# Patient Record
Sex: MALE | Race: BLACK OR AFRICAN AMERICAN | NOT HISPANIC OR LATINO | Employment: FULL TIME | URBAN - METROPOLITAN AREA
[De-identification: names, ages, dates, MRNs, and addresses within clinical notes are randomized per-mention and may not be internally consistent; named-entity substitution may affect disease eponyms.]

---

## 2017-01-13 ENCOUNTER — OFFICE VISIT (OUTPATIENT)
Dept: OPHTHALMOLOGY | Facility: CLINIC | Age: 52
End: 2017-01-13
Payer: COMMERCIAL

## 2017-01-13 DIAGNOSIS — I10 ESSENTIAL HYPERTENSION: ICD-10-CM

## 2017-01-13 DIAGNOSIS — H26.491 PCO (POSTERIOR CAPSULAR OPACIFICATION), RIGHT: ICD-10-CM

## 2017-01-13 DIAGNOSIS — Z96.1 PSEUDOPHAKIA: ICD-10-CM

## 2017-01-13 DIAGNOSIS — E11.9 DM TYPE 2 WITHOUT RETINOPATHY: ICD-10-CM

## 2017-01-13 DIAGNOSIS — H25.12 NUCLEAR SCLEROSIS, LEFT: Primary | ICD-10-CM

## 2017-01-13 DIAGNOSIS — H52.7 REFRACTIVE ERROR: ICD-10-CM

## 2017-01-13 PROCEDURE — 99999 PR PBB SHADOW E&M-EST. PATIENT-LVL III: CPT | Mod: PBBFAC,,, | Performed by: OPHTHALMOLOGY

## 2017-01-13 PROCEDURE — 92014 COMPRE OPH EXAM EST PT 1/>: CPT | Mod: S$GLB,,, | Performed by: OPHTHALMOLOGY

## 2017-01-13 NOTE — MR AVS SNAPSHOT
[facility]  [address]  [place] [address]  Phone: [phone]  Fax: [phone]                  David Corona Jr.   2017 3:00 PM   Office Visit    Description:  Male : 1965   Provider:  Festus Mata MD   Department:  [facility]           Reason for Visit     Diabetic Eye Exam           Diagnoses this Visit        Comments    Nuclear sclerosis, left    -  Primary     PCO (posterior capsular opacification), right         DM type 2 without retinopathy         Essential hypertension         Refractive error         Pseudophakia                To Do List           Future Appointments        Provider Department Dept Phone    2017 1:20 PM Riddhi Galindo MD [facility] Medicine [phone]      Goals (5 Years of Data)              11/18/16    7/15/16    3/24/16    HEMOGLOBIN A1C < 7.0   7.5  8.4  8.1    Related Problems    Uncontrolled type 2 diabetes with stage 3 chronic kidney disease GFR 30-59      Follow-Up and Disposition     Return in about 6 months (around 2017) for cataract check OD..      [facility]     [facility] On [facility] Nurse Care Line -  Assistance  Registered nurses in the [facility] Center provide clinical advisement, health education, appointment booking, and other advisory services.  Call for this free service at [phone].             Medications           Message regarding Medications     Verify the changes and/or additions to your medication regime listed below are the same as discussed with your clinician today.  If any of these changes or additions are incorrect, please notify your healthcare provider.             Verify that the below list of medications is an accurate representation of the medications you are currently taking.  If none reported, the list may be blank. If incorrect, please contact your healthcare provider. Carry this list with you in case of emergency.           Current Medications   "   amlodipine (NORVASC) 5 MG tablet Take 1 tablet (5 mg total) by mouth once daily.    aspirin 325 MG tablet Take by mouth. 1 Tablet Oral Every day    blood sugar diagnostic (CONTOUR TEST STRIPS) Strp USE THREE TIMES DAILY    colchicine 0.6 mg tablet Take 1 tablet (0.6 mg total) by mouth once daily.    diltiaZEM (CARDIZEM) 120 MG tablet Take 1 tablet (120 mg total) by mouth once daily.    furosemide (LASIX) 40 MG tablet Take 1 tablet (40 mg total) by mouth 2 (two) times daily.    glimepiride (AMARYL) 2 MG tablet Take 2 tablets (4 mg total) by mouth 2 (two) times daily.    insulin aspart (NOVOLOG FLEXPEN) 100 unit/mL InPn pen Inject 15 Units into the skin 3 (three) times daily with meals.    insulin glargine (LANTUS SOLOSTAR) 100 unit/mL (3 mL) InPn pen INJECT 26 UNITS UNDER THE SKIN NIGHTLY    lancets (MICROLET LANCET) Misc TEST THREE TIMES DAILY    lisinopril (PRINIVIL,ZESTRIL) 40 MG tablet Take 1 tablet (40 mg total) by mouth 2 (two) times daily.    omega-3 fatty acids 1,000 mg Cap Take by mouth. 2 Capsule Oral Every day    pen needle, diabetic (NOVOFINE 32) 32 gauge x 1/4" Ndle USE FOUR TIMES DAILY AS NEEDED    pioglitazone (ACTOS) 45 MG tablet Take 1 tablet (45 mg total) by mouth once daily.    pravastatin (PRAVACHOL) 20 MG tablet Take 1 tablet (20 mg total) by mouth once daily.           Clinical Reference Information           Allergies as of 1/13/2017     No Known Allergies      Immunizations Administered on Date of Encounter - 1/13/2017     None      "

## 2017-01-14 NOTE — PROGRESS NOTES
Subjective:       Patient ID: David Corona Jr. is a 51 y.o. male.    Chief Complaint: Diabetic Eye Exam    HPI  Review of Systems    Objective:      Physical Exam    Assessment:       1. Nuclear sclerosis, left    2. PCO (posterior capsular opacification), right    3. DM type 2 without retinopathy    4. Essential hypertension    5. Refractive error - Both Eyes    6. Pseudophakia - Right Eye        Plan:       Cataract OS- Not visually significant per Pt.     Early PCO OD- Not Visually Significant.  DM-No NPDR OU.  HTN-No retinopathy OU.  RE-Pt wants MRx.      Control DM & HTN.  Give MRx.  RTC 6 mos for cataract check.

## 2017-01-27 ENCOUNTER — OFFICE VISIT (OUTPATIENT)
Dept: FAMILY MEDICINE | Facility: CLINIC | Age: 52
End: 2017-01-27
Payer: COMMERCIAL

## 2017-01-27 VITALS
BODY MASS INDEX: 40.43 KG/M2 | HEART RATE: 100 BPM | WEIGHT: 315 LBS | HEIGHT: 74 IN | TEMPERATURE: 98 F | OXYGEN SATURATION: 96 % | SYSTOLIC BLOOD PRESSURE: 136 MMHG | DIASTOLIC BLOOD PRESSURE: 84 MMHG

## 2017-01-27 DIAGNOSIS — Z23 FLU VACCINE NEED: ICD-10-CM

## 2017-01-27 DIAGNOSIS — G47.33 OSA (OBSTRUCTIVE SLEEP APNEA): ICD-10-CM

## 2017-01-27 DIAGNOSIS — N25.81 SECONDARY HYPERPARATHYROIDISM OF RENAL ORIGIN: ICD-10-CM

## 2017-01-27 DIAGNOSIS — I10 ESSENTIAL HYPERTENSION: ICD-10-CM

## 2017-01-27 DIAGNOSIS — E66.01 MORBID OBESITY WITH BMI OF 50.0-59.9, ADULT: ICD-10-CM

## 2017-01-27 DIAGNOSIS — E78.5 HYPERLIPIDEMIA LDL GOAL <100: ICD-10-CM

## 2017-01-27 PROBLEM — E11.9 DM TYPE 2 WITHOUT RETINOPATHY: Status: RESOLVED | Noted: 2017-01-13 | Resolved: 2017-01-27

## 2017-01-27 PROCEDURE — 3045F PR MOST RECENT HEMOGLOBIN A1C LEVEL 7.0-9.0%: CPT | Mod: S$GLB,,, | Performed by: INTERNAL MEDICINE

## 2017-01-27 PROCEDURE — 1159F MED LIST DOCD IN RCRD: CPT | Mod: S$GLB,,, | Performed by: INTERNAL MEDICINE

## 2017-01-27 PROCEDURE — 99214 OFFICE O/P EST MOD 30 MIN: CPT | Mod: 25,S$GLB,, | Performed by: INTERNAL MEDICINE

## 2017-01-27 PROCEDURE — 3075F SYST BP GE 130 - 139MM HG: CPT | Mod: S$GLB,,, | Performed by: INTERNAL MEDICINE

## 2017-01-27 PROCEDURE — 90471 IMMUNIZATION ADMIN: CPT | Mod: S$GLB,,, | Performed by: INTERNAL MEDICINE

## 2017-01-27 PROCEDURE — 99999 PR PBB SHADOW E&M-EST. PATIENT-LVL III: CPT | Mod: PBBFAC,,, | Performed by: INTERNAL MEDICINE

## 2017-01-27 PROCEDURE — 3079F DIAST BP 80-89 MM HG: CPT | Mod: S$GLB,,, | Performed by: INTERNAL MEDICINE

## 2017-01-27 PROCEDURE — 3066F NEPHROPATHY DOC TX: CPT | Mod: S$GLB,,, | Performed by: INTERNAL MEDICINE

## 2017-01-27 PROCEDURE — 90686 IIV4 VACC NO PRSV 0.5 ML IM: CPT | Mod: S$GLB,,, | Performed by: INTERNAL MEDICINE

## 2017-01-27 RX ORDER — INSULIN LISPRO 100 [IU]/ML
INJECTION, SOLUTION INTRAVENOUS; SUBCUTANEOUS
Qty: 15 ML | Refills: 1 | Status: SHIPPED | OUTPATIENT
Start: 2017-01-27 | End: 2017-04-04 | Stop reason: SDUPTHER

## 2017-01-27 RX ORDER — INSULIN ASPART 100 [IU]/ML
15 INJECTION, SOLUTION INTRAVENOUS; SUBCUTANEOUS
Qty: 15 ML | Refills: 5 | Status: CANCELLED | OUTPATIENT
Start: 2017-01-27

## 2017-01-27 RX ORDER — DILTIAZEM HYDROCHLORIDE 120 MG/1
120 TABLET, FILM COATED ORAL DAILY
Qty: 30 TABLET | Refills: 5 | Status: SHIPPED | OUTPATIENT
Start: 2017-01-27 | End: 2017-08-18 | Stop reason: SDUPTHER

## 2017-01-27 NOTE — TELEPHONE ENCOUNTER
Novolog is not covered with insurance, will cover Humalog 100u/ml quick pen 15ml for 30 days supply @$20.00.

## 2017-01-27 NOTE — PROGRESS NOTES
HISTORY OF PRESENT ILLNESS:  David Corona Jr. is a 51 y.o. male who presents to the clinic today for Diabetes and Chronic Kidney Disease  .  The patient presents to clinic today for follow-up of his type 2 diabetes mellitus complicated by chronic kidney disease stage III, neuropathy, and retinopathy, as well as hypertension and hyperlipidemia.  He states his blood pressures are controlled at home.  He states his blood sugars at home range from 120-150.  He has fair dietary habits.  He stays active, but does not exercise regularly.  He denies any significant worsening of his lower extremity edema.  He denies cardiac chest pain or shortness of breath.  He reports fair compliance with his CPAP machine.  He is overdue for follow-up with nephrology.  He continues to struggle with trying to lose weight.      PAST MEDICAL HISTORY:  Past Medical History   Diagnosis Date    Arthritis     Cataract      left eye     CKD (chronic kidney disease) stage 3, GFR 30-59 ml/min     Colon polyp 11/2016    Dependent edema     Diverticulosis     Gout, arthritis     Hyperlipidemia LDL goal <100     Hypertension     Morbid obesity     TOSIN (obstructive sleep apnea)     Peripheral autonomic neuropathy in disorders classified elsewhere(337.1)     Proteinuria     Type II or unspecified type diabetes mellitus with neurological manifestations, uncontrolled     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        PAST SURGICAL HISTORY:  Past Surgical History   Procedure Laterality Date    Cataract surgery  2007     right eye    Colonoscopy N/A 11/22/2016     Procedure: COLONOSCOPY;  Surgeon: Abdi La MD;  Location: [facility] ([address] [place] [address]);  Service: Endoscopy;  Laterality: N/A;  2nd floor case/BMI 59.13/wants week of Thanksgiving       SOCIAL HISTORY:  Social History     Social History     Marital status:      Spouse name: N/A    Number of children: 3    Years of education: N/A     Occupational History     [employer]          Social History Main Topics    Smoking status: Never Smoker    Smokeless tobacco: Never Used    Alcohol use 0.0 oz/week     0 Standard drinks or equivalent per week      Comment: rarely    Drug use: No    Sexual activity: Not on file     Other Topics Concern    Not on file     Social History Narrative       FAMILY HISTORY:  Family History   Problem Relation Age of Onset    Breast cancer Mother     Diabetes Mother     Hypertension Mother     Stroke Mother     Prostate cancer Father     Diabetes Father     Hypertension Father     No Known Problems Sister     No Known Problems Brother     Breast cancer Maternal Grandmother     Amblyopia Neg Hx     Blindness Neg Hx     Cataracts Neg Hx     Glaucoma Neg Hx     Macular degeneration Neg Hx     Retinal detachment Neg Hx     Strabismus Neg Hx        ALLERGIES AND MEDICATIONS: updated and reviewed.  Review of patient's allergies indicates:  No Known Allergies  Medication List with Changes/Refills   Current Medications    AMLODIPINE (NORVASC) 5 MG TABLET    Take 1 tablet (5 mg total) by mouth once daily.    ASPIRIN 325 MG TABLET    Take by mouth. 1 Tablet Oral Every day    BLOOD SUGAR DIAGNOSTIC (CONTOUR TEST STRIPS) STRP    USE THREE TIMES DAILY    COLCHICINE 0.6 MG TABLET    Take 1 tablet (0.6 mg total) by mouth once daily.    FUROSEMIDE (LASIX) 40 MG TABLET    Take 1 tablet (40 mg total) by mouth 2 (two) times daily.    GLIMEPIRIDE (AMARYL) 2 MG TABLET    Take 2 tablets (4 mg total) by mouth 2 (two) times daily.    INSULIN ASPART (NOVOLOG FLEXPEN) 100 UNIT/ML INPN PEN    Inject 15 Units into the skin 3 (three) times daily with meals.    INSULIN GLARGINE (LANTUS SOLOSTAR) 100 UNIT/ML (3 ML) INPN PEN    INJECT 26 UNITS UNDER THE SKIN NIGHTLY    LANCETS  "(MICROLET LANCET) MISC    TEST THREE TIMES DAILY    LISINOPRIL (PRINIVIL,ZESTRIL) 40 MG TABLET    Take 1 tablet (40 mg total) by mouth 2 (two) times daily.    OMEGA-3 FATTY ACIDS 1,000 MG CAP    Take by mouth. 2 Capsule Oral Every day    PEN NEEDLE, DIABETIC (NOVOFINE 32) 32 GAUGE X 1/4" NDLE    USE FOUR TIMES DAILY AS NEEDED    PIOGLITAZONE (ACTOS) 45 MG TABLET    Take 1 tablet (45 mg total) by mouth once daily.    PRAVASTATIN (PRAVACHOL) 20 MG TABLET    Take 1 tablet (20 mg total) by mouth once daily.   Changed and/or Refilled Medications    Modified Medication Previous Medication    DILTIAZEM (CARDIZEM) 120 MG TABLET diltiaZEM (CARDIZEM) 120 MG tablet       Take 1 tablet (120 mg total) by mouth once daily.    Take 1 tablet (120 mg total) by mouth once daily.            REVIEW OF SYSTEMS:  General ROS: negative for - chills, fever or malaise  Psychological ROS: negative for - anxiety, depression or suicidal ideation  Ophthalmic ROS: negative for - blurry vision or eye pain  ENT ROS: negative for - epistaxis, oral lesions or sore throat  Allergy and Immunology ROS: negative for - hives  Hematological and Lymphatic ROS: negative for - swollen lymph nodes  Endocrine ROS: negative for - polydipsia/polyuria or temperature intolerance  Respiratory ROS: no cough, shortness of breath, or wheezing  Cardiovascular ROS: no chest pain or dyspnea on exertion  Gastrointestinal ROS: no abdominal pain, change in bowel habits, or black or bloody stools  Dermatological ROS: negative for mole changes  Musculoskeletal ROS: negative for - gait disturbance or joint swelling  Neurological ROS: no TIA or stroke symptoms  Genito-Urinary ROS: no dysuria, trouble voiding, or hematuria        PHYSICAL EXAM:  Vitals:    01/27/17 1416   BP: 136/84   Pulse: 100   Temp: 98.2 °F (36.8 °C)     Weight: (!) 205 kg (451 lb 15.1 oz)   Height: 6' 2" (188 cm)   Body mass index is 58.03 kg/(m^2).    Physical Examination: General appearance - alert, well " appearing, and in no distress and morbidly obese  Mental status - alert, oriented to person, place, and time, normal mood, behavior, speech, dress, motor activity, and thought processes  Eyes - pupils equal and reactive, extraocular eye movements intact, sclera anicteric  Mouth - mucous membranes moist, pharynx normal without lesions  Neck - supple, no significant adenopathy, carotids upstroke normal bilaterally, no bruits  Lymphatics - no palpable lymphadenopathy  Chest - clear to auscultation, no wheezes, rales or rhonchi, symmetric air entry  Heart - normal rate and regular rhythm, no gallops noted  Neurological - alert, oriented, normal speech, no focal findings or movement disorder noted, cranial nerves II through XII intact  Musculoskeletal - no muscular tenderness noted, Mild-Moderate osteoarthritic changes noted to both knee joints. No joint effusions noted.   Extremities - pedal edema 1 +  Skin - normal coloration and turgor, no rashes, no suspicious skin lesions noted       ASSESSMENT AND PLAN:  1. Uncontrolled type 2 diabetes mellitus with stage 3 chronic kidney disease, with long-term current use of insulin/2. Type 2 diabetes mellitus, uncontrolled, with neuropathy/3. Uncontrolled type 2 diabetes mellitus with right eye affected by mild nonproliferative retinopathy and macular edema, with long-term current use of insulin  The last time we checked his A1c it was less than 8.  We discussed diabetic diet and regular exercise.  Continue current regimen for now.  I will check blood work and address results accordingly.  He is up-to-date on his eye exam.  Neuropathy pain controlled. Stable kidney function. Observe. Patient counseled to avoid/minimize the use of anti-inflammatory  medication.   - Hemoglobin A1c; Future  - Microalbumin/creatinine urine ratio; Future    4. Essential hypertension  Discussed sodium restriction, maintaining ideal body weight and regular exercise program as physiologic means to  achieve blood pressure control. The patient will strive towards this. The current medical regimen is effective;  continue present plan and medications. Recommended patient to check home readings to monitor and see me for followup as scheduled or sooner as needed. Patient was educated that both decongestant and anti-inflammatory medication may raise blood pressure.   I do question the fact that he is on both low dose diltiazem and amlodipine.  I asked him to discuss this with his nephrologist once she schedules an appointment.  - diltiaZEM (CARDIZEM) 120 MG tablet; Take 1 tablet (120 mg total) by mouth once daily.  Dispense: 30 tablet; Refill: 5  - CBC auto differential; Future    5. Hyperlipidemia LDL goal <100  We discussed low fat diet and regular exercise.The current medical regimen is effective;  continue present plan and medications.    - Comprehensive metabolic panel; Future  - Lipid panel; Future    6. TOSIN (obstructive sleep apnea)  We discussed the potential ramifications of untreated sleep apnea, which could include daytime sleepiness, hypertension, heart disease including CHF, sudden death while sleeping and/or stroke. The patient was advised to abstain from driving should they feel sleepy or drowsy.  We discussed potential treatment options, which could include weight loss, body positioning, continuous positive airway pressure (CPAP), or referral for surgical consideration.      7. Secondary hyperparathyroidism of renal origin  Stable. Asymptomatic. Observe.     8. Morbid obesity with BMI of 50.0-59.9, adult  The patient is asked to make an attempt to improve diet and exercise patterns to aid in medical management of this problem.   He declines referral for bariatric surgery.    9. Flu vaccine need    - Influenza - Quadrivalent (3 years & older) (PF)          Return in about 6 months (around 7/27/2017), or if symptoms worsen or fail to improve, for annual exam. or sooner as needed.

## 2017-01-27 NOTE — MR AVS SNAPSHOT
[facility]  [address] [place] [address]  Phone: [phone]  Fax: [phone]                  David Corona    2017 2:20 PM   Office Visit    Description:  Male : 1965   Provider:  Riddhi Galindo MD   Department:  [facility] Medicine           Reason for Visit     Diabetes     Chronic Kidney Disease           Diagnoses this Visit        Comments    Uncontrolled type 2 diabetes mellitus with stage 3 chronic kidney disease, with long-term current use of insulin    -  Primary     Type 2 diabetes mellitus, uncontrolled, with neuropathy         Uncontrolled type 2 diabetes mellitus with 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1    Related Problems    Uncontrolled type 2 diabetes with stage 3 chronic kidney disease GFR 30-59      Follow-Up and Disposition     Return in about 6 months (around 2017), or if symptoms worsen or fail to improve, for annual exam.       These Medications        Disp Refills Start End    diltiaZEM (CARDIZEM) 120 MG tablet 30 tablet 5 2017     Take 1 tablet (120 mg total) by mouth once daily. - Oral    Pharmacy: [facility] [address] [place] [address] [place] - [address] [place] [address] [place] AT [facility] Ph #: [phone]         [facility] On Call     [facility] On Call Nurse Care Line -  Assistance  Registered nurses in the [facility] On Call  "Center provide clinical advisement, health education, appointment booking, and other advisory services.  Call for this free service at [phone].             Medications           Message regarding Medications     Verify the changes and/or additions to your medication regime listed below are the same as discussed with your clinician today.  If any of these changes or additions are incorrect, please notify your healthcare provider.             Verify that the below list of medications is an accurate representation of the medications you are currently taking.  If none reported, the list may be blank. If incorrect, please contact your healthcare provider. Carry this list with you in case of emergency.           Current Medications     amlodipine (NORVASC) 5 MG tablet Take 1 tablet (5 mg total) by mouth once daily.    aspirin 325 MG tablet Take by mouth. 1 Tablet Oral Every day    blood sugar diagnostic (CONTOUR TEST STRIPS) Strp USE THREE TIMES DAILY    colchicine 0.6 mg tablet Take 1 tablet (0.6 mg total) by mouth once daily.    diltiaZEM (CARDIZEM) 120 MG tablet Take 1 tablet (120 mg total) by mouth once daily.    furosemide (LASIX) 40 MG tablet Take 1 tablet (40 mg total) by mouth 2 (two) times daily.    glimepiride (AMARYL) 2 MG tablet Take 2 tablets (4 mg total) by mouth 2 (two) times daily.    insulin aspart (NOVOLOG FLEXPEN) 100 unit/mL InPn pen Inject 15 Units into the skin 3 (three) times daily with meals.    insulin glargine (LANTUS SOLOSTAR) 100 unit/mL (3 mL) InPn pen INJECT 26 UNITS UNDER THE SKIN NIGHTLY    lancets (MICROLET LANCET) Misc TEST THREE TIMES DAILY    lisinopril (PRINIVIL,ZESTRIL) 40 MG tablet Take 1 tablet (40 mg total) by mouth 2 (two) times daily.    omega-3 fatty acids 1,000 mg Cap Take by mouth. 2 Capsule Oral Every day    pen needle, diabetic (NOVOFINE 32) 32 gauge x 1/4" Ndle USE FOUR TIMES DAILY AS NEEDED    pioglitazone (ACTOS) 45 MG tablet Take 1 tablet (45 mg total) by mouth " "once daily.    pravastatin (PRAVACHOL) 20 MG tablet Take 1 tablet (20 mg total) by mouth once daily.           Clinical Reference Information           Vital Signs - Last Recorded  Most recent update: 1/27/2017  2:17 PM by Bradley Ryan MA    BP Pulse Temp Ht Wt SpO2    136/84 100 98.2 °F (36.8 °C) (Oral) 6' 2" (1.88 m) (!) 205 kg (451 lb 15.1 oz) 96%    BMI                58.03 kg/m2          Blood Pressure          Most Recent Value    BP  136/84      Allergies as of 1/27/2017     No Known Allergies      Immunizations Administered on Date of Encounter - 1/27/2017     Name Date Dose VIS Date Route    influenza - Quadrivalent - PF (ADULT)  Incomplete 0.5 mL 8/7/2015 Intramuscular      Orders Placed During Today's Visit      Normal Orders This Visit    Influenza - Quadrivalent (3 years & older) (PF)     Future Labs/Procedures Expected by Expires    CBC auto differential  1/27/2017 1/27/2018    Comprehensive metabolic panel  1/27/2017 1/27/2018    Hemoglobin A1c  1/27/2017 1/27/2018    Lipid panel  1/27/2017 1/27/2018    Microalbumin/creatinine urine ratio  1/27/2017 1/27/2018      "

## 2017-01-27 NOTE — PROGRESS NOTES
Influenza vaccine administered, madelin well. Instructed to wait 15mins for observation, no reaction noted @ time of discharge.

## 2017-01-31 ENCOUNTER — LAB VISIT (OUTPATIENT)
Dept: LAB | Facility: HOSPITAL | Age: 52
End: 2017-01-31
Attending: INTERNAL MEDICINE
Payer: COMMERCIAL

## 2017-01-31 DIAGNOSIS — E78.5 HYPERLIPIDEMIA LDL GOAL <100: ICD-10-CM

## 2017-01-31 DIAGNOSIS — I10 ESSENTIAL HYPERTENSION: ICD-10-CM

## 2017-01-31 LAB
ALBUMIN SERPL BCP-MCNC: 3.4 G/DL
ALP SERPL-CCNC: 97 U/L
ALT SERPL W/O P-5'-P-CCNC: 6 U/L
ANION GAP SERPL CALC-SCNC: 8 MMOL/L
AST SERPL-CCNC: 22 U/L
BASOPHILS # BLD AUTO: 0.03 K/UL
BASOPHILS NFR BLD: 0.5 %
BILIRUB SERPL-MCNC: 0.4 MG/DL
BUN SERPL-MCNC: 21 MG/DL
CALCIUM SERPL-MCNC: 9 MG/DL
CHLORIDE SERPL-SCNC: 105 MMOL/L
CHOLEST/HDLC SERPL: 2.9 {RATIO}
CO2 SERPL-SCNC: 26 MMOL/L
CREAT SERPL-MCNC: 1.7 MG/DL
DIFFERENTIAL METHOD: ABNORMAL
EOSINOPHIL # BLD AUTO: 0.3 K/UL
EOSINOPHIL NFR BLD: 4.1 %
ERYTHROCYTE [DISTWIDTH] IN BLOOD BY AUTOMATED COUNT: 15.7 %
EST. GFR  (AFRICAN AMERICAN): 53.2 ML/MIN/1.73 M^2
EST. GFR  (NON AFRICAN AMERICAN): 46 ML/MIN/1.73 M^2
GLUCOSE SERPL-MCNC: 132 MG/DL
HCT VFR BLD AUTO: 39.8 %
HDL/CHOLESTEROL RATIO: 34.1 %
HDLC SERPL-MCNC: 126 MG/DL
HDLC SERPL-MCNC: 43 MG/DL
HGB BLD-MCNC: 13 G/DL
LDLC SERPL CALC-MCNC: 72.2 MG/DL
LYMPHOCYTES # BLD AUTO: 1 K/UL
LYMPHOCYTES NFR BLD: 15.2 %
MCH RBC QN AUTO: 30.3 PG
MCHC RBC AUTO-ENTMCNC: 32.7 %
MCV RBC AUTO: 93 FL
MONOCYTES # BLD AUTO: 1.3 K/UL
MONOCYTES NFR BLD: 19.7 %
NEUTROPHILS # BLD AUTO: 4 K/UL
NEUTROPHILS NFR BLD: 60.3 %
NONHDLC SERPL-MCNC: 83 MG/DL
PLATELET # BLD AUTO: 191 K/UL
PMV BLD AUTO: 12 FL
POTASSIUM SERPL-SCNC: 4.6 MMOL/L
PROT SERPL-MCNC: 7.5 G/DL
RBC # BLD AUTO: 4.29 M/UL
SODIUM SERPL-SCNC: 139 MMOL/L
TRIGL SERPL-MCNC: 54 MG/DL
WBC # BLD AUTO: 6.66 K/UL

## 2017-01-31 PROCEDURE — 83036 HEMOGLOBIN GLYCOSYLATED A1C: CPT

## 2017-01-31 PROCEDURE — 80053 COMPREHEN METABOLIC PANEL: CPT

## 2017-01-31 PROCEDURE — 85025 COMPLETE CBC W/AUTO DIFF WBC: CPT

## 2017-01-31 PROCEDURE — 80061 LIPID PANEL: CPT

## 2017-01-31 PROCEDURE — 36415 COLL VENOUS BLD VENIPUNCTURE: CPT | Mod: PO

## 2017-02-01 LAB
ESTIMATED AVG GLUCOSE: 146 MG/DL
HBA1C MFR BLD HPLC: 6.7 %

## 2017-02-19 DIAGNOSIS — E78.5 HYPERLIPIDEMIA: ICD-10-CM

## 2017-02-19 DIAGNOSIS — I10 ESSENTIAL HYPERTENSION: ICD-10-CM

## 2017-02-20 RX ORDER — GLIMEPIRIDE 2 MG/1
TABLET ORAL
Qty: 120 TABLET | Refills: 5 | Status: SHIPPED | OUTPATIENT
Start: 2017-02-20 | End: 2017-08-29 | Stop reason: SDUPTHER

## 2017-02-20 RX ORDER — PRAVASTATIN SODIUM 20 MG/1
TABLET ORAL
Qty: 30 TABLET | Refills: 5 | Status: SHIPPED | OUTPATIENT
Start: 2017-02-20 | End: 2017-08-29 | Stop reason: SDUPTHER

## 2017-02-20 RX ORDER — LISINOPRIL 40 MG/1
TABLET ORAL
Qty: 60 TABLET | Refills: 5 | Status: SHIPPED | OUTPATIENT
Start: 2017-02-20 | End: 2017-08-29 | Stop reason: SDUPTHER

## 2017-02-20 RX ORDER — AMLODIPINE BESYLATE 5 MG/1
TABLET ORAL
Qty: 30 TABLET | Refills: 5 | Status: SHIPPED | OUTPATIENT
Start: 2017-02-20 | End: 2017-08-29 | Stop reason: SDUPTHER

## 2017-02-20 RX ORDER — PIOGLITAZONEHYDROCHLORIDE 45 MG/1
TABLET ORAL
Qty: 30 TABLET | Refills: 5 | Status: SHIPPED | OUTPATIENT
Start: 2017-02-20 | End: 2017-08-29 | Stop reason: SDUPTHER

## 2017-03-08 RX ORDER — FUROSEMIDE 40 MG/1
40 TABLET ORAL 2 TIMES DAILY
Qty: 60 TABLET | Refills: 2 | Status: SHIPPED | OUTPATIENT
Start: 2017-03-08 | End: 2017-06-13 | Stop reason: SDUPTHER

## 2017-03-28 ENCOUNTER — TELEPHONE (OUTPATIENT)
Dept: FAMILY MEDICINE | Facility: CLINIC | Age: 52
End: 2017-03-28

## 2017-03-28 NOTE — TELEPHONE ENCOUNTER
I called and spoke with the patient and he states that he slept on his shoulder wrong did not try anything over the counter and no he didn't injuire it

## 2017-03-28 NOTE — TELEPHONE ENCOUNTER
How long has he been having shoulder pain? Did he injure his shoulder? What has he tried so far for the pain?

## 2017-03-28 NOTE — TELEPHONE ENCOUNTER
Recommend patient ice the shoulder and take tylenol for the next few days. Recommend office visit if symptoms worsen or persist.

## 2017-03-28 NOTE — TELEPHONE ENCOUNTER
----- Message from Codi Marr sent at 3/28/2017 10:03 AM CDT -----  Contact: self   Pt having a lot of pain on the right side of his shoulder. He requests a prescription for muscle relaxer. Please contact the pt at [phone]. Thanks!     [facility] [address] [place] [address] [place] AT [facility]

## 2017-04-05 RX ORDER — INSULIN LISPRO 100 [IU]/ML
INJECTION, SOLUTION INTRAVENOUS; SUBCUTANEOUS
Qty: 15 ML | Refills: 0 | Status: SHIPPED | OUTPATIENT
Start: 2017-04-05 | End: 2017-05-09

## 2017-05-09 RX ORDER — INSULIN LISPRO 100 [IU]/ML
INJECTION, SOLUTION INTRAVENOUS; SUBCUTANEOUS
Qty: 15 ML | Refills: 0 | Status: SHIPPED | OUTPATIENT
Start: 2017-05-09 | End: 2017-06-12 | Stop reason: SDUPTHER

## 2017-05-19 DIAGNOSIS — M10.9 GOUT, UNSPECIFIED CAUSE, UNSPECIFIED CHRONICITY, UNSPECIFIED SITE: ICD-10-CM

## 2017-05-19 RX ORDER — COLCHICINE 0.6 MG/1
0.6 TABLET ORAL DAILY
Qty: 30 TABLET | Refills: 2 | Status: SHIPPED | OUTPATIENT
Start: 2017-05-19 | End: 2018-01-05 | Stop reason: SDUPTHER

## 2017-06-13 RX ORDER — INSULIN LISPRO 100 [IU]/ML
INJECTION, SOLUTION INTRAVENOUS; SUBCUTANEOUS
Qty: 15 ML | Refills: 0 | Status: SHIPPED | OUTPATIENT
Start: 2017-06-13 | End: 2017-07-14 | Stop reason: SDUPTHER

## 2017-06-13 RX ORDER — FUROSEMIDE 40 MG/1
TABLET ORAL
Qty: 60 TABLET | Refills: 0 | Status: SHIPPED | OUTPATIENT
Start: 2017-06-13 | End: 2017-07-17 | Stop reason: SDUPTHER

## 2017-07-14 RX ORDER — INSULIN LISPRO 100 [IU]/ML
INJECTION, SOLUTION INTRAVENOUS; SUBCUTANEOUS
Qty: 15 ML | Refills: 0 | Status: SHIPPED | OUTPATIENT
Start: 2017-07-14 | End: 2017-08-25 | Stop reason: SDUPTHER

## 2017-07-17 RX ORDER — FUROSEMIDE 40 MG/1
TABLET ORAL
Qty: 60 TABLET | Refills: 0 | Status: SHIPPED | OUTPATIENT
Start: 2017-07-17 | End: 2017-08-18 | Stop reason: SDUPTHER

## 2017-08-18 DIAGNOSIS — E11.9 TYPE 2 DIABETES MELLITUS WITHOUT COMPLICATION: ICD-10-CM

## 2017-08-18 DIAGNOSIS — I10 ESSENTIAL HYPERTENSION: ICD-10-CM

## 2017-08-18 DIAGNOSIS — E78.5 HYPERLIPIDEMIA LDL GOAL <100: ICD-10-CM

## 2017-08-18 RX ORDER — DILTIAZEM HYDROCHLORIDE 120 MG/1
TABLET, FILM COATED ORAL
Qty: 30 TABLET | Refills: 0 | Status: SHIPPED | OUTPATIENT
Start: 2017-08-18 | End: 2017-09-14 | Stop reason: SDUPTHER

## 2017-08-18 RX ORDER — FUROSEMIDE 40 MG/1
TABLET ORAL
Qty: 60 TABLET | Refills: 0 | Status: SHIPPED | OUTPATIENT
Start: 2017-08-18 | End: 2017-09-14 | Stop reason: SDUPTHER

## 2017-08-25 RX ORDER — INSULIN LISPRO 100 [IU]/ML
INJECTION, SOLUTION INTRAVENOUS; SUBCUTANEOUS
Qty: 15 ML | Refills: 0 | Status: SHIPPED | OUTPATIENT
Start: 2017-08-25 | End: 2017-09-28 | Stop reason: SDUPTHER

## 2017-08-29 DIAGNOSIS — I10 ESSENTIAL HYPERTENSION: ICD-10-CM

## 2017-08-29 DIAGNOSIS — E78.5 HYPERLIPIDEMIA, UNSPECIFIED HYPERLIPIDEMIA TYPE: ICD-10-CM

## 2017-08-29 RX ORDER — PIOGLITAZONEHYDROCHLORIDE 45 MG/1
TABLET ORAL
Qty: 30 TABLET | Refills: 1 | Status: SHIPPED | OUTPATIENT
Start: 2017-08-29 | End: 2017-11-01 | Stop reason: SDUPTHER

## 2017-08-29 RX ORDER — PRAVASTATIN SODIUM 20 MG/1
TABLET ORAL
Qty: 30 TABLET | Refills: 1 | Status: SHIPPED | OUTPATIENT
Start: 2017-08-29 | End: 2017-11-01 | Stop reason: SDUPTHER

## 2017-08-29 RX ORDER — LISINOPRIL 40 MG/1
TABLET ORAL
Qty: 60 TABLET | Refills: 1 | Status: SHIPPED | OUTPATIENT
Start: 2017-08-29 | End: 2017-11-01 | Stop reason: SDUPTHER

## 2017-08-29 RX ORDER — AMLODIPINE BESYLATE 5 MG/1
TABLET ORAL
Qty: 30 TABLET | Refills: 1 | Status: SHIPPED | OUTPATIENT
Start: 2017-08-29 | End: 2017-11-01 | Stop reason: SDUPTHER

## 2017-08-29 RX ORDER — GLIMEPIRIDE 2 MG/1
TABLET ORAL
Qty: 120 TABLET | Refills: 1 | Status: SHIPPED | OUTPATIENT
Start: 2017-08-29 | End: 2017-11-01 | Stop reason: SDUPTHER

## 2017-09-03 ENCOUNTER — HOSPITAL ENCOUNTER (EMERGENCY)
Facility: HOSPITAL | Age: 52
Discharge: HOME | End: 2017-09-04
Attending: EMERGENCY MEDICINE | Admitting: EMERGENCY MEDICINE
Payer: COMMERCIAL
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
## 2017-09-03 DIAGNOSIS — S01.111A EYELID LACERATION, RIGHT, INITIAL ENCOUNTER: Primary | ICD-10-CM

## 2017-09-03 PROCEDURE — 96374 THER/PROPH/DIAG INJ IV PUSH: CPT

## 2017-09-03 PROCEDURE — 99284 EMERGENCY DEPT VISIT MOD MDM: CPT | Mod: 25

## 2017-09-03 PROCEDURE — 99284 EMERGENCY DEPT VISIT MOD MDM: CPT | Mod: ,,, | Performed by: EMERGENCY MEDICINE

## 2017-09-04 VITALS
RESPIRATION RATE: 18 BRPM | TEMPERATURE: 99 F | OXYGEN SATURATION: 100 % | SYSTOLIC BLOOD PRESSURE: 146 MMHG | DIASTOLIC BLOOD PRESSURE: 78 MMHG | BODY MASS INDEX: 41.75 KG/M2 | WEIGHT: 315 LBS | HEART RATE: 75 BPM | HEIGHT: 73 IN

## 2017-09-04 PROCEDURE — 25000003 PHARM REV CODE 250: Performed by: EMERGENCY MEDICINE

## 2017-09-04 PROCEDURE — 90471 IMMUNIZATION ADMIN: CPT | Performed by: EMERGENCY MEDICINE

## 2017-09-04 PROCEDURE — 90715 TDAP VACCINE 7 YRS/> IM: CPT | Performed by: EMERGENCY MEDICINE

## 2017-09-04 PROCEDURE — 63600175 PHARM REV CODE 636 W HCPCS: Performed by: EMERGENCY MEDICINE

## 2017-09-04 PROCEDURE — 12051 INTMD RPR FACE/MM 2.5 CM/<: CPT

## 2017-09-04 RX ORDER — HYDROCODONE BITARTRATE AND ACETAMINOPHEN 5; 325 MG/1; MG/1
1 TABLET ORAL EVERY 6 HOURS PRN
Qty: 10 TABLET | Refills: 0 | Status: SHIPPED | OUTPATIENT
Start: 2017-09-04 | End: 2022-10-03

## 2017-09-04 RX ORDER — LIDOCAINE HYDROCHLORIDE AND EPINEPHRINE 15; 5 MG/ML; UG/ML
1 INJECTION, SOLUTION EPIDURAL ONCE
Status: DISCONTINUED | OUTPATIENT
Start: 2017-09-04 | End: 2017-09-04

## 2017-09-04 RX ORDER — TETRACAINE HYDROCHLORIDE 5 MG/ML
2 SOLUTION OPHTHALMIC
Status: COMPLETED | OUTPATIENT
Start: 2017-09-04 | End: 2017-09-04

## 2017-09-04 RX ORDER — BACITRACIN ZINC 500 UNIT/G
OINTMENT (GRAM) TOPICAL 2 TIMES DAILY
Qty: 28 G | Refills: 0 | Status: SHIPPED | OUTPATIENT
Start: 2017-09-04 | End: 2018-11-20

## 2017-09-04 RX ORDER — BACITRACIN ZINC 500 UNIT/G
OINTMENT IN PACKET (EA) TOPICAL
Status: COMPLETED | OUTPATIENT
Start: 2017-09-04 | End: 2017-09-04

## 2017-09-04 RX ORDER — LIDOCAINE HYDROCHLORIDE AND EPINEPHRINE 15; 5 MG/ML; UG/ML
1 INJECTION, SOLUTION EPIDURAL ONCE
Status: COMPLETED | OUTPATIENT
Start: 2017-09-04 | End: 2017-09-04

## 2017-09-04 RX ORDER — CEFAZOLIN SODIUM 1 G/3ML
1 INJECTION, POWDER, FOR SOLUTION INTRAMUSCULAR; INTRAVENOUS
Status: COMPLETED | OUTPATIENT
Start: 2017-09-04 | End: 2017-09-04

## 2017-09-04 RX ADMIN — CLOSTRIDIUM TETANI TOXOID ANTIGEN (FORMALDEHYDE INACTIVATED), CORYNEBACTERIUM DIPHTHERIAE TOXOID ANTIGEN (FORMALDEHYDE INACTIVATED), BORDETELLA PERTUSSIS TOXOID ANTIGEN (GLUTARALDEHYDE INACTIVATED), BORDETELLA PERTUSSIS FILAMENTOUS HEMAGGLUTININ ANTIGEN (FORMALDEHYDE INACTIVATED), BORDETELLA PERTUSSIS PERTACTIN ANTIGEN, AND BORDETELLA PERTUSSIS FIMBRIAE 2/3 ANTIGEN 0.5 ML: 5; 2; 2.5; 5; 3; 5 INJECTION, SUSPENSION INTRAMUSCULAR at 03:09

## 2017-09-04 RX ADMIN — TETRACAINE HYDROCHLORIDE 2 DROP: 5 SOLUTION OPHTHALMIC at 02:09

## 2017-09-04 RX ADMIN — CEFAZOLIN 1 G: 330 INJECTION, POWDER, FOR SOLUTION INTRAMUSCULAR; INTRAVENOUS at 03:09

## 2017-09-04 RX ADMIN — LIDOCAINE HYDROCHLORIDE AND EPINEPHRINE 1 ML: 15; 5 INJECTION, SOLUTION EPIDURAL at 05:09

## 2017-09-04 RX ADMIN — BACITRACIN ZINC: 500 OINTMENT TOPICAL at 08:09

## 2017-09-04 NOTE — CONSULTS
[facility]  Ophthalmology  Consult Note    Patient Name: David Corona Jr.  MRN: [id number]  Admission Date: 9/3/2017  Hospital Length of Stay: 0 days  Attending Provider: Jg Adams MD   Primary Care Physician: Riddhi Galindo MD  Principal Problem:<principal problem not specified>    Inpatient consult to Ophthalmology  Consult performed by: DORA BLACKWELL  Consult ordered by: JG ADAMS        Subjective:     Chief Complaint: eyelid laceration    HPI: 53 yo M with h/o T2DM, HTN, CKD, gout who presents with a laceration to his right upper eyelid. Earlier today, the patient states he fell forward while in the shower and glanced his eyelid on a glass door. He did not hit his head or face and he denies LOC. He denies any vision changes. He denies pain elsewhere.    No current facility-administered medications on file prior to encounter.      Current Outpatient Prescriptions on File Prior to Encounter   Medication Sig    amlodipine (NORVASC) 5 MG tablet TAKE 1 TABLET(5 MG) BY MOUTH EVERY DAY    aspirin 325 MG tablet Take by mouth. 1 Tablet Oral Every day    blood sugar diagnostic (CONTOUR TEST STRIPS) Strp USE THREE TIMES DAILY    colchicine 0.6 mg tablet Take 1 tablet (0.6 mg total) by mouth once daily.    diltiaZEM (CARDIZEM) 120 MG tablet TAKE 1 TABLET(120 MG) BY MOUTH EVERY DAY    furosemide (LASIX) 40 MG tablet TAKE 1 TABLET(40 MG) BY MOUTH TWICE DAILY    glimepiride (AMARYL) 2 MG tablet TAKE 2 TABLETS(4 MG) BY MOUTH TWICE DAILY    HUMALOG KWIKPEN 100 unit/mL InPn pen ADMINISTER 15 UNITS UNDER THE SKIN THREE TIMES DAILY WITH MEALS    insulin glargine (LANTUS SOLOSTAR) 100 unit/mL (3 mL) InPn pen INJECT 26 UNITS UNDER THE SKIN NIGHTLY (Patient taking differently: INJECT 20 UNITS UNDER THE SKIN NIGHTLY)    lancets (MICROLET LANCET) Misc TEST THREE TIMES DAILY    lisinopril (PRINIVIL,ZESTRIL) 40 MG tablet TAKE 1 TABLET(40 MG) BY MOUTH TWICE DAILY    omega-3  "fatty acids 1,000 mg Cap Take by mouth. 2 Capsule Oral Every day    pen needle, diabetic (NOVOFINE 32) 32 gauge x 1/4" Ndle USE FOUR TIMES DAILY AS NEEDED    pioglitazone (ACTOS) 45 MG tablet TAKE 1 TABLET(45 MG) BY MOUTH EVERY DAY    pravastatin (PRAVACHOL) 20 MG tablet TAKE 1 TABLET(20 MG) BY MOUTH EVERY DAY       Past Medical History:   Diagnosis Date    Arthritis     Aut neuropthy in other disease     Cataract     left eye     CKD (chronic kidney disease) stage 3, GFR 30-59 ml/min     Colon polyp 11/2016    Dependent edema     Diverticulosis     Gout, arthritis     Hyperlipidemia LDL goal <100     Hypertension     Morbid obesity     TOSIN (obstructive sleep apnea)     Proteinuria     Type II or unspecified type diabetes mellitus with neurological manifestations, uncontrolled     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        Past Surgical History:   Procedure Laterality Date    cataract surgery  2007    right eye    COLONOSCOPY N/A 11/22/2016    Procedure: COLONOSCOPY;  Surgeon: Abdi La MD;  Location: [facility] ([address] [place] [address]);  Service: Endoscopy;  Laterality: N/A;  2nd floor case/BMI 59.13/wants week of Thanksgiving       Review of patient's allergies indicates:  No Known Allergies    Family History     Problem Relation (Age of Onset)    Breast cancer Mother, Maternal Grandmother    Diabetes Mother, Father    Hypertension Mother, Father    No Known Problems Sister, Brother    Prostate cancer Father    Stroke Mother        Social History Main Topics    Smoking status: Never Smoker    Smokeless tobacco: Never Used    Alcohol use 0.0 oz/week      Comment: rarely    Drug use: No    Sexual activity: Not on file     Review of Systems  Objective:     Vital Signs (Most Recent):  Temp: 98.6 °F (37 °C) (09/03/17 2051)  Pulse: 75 (09/04/17 0801)  Resp: 18 " (09/04/17 0801)  BP: (!) 146/78 (09/04/17 0801)  SpO2: 100 % (09/04/17 0801)  O2 Device (Oxygen Therapy): room air (09/03/17 2051) Vital Signs (24h Range):  Temp:  [98.6 °F (37 °C)] 98.6 °F (37 °C)  Pulse:  [75-93] 75  Resp:  [18] 18  SpO2:  [96 %-100 %] 100 %  BP: (146-148)/(72-78) 146/78     Weight: (!) 204.1 kg (450 lb) (09/03/17 2051)  Body mass index is 59.37 kg/m².    Base Eye Exam     Visual Acuity (Snellen - Linear)       Right Left    Dist sc 20/30 20/40 +2    Dist ph sc 20/25 20/25          Tonometry (Applanation, 8:26 AM)       Right Left    Pressure 14 15          Pupils       Pupils Dark Light Shape React APD    Right PERRL 4 2 Round Brisk None    Left PERRL 4 2 Round Brisk None          Visual Fields       Right Left     Full Full          Extraocular Movement       Right Left     Full, Ortho Full, Ortho          Neuro/Psych     Oriented x3:  Yes    Mood/Affect:  Normal            Slit Lamp and Fundus Exam     External Exam       Right Left    External upper eylid edema and mild ecchymosis. Normal          Slit Lamp Exam       Right Left    Lids/Lashes 8 mm full thickness lacerationto upper lid Normal    Conjunctiva/Sclera Trace Injection White and quiet    Cornea Clear Clear    Anterior Chamber Deep and quiet Deep and quiet    Iris Round and reactive Round and reactive    Lens Posterior chamber intraocular lens Clear                  Assessment/Plan:     51 yo M with full thickness laceration of the upper eyelid.  No ocular injury appreciated.  Good vision.    Laceration repaired in the ED.  Procedure explained to pt and the RBA discussed.  The wound was copiously irrigated with sterile NS then the would was prepped and draped in sterile fashion  The wound was anesthetized with 1.5% lidocaine with epinephrine.  The lid margin was approximated with a 6-0 silk suture left untied.  Subcutaneous closure was achieved with 8-0 vicryl using interrupted sutures x3.  The skin was closed using 6-0 plain gut.   The silk suture of the lid margin was tied and a second silk suture was placed anteriorly.  The tag ends of the lid margin sutures were left long and fixed to upper lid using additional knot on the most inferior plain gut suture.  Good approximation and hemostasis was achieved.  Pt tolerated proc well.    Recommend bacitracin to wound BID.  Pt rec'd 1g keflex upon presentation.  Wound clean, no need for PO ABX.  Pt to follow up in 1-2 weeks in clinic for suture removal and wound check.  Pt given return precautions for wound infections.  Pt given instructions on wound care, all questions answered to pts satisfaction.    Thank you for your consult. I will follow-up with patient. Please contact us if you have any additional questions.    Giorgi Pina MD  Ophthalmology  [facility]    I agree with the above exam & plan.

## 2017-09-04 NOTE — ED TRIAGE NOTES
David YON Corona Jr., a 52 y.o. male presents to the ED complaining of a laceration to the right eyelid after playing with his grandson in the bathtub. Bleeding controlled      Chief Complaint   Patient presents with    Laceration     presents to ed s/p  trip and fall with lac to right eye -  no bleeding noted no vision change.   denies loc .     Review of patient's allergies indicates:  No Known Allergies  Past Medical History:   Diagnosis Date    Arthritis     Cataract     left eye     CKD (chronic kidney disease) stage 3, GFR 30-59 ml/min     Colon polyp 11/2016    Dependent edema     Diverticulosis     Gout, arthritis     Hyperlipidemia LDL goal <100     Hypertension     Morbid obesity     TOSIN (obstructive sleep apnea)     Peripheral autonomic neuropathy in disorders classified elsewhere(337.1)     Proteinuria     Type II or unspecified type diabetes mellitus with neurological manifestations, uncontrolled     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

## 2017-09-04 NOTE — ED NOTES
Patient identifiers verified and correct for David Corona Jr..    LOC: The patient is awake, alert and aware of environment with an appropriate affect, the patient is oriented x 3 and speaking appropriately.  APPEARANCE: Patient resting comfortably and in no acute distress, patient is clean and well groomed, patient's clothing is properly fastened.  SKIN: The skin is warm and dry, color consistent with ethnicity, patient has normal skin turgor and moist mucus membranes, skin intact, no breakdown or bruising noted. Pt has a small laceration to the right eyelid. Bleeding controlled  MUSCULOSKELETAL: Patient moving all extremities spontaneously, no obvious swelling or deformities noted.  RESPIRATORY: Airway is open and patent, respirations are spontaneous, patient has a normal effort and rate, no accessory muscle use noted, bilateral breath sounds even and clear.  CARDIAC: Patient has a normal rate and regular rhythm, no periphreal edema noted, capillary refill < 3 seconds.  ABDOMEN: Soft and non tender to palpation, no distention noted, normoactive bowel sounds present in all four quadrants.  NEUROLOGIC: Pupils equal bilaterally, eyes open spontaneously, behavior appropriate to situation, follows commands, facial expression symmetrical, bilateral hand grasp equal and even, purposeful motor response noted, normal sensation in all extremities when touched with a finger.

## 2017-09-04 NOTE — ED PROVIDER NOTES
Encounter Date: 9/3/2017       History     Chief Complaint   Patient presents with    Laceration     presents to ed s/p  trip and fall with lac to right eye -  no bleeding noted no vision change.   denies loc .     Mr. David Corona Jr. is a 52 year old male with a h/o T2DM, HTN, CKD, gout who presents with a laceration to his right eyelid. Earlier today, the patient states he fell forward while in the shower and hit his eyelid on a glass door. He did not hit his head and he denies LOC. He denies any vision changes. He denies pain elsewhere.          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      Past Surgical History:   Procedure Laterality Date    cataract surgery  2007    right eye    COLONOSCOPY N/A 11/22/2016    Procedure: COLONOSCOPY;  Surgeon: Abdi La MD;  Location: [facility] ([address] [place] [address]);  Service: Endoscopy;  Laterality: N/A;  2nd floor case/BMI 59.13/wants week of Thanksgiving     Family History   Problem Relation Age of Onset    Breast cancer Mother     Diabetes Mother     Hypertension Mother     Stroke Mother     Prostate cancer Father     Diabetes Father     Hypertension Father     No Known Problems Sister     No Known Problems Brother     Breast cancer Maternal Grandmother     Amblyopia Neg Hx      Blindness Neg Hx     Cataracts Neg Hx     Glaucoma Neg Hx     Macular degeneration Neg Hx     Retinal detachment Neg Hx     Strabismus Neg Hx      Social History   Substance Use Topics    Smoking status: Never Smoker    Smokeless tobacco: Never Used    Alcohol use 0.0 oz/week      Comment: rarely     Review of Systems   Constitutional: Negative for chills and fever.   HENT: Negative for congestion and sore throat.    Eyes: Positive for redness. Negative for photophobia and visual disturbance.   Respiratory: Negative for shortness of breath.    Cardiovascular: Negative for chest pain and palpitations.   Gastrointestinal: Negative for abdominal pain, nausea and vomiting.   Genitourinary: Negative for dysuria and hematuria.   Musculoskeletal: Negative for back pain and neck stiffness.   Skin: Negative for rash.   Neurological: Negative for dizziness, weakness and light-headedness.   Hematological: Does not bruise/bleed easily.       Physical Exam     Initial Vitals [09/03/17 2051]   BP Pulse Resp Temp SpO2   (!) 148/72 93 18 98.6 °F (37 °C) 96 %      MAP       97.33         Physical Exam    Constitutional: He appears well-developed and well-nourished. He is not diaphoretic. No distress.   HENT:   Head: Normocephalic and atraumatic.   Mouth/Throat: Oropharynx is clear and moist. No oropharyngeal exudate.   Eyes: Pupils are equal, round, and reactive to light. Right conjunctiva is injected. Right eye exhibits normal extraocular motion. Left eye exhibits normal extraocular motion. Right pupil is round and reactive. Left pupil is round and reactive.       Neck: Normal range of motion. Neck supple.   Cardiovascular: Normal rate, regular rhythm and normal heart sounds. Exam reveals no gallop and no friction rub.    No murmur heard.  Pulmonary/Chest: Breath sounds normal. No respiratory distress. He has no wheezes. He has no rales.   Abdominal: Soft. Bowel sounds are normal. He exhibits no distension. There is no  tenderness.   Lymphadenopathy:     He has no cervical adenopathy.   Neurological: He is alert and oriented to person, place, and time. No cranial nerve deficit.   Skin: Skin is warm and dry. No erythema.   Psychiatric: He has a normal mood and affect. Thought content normal.         ED Course   Procedures  Labs Reviewed - No data to display          Medical Decision Making:   Initial Assessment:   52 year old male with laceration to right eyelid. No other injuries. Conjunctiva is injected, but no visual changes. PERRL. Will consult ophthamology for suture repair.       APC / Resident Notes:   7:00AM  Patient signed out to me by the resident pending opthalmology repair of laceration.    Opthalmology repaired laceration at beside. Recommend bacitracin ointment BID. Follow up in clinic in 7-10 days for suture removal. No empiric abx needed.    Patient was observed for at least 30 minutes after suture removal with no developing eyelid hematoma. Stable for discharge.    He was discharged with prescriptions for Norco and bacitracin ointment.  He will follow up with ophthalmology.  All of the patient's questions were answered.  I reviewed the patient's chart and discussed the case with my supervising physician.            Attending Attestation:   Physician Attestation Statement for Resident:  As the supervising MD   Physician Attestation Statement: I have personally seen and examined this patient.   I agree with the above history. -:   As the supervising MD I agree with the above PE.    As the supervising MD I agree with the above treatment, course, plan, and disposition.    Physician Attestation Statement for NP/PA:   I have conducted a face to face encounter with this patient in addition to the NP/PA, due to Medical Complexity          Attending ED Notes:   Emergent evaluation of complicated right eyelid laceration.  Occurred just prior to arrival.  There does not appear to be any globe trauma.  Patient was evaluated by  ophthalmology and lid laceration was repaired.  Tetanus was updated.  He was given a dose of Ancef in the emergency department.          ED Course      Clinical Impression:   The encounter diagnosis was Eyelid laceration, right, initial encounter.    Disposition:   Disposition: Discharged  Condition: Stable                        Nathalie Guardado PA-C  09/04/17 0800       Jasmina Adams MD  09/05/17 0111       Jasmina Adams MD  09/13/17 2175

## 2017-09-14 ENCOUNTER — OFFICE VISIT (OUTPATIENT)
Dept: OPHTHALMOLOGY | Facility: CLINIC | Age: 52
End: 2017-09-14
Payer: COMMERCIAL

## 2017-09-14 DIAGNOSIS — S01.111A EYELID LACERATION, RIGHT, INITIAL ENCOUNTER: Primary | ICD-10-CM

## 2017-09-14 DIAGNOSIS — I10 ESSENTIAL HYPERTENSION: ICD-10-CM

## 2017-09-14 DIAGNOSIS — Z96.1 PSEUDOPHAKIA: ICD-10-CM

## 2017-09-14 DIAGNOSIS — H26.491 PCO (POSTERIOR CAPSULAR OPACIFICATION), RIGHT: ICD-10-CM

## 2017-09-14 PROCEDURE — 92012 INTRM OPH EXAM EST PATIENT: CPT | Mod: S$GLB,,, | Performed by: OPHTHALMOLOGY

## 2017-09-14 PROCEDURE — 99999 PR PBB SHADOW E&M-EST. PATIENT-LVL II: CPT | Mod: PBBFAC,,, | Performed by: OPHTHALMOLOGY

## 2017-09-14 RX ORDER — DILTIAZEM HYDROCHLORIDE 120 MG/1
TABLET, FILM COATED ORAL
Qty: 30 TABLET | Refills: 0 | Status: SHIPPED | OUTPATIENT
Start: 2017-09-14 | End: 2017-10-16 | Stop reason: SDUPTHER

## 2017-09-14 NOTE — PROGRESS NOTES
Subjective:       Patient ID: David Corona Jr. is a 52 y.o. male.    Chief Complaint: Concerns About Ocular Health    HPI  Review of Systems    Objective:      Physical Exam    Assessment:       1. Eyelid laceration, right, initial encounter    2. PCO (posterior capsular opacification), right    3. Pseudophakia - Right Eye        Plan:       RUL laceration s/p repair in ED on 9/4/17-Pt here for suture removal-Doing well.  Early PCO OD- Not Visually Significant.      Sutures removed from RUL x 2 with scissors & forceps.  RTC in 4 mos for DFE.

## 2017-09-15 RX ORDER — FUROSEMIDE 40 MG/1
TABLET ORAL
Qty: 60 TABLET | Refills: 0 | Status: SHIPPED | OUTPATIENT
Start: 2017-09-15 | End: 2017-10-16 | Stop reason: SDUPTHER

## 2017-09-28 RX ORDER — INSULIN LISPRO 100 [IU]/ML
INJECTION, SOLUTION INTRAVENOUS; SUBCUTANEOUS
Qty: 15 ML | Refills: 0 | Status: SHIPPED | OUTPATIENT
Start: 2017-09-28 | End: 2017-11-04 | Stop reason: SDUPTHER

## 2017-09-28 RX ORDER — INSULIN GLARGINE 100 [IU]/ML
INJECTION, SOLUTION SUBCUTANEOUS
Qty: 15 ML | Refills: 5 | Status: SHIPPED | OUTPATIENT
Start: 2017-09-28 | End: 2018-05-28 | Stop reason: SDUPTHER

## 2017-09-28 RX ORDER — INSULIN LISPRO 100 [IU]/ML
INJECTION, SOLUTION INTRAVENOUS; SUBCUTANEOUS
Qty: 15 ML | Refills: 0 | Status: CANCELLED | OUTPATIENT
Start: 2017-09-28

## 2017-10-16 DIAGNOSIS — I10 ESSENTIAL HYPERTENSION: ICD-10-CM

## 2017-10-17 RX ORDER — FUROSEMIDE 40 MG/1
TABLET ORAL
Qty: 60 TABLET | Refills: 0 | Status: SHIPPED | OUTPATIENT
Start: 2017-10-17 | End: 2017-11-17 | Stop reason: SDUPTHER

## 2017-10-17 RX ORDER — DILTIAZEM HYDROCHLORIDE 120 MG/1
TABLET, FILM COATED ORAL
Qty: 30 TABLET | Refills: 0 | Status: SHIPPED | OUTPATIENT
Start: 2017-10-17 | End: 2018-01-05 | Stop reason: SDUPTHER

## 2017-10-17 RX ORDER — BACITRACIN 500 [USP'U]/G
OINTMENT TOPICAL
Refills: 0 | COMMUNITY
Start: 2017-09-04 | End: 2017-11-01 | Stop reason: SDUPTHER

## 2017-10-20 ENCOUNTER — TELEPHONE (OUTPATIENT)
Dept: FAMILY MEDICINE | Facility: CLINIC | Age: 52
End: 2017-10-20

## 2017-10-20 NOTE — TELEPHONE ENCOUNTER
Spoke w/patient, requesting pain medication for a right ankle sprained which occurred yesterday. Patient declined OV, states e will go to the ED.

## 2017-10-20 NOTE — TELEPHONE ENCOUNTER
----- Message from Manuel Nash sent at 10/20/2017  8:31 AM CDT -----  Contact: Self/[phone]  Patient called stating that he sprained his ankle. He's requesting pain medicine. Thank you.

## 2017-10-28 ENCOUNTER — LAB VISIT (OUTPATIENT)
Dept: LAB | Facility: HOSPITAL | Age: 52
End: 2017-10-28
Attending: INTERNAL MEDICINE
Payer: COMMERCIAL

## 2017-10-28 DIAGNOSIS — I10 ESSENTIAL HYPERTENSION: ICD-10-CM

## 2017-10-28 LAB
ALBUMIN SERPL BCP-MCNC: 3.2 G/DL
ALP SERPL-CCNC: 110 U/L
ALT SERPL W/O P-5'-P-CCNC: 8 U/L
ANION GAP SERPL CALC-SCNC: 10 MMOL/L
AST SERPL-CCNC: 21 U/L
BILIRUB SERPL-MCNC: 0.3 MG/DL
BUN SERPL-MCNC: 35 MG/DL
CALCIUM SERPL-MCNC: 9.4 MG/DL
CHLORIDE SERPL-SCNC: 105 MMOL/L
CO2 SERPL-SCNC: 28 MMOL/L
CREAT SERPL-MCNC: 2.1 MG/DL
EST. GFR  (AFRICAN AMERICAN): 40.9 ML/MIN/1.73 M^2
EST. GFR  (NON AFRICAN AMERICAN): 35.4 ML/MIN/1.73 M^2
ESTIMATED AVG GLUCOSE: 140 MG/DL
GLUCOSE SERPL-MCNC: 151 MG/DL
HBA1C MFR BLD HPLC: 6.5 %
POTASSIUM SERPL-SCNC: 4.8 MMOL/L
PROT SERPL-MCNC: 8 G/DL
SODIUM SERPL-SCNC: 143 MMOL/L

## 2017-10-28 PROCEDURE — 83036 HEMOGLOBIN GLYCOSYLATED A1C: CPT

## 2017-10-28 PROCEDURE — 80053 COMPREHEN METABOLIC PANEL: CPT

## 2017-10-28 PROCEDURE — 36415 COLL VENOUS BLD VENIPUNCTURE: CPT | Mod: PO

## 2017-10-31 PROBLEM — S01.111A EYELID LACERATION, RIGHT: Status: RESOLVED | Noted: 2017-09-14 | Resolved: 2017-10-31

## 2017-11-01 ENCOUNTER — OFFICE VISIT (OUTPATIENT)
Dept: FAMILY MEDICINE | Facility: CLINIC | Age: 52
End: 2017-11-01
Payer: COMMERCIAL

## 2017-11-01 VITALS
SYSTOLIC BLOOD PRESSURE: 120 MMHG | DIASTOLIC BLOOD PRESSURE: 86 MMHG | HEART RATE: 80 BPM | HEIGHT: 73 IN | OXYGEN SATURATION: 96 % | TEMPERATURE: 99 F | BODY MASS INDEX: 41.75 KG/M2 | WEIGHT: 315 LBS

## 2017-11-01 DIAGNOSIS — N18.30 CONTROLLED TYPE 2 DIABETES MELLITUS WITH STAGE 3 CHRONIC KIDNEY DISEASE, WITH LONG-TERM CURRENT USE OF INSULIN: ICD-10-CM

## 2017-11-01 DIAGNOSIS — Z79.4 CONTROLLED TYPE 2 DIABETES MELLITUS WITH STAGE 3 CHRONIC KIDNEY DISEASE, WITH LONG-TERM CURRENT USE OF INSULIN: ICD-10-CM

## 2017-11-01 DIAGNOSIS — E78.5 HYPERLIPIDEMIA LDL GOAL <100: ICD-10-CM

## 2017-11-01 DIAGNOSIS — Z00.00 ROUTINE MEDICAL EXAM: Primary | ICD-10-CM

## 2017-11-01 DIAGNOSIS — E11.40 CONTROLLED TYPE 2 DIABETES WITH NEUROPATHY: ICD-10-CM

## 2017-11-01 DIAGNOSIS — G47.33 OSA (OBSTRUCTIVE SLEEP APNEA): ICD-10-CM

## 2017-11-01 DIAGNOSIS — I10 ESSENTIAL HYPERTENSION: ICD-10-CM

## 2017-11-01 DIAGNOSIS — N25.81 SECONDARY HYPERPARATHYROIDISM OF RENAL ORIGIN: ICD-10-CM

## 2017-11-01 DIAGNOSIS — E66.01 MORBID OBESITY WITH BMI OF 50.0-59.9, ADULT: ICD-10-CM

## 2017-11-01 DIAGNOSIS — E11.3219: ICD-10-CM

## 2017-11-01 DIAGNOSIS — E11.22 CONTROLLED TYPE 2 DIABETES MELLITUS WITH STAGE 3 CHRONIC KIDNEY DISEASE, WITH LONG-TERM CURRENT USE OF INSULIN: ICD-10-CM

## 2017-11-01 DIAGNOSIS — R60.9 DEPENDENT EDEMA: ICD-10-CM

## 2017-11-01 DIAGNOSIS — E78.5 HYPERLIPIDEMIA, UNSPECIFIED HYPERLIPIDEMIA TYPE: ICD-10-CM

## 2017-11-01 PROCEDURE — 99999 PR PBB SHADOW E&M-EST. PATIENT-LVL IV: CPT | Mod: PBBFAC,,, | Performed by: INTERNAL MEDICINE

## 2017-11-01 PROCEDURE — 99396 PREV VISIT EST AGE 40-64: CPT | Mod: S$GLB,,, | Performed by: INTERNAL MEDICINE

## 2017-11-01 RX ORDER — AMLODIPINE BESYLATE 5 MG/1
TABLET ORAL
Qty: 30 TABLET | Refills: 5 | Status: SHIPPED | OUTPATIENT
Start: 2017-11-01 | End: 2018-05-16 | Stop reason: SDUPTHER

## 2017-11-01 RX ORDER — IBUPROFEN 800 MG/1
TABLET ORAL
Refills: 0 | COMMUNITY
Start: 2017-10-21 | End: 2017-11-01

## 2017-11-01 NOTE — PROGRESS NOTES
HISTORY OF PRESENT ILLNESS:  David Corona Jr. is a 52 y.o. male who presents to the clinic today for a routine medical physical exam. His last physical exam was approximately 1 years(s) ago.      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        PAST SURGICAL HISTORY:  Past Surgical History:   Procedure Laterality Date    cataract surgery  2007    right eye    COLONOSCOPY N/A 11/22/2016    Procedure: COLONOSCOPY;  Surgeon: Abdi La MD;  Location: [address]);  Service: Endoscopy;  Laterality: N/A;  2nd floor case/BMI 59.13/wants week of Thanksgiving       SOCIAL HISTORY:  Social History     Social History    Marital status:      Spouse name: N/A    Number of children: 3    Years of education: N/A     Occupational History     [employer]          Social History Main Topics    Smoking status: Never Smoker    Smokeless tobacco: Never Used    Alcohol use 0.0 oz/week      Comment: rarely    Drug use: No    Sexual activity: Not on file     Other Topics Concern    Not on file     Social History Narrative    No narrative on file       FAMILY HISTORY:  Family History   Problem Relation Age of Onset    Breast cancer Mother     Diabetes Mother   "   Hypertension Mother     Stroke Mother     Prostate cancer Father     Diabetes Father     Hypertension Father     No Known Problems Sister     No Known Problems Brother     Breast cancer Maternal Grandmother     Amblyopia Neg Hx     Blindness Neg Hx     Cataracts Neg Hx     Glaucoma Neg Hx     Macular degeneration Neg Hx     Retinal detachment Neg Hx     Strabismus Neg Hx        ALLERGIES AND MEDICATIONS: updated and reviewed.  Review of patient's allergies indicates:  No Known Allergies  Medication List with Changes/Refills   Current Medications    AMLODIPINE (NORVASC) 5 MG TABLET    TAKE 1 TABLET(5 MG) BY MOUTH EVERY DAY    ASPIRIN 325 MG TABLET    Take by mouth. 1 Tablet Oral Every day    BACITRACIN 500 UNIT/GRAM OINT    Apply topically 2 (two) times daily.    BLOOD SUGAR DIAGNOSTIC (CONTOUR TEST STRIPS) STRP    USE THREE TIMES DAILY    COLCHICINE 0.6 MG TABLET    Take 1 tablet (0.6 mg total) by mouth once daily.    DILTIAZEM (CARDIZEM) 120 MG TABLET    TAKE 1 TABLET(120 MG) BY MOUTH EVERY DAY    FUROSEMIDE (LASIX) 40 MG TABLET    TAKE 1 TABLET(40 MG) BY MOUTH TWICE DAILY    GLIMEPIRIDE (AMARYL) 2 MG TABLET    TAKE 2 TABLETS(4 MG) BY MOUTH TWICE DAILY    HYDROCODONE-ACETAMINOPHEN 5-325MG (NORCO) 5-325 MG PER TABLET    Take 1 tablet by mouth every 6 (six) hours as needed for Pain.    INSULIN GLARGINE (LANTUS SOLOSTAR) 100 UNIT/ML (3 ML) INPN PEN    INJECT 26 UNITS UNDER THE SKIN NIGHTLY    INSULIN LISPRO (HUMALOG KWIKPEN) 100 UNIT/ML INPN PEN    ADMINISTER 15 UNITS UNDER THE SKIN THREE TIMES DAILY WITH MEALS    LANCETS (MICROLET LANCET) MISC    TEST THREE TIMES DAILY    LISINOPRIL (PRINIVIL,ZESTRIL) 40 MG TABLET    TAKE 1 TABLET(40 MG) BY MOUTH TWICE DAILY    OMEGA-3 FATTY ACIDS 1,000 MG CAP    Take by mouth. 2 Capsule Oral Every day    PEN NEEDLE, DIABETIC (NOVOFINE 32) 32 GAUGE X 1/4" NDLE    USE FOUR TIMES DAILY AS NEEDED    PIOGLITAZONE (ACTOS) 45 MG TABLET    TAKE 1 TABLET(45 MG) BY MOUTH " EVERY DAY    PRAVASTATIN (PRAVACHOL) 20 MG TABLET    TAKE 1 TABLET(20 MG) BY MOUTH EVERY DAY   Discontinued Medications    BACITRACIN 500 UNIT/GRAM OINTMENT    APPLY TOPICALLY BID    IBUPROFEN (ADVIL,MOTRIN) 800 MG TABLET    TK 1 T PO  Q 8 H PRN         CARE TEAM:  Patient Care Team:  Riddhi Galindo MD as PCP - General (Internal Medicine)  Vick Cornelius MD as Consulting Physician (Nephrology)           SCREENING HISTORY:  Health Maintenance       Date Due Completion Date    Foot Exam 08/19/2017 8/19/2016    Urine Microalbumin 01/31/2018 1/31/2017    Hemoglobin A1c 04/28/2018 10/28/2017    Lipid Panel 06/02/2018 6/2/2017    Eye Exam 09/14/2018 9/14/2017    Override on 1/30/2012: Done    Colonoscopy 11/22/2021 11/22/2016    TETANUS VACCINE 09/04/2027 9/4/2017    Pneumococcal PPSV23 (Medium Risk) (2) 05/07/2031 10/2/2013            REVIEW OF SYSTEMS:  The patient reports fair dietary habits.  The patient does not exercise regularly.  General ROS: positive for  - stressors related to the health of his family (son was injured)  negative for - chills or fever  Psychological ROS: negative for - memory difficulties, obsessive thoughts or suicidal ideation  Ophthalmic ROS: negative for - blurry vision or eye pain  ENT ROS: negative for - epistaxis, oral lesions or sore throat  Allergy and Immunology ROS: negative for - hives  Hematological and Lymphatic ROS: negative for - swollen lymph nodes  Endocrine ROS: negative for - polydipsia/polyuria or temperature intolerance  Respiratory ROS: no cough, shortness of breath, or wheezing  Cardiovascular ROS: no chest pain or dyspnea on exertion  Gastrointestinal ROS: no abdominal pain, change in bowel habits, or black or bloody stools  Dermatological ROS: negative for mole changes  Musculoskeletal ROS: negative for - muscle pain; he twisted his left ankle last week - feeling better  Neurological ROS: no TIA or stroke symptoms  Genito-Urinary ROS: no dysuria, trouble voiding,  "or hematuria        PHYSICAL EXAM:  Vitals:    11/01/17 1400   BP: 120/86   Pulse: 80   Temp: 98.6 °F (37 °C)     Weight: (!) 209.7 kg (462 lb 4.9 oz)   Height: 6' 0.99" (185.4 cm)   Body mass index is 61.01 kg/m².    Physical Examination: General appearance - alert, well appearing, and in no distress and morbidly obese  Mental status - alert, oriented to person, place, and time, normal mood, behavior, speech, dress, motor activity, and thought processes  Eyes - pupils equal and reactive, extraocular eye movements intact, sclera anicteric  Mouth - mucous membranes moist, pharynx normal without lesions  Neck - supple, no significant adenopathy, carotids upstroke normal bilaterally, no bruits, thyroid exam: thyroid is normal in size without nodules or tenderness  Lymphatics - no palpable lymphadenopathy  Chest - clear to auscultation, no wheezes, rales or rhonchi, symmetric air entry  Heart - normal rate and regular rhythm, no gallops noted  Abdomen - soft, nontender, nondistended, no masses or organomegaly   Male - not examined.  Back exam - limited range of motion due to body habitus, no pain with motion noted during exam  Neurological - alert, oriented, normal speech, no focal findings or movement disorder noted, cranial nerves II through XII intact  Musculoskeletal - no muscular tenderness noted, Moderate osteoarthritic changes noted to both knee joints. No joint effusions noted.   Extremities - pedal edema 1-2 +, venous stasis dermatitis noted  Skin - normal coloration and turgor, no rashes, no suspicious skin lesions noted       Protective Sensation (w/ 10 gram monofilament):  Right: Intact  Left: Intact    Visual Inspection:  Normal -  Bilateral    Pedal Pulses:   Right: Diminshed  Left: Diminshed    Posterior tibialis:   Right:Diminshed  Left: Diminshed         Results for orders placed or performed in visit on 10/28/17   Comprehensive metabolic panel   Result Value Ref Range    Sodium 143 136 - 145 mmol/L "    Potassium 4.8 3.5 - 5.1 mmol/L    Chloride 105 95 - 110 mmol/L    CO2 28 23 - 29 mmol/L    Glucose 151 (H) 70 - 110 mg/dL    BUN, Bld 35 (H) 6 - 20 mg/dL    Creatinine 2.1 (H) 0.5 - 1.4 mg/dL    Calcium 9.4 8.7 - 10.5 mg/dL    Total Protein 8.0 6.0 - 8.4 g/dL    Albumin 3.2 (L) 3.5 - 5.2 g/dL    Total Bilirubin 0.3 0.1 - 1.0 mg/dL    Alkaline Phosphatase 110 55 - 135 U/L    AST 21 10 - 40 U/L    ALT 8 (L) 10 - 44 U/L    Anion Gap 10 8 - 16 mmol/L    eGFR if African American 40.9 (A) >60 mL/min/1.73 m^2    eGFR if non  35.4 (A) >60 mL/min/1.73 m^2   Hemoglobin A1c   Result Value Ref Range    Hemoglobin A1C 6.5 (H) 4.0 - 5.6 %    Estimated Avg Glucose 140 (H) 68 - 131 mg/dL        ASSESSMENT AND PLAN:  1. Routine medical exam  Counseled on age appropriate medical preventative services including age appropriate cancer screenings, age appropriate eye and dental exams, over all nutritional health, need for a consistent exercise regimen, and an over all push towards maintaining a vigorous and active lifestyle.  Counseled on age appropriate vaccines and discussed upcoming health care needs based on age/gender. Discussed good sleep hygiene and stress management.     2. controlled type 2 diabetes mellitus with stage 3 chronic kidney disease, with long-term current use of insulin/3. Type 2 diabetes mellitus, controlled, with neuropathy/4. controlled type 2 diabetes mellitus with right eye affected by mild nonproliferative retinopathy and macular edema, with long-term current use of insulin  Diabetes currently is controlled. We discussed diabetic diet and regular exercise. We discussed home blood sugar monitoring, if appropriate. The current medical regimen is effective;  continue present plan and medications. Stable kidney function. Observe. Patient counseled to avoid/minimize the use of anti-inflammatory  Medication. Discussed to stay well hydrated. Also discussed with patient that good control of blood  pressure or diabetes, if present, will help to prevent progression. Neuropathy pain controlled. He is up to date on his eye exam.   - Ambulatory referral to Nephrology - he is overdue for office visit.    5. Essential hypertension  Discussed sodium restriction, maintaining ideal body weight and regular exercise program as physiologic means to achieve blood pressure control. The patient will strive towards this. The current medical regimen is effective;  continue present plan and medications. Recommended patient to check home readings to monitor and see me for followup as scheduled or sooner as needed. Patient was educated that both decongestant and anti-inflammatory medication may raise blood pressure.     6. Hyperlipidemia LDL goal <100  We discussed low fat diet and regular exercise.The current medical regimen is effective;  continue present plan and medications.      7. TOSIN (obstructive sleep apnea)  We discussed the potential ramifications of untreated sleep apnea, which could include daytime sleepiness, hypertension, heart disease including CHF, sudden death while sleeping and/or stroke. The patient was advised to abstain from driving should they feel sleepy or drowsy.  We discussed potential treatment options, which could include weight loss, body positioning, continuous positive airway pressure (CPAP), or referral for surgical consideration.    - CPAP/BIPAP SUPPLIES    8. Dependent edema  Stable. We discussed low salt diet. He is too big to use support stockings. He is aware of the need for weight loss.    9. Secondary hyperparathyroidism of renal origin  Stable. Asymptomatic. Observe.     10. Morbid obesity with BMI of 50.0-59.9, adult  The patient is asked to make an attempt to improve diet and exercise patterns to aid in medical management of this problem. He is not interested in bariatric surgery.          Return in about 6 months (around 5/1/2018), or if symptoms worsen or fail to improve, for follow up  chronic medical conditions.. or sooner as needed.

## 2017-11-02 RX ORDER — PIOGLITAZONEHYDROCHLORIDE 45 MG/1
TABLET ORAL
Qty: 30 TABLET | Refills: 2 | Status: SHIPPED | OUTPATIENT
Start: 2017-11-02 | End: 2018-02-02 | Stop reason: SDUPTHER

## 2017-11-02 RX ORDER — GLIMEPIRIDE 2 MG/1
TABLET ORAL
Qty: 120 TABLET | Refills: 2 | Status: SHIPPED | OUTPATIENT
Start: 2017-11-02 | End: 2018-02-02 | Stop reason: SDUPTHER

## 2017-11-02 RX ORDER — LISINOPRIL 40 MG/1
TABLET ORAL
Qty: 60 TABLET | Refills: 2 | Status: SHIPPED | OUTPATIENT
Start: 2017-11-02 | End: 2018-02-02 | Stop reason: SDUPTHER

## 2017-11-02 RX ORDER — PRAVASTATIN SODIUM 20 MG/1
TABLET ORAL
Qty: 30 TABLET | Refills: 2 | Status: SHIPPED | OUTPATIENT
Start: 2017-11-02 | End: 2018-02-02 | Stop reason: SDUPTHER

## 2017-11-05 RX ORDER — INSULIN LISPRO 100 [IU]/ML
INJECTION, SOLUTION INTRAVENOUS; SUBCUTANEOUS
Qty: 15 ML | Refills: 0 | Status: SHIPPED | OUTPATIENT
Start: 2017-11-05 | End: 2017-12-07 | Stop reason: SDUPTHER

## 2017-11-10 ENCOUNTER — TELEPHONE (OUTPATIENT)
Dept: FAMILY MEDICINE | Facility: CLINIC | Age: 52
End: 2017-11-10

## 2017-11-10 NOTE — TELEPHONE ENCOUNTER
----- Message from Maria L Oscar sent at 11/10/2017 12:52 PM CST -----  REFERRAL: Pt took the first available appointment on 12/5 with Dr. Hancock @ [facility]. Thanks

## 2017-11-17 RX ORDER — FUROSEMIDE 40 MG/1
TABLET ORAL
Qty: 60 TABLET | Refills: 0 | Status: SHIPPED | OUTPATIENT
Start: 2017-11-17 | End: 2017-12-15 | Stop reason: SDUPTHER

## 2017-12-07 RX ORDER — INSULIN LISPRO 100 [IU]/ML
INJECTION, SOLUTION INTRAVENOUS; SUBCUTANEOUS
Qty: 15 ML | Refills: 0 | Status: SHIPPED | OUTPATIENT
Start: 2017-12-07 | End: 2018-01-13 | Stop reason: SDUPTHER

## 2017-12-15 RX ORDER — FUROSEMIDE 40 MG/1
TABLET ORAL
Qty: 60 TABLET | Refills: 0 | Status: SHIPPED | OUTPATIENT
Start: 2017-12-15 | End: 2018-01-16 | Stop reason: SDUPTHER

## 2017-12-20 ENCOUNTER — OFFICE VISIT (OUTPATIENT)
Dept: NEPHROLOGY | Facility: CLINIC | Age: 52
End: 2017-12-20
Payer: COMMERCIAL

## 2017-12-20 VITALS
SYSTOLIC BLOOD PRESSURE: 150 MMHG | HEIGHT: 72 IN | OXYGEN SATURATION: 96 % | BODY MASS INDEX: 42.66 KG/M2 | HEART RATE: 89 BPM | WEIGHT: 315 LBS | DIASTOLIC BLOOD PRESSURE: 80 MMHG

## 2017-12-20 DIAGNOSIS — D63.1 ANEMIA OF CHRONIC RENAL FAILURE, STAGE 3 (MODERATE): ICD-10-CM

## 2017-12-20 DIAGNOSIS — N18.30 ANEMIA OF CHRONIC RENAL FAILURE, STAGE 3 (MODERATE): ICD-10-CM

## 2017-12-20 DIAGNOSIS — Z79.4 CONTROLLED TYPE 2 DIABETES MELLITUS WITH STAGE 3 CHRONIC KIDNEY DISEASE, WITH LONG-TERM CURRENT USE OF INSULIN: Primary | ICD-10-CM

## 2017-12-20 DIAGNOSIS — E11.22 CONTROLLED TYPE 2 DIABETES MELLITUS WITH STAGE 3 CHRONIC KIDNEY DISEASE, WITH LONG-TERM CURRENT USE OF INSULIN: Primary | ICD-10-CM

## 2017-12-20 DIAGNOSIS — I12.9 HYPERTENSIVE KIDNEY DISEASE WITH CHRONIC KIDNEY DISEASE, STAGE 1-4 OR UNSPECIFIED CHRONIC KIDNEY DISEASE: ICD-10-CM

## 2017-12-20 DIAGNOSIS — N25.81 HYPERPARATHYROIDISM DUE TO RENAL INSUFFICIENCY: ICD-10-CM

## 2017-12-20 DIAGNOSIS — N18.30 CONTROLLED TYPE 2 DIABETES MELLITUS WITH STAGE 3 CHRONIC KIDNEY DISEASE, WITH LONG-TERM CURRENT USE OF INSULIN: Primary | ICD-10-CM

## 2017-12-20 PROCEDURE — 99213 OFFICE O/P EST LOW 20 MIN: CPT | Mod: S$GLB,,, | Performed by: INTERNAL MEDICINE

## 2017-12-20 PROCEDURE — 99999 PR PBB SHADOW E&M-EST. PATIENT-LVL II: CPT | Mod: PBBFAC,,, | Performed by: INTERNAL MEDICINE

## 2017-12-20 NOTE — PROGRESS NOTES
Subjective:       Patient ID: David Corona Jr. is a 52 y.o. Black or  male who presents for follow-up evaluation of Chronic Kidney Disease    HPI      Mr. Corona is a 52 year old man with past medical history of hypertension, diabetes, presenting for follow up of chronic kidney disease.  Patient reports blood sugars have been well-controlled, has not checked his blood pressure.  He otherwise denies any fever, chest pain, shortness of breath, abdominal pain, diarrhea, dysuria/hematuria.     Review of Systems   Constitutional: Negative for appetite change, fatigue and fever.   Respiratory: Negative for cough and shortness of breath.    Cardiovascular: Negative for chest pain and leg swelling.   Gastrointestinal: Negative for abdominal pain, constipation, diarrhea, nausea and vomiting.   Genitourinary: Negative for dysuria, flank pain, frequency, hematuria and urgency.   Musculoskeletal: Negative for arthralgias, back pain and joint swelling.   Skin: Negative for rash.   Neurological: Negative for dizziness and light-headedness.   All other systems reviewed and are negative.      Objective:      Physical Exam   Constitutional: He appears well-developed and well-nourished.   Cardiovascular: Normal rate and regular rhythm.  Exam reveals no gallop and no friction rub.    No murmur heard.  Pulmonary/Chest: Effort normal and breath sounds normal. No respiratory distress. He has no wheezes. He has no rales.   Musculoskeletal: He exhibits no edema.   Neurological: He is alert.   Skin: Skin is warm and dry. No rash noted.   Vitals reviewed.      Assessment:       1. Controlled type 2 diabetes mellitus with stage 3 chronic kidney disease, with long-term current use of insulin    2. Hypertensive kidney disease with chronic kidney disease, stage 1-4 or unspecified chronic kidney disease    3. Hyperparathyroidism due to renal insufficiency    4. Anemia of chronic renal failure, stage 3 (moderate)        Plan:      Mr. Corona is a 52 year old man with past medical history of hypertension, diabetes, presenting for follow up of chronic kidney disease stage III.  Patient likely with hypertensive nephrosclerosis v. diabetic nephropathy.  Patient creatinine slightly elevated from baseline, will continue to trend.  Stressed importance of blood pressure/glycemic control, along with weight loss, to prevent any further progression of kidney disease, patient voiced understanding.   - Anemia: Hgb at goal, no indication for erythropoetin therapy  - Bone/mineral metabolism: patient with secondary hyperparathyroidism, PTH at goal for stage CKD, will continue ergocalciferol given VitD deficiency    Return to clinic 6 months pending renal panel 2 weeks, with renal/heme panel, urinalysis, PTH/VitD prior to next visit

## 2018-01-02 ENCOUNTER — LAB VISIT (OUTPATIENT)
Dept: LAB | Facility: HOSPITAL | Age: 53
End: 2018-01-02
Attending: INTERNAL MEDICINE
Payer: COMMERCIAL

## 2018-01-02 DIAGNOSIS — Z79.4 CONTROLLED TYPE 2 DIABETES MELLITUS WITH STAGE 3 CHRONIC KIDNEY DISEASE, WITH LONG-TERM CURRENT USE OF INSULIN: ICD-10-CM

## 2018-01-02 DIAGNOSIS — N18.30 CONTROLLED TYPE 2 DIABETES MELLITUS WITH STAGE 3 CHRONIC KIDNEY DISEASE, WITH LONG-TERM CURRENT USE OF INSULIN: ICD-10-CM

## 2018-01-02 DIAGNOSIS — E11.22 CONTROLLED TYPE 2 DIABETES MELLITUS WITH STAGE 3 CHRONIC KIDNEY DISEASE, WITH LONG-TERM CURRENT USE OF INSULIN: ICD-10-CM

## 2018-01-02 LAB
ALBUMIN SERPL BCP-MCNC: 3 G/DL
ANION GAP SERPL CALC-SCNC: 7 MMOL/L
BUN SERPL-MCNC: 25 MG/DL
CALCIUM SERPL-MCNC: 9 MG/DL
CHLORIDE SERPL-SCNC: 104 MMOL/L
CO2 SERPL-SCNC: 28 MMOL/L
CREAT SERPL-MCNC: 1.4 MG/DL
EST. GFR  (AFRICAN AMERICAN): >60 ML/MIN/1.73 M^2
EST. GFR  (NON AFRICAN AMERICAN): 57.8 ML/MIN/1.73 M^2
GLUCOSE SERPL-MCNC: 147 MG/DL
PHOSPHATE SERPL-MCNC: 3.2 MG/DL
POTASSIUM SERPL-SCNC: 4.4 MMOL/L
SODIUM SERPL-SCNC: 139 MMOL/L

## 2018-01-02 PROCEDURE — 80069 RENAL FUNCTION PANEL: CPT

## 2018-01-02 PROCEDURE — 36415 COLL VENOUS BLD VENIPUNCTURE: CPT | Mod: PO

## 2018-01-05 DIAGNOSIS — I10 ESSENTIAL HYPERTENSION: ICD-10-CM

## 2018-01-05 DIAGNOSIS — M10.9 GOUT, UNSPECIFIED CAUSE, UNSPECIFIED CHRONICITY, UNSPECIFIED SITE: ICD-10-CM

## 2018-01-05 RX ORDER — DILTIAZEM HYDROCHLORIDE 120 MG/1
120 TABLET, FILM COATED ORAL DAILY
Qty: 30 TABLET | Refills: 2 | Status: SHIPPED | OUTPATIENT
Start: 2018-01-05 | End: 2018-04-05 | Stop reason: SDUPTHER

## 2018-01-05 RX ORDER — COLCHICINE 0.6 MG/1
0.6 TABLET ORAL DAILY
Qty: 30 TABLET | Refills: 2 | Status: SHIPPED | OUTPATIENT
Start: 2018-01-05 | End: 2018-05-16 | Stop reason: SDUPTHER

## 2018-01-13 RX ORDER — INSULIN LISPRO 100 [IU]/ML
INJECTION, SOLUTION INTRAVENOUS; SUBCUTANEOUS
Qty: 15 ML | Refills: 0 | Status: SHIPPED | OUTPATIENT
Start: 2018-01-13 | End: 2018-02-17 | Stop reason: SDUPTHER

## 2018-01-16 RX ORDER — FUROSEMIDE 40 MG/1
TABLET ORAL
Qty: 60 TABLET | Refills: 2 | Status: SHIPPED | OUTPATIENT
Start: 2018-01-16 | End: 2018-05-15 | Stop reason: SDUPTHER

## 2018-02-02 DIAGNOSIS — E78.5 HYPERLIPIDEMIA, UNSPECIFIED HYPERLIPIDEMIA TYPE: ICD-10-CM

## 2018-02-02 DIAGNOSIS — I10 ESSENTIAL HYPERTENSION: ICD-10-CM

## 2018-02-02 RX ORDER — LISINOPRIL 40 MG/1
TABLET ORAL
Qty: 60 TABLET | Refills: 0 | Status: SHIPPED | OUTPATIENT
Start: 2018-02-02 | End: 2018-04-12 | Stop reason: SDUPTHER

## 2018-02-02 RX ORDER — GLIMEPIRIDE 2 MG/1
TABLET ORAL
Qty: 120 TABLET | Refills: 0 | Status: SHIPPED | OUTPATIENT
Start: 2018-02-02 | End: 2018-05-16 | Stop reason: SDUPTHER

## 2018-02-02 RX ORDER — PIOGLITAZONEHYDROCHLORIDE 45 MG/1
TABLET ORAL
Qty: 30 TABLET | Refills: 0 | Status: SHIPPED | OUTPATIENT
Start: 2018-02-02 | End: 2018-04-12 | Stop reason: SDUPTHER

## 2018-02-02 RX ORDER — PRAVASTATIN SODIUM 20 MG/1
TABLET ORAL
Qty: 30 TABLET | Refills: 0 | Status: SHIPPED | OUTPATIENT
Start: 2018-02-02 | End: 2018-04-12 | Stop reason: SDUPTHER

## 2018-02-19 RX ORDER — INSULIN LISPRO 100 [IU]/ML
INJECTION, SOLUTION INTRAVENOUS; SUBCUTANEOUS
Qty: 15 ML | Refills: 0 | Status: SHIPPED | OUTPATIENT
Start: 2018-02-19 | End: 2018-03-28 | Stop reason: SDUPTHER

## 2018-03-05 ENCOUNTER — OFFICE VISIT (OUTPATIENT)
Dept: OPHTHALMOLOGY | Facility: CLINIC | Age: 53
End: 2018-03-05
Payer: COMMERCIAL

## 2018-03-05 DIAGNOSIS — H52.7 REFRACTIVE ERROR: ICD-10-CM

## 2018-03-05 DIAGNOSIS — Z96.1 PSEUDOPHAKIA: ICD-10-CM

## 2018-03-05 DIAGNOSIS — E11.9 DM TYPE 2 WITHOUT RETINOPATHY: ICD-10-CM

## 2018-03-05 DIAGNOSIS — I10 ESSENTIAL HYPERTENSION: ICD-10-CM

## 2018-03-05 DIAGNOSIS — H26.491 PCO (POSTERIOR CAPSULAR OPACIFICATION), RIGHT: ICD-10-CM

## 2018-03-05 DIAGNOSIS — H25.12 NUCLEAR SCLEROSIS, LEFT: Primary | ICD-10-CM

## 2018-03-05 PROCEDURE — 99999 PR PBB SHADOW E&M-EST. PATIENT-LVL II: CPT | Mod: PBBFAC,,, | Performed by: OPHTHALMOLOGY

## 2018-03-05 PROCEDURE — 92014 COMPRE OPH EXAM EST PT 1/>: CPT | Mod: S$GLB,,, | Performed by: OPHTHALMOLOGY

## 2018-03-06 NOTE — PROGRESS NOTES
Subjective:       Patient ID: David Croona Jr. is a 52 y.o. male.    Chief Complaint: Cataract (Here for 4 months Cataract Check OS and DFE )    HPI     Cataract    Additional comments: Here for 4 months Cataract Check OS and DFE            Comments   Here for 4 months Cataract Check OS and DFE   Denies eye pain and f/f.  White mucus discharge right eye, notice through out the day.   No noticeable [facility] changes since last visit.   No problems with glare.     Meds: No gtt        Last edited by ESTER Lopez on 3/5/2018  3:34 PM. (History)             Assessment:       1. Nuclear sclerosis, left    2. PCO (posterior capsular opacification), right    3. DM type 2 without retinopathy    4. Essential hypertension    5. Refractive error - Both Eyes    6. Pseudophakia - Right Eye        Plan:       Cataract OS- Not visually significant per Pt.     Early PCO OD- Not Visually Significant.  DM-No NPDR OU.  HTN-No retinopathy OU.  RE-Pt wants MRx.      Control DM & HTN.  AT's.  Give MRx.  RTC 1 yr.

## 2018-03-13 ENCOUNTER — TELEPHONE (OUTPATIENT)
Dept: FAMILY MEDICINE | Facility: CLINIC | Age: 53
End: 2018-03-13

## 2018-03-13 RX ORDER — BLOOD SUGAR DIAGNOSTIC
STRIP MISCELLANEOUS
Qty: 100 EACH | Refills: 5 | Status: SHIPPED | OUTPATIENT
Start: 2018-03-13 | End: 2018-05-16 | Stop reason: SDUPTHER

## 2018-03-13 NOTE — TELEPHONE ENCOUNTER
Spoke with the patient, scheduled a follow up appt.  He didn't want an appt reminder send to his home.  Patient verbalized understandings.

## 2018-03-29 RX ORDER — INSULIN LISPRO 100 [IU]/ML
INJECTION, SOLUTION INTRAVENOUS; SUBCUTANEOUS
Qty: 15 ML | Refills: 0 | Status: SHIPPED | OUTPATIENT
Start: 2018-03-29 | End: 2018-05-02 | Stop reason: SDUPTHER

## 2018-04-05 DIAGNOSIS — I10 ESSENTIAL HYPERTENSION: ICD-10-CM

## 2018-04-05 RX ORDER — DILTIAZEM HYDROCHLORIDE 120 MG/1
TABLET, FILM COATED ORAL
Qty: 30 TABLET | Refills: 0 | Status: SHIPPED | OUTPATIENT
Start: 2018-04-05 | End: 2018-05-03 | Stop reason: SDUPTHER

## 2018-04-12 DIAGNOSIS — E78.5 HYPERLIPIDEMIA, UNSPECIFIED HYPERLIPIDEMIA TYPE: ICD-10-CM

## 2018-04-12 DIAGNOSIS — I10 ESSENTIAL HYPERTENSION: ICD-10-CM

## 2018-04-13 RX ORDER — LISINOPRIL 40 MG/1
TABLET ORAL
Qty: 60 TABLET | Refills: 0 | Status: SHIPPED | OUTPATIENT
Start: 2018-04-13 | End: 2018-05-16 | Stop reason: SDUPTHER

## 2018-04-13 RX ORDER — PIOGLITAZONEHYDROCHLORIDE 45 MG/1
TABLET ORAL
Qty: 30 TABLET | Refills: 0 | Status: SHIPPED | OUTPATIENT
Start: 2018-04-13 | End: 2018-05-16 | Stop reason: SDUPTHER

## 2018-04-13 RX ORDER — PRAVASTATIN SODIUM 20 MG/1
TABLET ORAL
Qty: 30 TABLET | Refills: 0 | Status: SHIPPED | OUTPATIENT
Start: 2018-04-13 | End: 2018-05-16 | Stop reason: SDUPTHER

## 2018-05-03 DIAGNOSIS — I10 ESSENTIAL HYPERTENSION: ICD-10-CM

## 2018-05-03 RX ORDER — DILTIAZEM HYDROCHLORIDE 120 MG/1
TABLET, FILM COATED ORAL
Qty: 30 TABLET | Refills: 5 | Status: SHIPPED | OUTPATIENT
Start: 2018-05-03 | End: 2018-05-28 | Stop reason: SDUPTHER

## 2018-05-03 RX ORDER — INSULIN LISPRO 100 [IU]/ML
INJECTION, SOLUTION INTRAVENOUS; SUBCUTANEOUS
Qty: 15 ML | Refills: 0 | Status: SHIPPED | OUTPATIENT
Start: 2018-05-03 | End: 2018-06-01 | Stop reason: SDUPTHER

## 2018-05-16 DIAGNOSIS — E78.5 HYPERLIPIDEMIA, UNSPECIFIED HYPERLIPIDEMIA TYPE: ICD-10-CM

## 2018-05-16 DIAGNOSIS — M10.9 GOUT, UNSPECIFIED CAUSE, UNSPECIFIED CHRONICITY, UNSPECIFIED SITE: ICD-10-CM

## 2018-05-16 DIAGNOSIS — I10 ESSENTIAL HYPERTENSION: ICD-10-CM

## 2018-05-16 RX ORDER — BLOOD SUGAR DIAGNOSTIC
STRIP MISCELLANEOUS
Qty: 100 EACH | Refills: 0 | Status: SHIPPED | OUTPATIENT
Start: 2018-05-16 | End: 2018-08-22 | Stop reason: SDUPTHER

## 2018-05-16 RX ORDER — FUROSEMIDE 40 MG/1
TABLET ORAL
Qty: 60 TABLET | Refills: 0 | Status: SHIPPED | OUTPATIENT
Start: 2018-05-16 | End: 2018-05-16 | Stop reason: SDUPTHER

## 2018-05-16 RX ORDER — AMLODIPINE BESYLATE 5 MG/1
5 TABLET ORAL DAILY
Qty: 30 TABLET | Refills: 0 | Status: SHIPPED | OUTPATIENT
Start: 2018-05-16 | End: 2018-05-28 | Stop reason: SDUPTHER

## 2018-05-16 RX ORDER — PRAVASTATIN SODIUM 20 MG/1
20 TABLET ORAL DAILY
Qty: 30 TABLET | Refills: 0 | Status: SHIPPED | OUTPATIENT
Start: 2018-05-16 | End: 2018-05-28 | Stop reason: SDUPTHER

## 2018-05-16 RX ORDER — LISINOPRIL 40 MG/1
40 TABLET ORAL 2 TIMES DAILY
Qty: 60 TABLET | Refills: 0 | Status: SHIPPED | OUTPATIENT
Start: 2018-05-16 | End: 2018-05-28 | Stop reason: SDUPTHER

## 2018-05-16 RX ORDER — PIOGLITAZONEHYDROCHLORIDE 45 MG/1
45 TABLET ORAL DAILY
Qty: 30 TABLET | Refills: 0 | Status: SHIPPED | OUTPATIENT
Start: 2018-05-16 | End: 2018-05-28 | Stop reason: SDUPTHER

## 2018-05-16 RX ORDER — GLIMEPIRIDE 2 MG/1
TABLET ORAL
Qty: 120 TABLET | Refills: 0 | Status: CANCELLED | OUTPATIENT
Start: 2018-05-16

## 2018-05-16 RX ORDER — FUROSEMIDE 40 MG/1
TABLET ORAL
Qty: 60 TABLET | Refills: 2 | Status: CANCELLED | OUTPATIENT
Start: 2018-05-16

## 2018-05-16 RX ORDER — FUROSEMIDE 40 MG/1
TABLET ORAL
Qty: 60 TABLET | Refills: 0 | Status: SHIPPED | OUTPATIENT
Start: 2018-05-16 | End: 2018-05-28 | Stop reason: SDUPTHER

## 2018-05-16 RX ORDER — COLCHICINE 0.6 MG/1
0.6 TABLET ORAL DAILY
Qty: 30 TABLET | Refills: 0 | Status: SHIPPED | OUTPATIENT
Start: 2018-05-16 | End: 2018-05-28 | Stop reason: SDUPTHER

## 2018-05-16 RX ORDER — GLIMEPIRIDE 2 MG/1
TABLET ORAL
Qty: 120 TABLET | Refills: 0 | Status: SHIPPED | OUTPATIENT
Start: 2018-05-16 | End: 2018-05-28 | Stop reason: SDUPTHER

## 2018-05-28 ENCOUNTER — OFFICE VISIT (OUTPATIENT)
Dept: FAMILY MEDICINE | Facility: CLINIC | Age: 53
End: 2018-05-28
Payer: COMMERCIAL

## 2018-05-28 ENCOUNTER — LAB VISIT (OUTPATIENT)
Dept: LAB | Facility: HOSPITAL | Age: 53
End: 2018-05-28
Attending: INTERNAL MEDICINE
Payer: COMMERCIAL

## 2018-05-28 VITALS
HEART RATE: 72 BPM | OXYGEN SATURATION: 95 % | HEIGHT: 74 IN | SYSTOLIC BLOOD PRESSURE: 130 MMHG | BODY MASS INDEX: 40.43 KG/M2 | DIASTOLIC BLOOD PRESSURE: 82 MMHG | TEMPERATURE: 98 F | WEIGHT: 315 LBS

## 2018-05-28 DIAGNOSIS — Z79.4 TYPE 2 DIABETES MELLITUS WITH STAGE 3 CHRONIC KIDNEY DISEASE, WITH LONG-TERM CURRENT USE OF INSULIN: ICD-10-CM

## 2018-05-28 DIAGNOSIS — Z79.4 CONTROLLED TYPE 2 DIABETES MELLITUS WITH MILD NONPROLIFERATIVE RETINOPATHY AND MACULAR EDEMA, WITH LONG-TERM CURRENT USE OF INSULIN, UNSPECIFIED LATERALITY: Primary | ICD-10-CM

## 2018-05-28 DIAGNOSIS — I10 ESSENTIAL HYPERTENSION: ICD-10-CM

## 2018-05-28 DIAGNOSIS — N25.81 SECONDARY HYPERPARATHYROIDISM OF RENAL ORIGIN: ICD-10-CM

## 2018-05-28 DIAGNOSIS — N18.30 TYPE 2 DIABETES MELLITUS WITH STAGE 3 CHRONIC KIDNEY DISEASE, WITH LONG-TERM CURRENT USE OF INSULIN: ICD-10-CM

## 2018-05-28 DIAGNOSIS — R60.9 DEPENDENT EDEMA: ICD-10-CM

## 2018-05-28 DIAGNOSIS — E11.40 CONTROLLED TYPE 2 DIABETES WITH NEUROPATHY: ICD-10-CM

## 2018-05-28 DIAGNOSIS — Z79.4 UNCONTROLLED TYPE 2 DIABETES MELLITUS WITH MILD NONPROLIFERATIVE RETINOPATHY AND MACULAR EDEMA, WITH LONG-TERM CURRENT USE OF INSULIN, UNSPECIFIED LATERALITY: ICD-10-CM

## 2018-05-28 DIAGNOSIS — I87.8 VENOUS STASIS OF LOWER EXTREMITY: ICD-10-CM

## 2018-05-28 DIAGNOSIS — E11.3219 CONTROLLED TYPE 2 DIABETES MELLITUS WITH MILD NONPROLIFERATIVE RETINOPATHY AND MACULAR EDEMA, WITH LONG-TERM CURRENT USE OF INSULIN, UNSPECIFIED LATERALITY: Primary | ICD-10-CM

## 2018-05-28 DIAGNOSIS — E11.22 TYPE 2 DIABETES MELLITUS WITH STAGE 3 CHRONIC KIDNEY DISEASE, WITH LONG-TERM CURRENT USE OF INSULIN: ICD-10-CM

## 2018-05-28 DIAGNOSIS — Z23 NEED FOR PROPHYLACTIC VACCINATION AGAINST VIRAL HEPATITIS: ICD-10-CM

## 2018-05-28 DIAGNOSIS — E78.5 HYPERLIPIDEMIA LDL GOAL <100: ICD-10-CM

## 2018-05-28 DIAGNOSIS — M10.9 GOUT, UNSPECIFIED CAUSE, UNSPECIFIED CHRONICITY, UNSPECIFIED SITE: ICD-10-CM

## 2018-05-28 DIAGNOSIS — E66.01 MORBID OBESITY WITH BMI OF 50.0-59.9, ADULT: ICD-10-CM

## 2018-05-28 DIAGNOSIS — G47.33 OSA (OBSTRUCTIVE SLEEP APNEA): ICD-10-CM

## 2018-05-28 DIAGNOSIS — E11.65 UNCONTROLLED TYPE 2 DIABETES MELLITUS WITH MILD NONPROLIFERATIVE RETINOPATHY AND MACULAR EDEMA, WITH LONG-TERM CURRENT USE OF INSULIN, UNSPECIFIED LATERALITY: ICD-10-CM

## 2018-05-28 DIAGNOSIS — E11.3219 UNCONTROLLED TYPE 2 DIABETES MELLITUS WITH MILD NONPROLIFERATIVE RETINOPATHY AND MACULAR EDEMA, WITH LONG-TERM CURRENT USE OF INSULIN, UNSPECIFIED LATERALITY: ICD-10-CM

## 2018-05-28 LAB
ALBUMIN SERPL BCP-MCNC: 3.4 G/DL
ALP SERPL-CCNC: 121 U/L
ALT SERPL W/O P-5'-P-CCNC: 5 U/L
ANION GAP SERPL CALC-SCNC: 10 MMOL/L
AST SERPL-CCNC: 22 U/L
BASOPHILS # BLD AUTO: 0.03 K/UL
BASOPHILS NFR BLD: 0.4 %
BILIRUB SERPL-MCNC: 0.6 MG/DL
BUN SERPL-MCNC: 26 MG/DL
CALCIUM SERPL-MCNC: 9.1 MG/DL
CHLORIDE SERPL-SCNC: 107 MMOL/L
CHOLEST SERPL-MCNC: 121 MG/DL
CHOLEST/HDLC SERPL: 2.6 {RATIO}
CO2 SERPL-SCNC: 22 MMOL/L
CREAT SERPL-MCNC: 1.6 MG/DL
DIFFERENTIAL METHOD: ABNORMAL
EOSINOPHIL # BLD AUTO: 0.3 K/UL
EOSINOPHIL NFR BLD: 4.3 %
ERYTHROCYTE [DISTWIDTH] IN BLOOD BY AUTOMATED COUNT: 17.3 %
EST. GFR  (AFRICAN AMERICAN): 56.4 ML/MIN/1.73 M^2
EST. GFR  (NON AFRICAN AMERICAN): 48.8 ML/MIN/1.73 M^2
ESTIMATED AVG GLUCOSE: 128 MG/DL
GLUCOSE SERPL-MCNC: 131 MG/DL
HBA1C MFR BLD HPLC: 6.1 %
HCT VFR BLD AUTO: 38.3 %
HDLC SERPL-MCNC: 47 MG/DL
HDLC SERPL: 38.8 %
HGB BLD-MCNC: 12 G/DL
IMM GRANULOCYTES # BLD AUTO: 0.03 K/UL
IMM GRANULOCYTES NFR BLD AUTO: 0.4 %
LDLC SERPL CALC-MCNC: 63.4 MG/DL
LYMPHOCYTES # BLD AUTO: 1.6 K/UL
LYMPHOCYTES NFR BLD: 23 %
MCH RBC QN AUTO: 29.7 PG
MCHC RBC AUTO-ENTMCNC: 31.3 G/DL
MCV RBC AUTO: 95 FL
MONOCYTES # BLD AUTO: 0.6 K/UL
MONOCYTES NFR BLD: 9 %
NEUTROPHILS # BLD AUTO: 4.4 K/UL
NEUTROPHILS NFR BLD: 62.9 %
NONHDLC SERPL-MCNC: 74 MG/DL
NRBC BLD-RTO: 0 /100 WBC
PLATELET # BLD AUTO: 198 K/UL
PMV BLD AUTO: 12.6 FL
POTASSIUM SERPL-SCNC: 5.1 MMOL/L
PROT SERPL-MCNC: 7.6 G/DL
RBC # BLD AUTO: 4.04 M/UL
SODIUM SERPL-SCNC: 139 MMOL/L
TRIGL SERPL-MCNC: 53 MG/DL
WBC # BLD AUTO: 6.92 K/UL

## 2018-05-28 PROCEDURE — 3075F SYST BP GE 130 - 139MM HG: CPT | Mod: CPTII,S$GLB,, | Performed by: INTERNAL MEDICINE

## 2018-05-28 PROCEDURE — 83036 HEMOGLOBIN GLYCOSYLATED A1C: CPT

## 2018-05-28 PROCEDURE — 3044F HG A1C LEVEL LT 7.0%: CPT | Mod: CPTII,S$GLB,, | Performed by: INTERNAL MEDICINE

## 2018-05-28 PROCEDURE — 85025 COMPLETE CBC W/AUTO DIFF WBC: CPT

## 2018-05-28 PROCEDURE — 99215 OFFICE O/P EST HI 40 MIN: CPT | Mod: 25,S$GLB,, | Performed by: INTERNAL MEDICINE

## 2018-05-28 PROCEDURE — 90746 HEPB VACCINE 3 DOSE ADULT IM: CPT | Mod: S$GLB,,, | Performed by: INTERNAL MEDICINE

## 2018-05-28 PROCEDURE — 80053 COMPREHEN METABOLIC PANEL: CPT

## 2018-05-28 PROCEDURE — 3008F BODY MASS INDEX DOCD: CPT | Mod: CPTII,S$GLB,, | Performed by: INTERNAL MEDICINE

## 2018-05-28 PROCEDURE — 99999 PR PBB SHADOW E&M-EST. PATIENT-LVL III: CPT | Mod: PBBFAC,,, | Performed by: INTERNAL MEDICINE

## 2018-05-28 PROCEDURE — 90471 IMMUNIZATION ADMIN: CPT | Mod: S$GLB,,, | Performed by: INTERNAL MEDICINE

## 2018-05-28 PROCEDURE — 3079F DIAST BP 80-89 MM HG: CPT | Mod: CPTII,S$GLB,, | Performed by: INTERNAL MEDICINE

## 2018-05-28 PROCEDURE — 36415 COLL VENOUS BLD VENIPUNCTURE: CPT | Mod: PO

## 2018-05-28 PROCEDURE — 80061 LIPID PANEL: CPT

## 2018-05-28 RX ORDER — LISINOPRIL 40 MG/1
40 TABLET ORAL 2 TIMES DAILY
Qty: 60 TABLET | Refills: 5 | Status: SHIPPED | OUTPATIENT
Start: 2018-05-28 | End: 2018-08-22 | Stop reason: SDUPTHER

## 2018-05-28 RX ORDER — DILTIAZEM HYDROCHLORIDE 120 MG/1
120 TABLET, FILM COATED ORAL DAILY
Qty: 30 TABLET | Refills: 5 | Status: SHIPPED | OUTPATIENT
Start: 2018-05-28 | End: 2018-08-22 | Stop reason: SDUPTHER

## 2018-05-28 RX ORDER — GLIMEPIRIDE 2 MG/1
TABLET ORAL
Qty: 120 TABLET | Refills: 5 | Status: SHIPPED | OUTPATIENT
Start: 2018-05-28 | End: 2019-01-14 | Stop reason: SDUPTHER

## 2018-05-28 RX ORDER — AMLODIPINE BESYLATE 5 MG/1
5 TABLET ORAL DAILY
Qty: 30 TABLET | Refills: 5 | Status: SHIPPED | OUTPATIENT
Start: 2018-05-28 | End: 2018-08-22 | Stop reason: SDUPTHER

## 2018-05-28 RX ORDER — FUROSEMIDE 40 MG/1
TABLET ORAL
Qty: 60 TABLET | Refills: 5 | Status: SHIPPED | OUTPATIENT
Start: 2018-05-28 | End: 2018-08-22 | Stop reason: SDUPTHER

## 2018-05-28 RX ORDER — INSULIN GLARGINE 100 [IU]/ML
INJECTION, SOLUTION SUBCUTANEOUS
Qty: 15 ML | Refills: 5 | Status: SHIPPED | OUTPATIENT
Start: 2018-05-28 | End: 2018-11-08 | Stop reason: SDUPTHER

## 2018-05-28 RX ORDER — PIOGLITAZONEHYDROCHLORIDE 45 MG/1
45 TABLET ORAL DAILY
Qty: 30 TABLET | Refills: 5 | Status: SHIPPED | OUTPATIENT
Start: 2018-05-28 | End: 2018-08-22 | Stop reason: SDUPTHER

## 2018-05-28 RX ORDER — PRAVASTATIN SODIUM 20 MG/1
20 TABLET ORAL DAILY
Qty: 30 TABLET | Refills: 5 | Status: SHIPPED | OUTPATIENT
Start: 2018-05-28 | End: 2018-08-22 | Stop reason: SDUPTHER

## 2018-05-28 RX ORDER — COLCHICINE 0.6 MG/1
0.6 TABLET ORAL DAILY
Qty: 30 TABLET | Refills: 5 | Status: SHIPPED | OUTPATIENT
Start: 2018-05-28 | End: 2018-08-22 | Stop reason: SDUPTHER

## 2018-05-28 NOTE — PROGRESS NOTES
HISTORY OF PRESENT ILLNESS:  David Corona Jr. is a 53 y.o. male who presents to the clinic today for Hypertension; Diabetes; and Hyperlipidemia  .  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  He states that he has been exercising for the last month.  He can do about 5 min on a stationary bicycle.  He also does some weights.  He does about 30 min a day.  He has noticed a little less lower extremity edema since he started exercising.  He is also improving his dietary habits.  He realizes he needs to be series about weight loss.  He denies cardiac chest pain or shortness of breath.  He states blood sugars have been well controlled.  He denies any problems with low blood sugars.  If he reports compliance with his CPAP machine.  He denies any recent gout flare-ups.  He does not check his blood pressures at home.  He denies abdominal pain or temperature intolerance.      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        PAST SURGICAL HISTORY:  Past Surgical History:   Procedure Laterality Date    CATARACT EXTRACTION W/  INTRAOCULAR LENS IMPLANT  Right 2007    COLONOSCOPY N/A 11/22/2016    Procedure: COLONOSCOPY;  Surgeon: Abdi La MD;  Location: [facility] ([address] [place] [address]);  Service: Endoscopy;  Laterality: N/A;  2nd floor case/BMI 59.13/wants week of Thanksgiving       SOCIAL HISTORY:  Social History     Social History    Marital status:      Spouse name: N/A    Number of children: 3    Years of education: N/A     Occupational History     [employer]          Social History Main Topics    Smoking status: Never Smoker    Smokeless tobacco: Never Used    Alcohol use 0.0 oz/week      Comment: rarely    Drug use: No    Sexual activity: Not on file     Other Topics Concern    Not on file     Social History Narrative    No narrative on file       FAMILY HISTORY:  Family History   Problem Relation Age of Onset    Breast cancer Mother     Diabetes Mother     Hypertension Mother     Stroke Mother     Prostate cancer Father     Diabetes Father     Hypertension Father     No Known Problems Sister     No Known Problems Brother     Breast cancer Maternal Grandmother     Amblyopia Neg Hx     Blindness Neg Hx     Cataracts Neg Hx     Glaucoma Neg Hx     Macular degeneration Neg Hx     Retinal detachment Neg Hx     Strabismus Neg Hx        ALLERGIES AND MEDICATIONS: updated and reviewed.  Review of patient's allergies indicates:  No Known Allergies  Medication List with Changes/Refills   Current Medications    ASPIRIN 325 MG TABLET    Take by mouth. 1 Tablet Oral Every day    BACITRACIN 500 UNIT/GRAM OINT    Apply topically 2 (two) times daily.    BLOOD SUGAR DIAGNOSTIC (CONTOUR TEST STRIPS) STRP    USE THREE TIMES DAILY    HUMALOG KWIKPEN INSULIN 100 UNIT/ML INPN PEN    ADMINISTER 15 UNITS UNDER THE SKIN THREE TIMES DAILY WITH MEALS    HYDROCODONE-ACETAMINOPHEN 5-325MG (NORCO) 5-325 MG PER TABLET    Take 1 tablet by mouth every 6 (six) hours as needed for Pain.    LANCETS (MICROLET  "LANCET) MISC    TEST THREE TIMES DAILY    OMEGA-3 FATTY ACIDS 1,000 MG CAP    Take by mouth. 2 Capsule Oral Every day    PEN NEEDLE, DIABETIC (NOVOFINE 32) 32 GAUGE X 1/4" NDLE    USE FOUR TIMES DAILY AS NEEDED   Changed and/or Refilled Medications    Modified Medication Previous Medication    AMLODIPINE (NORVASC) 5 MG TABLET amLODIPine (NORVASC) 5 MG tablet       Take 1 tablet (5 mg total) by mouth once daily.    Take 1 tablet (5 mg total) by mouth once daily.    COLCHICINE 0.6 MG TABLET colchicine 0.6 mg tablet       Take 1 tablet (0.6 mg total) by mouth once daily.    Take 1 tablet (0.6 mg total) by mouth once daily.    DILTIAZEM (CARDIZEM) 120 MG TABLET diltiaZEM (CARDIZEM) 120 MG tablet       Take 1 tablet (120 mg total) by mouth once daily.    TAKE 1 TABLET(120 MG) BY MOUTH EVERY DAY    FUROSEMIDE (LASIX) 40 MG TABLET furosemide (LASIX) 40 MG tablet       TAKE 1 TABLET(40 MG) BY MOUTH TWICE DAILY    TAKE 1 TABLET(40 MG) BY MOUTH TWICE DAILY    GLIMEPIRIDE (AMARYL) 2 MG TABLET glimepiride (AMARYL) 2 MG tablet       TAKE 2 TABLETS(4 MG) BY MOUTH TWICE DAILY    TAKE 2 TABLETS(4 MG) BY MOUTH TWICE DAILY    INSULIN GLARGINE (LANTUS SOLOSTAR U-100 INSULIN) 100 UNIT/ML (3 ML) INPN PEN insulin glargine (LANTUS SOLOSTAR) 100 unit/mL (3 mL) InPn pen       INJECT 26 UNITS UNDER THE SKIN NIGHTLY    INJECT 26 UNITS UNDER THE SKIN NIGHTLY    LISINOPRIL (PRINIVIL,ZESTRIL) 40 MG TABLET lisinopril (PRINIVIL,ZESTRIL) 40 MG tablet       Take 1 tablet (40 mg total) by mouth 2 (two) times daily.    Take 1 tablet (40 mg total) by mouth 2 (two) times daily.    PIOGLITAZONE (ACTOS) 45 MG TABLET pioglitazone (ACTOS) 45 MG tablet       Take 1 tablet (45 mg total) by mouth once daily.    Take 1 tablet (45 mg total) by mouth once daily.    PRAVASTATIN (PRAVACHOL) 20 MG TABLET pravastatin (PRAVACHOL) 20 MG tablet       Take 1 tablet (20 mg total) by mouth once daily.    Take 1 tablet (20 mg total) by mouth once daily.          CARE " "TEAM:  Patient Care Team:  Riddhi Galindo MD as PCP - General (Internal Medicine)  Vick Cornelius MD as Consulting Physician (Nephrology)         REVIEW OF SYSTEMS:  Review of Systems   Constitutional: Negative for appetite change, chills, fatigue, fever and unexpected weight change.   HENT: Negative for congestion, dental problem, ear discharge, ear pain, nosebleeds, sinus pain, sore throat and trouble swallowing.    Eyes: Negative for pain, discharge, itching and visual disturbance.   Respiratory: Negative for cough, chest tightness, shortness of breath, wheezing and stridor.    Cardiovascular: Positive for leg swelling. Negative for chest pain and palpitations.   Gastrointestinal: Negative for abdominal distention, abdominal pain, blood in stool, diarrhea, nausea and vomiting.   Endocrine: Negative for cold intolerance, heat intolerance, polydipsia and polyuria.   Genitourinary: Negative for difficulty urinating, dysuria, flank pain, frequency, hematuria and testicular pain.   Musculoskeletal: Positive for arthralgias. Negative for back pain, joint swelling and myalgias.   Skin: Negative for rash.   Allergic/Immunologic: Negative for food allergies.   Neurological: Negative for dizziness, tremors, seizures, syncope, weakness, light-headedness, numbness and headaches.   Hematological: Negative for adenopathy. Does not bruise/bleed easily.   Psychiatric/Behavioral: Negative for behavioral problems, confusion, dysphoric mood and sleep disturbance. The patient is not nervous/anxious.          PHYSICAL EXAM:  Vitals:    05/28/18 0759   BP: 130/82   Pulse: 72   Temp: 98.3 °F (36.8 °C)     Weight: (!) 211 kg (465 lb 2.7 oz)   Height: 6' 2" (188 cm)   Body mass index is 59.72 kg/m².    Physical Examination: General appearance - alert, well appearing, and in no distress and morbidly obese  Mental status - alert, oriented to person, place, and time, normal mood, behavior, speech, dress, motor activity, and thought " processes  Eyes - pupils equal and reactive, extraocular eye movements intact, sclera anicteric  Mouth - mucous membranes moist, pharynx normal without lesions  Neck - supple, no significant adenopathy, carotids upstroke normal bilaterally, no bruits, thyroid exam: thyroid is normal in size without nodules or tenderness  Lymphatics - no palpable lymphadenopathy  Chest - clear to auscultation, no wheezes, rales or rhonchi, symmetric air entry  Heart - normal rate and regular rhythm, no gallops noted  Neurological - alert, oriented, normal speech, no focal findings or movement disorder noted, cranial nerves II through XII intact  Musculoskeletal - no muscular tenderness noted, Moderate osteoarthritic changes noted to both knee joints. No joint effusions noted.   Extremities - pedal edema trace-1 +, venous stasis dermatitis noted  Skin - normal coloration and turgor, no rashes, no suspicious skin lesions noted       ASSESSMENT AND PLAN:  1. Controlled type 2 diabetes mellitus with mild nonproliferative retinopathy and macular edema, with long-term current use of insulin, unspecified laterality/2. Controlled type 2 diabetes with neuropathy/3. Type 2 diabetes mellitus with stage 3 chronic kidney disease, with long-term current use of insulin  Diabetes currently is controlled for age and comorbid conditions. We discussed diabetic diet and regular exercise. We discussed home blood sugar monitoring, if appropriate. He is up to date on his eye exam. Neuropathy pain controlled. Stable kidney function. Observe. Patient counseled to avoid/minimize the use of anti-inflammatory  Medication. Discussed to stay well hydrated. Also discussed with patient that good control of blood pressure or diabetes, if present, will help to prevent progression.    - Hemoglobin A1c; Future  - pioglitazone (ACTOS) 45 MG tablet; Take 1 tablet (45 mg total) by mouth once daily.  Dispense: 30 tablet; Refill: 5  - insulin glargine (LANTUS SOLOSTAR U-100  INSULIN) 100 unit/mL (3 mL) InPn pen; INJECT 26 UNITS UNDER THE SKIN NIGHTLY  Dispense: 15 mL; Refill: 5  - glimepiride (AMARYL) 2 MG tablet; TAKE 2 TABLETS(4 MG) BY MOUTH TWICE DAILY  Dispense: 120 tablet; Refill: 5    4. Essential hypertension  Discussed sodium restriction, maintaining ideal body weight and regular exercise program as physiologic means to achieve blood pressure control. The patient will strive towards this. The current medical regimen is effective;  continue present plan and medications. Recommended patient to check home readings to monitor and see me for followup as scheduled or sooner as needed. Patient was educated that both decongestant and anti-inflammatory medication may raise blood pressure.   - CBC auto differential; Future  - lisinopril (PRINIVIL,ZESTRIL) 40 MG tablet; Take 1 tablet (40 mg total) by mouth 2 (two) times daily.  Dispense: 60 tablet; Refill: 5  - furosemide (LASIX) 40 MG tablet; TAKE 1 TABLET(40 MG) BY MOUTH TWICE DAILY  Dispense: 60 tablet; Refill: 5  - amLODIPine (NORVASC) 5 MG tablet; Take 1 tablet (5 mg total) by mouth once daily.  Dispense: 30 tablet; Refill: 5  - diltiaZEM (CARDIZEM) 120 MG tablet; Take 1 tablet (120 mg total) by mouth once daily.  Dispense: 30 tablet; Refill: 5    5. Hyperlipidemia LDL goal <100  We discussed low fat diet and regular exercise.The current medical regimen is effective;  continue present plan and medications.    - Comprehensive metabolic panel; Future  - Lipid panel; Future  - pravastatin (PRAVACHOL) 20 MG tablet; Take 1 tablet (20 mg total) by mouth once daily.  Dispense: 30 tablet; Refill: 5    6. TOSIN (obstructive sleep apnea)  Patient reports that they are compliant with their CPAP machine.  We discussed the potential ramifications of untreated sleep apnea, which could include daytime sleepiness, hypertension, heart disease including CHF, sudden death while sleeping and/or stroke. The patient was advised to abstain from driving should  they feel sleepy or drowsy.  We discussed potential treatment options, which could include weight loss, body positioning, continuous positive airway pressure (CPAP), or referral for surgical consideration.      7. Dependent edema/8. Venous stasis of lower extremity  Stable. Observe. We discussed low salt diet, leg elevation and support stockings.    9. Secondary hyperparathyroidism of renal origin  Stable. Asymptomatic. Observe.     10. Morbid obesity with BMI of 50.0-59.9, adult  The patient is asked to make an attempt to improve diet and exercise patterns to aid in medical management of this problem.  He declines consultation with weight loss management.    11. Gout, unspecified cause, unspecified chronicity, unspecified site  Patient currently asymptomatic.  Continue current regimen.  Gout precautions discussed: avoid alcohol, seafood and organ meats; stay well hydrated.   - colchicine 0.6 mg tablet; Take 1 tablet (0.6 mg total) by mouth once daily.  Dispense: 30 tablet; Refill: 5    12. Need for prophylactic vaccination against viral hepatitis  Hep B #1 today - routine vaccination for diabetic <60 per CDC guidelines.           Follow-up in about 6 months (around 11/28/2018), or if symptoms worsen or fail to improve, for annual exam. or sooner as needed.

## 2018-06-01 RX ORDER — INSULIN LISPRO 100 [IU]/ML
INJECTION, SOLUTION INTRAVENOUS; SUBCUTANEOUS
Qty: 15 ML | Refills: 1 | Status: SHIPPED | OUTPATIENT
Start: 2018-06-01 | End: 2018-07-30 | Stop reason: SDUPTHER

## 2018-07-25 ENCOUNTER — OFFICE VISIT (OUTPATIENT)
Dept: NEPHROLOGY | Facility: CLINIC | Age: 53
End: 2018-07-25
Payer: COMMERCIAL

## 2018-07-25 VITALS
HEIGHT: 74 IN | SYSTOLIC BLOOD PRESSURE: 110 MMHG | BODY MASS INDEX: 40.43 KG/M2 | HEART RATE: 103 BPM | DIASTOLIC BLOOD PRESSURE: 80 MMHG | OXYGEN SATURATION: 96 % | WEIGHT: 315 LBS

## 2018-07-25 DIAGNOSIS — N18.30 CONTROLLED TYPE 2 DIABETES MELLITUS WITH STAGE 3 CHRONIC KIDNEY DISEASE, WITH LONG-TERM CURRENT USE OF INSULIN: Primary | ICD-10-CM

## 2018-07-25 DIAGNOSIS — Z79.4 CONTROLLED TYPE 2 DIABETES MELLITUS WITH STAGE 3 CHRONIC KIDNEY DISEASE, WITH LONG-TERM CURRENT USE OF INSULIN: Primary | ICD-10-CM

## 2018-07-25 DIAGNOSIS — I12.9 HYPERTENSIVE KIDNEY DISEASE WITH CHRONIC KIDNEY DISEASE, STAGE 1-4 OR UNSPECIFIED CHRONIC KIDNEY DISEASE: ICD-10-CM

## 2018-07-25 DIAGNOSIS — D63.1 ANEMIA OF CHRONIC RENAL FAILURE, STAGE 3 (MODERATE): ICD-10-CM

## 2018-07-25 DIAGNOSIS — N25.81 HYPERPARATHYROIDISM DUE TO RENAL INSUFFICIENCY: ICD-10-CM

## 2018-07-25 DIAGNOSIS — N18.30 ANEMIA OF CHRONIC RENAL FAILURE, STAGE 3 (MODERATE): ICD-10-CM

## 2018-07-25 DIAGNOSIS — E11.22 CONTROLLED TYPE 2 DIABETES MELLITUS WITH STAGE 3 CHRONIC KIDNEY DISEASE, WITH LONG-TERM CURRENT USE OF INSULIN: Primary | ICD-10-CM

## 2018-07-25 PROCEDURE — 3008F BODY MASS INDEX DOCD: CPT | Mod: CPTII,S$GLB,, | Performed by: INTERNAL MEDICINE

## 2018-07-25 PROCEDURE — 3074F SYST BP LT 130 MM HG: CPT | Mod: CPTII,S$GLB,, | Performed by: INTERNAL MEDICINE

## 2018-07-25 PROCEDURE — 3044F HG A1C LEVEL LT 7.0%: CPT | Mod: CPTII,S$GLB,, | Performed by: INTERNAL MEDICINE

## 2018-07-25 PROCEDURE — 99999 PR PBB SHADOW E&M-EST. PATIENT-LVL III: CPT | Mod: PBBFAC,,, | Performed by: INTERNAL MEDICINE

## 2018-07-25 PROCEDURE — 99213 OFFICE O/P EST LOW 20 MIN: CPT | Mod: S$GLB,,, | Performed by: INTERNAL MEDICINE

## 2018-07-25 PROCEDURE — 3079F DIAST BP 80-89 MM HG: CPT | Mod: CPTII,S$GLB,, | Performed by: INTERNAL MEDICINE

## 2018-07-30 RX ORDER — INSULIN LISPRO 100 [IU]/ML
INJECTION, SOLUTION INTRAVENOUS; SUBCUTANEOUS
Qty: 15 ML | Refills: 0 | Status: SHIPPED | OUTPATIENT
Start: 2018-07-30 | End: 2018-08-29 | Stop reason: SDUPTHER

## 2018-07-31 NOTE — PROGRESS NOTES
Subjective:       Patient ID: David Corona Jr. is a 53 y.o. Black or  male who presents for follow-up evaluation of Chronic Kidney Disease    HPI      Mr. Corona is a 53 year old man with past medical history of hypertension, diabetes, presenting for follow up of chronic kidney disease.  Patient reports blood sugars have been well-controlled, has not checked his blood pressure.  He otherwise denies any fever, chest pain, shortness of breath, abdominal pain, diarrhea, dysuria/hematuria.     Review of Systems   Constitutional: Negative for appetite change, fatigue and fever.   Respiratory: Negative for cough and shortness of breath.    Cardiovascular: Negative for chest pain and leg swelling.   Gastrointestinal: Negative for abdominal pain, constipation, diarrhea, nausea and vomiting.   Genitourinary: Negative for dysuria, flank pain, frequency, hematuria and urgency.   Musculoskeletal: Negative for arthralgias, back pain and joint swelling.   Skin: Negative for rash.   Neurological: Negative for dizziness and light-headedness.   All other systems reviewed and are negative.      Objective:      Physical Exam   Constitutional: He appears well-developed and well-nourished.   Cardiovascular: Normal rate and regular rhythm.  Exam reveals no gallop and no friction rub.    No murmur heard.  Pulmonary/Chest: Effort normal and breath sounds normal. No respiratory distress. He has no wheezes. He has no rales.   Musculoskeletal: He exhibits no edema.   Neurological: He is alert.   Skin: Skin is warm and dry. No rash noted.   Vitals reviewed.      Assessment:       1. Controlled type 2 diabetes mellitus with stage 3 chronic kidney disease, with long-term current use of insulin    2. Hypertensive kidney disease with chronic kidney disease, stage 1-4 or unspecified chronic kidney disease    3. Hyperparathyroidism due to renal insufficiency    4. Anemia of chronic renal failure, stage 3 (moderate)        Plan:      Mr. Corona is a 53 year old man with past medical history of hypertension, diabetes, presenting for follow up of chronic kidney disease stage III.  Patient likely with hypertensive nephrosclerosis v. diabetic nephropathy.  Patient creatinine previously within baseline range, will continue to trend.  Stressed importance of blood pressure/glycemic control, along with weight loss, to prevent any further progression of kidney disease, patient voiced understanding.   - Anemia: Hgb at goal, no indication for erythropoetin therapy  - Bone/mineral metabolism: patient with secondary hyperparathyroidism, PTH at goal for stage CKD, will continue ergocalciferol given VitD deficiency    Return to clinic 6 months pending renal panel 2 months, with renal/heme panel, urinalysis, PTH/VitD prior to next visit

## 2018-08-22 DIAGNOSIS — E78.5 HYPERLIPIDEMIA LDL GOAL <100: ICD-10-CM

## 2018-08-22 DIAGNOSIS — E11.40 CONTROLLED TYPE 2 DIABETES WITH NEUROPATHY: ICD-10-CM

## 2018-08-22 DIAGNOSIS — M10.9 GOUT, UNSPECIFIED CAUSE, UNSPECIFIED CHRONICITY, UNSPECIFIED SITE: ICD-10-CM

## 2018-08-22 DIAGNOSIS — I10 ESSENTIAL HYPERTENSION: ICD-10-CM

## 2018-08-22 RX ORDER — LISINOPRIL 40 MG/1
40 TABLET ORAL 2 TIMES DAILY
Qty: 60 TABLET | Refills: 5 | Status: SHIPPED | OUTPATIENT
Start: 2018-08-22 | End: 2019-01-14 | Stop reason: SDUPTHER

## 2018-08-22 RX ORDER — DILTIAZEM HYDROCHLORIDE 120 MG/1
120 TABLET, FILM COATED ORAL DAILY
Qty: 30 TABLET | Refills: 5 | Status: SHIPPED | OUTPATIENT
Start: 2018-08-22 | End: 2019-01-16

## 2018-08-22 RX ORDER — PIOGLITAZONEHYDROCHLORIDE 45 MG/1
45 TABLET ORAL DAILY
Qty: 30 TABLET | Refills: 5 | Status: SHIPPED | OUTPATIENT
Start: 2018-08-22 | End: 2019-03-02 | Stop reason: SDUPTHER

## 2018-08-22 RX ORDER — PRAVASTATIN SODIUM 20 MG/1
20 TABLET ORAL DAILY
Qty: 30 TABLET | Refills: 5 | Status: SHIPPED | OUTPATIENT
Start: 2018-08-22 | End: 2019-03-18 | Stop reason: SDUPTHER

## 2018-08-22 RX ORDER — BLOOD SUGAR DIAGNOSTIC
STRIP MISCELLANEOUS
Qty: 100 EACH | Refills: 5 | Status: SHIPPED | OUTPATIENT
Start: 2018-08-22 | End: 2019-04-24 | Stop reason: SDUPTHER

## 2018-08-22 RX ORDER — FUROSEMIDE 40 MG/1
TABLET ORAL
Qty: 60 TABLET | Refills: 5 | Status: SHIPPED | OUTPATIENT
Start: 2018-08-22 | End: 2019-09-04 | Stop reason: SDUPTHER

## 2018-08-22 RX ORDER — COLCHICINE 0.6 MG/1
0.6 TABLET ORAL DAILY
Qty: 30 TABLET | Refills: 5 | Status: SHIPPED | OUTPATIENT
Start: 2018-08-22 | End: 2019-02-25 | Stop reason: SDUPTHER

## 2018-08-22 RX ORDER — AMLODIPINE BESYLATE 5 MG/1
5 TABLET ORAL DAILY
Qty: 30 TABLET | Refills: 5 | Status: SHIPPED | OUTPATIENT
Start: 2018-08-22 | End: 2019-01-16

## 2018-08-29 RX ORDER — INSULIN LISPRO 100 [IU]/ML
INJECTION, SOLUTION INTRAVENOUS; SUBCUTANEOUS
Qty: 15 ML | Refills: 0 | Status: SHIPPED | OUTPATIENT
Start: 2018-08-29 | End: 2018-09-29 | Stop reason: SDUPTHER

## 2018-08-30 ENCOUNTER — PATIENT MESSAGE (OUTPATIENT)
Dept: FAMILY MEDICINE | Facility: CLINIC | Age: 53
End: 2018-08-30

## 2018-09-30 RX ORDER — INSULIN LISPRO 100 [IU]/ML
INJECTION, SOLUTION INTRAVENOUS; SUBCUTANEOUS
Qty: 15 ML | Refills: 0 | Status: SHIPPED | OUTPATIENT
Start: 2018-09-30 | End: 2018-11-08 | Stop reason: SDUPTHER

## 2018-10-08 ENCOUNTER — LAB VISIT (OUTPATIENT)
Dept: LAB | Facility: HOSPITAL | Age: 53
End: 2018-10-08
Attending: INTERNAL MEDICINE
Payer: COMMERCIAL

## 2018-10-08 DIAGNOSIS — E11.22 CONTROLLED TYPE 2 DIABETES MELLITUS WITH STAGE 3 CHRONIC KIDNEY DISEASE, WITH LONG-TERM CURRENT USE OF INSULIN: ICD-10-CM

## 2018-10-08 DIAGNOSIS — N18.30 CONTROLLED TYPE 2 DIABETES MELLITUS WITH STAGE 3 CHRONIC KIDNEY DISEASE, WITH LONG-TERM CURRENT USE OF INSULIN: ICD-10-CM

## 2018-10-08 DIAGNOSIS — Z79.4 CONTROLLED TYPE 2 DIABETES MELLITUS WITH STAGE 3 CHRONIC KIDNEY DISEASE, WITH LONG-TERM CURRENT USE OF INSULIN: ICD-10-CM

## 2018-10-08 LAB
BILIRUB UR QL STRIP: NEGATIVE
CLARITY UR REFRACT.AUTO: CLEAR
COLOR UR AUTO: YELLOW
CREAT UR-MCNC: 160 MG/DL
GLUCOSE UR QL STRIP: NEGATIVE
HGB UR QL STRIP: NEGATIVE
KETONES UR QL STRIP: NEGATIVE
LEUKOCYTE ESTERASE UR QL STRIP: NEGATIVE
NITRITE UR QL STRIP: NEGATIVE
PH UR STRIP: 6 [PH] (ref 5–8)
PROT UR QL STRIP: NEGATIVE
PROT UR-MCNC: 9 MG/DL
PROT/CREAT UR: 0.06 MG/G{CREAT}
SP GR UR STRIP: 1.01 (ref 1–1.03)
URN SPEC COLLECT METH UR: NORMAL
UROBILINOGEN UR STRIP-ACNC: NEGATIVE EU/DL

## 2018-10-08 PROCEDURE — 81003 URINALYSIS AUTO W/O SCOPE: CPT

## 2018-10-08 PROCEDURE — 82570 ASSAY OF URINE CREATININE: CPT

## 2018-10-09 ENCOUNTER — TELEPHONE (OUTPATIENT)
Dept: FAMILY MEDICINE | Facility: CLINIC | Age: 53
End: 2018-10-09

## 2018-10-09 NOTE — TELEPHONE ENCOUNTER
Spoke with patient and he wanted to schedule follow up with DR Galindo for his diabetes. Informed him that Dr Galindo next available date is 2/1/19. Patient decline seeing anyone else and schedule for 2/1/19.

## 2018-10-16 RX ORDER — INSULIN GLARGINE 100 [IU]/ML
INJECTION, SOLUTION SUBCUTANEOUS
Qty: 15 ML | Refills: 0 | Status: SHIPPED | OUTPATIENT
Start: 2018-10-16 | End: 2018-11-08 | Stop reason: SDUPTHER

## 2018-10-22 ENCOUNTER — TELEPHONE (OUTPATIENT)
Dept: FAMILY MEDICINE | Facility: CLINIC | Age: 53
End: 2018-10-22

## 2018-10-22 DIAGNOSIS — G47.33 OSA (OBSTRUCTIVE SLEEP APNEA): Primary | ICD-10-CM

## 2018-10-22 NOTE — TELEPHONE ENCOUNTER
----- Message from Manuel Nash sent at 10/22/2018  1:38 PM CDT -----  Contact: Self/[phone]  Patient called to request an Order for Cleaning Supplies (CPAP Machine). Thank you.

## 2018-10-23 NOTE — TELEPHONE ENCOUNTER
Pt requesting Rx not listed: Diphenhist 25mg Cab   Spoke with patient.  He states his insurance will pay for a nebulizer cleaning machine if I write a prescription.  We will fax it to [facility].

## 2018-11-08 RX ORDER — INSULIN GLARGINE 100 [IU]/ML
INJECTION, SOLUTION SUBCUTANEOUS
Qty: 15 ML | Refills: 0 | Status: SHIPPED | OUTPATIENT
Start: 2018-11-08 | End: 2019-06-06 | Stop reason: SDUPTHER

## 2018-11-08 RX ORDER — INSULIN LISPRO 100 [IU]/ML
INJECTION, SOLUTION INTRAVENOUS; SUBCUTANEOUS
Qty: 15 ML | Refills: 0 | Status: SHIPPED | OUTPATIENT
Start: 2018-11-08 | End: 2018-12-05 | Stop reason: SDUPTHER

## 2018-11-09 ENCOUNTER — TELEPHONE (OUTPATIENT)
Dept: FAMILY MEDICINE | Facility: CLINIC | Age: 53
End: 2018-11-09

## 2018-11-09 NOTE — TELEPHONE ENCOUNTER
----- Message from Rizwana Lino sent at 11/9/2018 10:01 AM CST -----  Contact: Self/ [phone]  Refill: [HUMALOG KWIKPEN INSULIN 100 unit/mL InPn pen]. Thank you.  .  [facility] [address] [place] [address] [place] [address] [place] AT [address]  [place] [address]  Phone: [phone] Fax: [phone]

## 2018-11-20 ENCOUNTER — LAB VISIT (OUTPATIENT)
Dept: LAB | Facility: HOSPITAL | Age: 53
End: 2018-11-20
Attending: NURSE PRACTITIONER
Payer: COMMERCIAL

## 2018-11-20 ENCOUNTER — OFFICE VISIT (OUTPATIENT)
Dept: FAMILY MEDICINE | Facility: CLINIC | Age: 53
End: 2018-11-20
Payer: COMMERCIAL

## 2018-11-20 VITALS
OXYGEN SATURATION: 95 % | BODY MASS INDEX: 40.43 KG/M2 | HEART RATE: 80 BPM | HEIGHT: 74 IN | TEMPERATURE: 99 F | DIASTOLIC BLOOD PRESSURE: 80 MMHG | SYSTOLIC BLOOD PRESSURE: 142 MMHG | WEIGHT: 315 LBS

## 2018-11-20 DIAGNOSIS — G47.33 OSA (OBSTRUCTIVE SLEEP APNEA): ICD-10-CM

## 2018-11-20 DIAGNOSIS — E78.5 HYPERLIPIDEMIA LDL GOAL <100: ICD-10-CM

## 2018-11-20 DIAGNOSIS — N25.81 SECONDARY HYPERPARATHYROIDISM OF RENAL ORIGIN: ICD-10-CM

## 2018-11-20 DIAGNOSIS — E66.01 MORBID OBESITY WITH BMI OF 50.0-59.9, ADULT: ICD-10-CM

## 2018-11-20 DIAGNOSIS — H25.12 NUCLEAR SCLEROSIS, LEFT: ICD-10-CM

## 2018-11-20 DIAGNOSIS — I10 ESSENTIAL HYPERTENSION: ICD-10-CM

## 2018-11-20 DIAGNOSIS — N18.30 CKD (CHRONIC KIDNEY DISEASE) STAGE 3, GFR 30-59 ML/MIN: Primary | ICD-10-CM

## 2018-11-20 LAB
ESTIMATED AVG GLUCOSE: 146 MG/DL
HBA1C MFR BLD HPLC: 6.7 %

## 2018-11-20 PROCEDURE — 99999 PR PBB SHADOW E&M-EST. PATIENT-LVL V: CPT | Mod: PBBFAC,,, | Performed by: NURSE PRACTITIONER

## 2018-11-20 PROCEDURE — 99214 OFFICE O/P EST MOD 30 MIN: CPT | Mod: S$GLB,,, | Performed by: NURSE PRACTITIONER

## 2018-11-20 PROCEDURE — 83036 HEMOGLOBIN GLYCOSYLATED A1C: CPT

## 2018-11-20 PROCEDURE — 36415 COLL VENOUS BLD VENIPUNCTURE: CPT | Mod: PO

## 2018-11-20 PROCEDURE — 3008F BODY MASS INDEX DOCD: CPT | Mod: CPTII,S$GLB,, | Performed by: NURSE PRACTITIONER

## 2018-11-20 PROCEDURE — 3079F DIAST BP 80-89 MM HG: CPT | Mod: CPTII,S$GLB,, | Performed by: NURSE PRACTITIONER

## 2018-11-20 PROCEDURE — 3077F SYST BP >= 140 MM HG: CPT | Mod: CPTII,S$GLB,, | Performed by: NURSE PRACTITIONER

## 2018-11-20 PROCEDURE — 3044F HG A1C LEVEL LT 7.0%: CPT | Mod: CPTII,S$GLB,, | Performed by: NURSE PRACTITIONER

## 2018-11-20 NOTE — PROGRESS NOTES
Subjective:       Patient ID: David Corona Jr. is a 53 y.o. male.    Chief Complaint: Annual Exam    53-year-old male presents to the clinic today for annual physical.  He states he has fair dietary habits.  He does not exercise.  He is compliant with all of his medications.  He denies any headaches, dizziness, or blurred vision.  He denies any cardiac chest pain, heart palpitations, shortness breath, or swelling to lower extremities.  He denies any abdominal pain, nausea, vomiting, diarrhea, constipation, or blood in stool.  His blood pressure today is 142/80.  Has not taking his blood pressure medications this morning yet.  He h ad his flu shot at work on 10/08/2018.  He is due for a foot exam today and hemoglobin A1c.  He denies any complaints today.      Past Medical History:   Diagnosis Date    Arthritis     Cataract     left eye     CKD (chronic kidney disease) stage 3, GFR 30-59 ml/min     Colon polyp 11/2016    Dependent edema     Diverticulosis     Gout, arthritis     Hyperlipidemia LDL goal <100     Hypertension     Morbid obesity     TOSIN (obstructive sleep apnea)     Peripheral autonomic neuropathy in disorders classified elsewhere(337.1)     Proteinuria     Type II or unspecified type diabetes mellitus with neurological manifestations, uncontrolled(250.62)     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      Past Surgical History:   Procedure Laterality Date    CATARACT EXTRACTION W/  INTRAOCULAR LENS IMPLANT Right 2007    COLONOSCOPY N/A 11/22/2016    Procedure: COLONOSCOPY;  Surgeon: Abdi La MD;  Location: [facility] ([address] [place] [address]);  Service: Endoscopy;  Laterality: N/A;  2nd floor case/BMI 59.13/wants week of Thanksgiving    COLONOSCOPY N/A 11/22/2016    Performed by Abdi La MD at [facility] ([address] [place] [address])      reports that  has never smoked.  he has never used smokeless tobacco. He reports that he drinks alcohol. He reports that he does not use drugs.  Review of Systems   Constitutional: Negative for chills, fatigue and fever.   HENT: Negative for congestion, ear discharge, ear pain, nosebleeds, postnasal drip, rhinorrhea, sinus pressure, sneezing and sore throat.    Eyes: Negative for pain, discharge and itching.   Respiratory: Negative for cough, shortness of breath and wheezing.    Cardiovascular: Negative for chest pain, palpitations and leg swelling.   Gastrointestinal: Negative for abdominal pain, constipation, diarrhea, nausea and vomiting.   Musculoskeletal: Negative for back pain, gait problem and neck pain.   Skin: Negative for color change and rash.   Neurological: Negative for dizziness, light-headedness and headaches.       Objective:      Physical Exam   Constitutional: He is oriented to person, place, and time. He appears well-developed and well-nourished. No distress.   Eyes: Conjunctivae and EOM are normal. Pupils are equal, round, and reactive to light. Right eye exhibits no discharge. Left eye exhibits no discharge. No scleral icterus.   Neck: Normal range of motion. Neck supple. No JVD present.   Cardiovascular: Normal rate, regular rhythm and normal heart sounds.   No murmur heard.  Pulmonary/Chest: Effort normal and breath sounds normal. No respiratory distress. He has no wheezes. He has no rales.   Abdominal: Soft. Bowel sounds are normal. There is no tenderness.   Musculoskeletal: Normal range of motion. He exhibits no edema.   Protective Sensation (w/ 10 gram monofilament):  Right: Intact  Left: Intact    Visual Inspection:  Normal -  Bilateral    Pedal Pulses:   Right: Present  Left: Present    Posterior tibialis:   Right:Present  Left: Present  .   Neurological: He is alert and oriented to person, place, and time.   Skin: Skin is warm and dry. He is not diaphoretic.   Psychiatric: He has a normal mood and affect.        Assessment:       1. CKD (chronic kidney disease) stage 3, GFR 30-59 ml/min    2. Hyperlipidemia LDL goal <100    3. Essential hypertension    4. Morbid obesity with BMI of 50.0-59.9, adult    5. Nuclear sclerosis, left    6. TOSIN (obstructive sleep apnea)    7. Secondary hyperparathyroidism of renal origin    8. Type 2 diabetes mellitus, uncontrolled, with neuropathy    9. 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 Uncontrolled type 2 diabetes with stage 3 chronic kidney disease GFR 30-59        Plan:         CKD (chronic kidney disease) stage 3, GFR 30-59 ml/min  - avoid all anti-inflammatories like Advil, Aleve, Ibuprofen and Naprosyn and stay wee hydrated     Hyperlipidemia LDL goal <100  - The current medical regimen is effective;  continue present plan and medications.    Essential hypertension  - continue all current medications  - blood pressure check up with nurse in 2 weeks   - be sure to take blood pressure medications before come in for blood pressure check up    Morbid obesity with BMI of 50.0-59.9, adult  - The patient is asked to make an attempt to improve diet and exercise patterns to aid in medical management of this problem.    Nuclear sclerosis, left  - followed by Dr. Mata    TOSIN (obstructive sleep apnea)  - uses CPAP machine every night     Secondary hyperparathyroidism of renal origin  - observe stable asymptomatic     Type 2 diabetes mellitus, uncontrolled, with neuropathy  -     Hemoglobin A1c; Future; Expected date: 11/20/2018    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, with long-term current use of insulin  - The current medical regimen is effective;  continue present plan and medications.    Uncontrolled type 2 diabetes with stage 3 chronic kidney disease GFR 30-59  - The current medical regimen is effective;  continue present plan  and medications.

## 2018-11-20 NOTE — PATIENT INSTRUCTIONS
Need to start a exercise program again  Continue to watch diet closely   HGB A1C today   Foot exam today   Blood pressure check up with nurse in 2 weeks  Be sure to take blood pressure medication before coming to get blood pressure rechecked

## 2018-12-04 ENCOUNTER — CLINICAL SUPPORT (OUTPATIENT)
Dept: FAMILY MEDICINE | Facility: CLINIC | Age: 53
End: 2018-12-04
Payer: COMMERCIAL

## 2018-12-04 ENCOUNTER — TELEPHONE (OUTPATIENT)
Dept: FAMILY MEDICINE | Facility: CLINIC | Age: 53
End: 2018-12-04

## 2018-12-04 VITALS — DIASTOLIC BLOOD PRESSURE: 82 MMHG | SYSTOLIC BLOOD PRESSURE: 138 MMHG

## 2018-12-04 DIAGNOSIS — I10 UNCONTROLLED HYPERTENSION: Primary | ICD-10-CM

## 2018-12-04 PROCEDURE — 99999 PR PBB SHADOW E&M-EST. PATIENT-LVL II: CPT | Mod: PBBFAC,,,

## 2018-12-04 NOTE — TELEPHONE ENCOUNTER
Spoke with patient - he will call his pharmacist and get back with me if he still needs a medication change.

## 2018-12-04 NOTE — PROGRESS NOTES
David Ajlobo Savage. 53 y.o. male is here today for Blood Pressure check.   History of HTN yes.    Review of patient's allergies indicates:  No Known Allergies  Creatinine   Date Value Ref Range Status   10/08/2018 1.7 (H) 0.5 - 1.4 mg/dL Final     Sodium   Date Value Ref Range Status   10/08/2018 139 136 - 145 mmol/L Final     Potassium   Date Value Ref Range Status   10/08/2018 5.1 3.5 - 5.1 mmol/L Final   ]  Patient verifies taking blood pressure medications on a regular basis at the same time of the day.     Current Outpatient Medications:     amLODIPine (NORVASC) 5 MG tablet, Take 1 tablet (5 mg total) by mouth once daily., Disp: 30 tablet, Rfl: 5    aspirin 325 MG tablet, Take by mouth. 1 Tablet Oral Every day, Disp: , Rfl:     blood sugar diagnostic (CONTOUR TEST STRIPS) Strp, USE THREE TIMES DAILY, Disp: 100 each, Rfl: 11    colchicine 0.6 mg tablet, Take 1 tablet (0.6 mg total) by mouth once daily., Disp: 30 tablet, Rfl: 5    diltiaZEM (CARDIZEM) 120 MG tablet, Take 1 tablet (120 mg total) by mouth once daily., Disp: 30 tablet, Rfl: 5    furosemide (LASIX) 40 MG tablet, TAKE 1 TABLET(40 MG) BY MOUTH TWICE DAILY, Disp: 60 tablet, Rfl: 5    glimepiride (AMARYL) 2 MG tablet, TAKE 2 TABLETS(4 MG) BY MOUTH TWICE DAILY, Disp: 120 tablet, Rfl: 5    HUMALOG KWIKPEN INSULIN 100 unit/mL InPn pen, ADMINISTER 15 UNITS UNDER THE SKIN THREE TIMES DAILY WITH MEALS, Disp: 15 mL, Rfl: 0    hydrocodone-acetaminophen 5-325mg (NORCO) 5-325 mg per tablet, Take 1 tablet by mouth every 6 (six) hours as needed for Pain., Disp: 10 tablet, Rfl: 0    insulin glargine (LANTUS SOLOSTAR U-100 INSULIN) 100 unit/mL (3 mL) InPn pen, ADMINISTER 26 UNITS UNDER THE SKIN EVERY NIGHT, Disp: 15 mL, Rfl: 0    lancets (MICROLET LANCET) Misc, TEST THREE TIMES DAILY, Disp: 100 each, Rfl: 11    lisinopril (PRINIVIL,ZESTRIL) 40 MG tablet, Take 1 tablet (40 mg total) by mouth 2 (two) times daily., Disp: 60 tablet, Rfl: 5    miscellaneous  "medical supply Kit, Nebulizer machine cleaning supplies/apparatus, Disp: 1 kit, Rfl: 0    omega-3 fatty acids 1,000 mg Cap, Take by mouth. 2 Capsule Oral Every day, Disp: , Rfl:     pen needle, diabetic (NOVOFINE 32) 32 gauge x 1/4" Ndle, USE FOUR TIMES DAILY AS NEEDED, Disp: 100 each, Rfl: 5    pioglitazone (ACTOS) 45 MG tablet, Take 1 tablet (45 mg total) by mouth once daily., Disp: 30 tablet, Rfl: 5    pravastatin (PRAVACHOL) 20 MG tablet, Take 1 tablet (20 mg total) by mouth once daily., Disp: 30 tablet, Rfl: 5  Does patient have record of home blood pressure readings no.   Patient said that he does have a machine and will start taking.  Last dose of blood pressure medication was taken at 8:30 AM.  Patient is asymptomatic.       Vitals:    12/04/18 0938   BP: (!) 172/92   BP Location: Left arm   Patient Position: Sitting   BP Method: Large (Manual)         Dr. Galindo informed of nurse visit.   "

## 2018-12-04 NOTE — PROGRESS NOTES
BP very uncontrolled. I recommend consultation with cardiology for further evaluation/treatment. Consult order placed.

## 2018-12-05 ENCOUNTER — TELEPHONE (OUTPATIENT)
Dept: FAMILY MEDICINE | Facility: CLINIC | Age: 53
End: 2018-12-05

## 2018-12-05 RX ORDER — INSULIN LISPRO 100 [IU]/ML
INJECTION, SOLUTION INTRAVENOUS; SUBCUTANEOUS
Qty: 15 ML | Refills: 0 | Status: SHIPPED | OUTPATIENT
Start: 2018-12-05 | End: 2019-01-07 | Stop reason: SDUPTHER

## 2018-12-05 NOTE — TELEPHONE ENCOUNTER
Pt scheduled to see Dr. Messer on 1/2/19 @ 1:20p. The patient requested an appointment in January. Thanks.

## 2018-12-17 DIAGNOSIS — I10 ESSENTIAL HYPERTENSION: ICD-10-CM

## 2018-12-17 RX ORDER — LISINOPRIL 40 MG/1
TABLET ORAL
Qty: 60 TABLET | Refills: 0 | Status: SHIPPED | OUTPATIENT
Start: 2018-12-17 | End: 2019-01-14 | Stop reason: SDUPTHER

## 2018-12-31 NOTE — TELEPHONE ENCOUNTER
Please schedule next available office visit.   36 y/o F, reported to ED from OBGYN office. A&Ox3, c/o suprapubic pain. Pt reports that her pain is a 4/10 at rest comfortably but as soon as her pelvic area is palpated then her pain increases to 9/10. Pt reports that her pain started about 4 days ago. Pt reports that she had a D&C about 1 month ago. Pt reports that she then was diagnosed with endometriosis about 2 weeks ago. Pt denies any vaginal bleeding or discharge. Pt reports dysuria and frequency. Pt denies hematuria. Pt denies fevers or chills. Pt denies any LOC, H/A, SOB, C/P, N/V/D. Boyfriend at bedside, will continue to monitor pt.

## 2019-01-07 RX ORDER — INSULIN LISPRO 100 [IU]/ML
INJECTION, SOLUTION INTRAVENOUS; SUBCUTANEOUS
Qty: 15 ML | Refills: 0 | Status: SHIPPED | OUTPATIENT
Start: 2019-01-07 | End: 2019-02-13 | Stop reason: SDUPTHER

## 2019-01-14 DIAGNOSIS — I10 ESSENTIAL HYPERTENSION: ICD-10-CM

## 2019-01-14 DIAGNOSIS — E11.40 CONTROLLED TYPE 2 DIABETES WITH NEUROPATHY: ICD-10-CM

## 2019-01-14 RX ORDER — LISINOPRIL 40 MG/1
TABLET ORAL
Qty: 180 TABLET | Refills: 0 | Status: SHIPPED | OUTPATIENT
Start: 2019-01-14 | End: 2019-04-28 | Stop reason: SDUPTHER

## 2019-01-14 RX ORDER — GLIMEPIRIDE 2 MG/1
TABLET ORAL
Qty: 120 TABLET | Refills: 0 | Status: SHIPPED | OUTPATIENT
Start: 2019-01-14 | End: 2019-10-25 | Stop reason: SDUPTHER

## 2019-01-16 ENCOUNTER — OFFICE VISIT (OUTPATIENT)
Dept: CARDIOLOGY | Facility: CLINIC | Age: 54
End: 2019-01-16
Payer: COMMERCIAL

## 2019-01-16 VITALS
RESPIRATION RATE: 15 BRPM | SYSTOLIC BLOOD PRESSURE: 144 MMHG | HEART RATE: 95 BPM | DIASTOLIC BLOOD PRESSURE: 82 MMHG | HEIGHT: 74 IN | WEIGHT: 315 LBS | BODY MASS INDEX: 40.43 KG/M2 | OXYGEN SATURATION: 97 %

## 2019-01-16 DIAGNOSIS — E78.5 HYPERLIPIDEMIA LDL GOAL <100: ICD-10-CM

## 2019-01-16 DIAGNOSIS — G47.33 OSA (OBSTRUCTIVE SLEEP APNEA): ICD-10-CM

## 2019-01-16 DIAGNOSIS — E66.01 MORBID OBESITY WITH BMI OF 50.0-59.9, ADULT: ICD-10-CM

## 2019-01-16 DIAGNOSIS — I10 ESSENTIAL HYPERTENSION: Primary | ICD-10-CM

## 2019-01-16 DIAGNOSIS — N18.30 CKD (CHRONIC KIDNEY DISEASE) STAGE 3, GFR 30-59 ML/MIN: ICD-10-CM

## 2019-01-16 PROCEDURE — 99244 OFF/OP CNSLTJ NEW/EST MOD 40: CPT | Mod: S$GLB,,, | Performed by: INTERNAL MEDICINE

## 2019-01-16 PROCEDURE — 99244 PR OFFICE CONSULTATION,LEVEL IV: ICD-10-PCS | Mod: S$GLB,,, | Performed by: INTERNAL MEDICINE

## 2019-01-16 PROCEDURE — 93000 ELECTROCARDIOGRAM COMPLETE: CPT | Mod: S$GLB,,, | Performed by: INTERNAL MEDICINE

## 2019-01-16 PROCEDURE — 93000 EKG 12-LEAD: ICD-10-PCS | Mod: S$GLB,,, | Performed by: INTERNAL MEDICINE

## 2019-01-16 PROCEDURE — 99999 PR PBB SHADOW E&M-EST. PATIENT-LVL III: ICD-10-PCS | Mod: PBBFAC,,, | Performed by: INTERNAL MEDICINE

## 2019-01-16 PROCEDURE — 99999 PR PBB SHADOW E&M-EST. PATIENT-LVL III: CPT | Mod: PBBFAC,,, | Performed by: INTERNAL MEDICINE

## 2019-01-16 RX ORDER — DILTIAZEM HYDROCHLORIDE 240 MG/1
240 CAPSULE, COATED, EXTENDED RELEASE ORAL DAILY
Qty: 90 CAPSULE | Refills: 3 | Status: SHIPPED | OUTPATIENT
Start: 2019-01-16 | End: 2019-05-06 | Stop reason: SDUPTHER

## 2019-01-16 RX ORDER — ASPIRIN 81 MG/1
81 TABLET ORAL DAILY
Qty: 90 TABLET | Refills: 3 | COMMUNITY
Start: 2019-01-16 | End: 2024-03-18 | Stop reason: SDUPTHER

## 2019-01-16 NOTE — PROGRESS NOTES
CARDIOVASCULAR CONSULTATION    REASON FOR CONSULT:   David Corona Jr. is a 53 y.o. male who presents for eval/mgmt of HTN.    Req/PCP: Josie  Neph: [place]  HISTORY OF PRESENT ILLNESS:   The patient is a very pleasant 53-year-old  man referred at the request of his primary care physician for evaluation and management of uncontrolled high blood pressure.  The patient denies angina or dyspnea.  He has had no palpitations, lightheadedness, dizziness, or syncope.  There has been no PND, orthopnea, or lower extremity edema.  He denies melena, hematuria, or claudicant symptoms.  He tells me he has been compliant with his CPAP apparatus and all of his medications.  He also tells me he is taking baby aspirin at present, not 325 mg.    Family history appears to be negative for premature CAD in first-degree relatives.    The patient is a nonsmoker.    Vitals - 1 value per visit 12/20/2017 3/5/2018 5/28/2018 7/25/2018   SYSTOLIC 150  130 110   DIASTOLIC 80  82 80     Vitals - 1 value per visit 11/20/2018 12/4/2018   SYSTOLIC 142 138   DIASTOLIC 80 82       CARDIOVASCULAR HISTORY:   TOSIN on CPAP    PAST MEDICAL HISTORY:     Past Medical History:   Diagnosis Date    Arthritis     Cataract     left eye     CKD (chronic kidney disease) stage 3, GFR 30-59 ml/min     Colon polyp 11/2016    Dependent edema     Diverticulosis     Gout, arthritis     Hyperlipidemia LDL goal <100     Hypertension     Morbid obesity     TOSIN (obstructive sleep apnea)     Peripheral autonomic neuropathy in disorders classified elsewhere(337.1)     Proteinuria     Type II or unspecified type diabetes mellitus with neurological manifestations, uncontrolled(250.62)     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 If you have any questions, ask your nurse or doctor.               CONTINUE taking these medications    amLODIPine 5 MG tablet  Commonly known as:  NORVASC  Take 1 tablet (5 mg total) by mouth once daily.     aspirin 325 MG tablet     blood sugar diagnostic Strp  Commonly known as:  CONTOUR TEST STRIPS  USE THREE TIMES DAILY     colchicine 0.6 mg tablet  Commonly known as:  COLCRYS  Take 1 tablet (0.6 mg total) by mouth once daily.     diltiaZEM 120 MG tablet  Commonly known as:  CARDIZEM  Take 1 tablet (120 mg total) by mouth once daily.     furosemide 40 MG tablet  Commonly known as:  LASIX  TAKE 1 TABLET(40 MG) BY MOUTH TWICE DAILY     glimepiride 2 MG tablet  Commonly known as:  AMARYL  TAKE 2 TABLETS(4 MG) BY MOUTH TWICE DAILY     HYDROcodone-acetaminophen 5-325 mg per tablet  Commonly known as:  NORCO  Take 1 tablet by mouth every 6 (six) hours as needed for Pain.     insulin glargine 100 units/mL (3mL) SubQ pen  Commonly known as:  LANTUS SOLOSTAR U-100 INSULIN  ADMINISTER 26 UNITS UNDER THE SKIN EVERY NIGHT     insulin lispro 100 unit/mL pen  Commonly known as:  HumaLOG KwikPen Insulin  ADMINISTER 15 UNITS UNDER THE SKIN THREE TIMES DAILY WITH MEALS     lancets Misc  Commonly known as:  MICROLET LANCET  TEST THREE TIMES DAILY     lisinopril 40 MG tablet  Commonly known as:  PRINIVIL,ZESTRIL  TAKE 1 TABLET BY MOUTH TWICE DAILY     miscellaneous medical supply Kit  Nebulizer machine cleaning supplies/apparatus     omega-3 fatty acids 1,000 mg Cap     pen needle, diabetic 32 gauge x 1/4" Ndle  Commonly known as:  NOVOFINE 32  USE FOUR TIMES DAILY AS NEEDED     pioglitazone 45 MG " tablet  Commonly known as:  ACTOS  Take 1 tablet (45 mg total) by mouth once daily.     pravastatin 20 MG tablet  Commonly known as:  PRAVACHOL  Take 1 tablet (20 mg total) by mouth once daily.            SOCIAL HISTORY:     Social History     Socioeconomic History    Marital status:      Spouse name: Not on file    Number of children: 3    Years of education: Not on file    Highest education level: Not on file   Social Needs    Financial resource strain: Not on file    Food insecurity - worry: Not on file    Food insecurity - inability: Not on file    Transportation needs - medical: Not on file    Transportation needs - non-medical: Not on file   Occupational History    Occupation:      Employer: [employer]    Occupation:    Tobacco Use    Smoking status: Never Smoker    Smokeless tobacco: Never Used   Substance and Sexual Activity    Alcohol use: Yes     Alcohol/week: 0.0 oz     Comment: rarely    Drug use: No    Sexual activity: Not on file   Other Topics Concern    Not on file   Social History Narrative    Not on file       FAMILY HISTORY:     Family History   Problem Relation Age of Onset    Breast cancer Mother     Diabetes Mother     Hypertension Mother     Stroke Mother     Prostate cancer Father     Diabetes Father     Hypertension Father     No Known Problems Sister     No Known Problems Brother     Breast cancer Maternal Grandmother     Amblyopia Neg Hx     Blindness Neg Hx     Cataracts Neg Hx     Glaucoma Neg Hx     Macular degeneration Neg Hx     Retinal detachment Neg Hx     Strabismus Neg Hx        REVIEW OF SYSTEMS:   Review of Systems   Constitutional: Negative for chills, diaphoresis and fever.   HENT: Negative for nosebleeds.    Eyes: Negative for blurred vision, double vision and photophobia.   Respiratory: Negative for hemoptysis, shortness of breath and wheezing.    Cardiovascular: Negative for  "chest pain, palpitations, orthopnea, claudication, leg swelling and PND.   Gastrointestinal: Negative for abdominal pain, blood in stool, heartburn, melena, nausea and vomiting.   Genitourinary: Negative for flank pain and hematuria.   Musculoskeletal: Negative for falls, myalgias and neck pain.   Skin: Negative for rash.   Neurological: Negative for dizziness, seizures, loss of consciousness, weakness and headaches.   Endo/Heme/Allergies: Negative for polydipsia. Does not bruise/bleed easily.   Psychiatric/Behavioral: Negative for depression and memory loss. The patient is not nervous/anxious.        PHYSICAL EXAM:     Vitals:    01/16/19 1417   BP: (!) 144/82   Pulse: 95   Resp: 15    Body mass index is 62.07 kg/m².  Weight: (!) 219.3 kg (483 lb 7.5 oz)   Height: 6' 2" (188 cm)     Physical Exam   Constitutional: He is oriented to person, place, and time. He appears well-developed and well-nourished. He is cooperative.  Non-toxic appearance. No distress.   HENT:   Head: Normocephalic and atraumatic.   Eyes: Conjunctivae and EOM are normal. Pupils are equal, round, and reactive to light. No scleral icterus.   Neck: Trachea normal and normal range of motion. Neck supple. Normal carotid pulses and no JVD present. Carotid bruit is not present. No neck rigidity. No tracheal deviation and no edema present. No thyromegaly present.   Cardiovascular: Normal rate, regular rhythm, S1 normal and S2 normal. PMI is not displaced. Exam reveals distant heart sounds. Exam reveals no gallop and no friction rub.   No murmur heard.  Pulses:       Carotid pulses are 2+ on the right side, and 2+ on the left side.  Pulmonary/Chest: Effort normal and breath sounds normal. No stridor. No respiratory distress. He has no wheezes. He has no rales. He exhibits no tenderness.   Abdominal: Soft. He exhibits no distension. There is no hepatosplenomegaly.   obese   Musculoskeletal: Normal range of motion. He exhibits no edema or tenderness. "   Feet:   Right Foot:   Skin Integrity: Negative for ulcer.   Left Foot:   Skin Integrity: Negative for ulcer.   Neurological: He is alert and oriented to person, place, and time. No cranial nerve deficit.   Skin: Skin is warm and dry. No rash noted. No erythema.   Psychiatric: He has a normal mood and affect. His speech is normal and behavior is normal.   Vitals reviewed.      DATA:   EKG: (personally reviewed tracing)  1/16/19 SR 95, PVC, PRWP    Laboratory:  CBC:  Recent Labs   Lab 03/24/16  0742 01/31/17  0715 05/28/18  0841   WHITE BLOOD CELL COUNT 7.92 6.66 6.92   HEMOGLOBIN 12.2 L 13.0 L 12.0 L   HEMATOCRIT 37.8 L 39.8 L 38.3 L   PLATELETS 235 191 198       CHEMISTRIES:  Recent Labs   Lab 01/02/18  0804 05/28/18  0841 10/08/18  0915   GLUCOSE 147 H 131 H 120 H   SODIUM 139 139 139   POTASSIUM 4.4 5.1 5.1   BUN BLD 25 H 26 H 28 H   CREATININE 1.4 1.6 H 1.7 H   EGFR IF  >60.0 56.4 A 52.4 A   EGFR IF NON- 57.8 A 48.8 A 45.4 A   CALCIUM 9.0 9.1 9.1       CARDIAC BIOMARKERS:        COAGS:        LIPIDS/LFTS:  Recent Labs   Lab 03/24/16  0742  01/31/17  0715 10/28/17  0813 05/28/18  0841   CHOLESTEROL 151  --  126  --  121   TRIGLYCERIDES 73  --  54  --  53   HDL 45  --  43  --  47   LDL CHOLESTEROL 91.4  --  72.2  --  63.4   NON-HDL CHOLESTEROL 106  --  83  --  74   AST 22   < > 22 21 22   ALT 7 L   < > 6 L 8 L 5 L    < > = values in this interval not displayed.       Cardiovascular Testing:  none    ASSESSMENT:   # HTN, uncontrolled  # CKD3  # BMI 62  # TOSIN on CPAP  # DM  # HLP on prava 20mg    PLAN:   Cont med rx  Stop amlod  Inc dilt 240mg qd  Diet/exercise/weight loss, bariatric referral  Enroll in digital HTN program  RTC 3 months, sooner if BP >140/90 persistently (pt to purchase BP cuff)    Kavon Messer MD, FACC

## 2019-01-16 NOTE — LETTER
January 16, 2019      Riddhi Galindo MD  [address] [facility] [facility] [address]  [place] [address]           [facility] - Cardiology  [address] [facility] [address]  [place] [address]  Phone: [phone]          Patient: David Corona Jr.   MR Number: [id number]   YOB: 1965   Date of Visit: 1/16/2019       Dear Dr. Riddhi Galindo:    Thank you for referring David Corona to me for evaluation. Attached you will find relevant portions of my assessment and plan of care.    If you have questions, please do not hesitate to call me. I look forward to following David Corona along with you.    Sincerely,    Kavon Messer MD    Enclosure  CC:  No Recipients    If you would like to receive this communication electronically, please contact [email or web address] or [phone] to request more information on [email or web address] Link access.    For providers and/or their staff who would like to refer a patient to [facility], please contact us through our one-stop-shop provider referral line, [facility] , at [phone].    If you feel you have received this communication in error or would no longer like to receive these types of communications, please e-mail [email or web address]

## 2019-02-01 ENCOUNTER — OFFICE VISIT (OUTPATIENT)
Dept: FAMILY MEDICINE | Facility: CLINIC | Age: 54
End: 2019-02-01
Payer: COMMERCIAL

## 2019-02-01 VITALS
OXYGEN SATURATION: 95 % | BODY MASS INDEX: 40.43 KG/M2 | SYSTOLIC BLOOD PRESSURE: 158 MMHG | WEIGHT: 315 LBS | TEMPERATURE: 98 F | HEIGHT: 74 IN | HEART RATE: 98 BPM | DIASTOLIC BLOOD PRESSURE: 86 MMHG

## 2019-02-01 DIAGNOSIS — Z79.4 CONTROLLED TYPE 2 DIABETES MELLITUS WITH STAGE 3 CHRONIC KIDNEY DISEASE, WITH LONG-TERM CURRENT USE OF INSULIN: ICD-10-CM

## 2019-02-01 DIAGNOSIS — E78.5 HYPERLIPIDEMIA LDL GOAL <100: ICD-10-CM

## 2019-02-01 DIAGNOSIS — E11.22 CONTROLLED TYPE 2 DIABETES MELLITUS WITH STAGE 3 CHRONIC KIDNEY DISEASE, WITH LONG-TERM CURRENT USE OF INSULIN: ICD-10-CM

## 2019-02-01 DIAGNOSIS — N18.30 CONTROLLED TYPE 2 DIABETES MELLITUS WITH STAGE 3 CHRONIC KIDNEY DISEASE, WITH LONG-TERM CURRENT USE OF INSULIN: ICD-10-CM

## 2019-02-01 DIAGNOSIS — G47.33 OSA (OBSTRUCTIVE SLEEP APNEA): ICD-10-CM

## 2019-02-01 DIAGNOSIS — E11.3219: ICD-10-CM

## 2019-02-01 DIAGNOSIS — Z23 NEED FOR STREPTOCOCCUS PNEUMONIAE VACCINATION: ICD-10-CM

## 2019-02-01 DIAGNOSIS — Z23 NEED FOR PROPHYLACTIC VACCINATION AGAINST VIRAL HEPATITIS: ICD-10-CM

## 2019-02-01 DIAGNOSIS — Z00.00 ROUTINE MEDICAL EXAM: Primary | ICD-10-CM

## 2019-02-01 DIAGNOSIS — E11.40 CONTROLLED TYPE 2 DIABETES WITH NEUROPATHY: ICD-10-CM

## 2019-02-01 DIAGNOSIS — N25.81 SECONDARY HYPERPARATHYROIDISM OF RENAL ORIGIN: ICD-10-CM

## 2019-02-01 DIAGNOSIS — E66.01 MORBID OBESITY WITH BMI OF 50.0-59.9, ADULT: ICD-10-CM

## 2019-02-01 DIAGNOSIS — I10 ESSENTIAL HYPERTENSION: ICD-10-CM

## 2019-02-01 PROCEDURE — 3077F SYST BP >= 140 MM HG: CPT | Mod: CPTII,S$GLB,, | Performed by: INTERNAL MEDICINE

## 2019-02-01 PROCEDURE — 3079F DIAST BP 80-89 MM HG: CPT | Mod: CPTII,S$GLB,, | Performed by: INTERNAL MEDICINE

## 2019-02-01 PROCEDURE — 90471 IMMUNIZATION ADMIN: CPT | Mod: S$GLB,,, | Performed by: INTERNAL MEDICINE

## 2019-02-01 PROCEDURE — 99999 PR PBB SHADOW E&M-EST. PATIENT-LVL V: ICD-10-PCS | Mod: PBBFAC,,, | Performed by: INTERNAL MEDICINE

## 2019-02-01 PROCEDURE — 99999 PR PBB SHADOW E&M-EST. PATIENT-LVL V: CPT | Mod: PBBFAC,,, | Performed by: INTERNAL MEDICINE

## 2019-02-01 PROCEDURE — 3077F PR MOST RECENT SYSTOLIC BLOOD PRESSURE >= 140 MM HG: ICD-10-PCS | Mod: CPTII,S$GLB,, | Performed by: INTERNAL MEDICINE

## 2019-02-01 PROCEDURE — 3044F PR MOST RECENT HEMOGLOBIN A1C LEVEL <7.0%: ICD-10-PCS | Mod: CPTII,S$GLB,, | Performed by: INTERNAL MEDICINE

## 2019-02-01 PROCEDURE — 90746 HEPB VACCINE 3 DOSE ADULT IM: CPT | Mod: S$GLB,,, | Performed by: INTERNAL MEDICINE

## 2019-02-01 PROCEDURE — 90670 PNEUMOCOCCAL CONJUGATE VACCINE 13-VALENT LESS THAN 5YO & GREATER THAN: ICD-10-PCS | Mod: S$GLB,,, | Performed by: INTERNAL MEDICINE

## 2019-02-01 PROCEDURE — 90471 HEPATITIS B VACCINE ADULT IM: ICD-10-PCS | Mod: S$GLB,,, | Performed by: INTERNAL MEDICINE

## 2019-02-01 PROCEDURE — 99396 PR PREVENTIVE VISIT,EST,40-64: ICD-10-PCS | Mod: 25,S$GLB,, | Performed by: INTERNAL MEDICINE

## 2019-02-01 PROCEDURE — 3079F PR MOST RECENT DIASTOLIC BLOOD PRESSURE 80-89 MM HG: ICD-10-PCS | Mod: CPTII,S$GLB,, | Performed by: INTERNAL MEDICINE

## 2019-02-01 PROCEDURE — 90746 HEPATITIS B VACCINE ADULT IM: ICD-10-PCS | Mod: S$GLB,,, | Performed by: INTERNAL MEDICINE

## 2019-02-01 PROCEDURE — 90472 IMMUNIZATION ADMIN EACH ADD: CPT | Mod: S$GLB,,, | Performed by: INTERNAL MEDICINE

## 2019-02-01 PROCEDURE — 90670 PCV13 VACCINE IM: CPT | Mod: S$GLB,,, | Performed by: INTERNAL MEDICINE

## 2019-02-01 PROCEDURE — 90472 PNEUMOCOCCAL CONJUGATE VACCINE 13-VALENT LESS THAN 5YO & GREATER THAN: ICD-10-PCS | Mod: S$GLB,,, | Performed by: INTERNAL MEDICINE

## 2019-02-01 PROCEDURE — 3044F HG A1C LEVEL LT 7.0%: CPT | Mod: CPTII,S$GLB,, | Performed by: INTERNAL MEDICINE

## 2019-02-01 PROCEDURE — 99396 PREV VISIT EST AGE 40-64: CPT | Mod: 25,S$GLB,, | Performed by: INTERNAL MEDICINE

## 2019-02-01 RX ORDER — ACETAMINOPHEN 500 MG
TABLET ORAL
Qty: 1 EACH | Refills: 0 | Status: SHIPPED | OUTPATIENT
Start: 2019-02-01 | End: 2019-04-24 | Stop reason: SDUPTHER

## 2019-02-01 RX ORDER — AMLODIPINE BESYLATE 5 MG/1
TABLET ORAL
Refills: 0 | COMMUNITY
Start: 2019-01-22 | End: 2019-02-01

## 2019-02-01 NOTE — PROGRESS NOTES
Patient received Pneumococcal 13 vaccine and Hep B tolerated it well. Patient received VIS information.

## 2019-02-01 NOTE — PROGRESS NOTES
HISTORY OF PRESENT ILLNESS:  David Corona Jr. is a 53 y.o. male who presents to the clinic today for a routine medical physical exam. His last physical exam was approximately 1 years(s) ago.      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        PAST SURGICAL HISTORY:  Past Surgical History:   Procedure Laterality Date    CATARACT EXTRACTION W/  INTRAOCULAR LENS IMPLANT Right 2007    COLONOSCOPY N/A 11/22/2016    Performed by Abdi La MD at [facility] ([address] [place] [address])       SOCIAL HISTORY:  Social History     Socioeconomic History    Marital status:      Spouse name: Not on file    Number of children: 3    Years of education: Not on file    Highest education level: Not on file   Social Needs    Financial resource strain: Not on file    Food insecurity - worry: Not on file    Food insecurity - inability: Not on file    Transportation needs - medical: Not on file    Transportation needs - non-medical: Not on file   Occupational History    Occupation:      Employer: [employer]    Occupation:    Tobacco Use    Smoking status: Never Smoker    Smokeless tobacco: Never Used   Substance and Sexual Activity    Alcohol use: Yes     Alcohol/week: 0.0 oz      Comment: rarely    Drug use: No    Sexual activity: Not on file   Other Topics Concern    Not on file   Social History Narrative    Not on file       FAMILY HISTORY:  Family History   Problem Relation Age of Onset    Breast cancer Mother     Diabetes Mother     Hypertension Mother     Stroke Mother     Prostate cancer Father     Diabetes Father     Hypertension Father     No Known Problems Sister     No Known Problems Brother     Breast cancer Maternal Grandmother     Amblyopia Neg Hx     Blindness Neg Hx     Cataracts Neg Hx     Glaucoma Neg Hx     Macular degeneration Neg Hx     Retinal detachment Neg Hx     Strabismus Neg Hx        ALLERGIES AND MEDICATIONS: updated and reviewed.  Review of patient's allergies indicates:  No Known Allergies  Medication List with Changes/Refills   New Medications    BLOOD PRESSURE MONITOR KIT    Test BP as needed.   Current Medications    ASPIRIN (ECOTRIN) 81 MG EC TABLET    Take 1 tablet (81 mg total) by mouth once daily.    BLOOD SUGAR DIAGNOSTIC (CONTOUR TEST STRIPS) STRP    USE THREE TIMES DAILY    COLCHICINE 0.6 MG TABLET    Take 1 tablet (0.6 mg total) by mouth once daily.    DILTIAZEM (CARDIZEM CD) 240 MG 24 HR CAPSULE    Take 1 capsule (240 mg total) by mouth once daily.    FUROSEMIDE (LASIX) 40 MG TABLET    TAKE 1 TABLET(40 MG) BY MOUTH TWICE DAILY    GLIMEPIRIDE (AMARYL) 2 MG TABLET    TAKE 2 TABLETS(4 MG) BY MOUTH TWICE DAILY    HYDROCODONE-ACETAMINOPHEN 5-325MG (NORCO) 5-325 MG PER TABLET    Take 1 tablet by mouth every 6 (six) hours as needed for Pain.    INSULIN GLARGINE (LANTUS SOLOSTAR U-100 INSULIN) 100 UNIT/ML (3 ML) INPN PEN    ADMINISTER 26 UNITS UNDER THE SKIN EVERY NIGHT    INSULIN LISPRO (HUMALOG KWIKPEN INSULIN) 100 UNIT/ML PEN    ADMINISTER 15 UNITS UNDER THE SKIN THREE TIMES DAILY WITH MEALS    LANCETS (MICROLET LANCET) MISC    TEST THREE TIMES DAILY    LISINOPRIL (PRINIVIL,ZESTRIL) 40 MG TABLET    TAKE 1 TABLET BY MOUTH TWICE DAILY  "   MISCELLANEOUS MEDICAL SUPPLY KIT    Nebulizer machine cleaning supplies/apparatus    OMEGA-3 FATTY ACIDS 1,000 MG CAP    Take by mouth. 2 Capsule Oral Every day    PEN NEEDLE, DIABETIC (NOVOFINE 32) 32 GAUGE X 1/4" NDLE    USE FOUR TIMES DAILY AS NEEDED    PIOGLITAZONE (ACTOS) 45 MG TABLET    Take 1 tablet (45 mg total) by mouth once daily.    PRAVASTATIN (PRAVACHOL) 20 MG TABLET    Take 1 tablet (20 mg total) by mouth once daily.   Discontinued Medications    AMLODIPINE (NORVASC) 5 MG TABLET             CARE TEAM:  Patient Care Team:  Riddhi Galindo MD as PCP - General (Internal Medicine)  Vick Cornelius MD as Consulting Physician (Nephrology)           SCREENING HISTORY:  Health Maintenance       Date Due Completion Date    Hepatitis B Vaccines (1 of 3 - Risk 3-dose series) 05/07/1985 2/1/2019    Eye Exam 03/05/2019 3/5/2018    Override on 1/30/2012: Done    Hemoglobin A1c 05/20/2019 11/20/2018    Lipid Panel 05/28/2019 5/28/2018    Urine Microalbumin 10/08/2019 10/8/2018    Foot Exam 11/20/2019 11/20/2018    Low Dose Statin 02/01/2020 2/1/2019    Colonoscopy 11/22/2021 11/22/2016    TETANUS VACCINE 09/04/2027 9/4/2017            REVIEW OF SYSTEMS:   The patient reports fair dietary habits.  The patient does not exercise regularly.  Review of Systems   Constitutional: Negative for appetite change, chills, fatigue, fever and unexpected weight change.   HENT: Negative for congestion, dental problem, ear discharge, ear pain, nosebleeds, sinus pain, sore throat and trouble swallowing.    Eyes: Negative for pain, discharge, itching and visual disturbance.   Respiratory: Negative for cough, chest tightness, shortness of breath, wheezing and stridor.    Cardiovascular: Positive for leg swelling (- chronic). Negative for chest pain and palpitations.   Gastrointestinal: Negative for abdominal distention, abdominal pain, blood in stool, diarrhea, nausea and vomiting.   Endocrine: Negative for cold intolerance, heat " "intolerance, polydipsia and polyuria.   Genitourinary: Negative for difficulty urinating, dysuria, flank pain, frequency, hematuria and testicular pain.   Musculoskeletal: Negative for back pain, joint swelling and myalgias.   Skin: Negative for rash.   Allergic/Immunologic: Negative for food allergies.   Neurological: Negative for dizziness, tremors, seizures, syncope, weakness, light-headedness, numbness and headaches.   Hematological: Negative for adenopathy. Does not bruise/bleed easily.   Psychiatric/Behavioral: Negative for behavioral problems, confusion, dysphoric mood and sleep disturbance. The patient is not nervous/anxious.            PHYSICAL EXAM:  Vitals:    02/01/19 0832   BP: (!) 158/86   Pulse:    Temp:      Weight: (!) 222 kg (489 lb 6.7 oz)   Height: 6' 2" (188 cm)   Body mass index is 62.84 kg/m².    Physical Examination: General appearance - alert, well appearing, and in no distress and morbidly obese  Mental status - alert, oriented to person, place, and time, normal mood, behavior, speech, dress, motor activity, and thought processes  Eyes - pupils equal and reactive, extraocular eye movements intact, sclera anicteric  Mouth - mucous membranes moist, pharynx normal without lesions  Neck - supple, no significant adenopathy, carotids upstroke normal bilaterally, no bruits  Lymphatics - no palpable lymphadenopathy  Chest - clear to auscultation, no wheezes, rales or rhonchi, symmetric air entry  Heart - normal rate and regular rhythm  Neurological - alert, oriented, normal speech, no focal findings or movement disorder noted, cranial nerves II through XII intact  Musculoskeletal - no muscular tenderness noted  Extremities - pedal edema 1-2 +; chronic venous changes noted  Skin - normal coloration and turgor, no rashes, no suspicious skin lesions noted       Lab Results   Component Value Date    HGBA1C 6.7 (H) 11/20/2018    HGBA1C 6.1 (H) 05/28/2018    HGBA1C 6.5 (H) 10/28/2017      Lab Results "   Component Value Date    CHOL 121 05/28/2018    CHOL 126 01/31/2017    CHOL 151 03/24/2016     Lab Results   Component Value Date    LDLCALC 63.4 05/28/2018    LDLCALC 72.2 01/31/2017    LDLCALC 91.4 03/24/2016          ASSESSMENT AND PLAN:  1. Routine medical exam  Counseled on age appropriate medical preventative services including age appropriate cancer screenings, age appropriate eye and dental exams, over all nutritional health, need for a consistent exercise regimen, and an over all push towards maintaining a vigorous and active lifestyle.  Counseled on age appropriate vaccines and discussed upcoming health care needs based on age/gender. Discussed good sleep hygiene and stress management.     2. Controlled type 2 diabetes mellitus with stage 3 chronic kidney disease, with long-term current use of insulin/3. Controlled type 2 diabetes with neuropathy/4. 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 We discussed diabetic diet and regular exercise. We discussed home blood sugar monitoring, if appropriate. Continue current medication regimen.  Diabetic complications addressed: Stable decreased kidney function. Observe. Patient counseled to avoid/minimize the use of anti-inflammatory  Medication. Discussed to stay well hydrated. Also discussed with patient that good control of blood pressure and/or diabetes, if present, will help to prevent progression. and Neuropathy pain controlled.  Patient was counseled on the need for yearly diabetic retinopathy exam and yearly diabetic foot exam.     5. Essential hypertension  Blood pressure is not controlled today. We discussed low salt diet and regular exercise. Patient reports that they have not taken any decongestant or anti-inflammatory medication recently (patient educated that these medications can increase blood pressure).  Medication changes made today:  None. Patient will come back in 2 months for recheck of blood pressure with cardiology - he plans to work on diet and exercise. Patient also asked to check blood pressure at home and bring recordings to next office visit. Patient is not eligible to enroll at this time (program does not have extra large cuff) in the digital hypertension program at this time.    - blood pressure monitor Kit; Test BP as needed.  Dispense: 1 each; Refill: 0    6. Hyperlipidemia LDL goal <100  We discussed low fat diet and regular exercise.The current medical regimen is effective;  continue present plan and medications.      7. TOSIN (obstructive sleep apnea)  CPAP compliance: yes - he needs a new machine.  We discussed the potential ramifications of untreated sleep apnea, which could include daytime sleepiness, hypertension, heart disease including CHF, sudden death while sleeping and/or stroke. The patient was advised to abstain from driving should they feel sleepy or drowsy.  We discussed potential treatment options, which could include weight loss, body positioning, continuous positive airway pressure (CPAP), or referral for surgical consideration.    - CPAP FOR HOME USE    8. Secondary hyperparathyroidism of renal origin  Stable. Asymptomatic. Observe. Followed by nephrology.    9. Morbid obesity with BMI of 50.0-59.9, adult  The patient is asked to make an attempt to improve diet and exercise patterns to aid in medical management of this problem.     10. Need for prophylactic vaccination against viral hepatitis    - Hepatitis B Vaccine (Adult) (IM)  - Hepatitis B Vaccine (Pediatric/Adolescent) (3-Dose) (IM)    11. Need for Streptococcus pneumoniae vaccination    - Pneumococcal Conjugate Vaccine (13 Valent) (IM)           Follow-up in about 6 months (around 8/1/2019), or if symptoms worsen or fail to improve, for follow up chronic medical conditions.. or sooner as needed.

## 2019-02-12 ENCOUNTER — OFFICE VISIT (OUTPATIENT)
Dept: NEPHROLOGY | Facility: CLINIC | Age: 54
End: 2019-02-12
Payer: COMMERCIAL

## 2019-02-12 VITALS
BODY MASS INDEX: 40.43 KG/M2 | WEIGHT: 315 LBS | HEART RATE: 103 BPM | SYSTOLIC BLOOD PRESSURE: 160 MMHG | HEIGHT: 74 IN | OXYGEN SATURATION: 95 % | DIASTOLIC BLOOD PRESSURE: 80 MMHG

## 2019-02-12 DIAGNOSIS — E11.22 CONTROLLED TYPE 2 DIABETES MELLITUS WITH STAGE 3 CHRONIC KIDNEY DISEASE, WITH LONG-TERM CURRENT USE OF INSULIN: Primary | ICD-10-CM

## 2019-02-12 DIAGNOSIS — N18.30 CONTROLLED TYPE 2 DIABETES MELLITUS WITH STAGE 3 CHRONIC KIDNEY DISEASE, WITH LONG-TERM CURRENT USE OF INSULIN: Primary | ICD-10-CM

## 2019-02-12 DIAGNOSIS — Z79.4 CONTROLLED TYPE 2 DIABETES MELLITUS WITH STAGE 3 CHRONIC KIDNEY DISEASE, WITH LONG-TERM CURRENT USE OF INSULIN: Primary | ICD-10-CM

## 2019-02-12 DIAGNOSIS — N18.30 CHRONIC KIDNEY DISEASE, STAGE III (MODERATE): ICD-10-CM

## 2019-02-12 DIAGNOSIS — N25.81 HYPERPARATHYROIDISM DUE TO RENAL INSUFFICIENCY: ICD-10-CM

## 2019-02-12 DIAGNOSIS — D63.1 ANEMIA OF CHRONIC RENAL FAILURE, STAGE 3 (MODERATE): ICD-10-CM

## 2019-02-12 DIAGNOSIS — I12.9 HYPERTENSIVE KIDNEY DISEASE WITH CHRONIC KIDNEY DISEASE, STAGE 1-4 OR UNSPECIFIED CHRONIC KIDNEY DISEASE: ICD-10-CM

## 2019-02-12 DIAGNOSIS — N18.30 ANEMIA OF CHRONIC RENAL FAILURE, STAGE 3 (MODERATE): ICD-10-CM

## 2019-02-12 PROCEDURE — 3077F SYST BP >= 140 MM HG: CPT | Mod: CPTII,S$GLB,, | Performed by: INTERNAL MEDICINE

## 2019-02-12 PROCEDURE — 3079F DIAST BP 80-89 MM HG: CPT | Mod: CPTII,S$GLB,, | Performed by: INTERNAL MEDICINE

## 2019-02-12 PROCEDURE — 99213 PR OFFICE/OUTPT VISIT, EST, LEVL III, 20-29 MIN: ICD-10-PCS | Mod: S$GLB,,, | Performed by: INTERNAL MEDICINE

## 2019-02-12 PROCEDURE — 3079F PR MOST RECENT DIASTOLIC BLOOD PRESSURE 80-89 MM HG: ICD-10-PCS | Mod: CPTII,S$GLB,, | Performed by: INTERNAL MEDICINE

## 2019-02-12 PROCEDURE — 3008F BODY MASS INDEX DOCD: CPT | Mod: CPTII,S$GLB,, | Performed by: INTERNAL MEDICINE

## 2019-02-12 PROCEDURE — 99213 OFFICE O/P EST LOW 20 MIN: CPT | Mod: S$GLB,,, | Performed by: INTERNAL MEDICINE

## 2019-02-12 PROCEDURE — 99999 PR PBB SHADOW E&M-EST. PATIENT-LVL II: CPT | Mod: PBBFAC,,, | Performed by: INTERNAL MEDICINE

## 2019-02-12 PROCEDURE — 3044F PR MOST RECENT HEMOGLOBIN A1C LEVEL <7.0%: ICD-10-PCS | Mod: CPTII,S$GLB,, | Performed by: INTERNAL MEDICINE

## 2019-02-12 PROCEDURE — 3008F PR BODY MASS INDEX (BMI) DOCUMENTED: ICD-10-PCS | Mod: CPTII,S$GLB,, | Performed by: INTERNAL MEDICINE

## 2019-02-12 PROCEDURE — 99999 PR PBB SHADOW E&M-EST. PATIENT-LVL II: ICD-10-PCS | Mod: PBBFAC,,, | Performed by: INTERNAL MEDICINE

## 2019-02-12 PROCEDURE — 3077F PR MOST RECENT SYSTOLIC BLOOD PRESSURE >= 140 MM HG: ICD-10-PCS | Mod: CPTII,S$GLB,, | Performed by: INTERNAL MEDICINE

## 2019-02-12 PROCEDURE — 3044F HG A1C LEVEL LT 7.0%: CPT | Mod: CPTII,S$GLB,, | Performed by: INTERNAL MEDICINE

## 2019-02-13 RX ORDER — INSULIN LISPRO 100 [IU]/ML
INJECTION, SOLUTION INTRAVENOUS; SUBCUTANEOUS
Qty: 15 ML | Refills: 1 | Status: SHIPPED | OUTPATIENT
Start: 2019-02-13 | End: 2019-04-13 | Stop reason: SDUPTHER

## 2019-02-19 ENCOUNTER — CLINICAL SUPPORT (OUTPATIENT)
Dept: FAMILY MEDICINE | Facility: CLINIC | Age: 54
End: 2019-02-19
Payer: COMMERCIAL

## 2019-02-19 VITALS — HEART RATE: 89 BPM | DIASTOLIC BLOOD PRESSURE: 70 MMHG | SYSTOLIC BLOOD PRESSURE: 130 MMHG | OXYGEN SATURATION: 98 %

## 2019-02-19 DIAGNOSIS — I10 ESSENTIAL HYPERTENSION: Primary | ICD-10-CM

## 2019-02-19 PROCEDURE — 99999 PR PBB SHADOW E&M-EST. PATIENT-LVL II: ICD-10-PCS | Mod: PBBFAC,,,

## 2019-02-19 PROCEDURE — 99499 UNLISTED E&M SERVICE: CPT | Mod: S$GLB,,, | Performed by: INTERNAL MEDICINE

## 2019-02-19 PROCEDURE — 99499 NO LOS: ICD-10-PCS | Mod: S$GLB,,, | Performed by: INTERNAL MEDICINE

## 2019-02-19 PROCEDURE — 99999 PR PBB SHADOW E&M-EST. PATIENT-LVL II: CPT | Mod: PBBFAC,,,

## 2019-02-19 NOTE — PROGRESS NOTES
Subjective:       Patient ID: David Corona Jr. is a 53 y.o. Black or  male who presents for follow-up evaluation of Chronic Kidney Disease    HPI      Mr. Corona is a 53 year old man with past medical history of hypertension, diabetes, presenting for follow up of chronic kidney disease.  Patient reports blood sugars have been well-controlled, has not checked his blood pressure.  He otherwise denies any fever, chest pain, shortness of breath, abdominal pain, diarrhea, dysuria/hematuria.  He reports adequate fluid intake, denies any NSAID use; reports weight gain (attributed to dietary indiscretion).      Review of Systems   Constitutional: Negative for appetite change, fatigue and fever.   Respiratory: Negative for cough and shortness of breath.    Cardiovascular: Negative for chest pain and leg swelling.   Gastrointestinal: Negative for abdominal pain, constipation, diarrhea, nausea and vomiting.   Genitourinary: Negative for dysuria, flank pain, frequency, hematuria and urgency.   Musculoskeletal: Negative for arthralgias, back pain and joint swelling.   Skin: Negative for rash.   Neurological: Negative for dizziness and light-headedness.   All other systems reviewed and are negative.      Objective:      Physical Exam   Constitutional: He appears well-developed and well-nourished.   Cardiovascular: Normal rate and regular rhythm. Exam reveals no gallop and no friction rub.   No murmur heard.  Pulmonary/Chest: Effort normal and breath sounds normal. No respiratory distress. He has no wheezes. He has no rales.   Musculoskeletal: He exhibits no edema.   Neurological: He is alert.   Skin: Skin is warm and dry. No rash noted.   Vitals reviewed.      Assessment:       1. Controlled type 2 diabetes mellitus with stage 3 chronic kidney disease, with long-term current use of insulin    2. Chronic kidney disease, stage III (moderate)    3. Hypertensive kidney disease with chronic kidney disease, stage  1-4 or unspecified chronic kidney disease    4. Hyperparathyroidism due to renal insufficiency    5. Anemia of chronic renal failure, stage 3 (moderate)        Plan:     Mr. Corona is a 53 year old man with past medical history of hypertension, diabetes, presenting for follow up of chronic kidney disease stage IIIa.  Patient likely with hypertensive nephrosclerosis v. diabetic nephropathy.  Patient creatinine previously within baseline range, will continue to trend.  Stressed importance of blood pressure/glycemic control, along with weight loss, to prevent any further progression of kidney disease, patient voiced understanding.   - Anemia: Hgb at goal, no indication for erythropoetin therapy  - Bone/mineral metabolism: patient with secondary hyperparathyroidism, PTH at goal for stage CKD, will continue ergocalciferol given VitD deficiency    Return to clinic 6 months pending renal panel within a month, then with renal/heme panel, urinalysis, PTH/VitD prior to next visit

## 2019-02-19 NOTE — PROGRESS NOTES
David MARVIN Doshirley Savage. 53 y.o. male is here today for Blood Pressure check.   History of HTN yes.    Review of patient's allergies indicates:  No Known Allergies  Creatinine   Date Value Ref Range Status   10/08/2018 1.7 (H) 0.5 - 1.4 mg/dL Final     Sodium   Date Value Ref Range Status   10/08/2018 139 136 - 145 mmol/L Final     Potassium   Date Value Ref Range Status   10/08/2018 5.1 3.5 - 5.1 mmol/L Final   ]  Patient verifies taking blood pressure medications on a regular basis at the same time of the day.     Current Outpatient Medications:     aspirin (ECOTRIN) 81 MG EC tablet, Take 1 tablet (81 mg total) by mouth once daily., Disp: 90 tablet, Rfl: 3    blood pressure monitor Kit, Test BP as needed., Disp: 1 each, Rfl: 0    blood sugar diagnostic (CONTOUR TEST STRIPS) Strp, USE THREE TIMES DAILY, Disp: 100 each, Rfl: 11    colchicine 0.6 mg tablet, Take 1 tablet (0.6 mg total) by mouth once daily., Disp: 30 tablet, Rfl: 5    diltiaZEM (CARDIZEM CD) 240 MG 24 hr capsule, Take 1 capsule (240 mg total) by mouth once daily., Disp: 90 capsule, Rfl: 3    furosemide (LASIX) 40 MG tablet, TAKE 1 TABLET(40 MG) BY MOUTH TWICE DAILY, Disp: 60 tablet, Rfl: 5    glimepiride (AMARYL) 2 MG tablet, TAKE 2 TABLETS(4 MG) BY MOUTH TWICE DAILY, Disp: 120 tablet, Rfl: 0    hydrocodone-acetaminophen 5-325mg (NORCO) 5-325 mg per tablet, Take 1 tablet by mouth every 6 (six) hours as needed for Pain., Disp: 10 tablet, Rfl: 0    insulin glargine (LANTUS SOLOSTAR U-100 INSULIN) 100 unit/mL (3 mL) InPn pen, ADMINISTER 26 UNITS UNDER THE SKIN EVERY NIGHT, Disp: 15 mL, Rfl: 0    insulin lispro (HUMALOG KWIKPEN INSULIN) 100 unit/mL pen, ADMINISTER 15 UNITS UNDER THE SKIN THREE TIMES DAILY WITH MEALS, Disp: 15 mL, Rfl: 1    lancets (MICROLET LANCET) Misc, TEST THREE TIMES DAILY, Disp: 100 each, Rfl: 11    lisinopril (PRINIVIL,ZESTRIL) 40 MG tablet, TAKE 1 TABLET BY MOUTH TWICE DAILY, Disp: 180 tablet, Rfl: 0    miscellaneous  "medical supply Kit, Nebulizer machine cleaning supplies/apparatus, Disp: 1 kit, Rfl: 0    omega-3 fatty acids 1,000 mg Cap, Take by mouth. 2 Capsule Oral Every day, Disp: , Rfl:     pen needle, diabetic (NOVOFINE 32) 32 gauge x 1/4" Ndle, USE FOUR TIMES DAILY AS NEEDED, Disp: 100 each, Rfl: 5    pioglitazone (ACTOS) 45 MG tablet, Take 1 tablet (45 mg total) by mouth once daily., Disp: 30 tablet, Rfl: 5    pravastatin (PRAVACHOL) 20 MG tablet, Take 1 tablet (20 mg total) by mouth once daily., Disp: 30 tablet, Rfl: 5  Does patient have record of home blood pressure readings no. .   Last dose of blood pressure medication was taken at 7:00 this am.  Patient is asymptomatic.   Complains of none.    Vitals:    02/19/19 0715   BP: 130/70   BP Location: Right arm   Patient Position: Sitting   BP Method: Thigh Cuff (Manual)   Pulse: 89   SpO2: 98%         Dr. Galindo  informed of nurse visit.   "

## 2019-02-25 DIAGNOSIS — M10.9 GOUT, UNSPECIFIED CAUSE, UNSPECIFIED CHRONICITY, UNSPECIFIED SITE: ICD-10-CM

## 2019-02-25 RX ORDER — COLCHICINE 0.6 MG/1
TABLET ORAL
Qty: 30 TABLET | Refills: 0 | Status: SHIPPED | OUTPATIENT
Start: 2019-02-25 | End: 2019-04-24 | Stop reason: SDUPTHER

## 2019-03-02 DIAGNOSIS — E11.40 CONTROLLED TYPE 2 DIABETES WITH NEUROPATHY: ICD-10-CM

## 2019-03-04 RX ORDER — PIOGLITAZONEHYDROCHLORIDE 45 MG/1
TABLET ORAL
Qty: 30 TABLET | Refills: 5 | Status: SHIPPED | OUTPATIENT
Start: 2019-03-04 | End: 2019-04-24 | Stop reason: SDUPTHER

## 2019-03-12 ENCOUNTER — LAB VISIT (OUTPATIENT)
Dept: LAB | Facility: HOSPITAL | Age: 54
End: 2019-03-12
Attending: INTERNAL MEDICINE
Payer: COMMERCIAL

## 2019-03-12 DIAGNOSIS — N18.30 CHRONIC KIDNEY DISEASE, STAGE III (MODERATE): ICD-10-CM

## 2019-03-12 LAB
ALBUMIN SERPL BCP-MCNC: 3.4 G/DL
ANION GAP SERPL CALC-SCNC: 7 MMOL/L
BASOPHILS # BLD AUTO: 0.03 K/UL
BASOPHILS NFR BLD: 0.4 %
BUN SERPL-MCNC: 21 MG/DL
CALCIUM SERPL-MCNC: 9.1 MG/DL
CHLORIDE SERPL-SCNC: 106 MMOL/L
CO2 SERPL-SCNC: 26 MMOL/L
CREAT SERPL-MCNC: 1.4 MG/DL
DIFFERENTIAL METHOD: ABNORMAL
EOSINOPHIL # BLD AUTO: 0.4 K/UL
EOSINOPHIL NFR BLD: 4.5 %
ERYTHROCYTE [DISTWIDTH] IN BLOOD BY AUTOMATED COUNT: 15.1 %
EST. GFR  (AFRICAN AMERICAN): >60 ML/MIN/1.73 M^2
EST. GFR  (NON AFRICAN AMERICAN): 57.4 ML/MIN/1.73 M^2
FERRITIN SERPL-MCNC: 88 NG/ML
GLUCOSE SERPL-MCNC: 146 MG/DL
HCT VFR BLD AUTO: 39.8 %
HGB BLD-MCNC: 12.4 G/DL
IMM GRANULOCYTES # BLD AUTO: 0.03 K/UL
IMM GRANULOCYTES NFR BLD AUTO: 0.4 %
IRON SERPL-MCNC: 44 UG/DL
LYMPHOCYTES # BLD AUTO: 2 K/UL
LYMPHOCYTES NFR BLD: 24.2 %
MCH RBC QN AUTO: 30.2 PG
MCHC RBC AUTO-ENTMCNC: 31.2 G/DL
MCV RBC AUTO: 97 FL
MONOCYTES # BLD AUTO: 0.8 K/UL
MONOCYTES NFR BLD: 10.1 %
NEUTROPHILS # BLD AUTO: 5 K/UL
NEUTROPHILS NFR BLD: 60.4 %
NRBC BLD-RTO: 0 /100 WBC
PHOSPHATE SERPL-MCNC: 3.3 MG/DL
PLATELET # BLD AUTO: 202 K/UL
PMV BLD AUTO: 12.8 FL
POTASSIUM SERPL-SCNC: 4.1 MMOL/L
RBC # BLD AUTO: 4.11 M/UL
SATURATED IRON: 13 %
SODIUM SERPL-SCNC: 139 MMOL/L
TOTAL IRON BINDING CAPACITY: 333 UG/DL
TRANSFERRIN SERPL-MCNC: 225 MG/DL
WBC # BLD AUTO: 8.3 K/UL

## 2019-03-12 PROCEDURE — 85025 COMPLETE CBC W/AUTO DIFF WBC: CPT

## 2019-03-12 PROCEDURE — 36415 COLL VENOUS BLD VENIPUNCTURE: CPT | Mod: PO

## 2019-03-12 PROCEDURE — 82728 ASSAY OF FERRITIN: CPT

## 2019-03-12 PROCEDURE — 80069 RENAL FUNCTION PANEL: CPT

## 2019-03-12 PROCEDURE — 83540 ASSAY OF IRON: CPT

## 2019-03-18 DIAGNOSIS — E78.5 HYPERLIPIDEMIA LDL GOAL <100: ICD-10-CM

## 2019-03-18 RX ORDER — PRAVASTATIN SODIUM 20 MG/1
TABLET ORAL
Qty: 30 TABLET | Refills: 0 | Status: SHIPPED | OUTPATIENT
Start: 2019-03-18 | End: 2019-04-24 | Stop reason: SDUPTHER

## 2019-04-13 RX ORDER — INSULIN LISPRO 100 [IU]/ML
INJECTION, SOLUTION INTRAVENOUS; SUBCUTANEOUS
Qty: 15 ML | Refills: 0 | Status: SHIPPED | OUTPATIENT
Start: 2019-04-13 | End: 2019-05-17 | Stop reason: SDUPTHER

## 2019-04-24 DIAGNOSIS — I10 ESSENTIAL HYPERTENSION: ICD-10-CM

## 2019-04-24 DIAGNOSIS — M10.9 GOUT, UNSPECIFIED CAUSE, UNSPECIFIED CHRONICITY, UNSPECIFIED SITE: ICD-10-CM

## 2019-04-24 DIAGNOSIS — E78.5 HYPERLIPIDEMIA LDL GOAL <100: ICD-10-CM

## 2019-04-24 DIAGNOSIS — E11.40 CONTROLLED TYPE 2 DIABETES WITH NEUROPATHY: ICD-10-CM

## 2019-04-24 RX ORDER — PIOGLITAZONEHYDROCHLORIDE 45 MG/1
TABLET ORAL
Qty: 30 TABLET | Refills: 2 | Status: SHIPPED | OUTPATIENT
Start: 2019-04-24 | End: 2019-07-27 | Stop reason: SDUPTHER

## 2019-04-24 RX ORDER — PRAVASTATIN SODIUM 20 MG/1
TABLET ORAL
Qty: 30 TABLET | Refills: 2 | Status: SHIPPED | OUTPATIENT
Start: 2019-04-24 | End: 2019-08-01 | Stop reason: SDUPTHER

## 2019-04-24 RX ORDER — ACETAMINOPHEN 500 MG
TABLET ORAL
Qty: 1 EACH | Refills: 0 | Status: SHIPPED | OUTPATIENT
Start: 2019-04-24 | End: 2023-05-15

## 2019-04-24 RX ORDER — COLCHICINE 0.6 MG/1
TABLET ORAL
Qty: 30 TABLET | Refills: 2 | Status: SHIPPED | OUTPATIENT
Start: 2019-04-24 | End: 2019-08-01 | Stop reason: SDUPTHER

## 2019-04-24 RX ORDER — BLOOD SUGAR DIAGNOSTIC
STRIP MISCELLANEOUS
Qty: 100 EACH | Refills: 5 | Status: SHIPPED | OUTPATIENT
Start: 2019-04-24 | End: 2020-12-28 | Stop reason: SDUPTHER

## 2019-04-28 DIAGNOSIS — I10 ESSENTIAL HYPERTENSION: ICD-10-CM

## 2019-04-29 RX ORDER — LISINOPRIL 40 MG/1
TABLET ORAL
Qty: 180 TABLET | Refills: 0 | Status: SHIPPED | OUTPATIENT
Start: 2019-04-29 | End: 2019-05-06

## 2019-05-06 ENCOUNTER — OFFICE VISIT (OUTPATIENT)
Dept: CARDIOLOGY | Facility: CLINIC | Age: 54
End: 2019-05-06
Payer: COMMERCIAL

## 2019-05-06 VITALS
DIASTOLIC BLOOD PRESSURE: 78 MMHG | HEIGHT: 74 IN | SYSTOLIC BLOOD PRESSURE: 142 MMHG | BODY MASS INDEX: 40.43 KG/M2 | OXYGEN SATURATION: 96 % | HEART RATE: 93 BPM | RESPIRATION RATE: 15 BRPM | WEIGHT: 315 LBS

## 2019-05-06 DIAGNOSIS — I10 ESSENTIAL HYPERTENSION: Primary | ICD-10-CM

## 2019-05-06 DIAGNOSIS — E66.01 MORBID OBESITY WITH BMI OF 50.0-59.9, ADULT: ICD-10-CM

## 2019-05-06 DIAGNOSIS — N18.30 CKD (CHRONIC KIDNEY DISEASE) STAGE 3, GFR 30-59 ML/MIN: ICD-10-CM

## 2019-05-06 DIAGNOSIS — E78.5 HYPERLIPIDEMIA LDL GOAL <100: ICD-10-CM

## 2019-05-06 DIAGNOSIS — G47.33 OSA (OBSTRUCTIVE SLEEP APNEA): ICD-10-CM

## 2019-05-06 PROCEDURE — 3008F BODY MASS INDEX DOCD: CPT | Mod: CPTII,S$GLB,, | Performed by: INTERNAL MEDICINE

## 2019-05-06 PROCEDURE — 99999 PR PBB SHADOW E&M-EST. PATIENT-LVL III: ICD-10-PCS | Mod: PBBFAC,,, | Performed by: INTERNAL MEDICINE

## 2019-05-06 PROCEDURE — 3078F PR MOST RECENT DIASTOLIC BLOOD PRESSURE < 80 MM HG: ICD-10-PCS | Mod: CPTII,S$GLB,, | Performed by: INTERNAL MEDICINE

## 2019-05-06 PROCEDURE — 99214 OFFICE O/P EST MOD 30 MIN: CPT | Mod: S$GLB,,, | Performed by: INTERNAL MEDICINE

## 2019-05-06 PROCEDURE — 99999 PR PBB SHADOW E&M-EST. PATIENT-LVL III: CPT | Mod: PBBFAC,,, | Performed by: INTERNAL MEDICINE

## 2019-05-06 PROCEDURE — 3078F DIAST BP <80 MM HG: CPT | Mod: CPTII,S$GLB,, | Performed by: INTERNAL MEDICINE

## 2019-05-06 PROCEDURE — 3077F SYST BP >= 140 MM HG: CPT | Mod: CPTII,S$GLB,, | Performed by: INTERNAL MEDICINE

## 2019-05-06 PROCEDURE — 93000 EKG 12-LEAD: ICD-10-PCS | Mod: S$GLB,,, | Performed by: INTERNAL MEDICINE

## 2019-05-06 PROCEDURE — 99214 PR OFFICE/OUTPT VISIT, EST, LEVL IV, 30-39 MIN: ICD-10-PCS | Mod: S$GLB,,, | Performed by: INTERNAL MEDICINE

## 2019-05-06 PROCEDURE — 3044F HG A1C LEVEL LT 7.0%: CPT | Mod: CPTII,S$GLB,, | Performed by: INTERNAL MEDICINE

## 2019-05-06 PROCEDURE — 93000 ELECTROCARDIOGRAM COMPLETE: CPT | Mod: S$GLB,,, | Performed by: INTERNAL MEDICINE

## 2019-05-06 PROCEDURE — 3008F PR BODY MASS INDEX (BMI) DOCUMENTED: ICD-10-PCS | Mod: CPTII,S$GLB,, | Performed by: INTERNAL MEDICINE

## 2019-05-06 PROCEDURE — 3044F PR MOST RECENT HEMOGLOBIN A1C LEVEL <7.0%: ICD-10-PCS | Mod: CPTII,S$GLB,, | Performed by: INTERNAL MEDICINE

## 2019-05-06 PROCEDURE — 3077F PR MOST RECENT SYSTOLIC BLOOD PRESSURE >= 140 MM HG: ICD-10-PCS | Mod: CPTII,S$GLB,, | Performed by: INTERNAL MEDICINE

## 2019-05-06 RX ORDER — DILTIAZEM HYDROCHLORIDE 240 MG/1
240 CAPSULE, COATED, EXTENDED RELEASE ORAL DAILY
Qty: 90 CAPSULE | Refills: 3 | Status: SHIPPED | OUTPATIENT
Start: 2019-05-06 | End: 2019-06-26 | Stop reason: SDUPTHER

## 2019-05-06 RX ORDER — LOSARTAN POTASSIUM 100 MG/1
100 TABLET ORAL DAILY
Qty: 90 TABLET | Refills: 3 | Status: SHIPPED | OUTPATIENT
Start: 2019-05-06 | End: 2019-05-22 | Stop reason: SDUPTHER

## 2019-05-06 NOTE — PROGRESS NOTES
CARDIOVASCULAR PROGRESS NOTE    REASON FOR CONSULT:   David Corona Jr. is a 53 y.o. male who presents for eval/mgmt of HTN.    PCP: Josie  Neph: [place]  HISTORY OF PRESENT ILLNESS:   The patient returns for follow-up.  He denies intercurrent angina or dyspnea.  There has been no palpitations, lightheadedness, dizziness, or syncope.  He denies PND, orthopnea, or lower extremity edema.  There has been no melena, hematuria, or claudicant symptoms.  He tells me plans the a back into the gym to try to lose some more weight.    Vitals - 1 value per visit 1/16/2019 2/1/2019 2/12/2019 2/19/2019 5/6/2019   SYSTOLIC 144 158 160 130 142   DIASTOLIC 82 86 80 70 78     CARDIOVASCULAR HISTORY:   TOSIN on CPAP    PAST MEDICAL HISTORY:     Past Medical History:   Diagnosis Date    Arthritis     Cataract     left eye     CKD (chronic kidney disease) stage 3, GFR 30-59 ml/min     Colon polyp 11/2016    Dependent edema     Diverticulosis     Gout, arthritis     Hyperlipidemia LDL goal <100     Hypertension     Morbid obesity     TOSIN (obstructive sleep apnea)     Peripheral autonomic neuropathy in disorders classified elsewhere(337.1)     Proteinuria     Type II or unspecified type diabetes mellitus with neurological manifestations, uncontrolled(250.62)     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 If you have any questions, ask your nurse or doctor.               CONTINUE taking these  "medications    aspirin 81 MG EC tablet  Commonly known as:  ECOTRIN  Take 1 tablet (81 mg total) by mouth once daily.     blood pressure monitor Kit  Test BP as needed.     blood sugar diagnostic Strp  Commonly known as:  CONTOUR TEST STRIPS  USE THREE TIMES DAILY     colchicine 0.6 mg tablet  Commonly known as:  COLCRYS  TAKE 1 TABLET(0.6 MG) BY MOUTH EVERY DAY     diltiaZEM 240 MG 24 hr capsule  Commonly known as:  CARDIZEM CD  Take 1 capsule (240 mg total) by mouth once daily.     furosemide 40 MG tablet  Commonly known as:  LASIX  TAKE 1 TABLET(40 MG) BY MOUTH TWICE DAILY     glimepiride 2 MG tablet  Commonly known as:  AMARYL  TAKE 2 TABLETS(4 MG) BY MOUTH TWICE DAILY     HYDROcodone-acetaminophen 5-325 mg per tablet  Commonly known as:  NORCO  Take 1 tablet by mouth every 6 (six) hours as needed for Pain.     insulin glargine 100 units/mL (3mL) SubQ pen  Commonly known as:  LANTUS SOLOSTAR U-100 INSULIN  ADMINISTER 26 UNITS UNDER THE SKIN EVERY NIGHT     insulin lispro 100 unit/mL pen  Commonly known as:  HumaLOG KwikPen Insulin  ADMINISTER 15 UNITS UNDER THE SKIN THREE TIMES DAILY WITH MEALS     lancets Misc  Commonly known as:  MICROLET LANCET  TEST THREE TIMES DAILY     lisinopril 40 MG tablet  Commonly known as:  PRINIVIL,ZESTRIL  TAKE 1 TABLET BY MOUTH TWICE DAILY     miscellaneous medical supply Kit  Nebulizer machine cleaning supplies/apparatus     omega-3 fatty acids 1,000 mg Cap     pen needle, diabetic 32 gauge x 1/4" Ndle  Commonly known as:  NOVOFINE 32  USE FOUR TIMES DAILY AS NEEDED     pioglitazone 45 MG tablet  Commonly known as:  ACTOS  TAKE 1 TABLET(45 MG) BY MOUTH EVERY DAY     pravastatin 20 MG tablet  Commonly known as:  PRAVACHOL  TAKE 1 TABLET(20 MG) BY MOUTH EVERY DAY            SOCIAL HISTORY:     Social History     Socioeconomic History    Marital status:      Spouse name: Not on file    Number of children: 3    Years of education: Not on file    Highest education level: " Not on file   Occupational History    Occupation:      Employer: [employer]    Occupation:    Social Needs    Financial resource strain: Not on file    Food insecurity:     Worry: Not on file     Inability: Not on file    Transportation needs:     Medical: Not on file     Non-medical: Not on file   Tobacco Use    Smoking status: Never Smoker    Smokeless tobacco: Never Used   Substance and Sexual Activity    Alcohol use: Yes     Alcohol/week: 0.0 oz     Comment: rarely    Drug use: No    Sexual activity: Not on file   Lifestyle    Physical activity:     Days per week: Not on file     Minutes per session: Not on file    Stress: Not on file   Relationships    Social connections:     Talks on phone: Not on file     Gets together: Not on file     Attends Jew service: Not on file     Active member of club or organization: Not on file     Attends meetings of clubs or organizations: Not on file     Relationship status: Not on file   Other Topics Concern    Not on file   Social History Narrative    Not on file       FAMILY HISTORY:     Family History   Problem Relation Age of Onset    Breast cancer Mother     Diabetes Mother     Hypertension Mother     Stroke Mother     Prostate cancer Father     Diabetes Father     Hypertension Father     No Known Problems Sister     No Known Problems Brother     Breast cancer Maternal Grandmother     Amblyopia Neg Hx     Blindness Neg Hx     Cataracts Neg Hx     Glaucoma Neg Hx     Macular degeneration Neg Hx     Retinal detachment Neg Hx     Strabismus Neg Hx        REVIEW OF SYSTEMS:   Review of Systems   Constitutional: Negative for chills, diaphoresis and fever.   HENT: Negative for nosebleeds.    Eyes: Negative for blurred vision, double vision and photophobia.   Respiratory: Negative for hemoptysis, shortness of breath and wheezing.    Cardiovascular: Negative for chest pain, palpitations,  "orthopnea, claudication, leg swelling and PND.   Gastrointestinal: Negative for abdominal pain, blood in stool, heartburn, melena, nausea and vomiting.   Genitourinary: Negative for flank pain and hematuria.   Musculoskeletal: Negative for falls, myalgias and neck pain.   Skin: Negative for rash.   Neurological: Negative for dizziness, seizures, loss of consciousness, weakness and headaches.   Endo/Heme/Allergies: Negative for polydipsia. Does not bruise/bleed easily.   Psychiatric/Behavioral: Negative for depression and memory loss. The patient is not nervous/anxious.        PHYSICAL EXAM:     Vitals:    05/06/19 0813   BP: (!) 142/78   Pulse: 93   Resp: 15    Body mass index is 61.99 kg/m².  Weight: (!) 219 kg (482 lb 12.9 oz)   Height: 6' 2" (188 cm)     Physical Exam   Constitutional: He is oriented to person, place, and time. He appears well-developed and well-nourished. He is cooperative.  Non-toxic appearance. No distress.   HENT:   Head: Normocephalic and atraumatic.   Eyes: Pupils are equal, round, and reactive to light. Conjunctivae and EOM are normal. No scleral icterus.   Neck: Trachea normal and normal range of motion. Neck supple. Normal carotid pulses and no JVD present. Carotid bruit is not present. No neck rigidity. No tracheal deviation and no edema present. No thyromegaly present.   Cardiovascular: Normal rate, regular rhythm, S1 normal and S2 normal. PMI is not displaced. Exam reveals distant heart sounds. Exam reveals no gallop and no friction rub.   No murmur heard.  Pulses:       Carotid pulses are 2+ on the right side, and 2+ on the left side.  Pulmonary/Chest: Effort normal and breath sounds normal. No stridor. No respiratory distress. He has no wheezes. He has no rales. He exhibits no tenderness.   Abdominal: Soft. He exhibits no distension. There is no hepatosplenomegaly.   obese   Musculoskeletal: Normal range of motion. He exhibits no edema or tenderness.   Feet:   Right Foot:   Skin " Integrity: Negative for ulcer.   Left Foot:   Skin Integrity: Negative for ulcer.   Neurological: He is alert and oriented to person, place, and time. No cranial nerve deficit.   Skin: Skin is warm and dry. No rash noted. No erythema.   Psychiatric: He has a normal mood and affect. His speech is normal and behavior is normal.   Vitals reviewed.      DATA:   EKG: (personally reviewed tracing)  5/6/19 SR 93, PRWP, no change vs 1/16/19    Laboratory:  CBC:  Recent Labs   Lab 01/31/17  0715 05/28/18  0841 03/12/19  0705   WHITE BLOOD CELL COUNT 6.66 6.92 8.30   HEMOGLOBIN 13.0 L 12.0 L 12.4 L   HEMATOCRIT 39.8 L 38.3 L 39.8 L   PLATELETS 191 198 202       CHEMISTRIES:  Recent Labs   Lab 05/28/18  0841 10/08/18  0915 03/12/19  0705   GLUCOSE 131 H 120 H 146 H   SODIUM 139 139 139   POTASSIUM 5.1 5.1 4.1   BUN BLD 26 H 28 H 21 H   CREATININE 1.6 H 1.7 H 1.4   EGFR IF  56.4 A 52.4 A >60.0   EGFR IF NON- 48.8 A 45.4 A 57.4 A   CALCIUM 9.1 9.1 9.1       CARDIAC BIOMARKERS:        COAGS:        LIPIDS/LFTS:  Recent Labs   Lab 01/31/17  0715 10/28/17  0813 05/28/18  0841   CHOLESTEROL 126  --  121   TRIGLYCERIDES 54  --  53   HDL 43  --  47   LDL CHOLESTEROL 72.2  --  63.4   NON-HDL CHOLESTEROL 83  --  74   AST 22 21 22   ALT 6 L 8 L 5 L       Cardiovascular Testing:  none    ASSESSMENT:   # HTN, uncontrolled  # CKD3  # BMI 62, stable vs last OV  # TOSIN on CPAP  # DM  # HLP on prava 20mg    PLAN:   Cont med rx  Stop lisinopril  Start losartan 100mg qd  Inc dilt 480mg qd  Check BMP 2 weeks  Diet/exercise/weight loss  RTC 1 month    Kavon Messer MD, FACC    Addendum 5/22/19  Creat 1.4->1.8  BMP  Lab Results   Component Value Date     05/21/2019    K 4.3 05/21/2019     05/21/2019    CO2 28 05/21/2019    BUN 27 (H) 05/21/2019    CREATININE 1.8 (H) 05/21/2019    CALCIUM 9.4 05/21/2019    ANIONGAP 8 05/21/2019    ESTGFRAFRICA 48.6 (A) 05/21/2019    EGFRNONAA 42.0 (A) 05/21/2019      Decrease losartan 50mg qd  Repeat BMP prior to next OV (in 1 week).

## 2019-05-17 RX ORDER — INSULIN LISPRO 100 [IU]/ML
INJECTION, SOLUTION INTRAVENOUS; SUBCUTANEOUS
Qty: 15 ML | Refills: 0 | Status: SHIPPED | OUTPATIENT
Start: 2019-05-17 | End: 2019-06-16 | Stop reason: SDUPTHER

## 2019-05-21 ENCOUNTER — LAB VISIT (OUTPATIENT)
Dept: LAB | Facility: HOSPITAL | Age: 54
End: 2019-05-21
Attending: INTERNAL MEDICINE
Payer: COMMERCIAL

## 2019-05-21 DIAGNOSIS — I10 ESSENTIAL HYPERTENSION: ICD-10-CM

## 2019-05-21 LAB
ANION GAP SERPL CALC-SCNC: 8 MMOL/L (ref 8–16)
BUN SERPL-MCNC: 27 MG/DL (ref 6–20)
CALCIUM SERPL-MCNC: 9.4 MG/DL (ref 8.7–10.5)
CHLORIDE SERPL-SCNC: 103 MMOL/L (ref 95–110)
CO2 SERPL-SCNC: 28 MMOL/L (ref 23–29)
CREAT SERPL-MCNC: 1.8 MG/DL (ref 0.5–1.4)
EST. GFR  (AFRICAN AMERICAN): 48.6 ML/MIN/1.73 M^2
EST. GFR  (NON AFRICAN AMERICAN): 42 ML/MIN/1.73 M^2
GLUCOSE SERPL-MCNC: 149 MG/DL (ref 70–110)
POTASSIUM SERPL-SCNC: 4.3 MMOL/L (ref 3.5–5.1)
SODIUM SERPL-SCNC: 139 MMOL/L (ref 136–145)

## 2019-05-21 PROCEDURE — 36415 COLL VENOUS BLD VENIPUNCTURE: CPT | Mod: PO

## 2019-05-21 PROCEDURE — 80048 BASIC METABOLIC PNL TOTAL CA: CPT

## 2019-05-22 ENCOUNTER — TELEPHONE (OUTPATIENT)
Dept: CARDIOLOGY | Facility: CLINIC | Age: 54
End: 2019-05-22

## 2019-05-22 RX ORDER — LOSARTAN POTASSIUM 50 MG/1
50 TABLET ORAL DAILY
Qty: 90 TABLET | Refills: 3
Start: 2019-05-22 | End: 2020-10-21 | Stop reason: SDUPTHER

## 2019-05-24 ENCOUNTER — TELEPHONE (OUTPATIENT)
Dept: CARDIOLOGY | Facility: CLINIC | Age: 54
End: 2019-05-24

## 2019-05-29 DIAGNOSIS — E11.9 TYPE 2 DIABETES MELLITUS WITHOUT COMPLICATION: ICD-10-CM

## 2019-06-03 ENCOUNTER — LAB VISIT (OUTPATIENT)
Dept: LAB | Facility: HOSPITAL | Age: 54
End: 2019-06-03
Attending: INTERNAL MEDICINE
Payer: COMMERCIAL

## 2019-06-03 DIAGNOSIS — I10 ESSENTIAL HYPERTENSION: ICD-10-CM

## 2019-06-03 DIAGNOSIS — N18.30 CKD (CHRONIC KIDNEY DISEASE) STAGE 3, GFR 30-59 ML/MIN: ICD-10-CM

## 2019-06-03 LAB
ANION GAP SERPL CALC-SCNC: 10 MMOL/L (ref 8–16)
BUN SERPL-MCNC: 21 MG/DL (ref 6–20)
CALCIUM SERPL-MCNC: 9.6 MG/DL (ref 8.7–10.5)
CHLORIDE SERPL-SCNC: 105 MMOL/L (ref 95–110)
CO2 SERPL-SCNC: 25 MMOL/L (ref 23–29)
CREAT SERPL-MCNC: 1.6 MG/DL (ref 0.5–1.4)
EST. GFR  (AFRICAN AMERICAN): 56 ML/MIN/1.73 M^2
EST. GFR  (NON AFRICAN AMERICAN): 48.5 ML/MIN/1.73 M^2
GLUCOSE SERPL-MCNC: 186 MG/DL (ref 70–110)
POTASSIUM SERPL-SCNC: 4.3 MMOL/L (ref 3.5–5.1)
SODIUM SERPL-SCNC: 140 MMOL/L (ref 136–145)

## 2019-06-03 PROCEDURE — 36415 COLL VENOUS BLD VENIPUNCTURE: CPT | Mod: PO

## 2019-06-03 PROCEDURE — 80048 BASIC METABOLIC PNL TOTAL CA: CPT

## 2019-06-07 RX ORDER — INSULIN GLARGINE 100 [IU]/ML
INJECTION, SOLUTION SUBCUTANEOUS
Qty: 15 ML | Refills: 0 | Status: SHIPPED | OUTPATIENT
Start: 2019-06-07 | End: 2019-08-20 | Stop reason: SDUPTHER

## 2019-06-17 RX ORDER — INSULIN LISPRO 100 [IU]/ML
INJECTION, SOLUTION INTRAVENOUS; SUBCUTANEOUS
Qty: 15 ML | Refills: 1 | Status: SHIPPED | OUTPATIENT
Start: 2019-06-17 | End: 2019-08-19 | Stop reason: SDUPTHER

## 2019-06-26 ENCOUNTER — PATIENT OUTREACH (OUTPATIENT)
Dept: ADMINISTRATIVE | Facility: OTHER | Age: 54
End: 2019-06-26

## 2019-06-26 ENCOUNTER — OFFICE VISIT (OUTPATIENT)
Dept: CARDIOLOGY | Facility: CLINIC | Age: 54
End: 2019-06-26
Payer: COMMERCIAL

## 2019-06-26 VITALS
OXYGEN SATURATION: 94 % | DIASTOLIC BLOOD PRESSURE: 80 MMHG | SYSTOLIC BLOOD PRESSURE: 142 MMHG | HEART RATE: 90 BPM | WEIGHT: 315 LBS | HEIGHT: 74 IN | RESPIRATION RATE: 15 BRPM | BODY MASS INDEX: 40.43 KG/M2

## 2019-06-26 DIAGNOSIS — G47.33 OSA (OBSTRUCTIVE SLEEP APNEA): ICD-10-CM

## 2019-06-26 DIAGNOSIS — N18.30 CKD (CHRONIC KIDNEY DISEASE) STAGE 3, GFR 30-59 ML/MIN: ICD-10-CM

## 2019-06-26 DIAGNOSIS — E78.2 MIXED HYPERLIPIDEMIA: ICD-10-CM

## 2019-06-26 DIAGNOSIS — E66.01 MORBID OBESITY WITH BMI OF 50.0-59.9, ADULT: ICD-10-CM

## 2019-06-26 DIAGNOSIS — I10 ESSENTIAL HYPERTENSION: Primary | ICD-10-CM

## 2019-06-26 DIAGNOSIS — I10 ESSENTIAL HYPERTENSION: ICD-10-CM

## 2019-06-26 PROCEDURE — 3079F PR MOST RECENT DIASTOLIC BLOOD PRESSURE 80-89 MM HG: ICD-10-PCS | Mod: CPTII,S$GLB,, | Performed by: INTERNAL MEDICINE

## 2019-06-26 PROCEDURE — 3077F PR MOST RECENT SYSTOLIC BLOOD PRESSURE >= 140 MM HG: ICD-10-PCS | Mod: CPTII,S$GLB,, | Performed by: INTERNAL MEDICINE

## 2019-06-26 PROCEDURE — 3044F PR MOST RECENT HEMOGLOBIN A1C LEVEL <7.0%: ICD-10-PCS | Mod: CPTII,S$GLB,, | Performed by: INTERNAL MEDICINE

## 2019-06-26 PROCEDURE — 3079F DIAST BP 80-89 MM HG: CPT | Mod: CPTII,S$GLB,, | Performed by: INTERNAL MEDICINE

## 2019-06-26 PROCEDURE — 99999 PR PBB SHADOW E&M-EST. PATIENT-LVL III: CPT | Mod: PBBFAC,,, | Performed by: INTERNAL MEDICINE

## 2019-06-26 PROCEDURE — 3077F SYST BP >= 140 MM HG: CPT | Mod: CPTII,S$GLB,, | Performed by: INTERNAL MEDICINE

## 2019-06-26 PROCEDURE — 3044F HG A1C LEVEL LT 7.0%: CPT | Mod: CPTII,S$GLB,, | Performed by: INTERNAL MEDICINE

## 2019-06-26 PROCEDURE — 3008F BODY MASS INDEX DOCD: CPT | Mod: CPTII,S$GLB,, | Performed by: INTERNAL MEDICINE

## 2019-06-26 PROCEDURE — 99214 PR OFFICE/OUTPT VISIT, EST, LEVL IV, 30-39 MIN: ICD-10-PCS | Mod: S$GLB,,, | Performed by: INTERNAL MEDICINE

## 2019-06-26 PROCEDURE — 99999 PR PBB SHADOW E&M-EST. PATIENT-LVL III: ICD-10-PCS | Mod: PBBFAC,,, | Performed by: INTERNAL MEDICINE

## 2019-06-26 PROCEDURE — 3008F PR BODY MASS INDEX (BMI) DOCUMENTED: ICD-10-PCS | Mod: CPTII,S$GLB,, | Performed by: INTERNAL MEDICINE

## 2019-06-26 PROCEDURE — 99214 OFFICE O/P EST MOD 30 MIN: CPT | Mod: S$GLB,,, | Performed by: INTERNAL MEDICINE

## 2019-06-26 RX ORDER — METOPROLOL SUCCINATE 50 MG/1
50 TABLET, EXTENDED RELEASE ORAL DAILY
Qty: 90 TABLET | Refills: 3 | Status: SHIPPED | OUTPATIENT
Start: 2019-06-26 | End: 2020-06-24

## 2019-06-26 RX ORDER — FUROSEMIDE 40 MG/1
TABLET ORAL
Qty: 60 TABLET | Refills: 0 | Status: SHIPPED | OUTPATIENT
Start: 2019-06-26 | End: 2019-07-25 | Stop reason: SDUPTHER

## 2019-06-26 RX ORDER — DILTIAZEM HYDROCHLORIDE 240 MG/1
480 CAPSULE, COATED, EXTENDED RELEASE ORAL DAILY
Qty: 180 CAPSULE | Refills: 3 | Status: SHIPPED | OUTPATIENT
Start: 2019-06-26 | End: 2020-03-11 | Stop reason: SDUPTHER

## 2019-06-26 NOTE — PROGRESS NOTES
CARDIOVASCULAR PROGRESS NOTE    REASON FOR CONSULT:   David Corona Jr. is a 54 y.o. male who presents for eval/mgmt of HTN.    PCP: Josie  Neph: [place]  HISTORY OF PRESENT ILLNESS:   The patient returns for follow-up.  He denies intercurrent angina or dyspnea.  There has been no palpitations, lightheadedness, dizziness, or syncope.  He denies PND, orthopnea, or lower extremity edema.  There has been no melena, hematuria, or claudicant symptoms.  He tells me plans the a back into the gym to try to lose some more weight.  He also appears to be somewhat resistant to Bariatric referral, but will plan to go to the information session.    In contradistinction to list below, the patient is taking 480 mg daily of diltiazem.    CARDIOVASCULAR HISTORY:   TOSIN on CPAP    PAST MEDICAL HISTORY:     Past Medical History:   Diagnosis Date    Arthritis     Cataract     left eye     CKD (chronic kidney disease) stage 3, GFR 30-59 ml/min     Colon polyp 11/2016    Dependent edema     Diverticulosis     Gout, arthritis     Hyperlipidemia LDL goal <100     Hypertension     Morbid obesity     TOSIN (obstructive sleep apnea)     Peripheral autonomic neuropathy in disorders classified elsewhere(337.1)     Proteinuria     Type II or unspecified type diabetes mellitus with neurological manifestations, uncontrolled(250.62)     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 If you have any questions, ask  your nurse or doctor.               CHANGE how you take these medications    * furosemide 40 MG tablet  Commonly known as:  LASIX  TAKE 1 TABLET(40 MG) BY MOUTH TWICE DAILY  What changed:  Another medication with the same name was added. Make sure you understand how and when to take each.  Changed by:  Riddhi Galindo MD     * furosemide 40 MG tablet  Commonly known as:  LASIX  TAKE 1 TABLET(40 MG) BY MOUTH TWICE DAILY  What changed:  You were already taking a medication with the same name, and this prescription was added. Make sure you understand how and when to take each.  Changed by:  Riddhi Galindo MD         * This list has 2 medication(s) that are the same as other medications prescribed for you. Read the directions carefully, and ask your doctor or other care provider to review them with you.            CONTINUE taking these medications    aspirin 81 MG EC tablet  Commonly known as:  ECOTRIN  Take 1 tablet (81 mg total) by mouth once daily.     blood pressure monitor Kit  Test BP as needed.     blood sugar diagnostic Strp  Commonly known as:  CONTOUR TEST STRIPS  USE THREE TIMES DAILY     colchicine 0.6 mg tablet  Commonly known as:  COLCRYS  TAKE 1 TABLET(0.6 MG) BY MOUTH EVERY DAY     diltiaZEM 240 MG 24 hr capsule  Commonly known as:  CARDIZEM CD  Take 1 capsule (240 mg total) by mouth once daily.     glimepiride 2 MG tablet  Commonly known as:  AMARYL  TAKE 2 TABLETS(4 MG) BY MOUTH TWICE DAILY     HYDROcodone-acetaminophen 5-325 mg per tablet  Commonly known as:  NORCO  Take 1 tablet by mouth every 6 (six) hours as needed for Pain.     insulin glargine 100 units/mL (3mL) SubQ pen  Commonly known as:  LANTUS SOLOSTAR U-100 INSULIN  INJECT 26 UNITS UNDER THE SKIN EVERY NIGHT     insulin lispro 100 unit/mL pen  Commonly known as:  HumaLOG KwikPen Insulin  INJECT 15 UNITS UNDER THE SKIN THREE TIMES DAILY WITH MEALS     lancets Misc  Commonly known as:  MICROLET LANCET  TEST THREE TIMES DAILY     losartan  "50 MG tablet  Commonly known as:  COZAAR  Take 1 tablet (50 mg total) by mouth once daily.     miscellaneous medical supply Kit  Nebulizer machine cleaning supplies/apparatus     omega-3 fatty acids 1,000 mg Cap     pen needle, diabetic 32 gauge x 1/4" Ndle  Commonly known as:  NOVOFINE 32  USE FOUR TIMES DAILY AS NEEDED     pioglitazone 45 MG tablet  Commonly known as:  ACTOS  TAKE 1 TABLET(45 MG) BY MOUTH EVERY DAY     pravastatin 20 MG tablet  Commonly known as:  PRAVACHOL  TAKE 1 TABLET(20 MG) BY MOUTH EVERY DAY           Where to Get Your Medications      These medications were sent to "[facility] [address] [place] [address] [address] [place] [address]    Hours:  24-hours Phone:  [phone]   · furosemide 40 MG tablet         SOCIAL HISTORY:     Social History     Socioeconomic History    Marital status:      Spouse name: Not on file    Number of children: 3    Years of education: Not on file    Highest education level: Not on file   Occupational History    Occupation:      Employer: [employer]    Occupation:    Social Needs    Financial resource strain: Not on file    Food insecurity:     Worry: Not on file     Inability: Not on file    Transportation needs:     Medical: Not on file     Non-medical: Not on file   Tobacco Use    Smoking status: Never Smoker    Smokeless tobacco: Never Used   Substance and Sexual Activity    Alcohol use: Yes     Alcohol/week: 0.0 oz     Comment: rarely    Drug use: No    Sexual activity: Not on file   Lifestyle    Physical activity:     Days per week: Not on file     Minutes per session: Not on file    Stress: Not on file   Relationships    Social connections:     Talks on phone: Not on file     Gets together: Not on file     Attends Taoist service: Not on file     Active member of club or organization: Not on file     Attends " "meetings of clubs or organizations: Not on file     Relationship status: Not on file   Other Topics Concern    Not on file   Social History Narrative    Not on file       FAMILY HISTORY:     Family History   Problem Relation Age of Onset    Breast cancer Mother     Diabetes Mother     Hypertension Mother     Stroke Mother     Prostate cancer Father     Diabetes Father     Hypertension Father     No Known Problems Sister     No Known Problems Brother     Breast cancer Maternal Grandmother     Amblyopia Neg Hx     Blindness Neg Hx     Cataracts Neg Hx     Glaucoma Neg Hx     Macular degeneration Neg Hx     Retinal detachment Neg Hx     Strabismus Neg Hx        REVIEW OF SYSTEMS:   Review of Systems   Constitutional: Negative for chills, diaphoresis and fever.   HENT: Negative for nosebleeds.    Eyes: Negative for blurred vision, double vision and photophobia.   Respiratory: Negative for hemoptysis, shortness of breath and wheezing.    Cardiovascular: Negative for chest pain, palpitations, orthopnea, claudication, leg swelling and PND.   Gastrointestinal: Negative for abdominal pain, blood in stool, heartburn, melena, nausea and vomiting.   Genitourinary: Negative for flank pain and hematuria.   Musculoskeletal: Negative for falls, myalgias and neck pain.   Skin: Negative for rash.   Neurological: Negative for dizziness, seizures, loss of consciousness, weakness and headaches.   Endo/Heme/Allergies: Negative for polydipsia. Does not bruise/bleed easily.   Psychiatric/Behavioral: Negative for depression and memory loss. The patient is not nervous/anxious.        PHYSICAL EXAM:     Vitals:    06/26/19 1523   BP: (!) 142/80   Pulse: 90   Resp: 15    Body mass index is 62.97 kg/m².  Weight: (!) 222.4 kg (490 lb 6.6 oz)   Height: 6' 2" (188 cm)     Physical Exam   Constitutional: He is oriented to person, place, and time. He appears well-developed and well-nourished. He is cooperative.  Non-toxic " appearance. No distress.   HENT:   Head: Normocephalic and atraumatic.   Eyes: Pupils are equal, round, and reactive to light. Conjunctivae and EOM are normal. No scleral icterus.   Neck: Trachea normal and normal range of motion. Neck supple. Normal carotid pulses and no JVD present. Carotid bruit is not present. No neck rigidity. No tracheal deviation and no edema present. No thyromegaly present.   Cardiovascular: Normal rate, regular rhythm, S1 normal and S2 normal. PMI is not displaced. Exam reveals distant heart sounds. Exam reveals no gallop and no friction rub.   No murmur heard.  Pulses:       Carotid pulses are 2+ on the right side, and 2+ on the left side.  Pulmonary/Chest: Effort normal and breath sounds normal. No stridor. No respiratory distress. He has no wheezes. He has no rales. He exhibits no tenderness.   Abdominal: Soft. He exhibits no distension. There is no hepatosplenomegaly.   obese   Musculoskeletal: Normal range of motion. He exhibits no edema or tenderness.   Feet:   Right Foot:   Skin Integrity: Negative for ulcer.   Left Foot:   Skin Integrity: Negative for ulcer.   Neurological: He is alert and oriented to person, place, and time. No cranial nerve deficit.   Skin: Skin is warm and dry. No rash noted. No erythema.   Psychiatric: He has a normal mood and affect. His speech is normal and behavior is normal.   Vitals reviewed.      DATA:   EKG: (personally reviewed tracing)  5/6/19 SR 93, PRWP, no change vs 1/16/19    Laboratory:  CBC:  Recent Labs   Lab 01/31/17  0715 05/28/18  0841 03/12/19  0705   WBC 6.66 6.92 8.30   Hemoglobin 13.0 L 12.0 L 12.4 L   Hematocrit 39.8 L 38.3 L 39.8 L   Platelets 191 198 202       CHEMISTRIES:  Recent Labs   Lab 03/12/19  0705 05/21/19  0736 06/03/19  0750   Glucose 146 H 149 H 186 H   Sodium 139 139 140   Potassium 4.1 4.3 4.3   BUN, Bld 21 H 27 H 21 H   Creatinine 1.4 1.8 H 1.6 H   eGFR if  >60.0 48.6 A 56.0 A   eGFR if non   American 57.4 A 42.0 A 48.5 A   Calcium 9.1 9.4 9.6       CARDIAC BIOMARKERS:        COAGS:        LIPIDS/LFTS:  Recent Labs   Lab 01/31/17  0715 10/28/17  0813 05/28/18  0841   Cholesterol 126  --  121   Triglycerides 54  --  53   HDL 43  --  47   LDL Cholesterol 72.2  --  63.4   Non-HDL Cholesterol 83  --  74   AST 22 21 22   ALT 6 L 8 L 5 L       Cardiovascular Testing:  none    ASSESSMENT:   # HTN, uncontrolled  # CKD3, creat 1.4->1.8 (with losartan 100mg qd)->1.6 (on losartan 50mg qd)  # BMI 63, up 1 unit vs last OV  # TOSIN on CPAP  # DM  # HLP on prava 20mg    PLAN:   Cont med rx  Cont losartan 50mg qd, diltiazem 480mg qd, lasix 40mg bid  Start Toprol XL 50mg qd  Diet/exercise/weight loss, pt to attend bariatric info session  RTC 3 months    Kavon Messer MD, FACC

## 2019-07-25 DIAGNOSIS — I10 ESSENTIAL HYPERTENSION: ICD-10-CM

## 2019-07-25 RX ORDER — FUROSEMIDE 40 MG/1
TABLET ORAL
Qty: 180 TABLET | Refills: 0 | Status: SHIPPED | OUTPATIENT
Start: 2019-07-25 | End: 2019-10-25 | Stop reason: SDUPTHER

## 2019-07-27 DIAGNOSIS — E11.40 CONTROLLED TYPE 2 DIABETES WITH NEUROPATHY: ICD-10-CM

## 2019-07-29 RX ORDER — PIOGLITAZONEHYDROCHLORIDE 45 MG/1
TABLET ORAL
Qty: 30 TABLET | Refills: 1 | Status: SHIPPED | OUTPATIENT
Start: 2019-07-29 | End: 2019-10-06 | Stop reason: SDUPTHER

## 2019-08-01 DIAGNOSIS — M10.9 GOUT, UNSPECIFIED CAUSE, UNSPECIFIED CHRONICITY, UNSPECIFIED SITE: ICD-10-CM

## 2019-08-01 DIAGNOSIS — E78.5 HYPERLIPIDEMIA LDL GOAL <100: ICD-10-CM

## 2019-08-01 RX ORDER — COLCHICINE 0.6 MG/1
TABLET ORAL
Qty: 30 TABLET | Refills: 0 | Status: SHIPPED | OUTPATIENT
Start: 2019-08-01 | End: 2019-09-04 | Stop reason: SDUPTHER

## 2019-08-02 RX ORDER — PRAVASTATIN SODIUM 20 MG/1
TABLET ORAL
Qty: 30 TABLET | Refills: 0 | Status: SHIPPED | OUTPATIENT
Start: 2019-08-02 | End: 2019-08-30 | Stop reason: SDUPTHER

## 2019-08-20 RX ORDER — INSULIN LISPRO 100 [IU]/ML
INJECTION, SOLUTION INTRAVENOUS; SUBCUTANEOUS
Qty: 15 ML | Refills: 1 | Status: SHIPPED | OUTPATIENT
Start: 2019-08-20 | End: 2019-10-16 | Stop reason: SDUPTHER

## 2019-08-21 RX ORDER — INSULIN GLARGINE 100 [IU]/ML
INJECTION, SOLUTION SUBCUTANEOUS
Qty: 15 ML | Refills: 0 | Status: SHIPPED | OUTPATIENT
Start: 2019-08-21 | End: 2019-10-12 | Stop reason: SDUPTHER

## 2019-08-24 ENCOUNTER — LAB VISIT (OUTPATIENT)
Dept: LAB | Facility: HOSPITAL | Age: 54
End: 2019-08-24
Attending: INTERNAL MEDICINE
Payer: COMMERCIAL

## 2019-08-24 DIAGNOSIS — E11.9 TYPE 2 DIABETES MELLITUS WITHOUT COMPLICATION: ICD-10-CM

## 2019-08-24 LAB
CHOLEST SERPL-MCNC: 149 MG/DL (ref 120–199)
CHOLEST/HDLC SERPL: 2.7 {RATIO} (ref 2–5)
ESTIMATED AVG GLUCOSE: 171 MG/DL (ref 68–131)
HBA1C MFR BLD HPLC: 7.6 % (ref 4–5.6)
HDLC SERPL-MCNC: 55 MG/DL (ref 40–75)
HDLC SERPL: 36.9 % (ref 20–50)
LDLC SERPL CALC-MCNC: 80.4 MG/DL (ref 63–159)
NONHDLC SERPL-MCNC: 94 MG/DL
TRIGL SERPL-MCNC: 68 MG/DL (ref 30–150)

## 2019-08-24 PROCEDURE — 83036 HEMOGLOBIN GLYCOSYLATED A1C: CPT

## 2019-08-24 PROCEDURE — 36415 COLL VENOUS BLD VENIPUNCTURE: CPT | Mod: PO

## 2019-08-24 PROCEDURE — 80061 LIPID PANEL: CPT

## 2019-08-30 DIAGNOSIS — E78.5 HYPERLIPIDEMIA LDL GOAL <100: ICD-10-CM

## 2019-08-30 RX ORDER — PRAVASTATIN SODIUM 20 MG/1
TABLET ORAL
Qty: 30 TABLET | Refills: 0 | Status: SHIPPED | OUTPATIENT
Start: 2019-08-30 | End: 2019-10-06 | Stop reason: SDUPTHER

## 2019-09-03 ENCOUNTER — PATIENT OUTREACH (OUTPATIENT)
Dept: ADMINISTRATIVE | Facility: OTHER | Age: 54
End: 2019-09-03

## 2019-09-04 ENCOUNTER — OFFICE VISIT (OUTPATIENT)
Dept: FAMILY MEDICINE | Facility: CLINIC | Age: 54
End: 2019-09-04
Payer: COMMERCIAL

## 2019-09-04 VITALS
TEMPERATURE: 99 F | HEIGHT: 74 IN | BODY MASS INDEX: 40.43 KG/M2 | SYSTOLIC BLOOD PRESSURE: 158 MMHG | HEART RATE: 82 BPM | WEIGHT: 315 LBS | DIASTOLIC BLOOD PRESSURE: 76 MMHG

## 2019-09-04 DIAGNOSIS — M10.9 GOUT, UNSPECIFIED CAUSE, UNSPECIFIED CHRONICITY, UNSPECIFIED SITE: ICD-10-CM

## 2019-09-04 DIAGNOSIS — N18.30 CKD (CHRONIC KIDNEY DISEASE) STAGE 3, GFR 30-59 ML/MIN: ICD-10-CM

## 2019-09-04 DIAGNOSIS — E66.01 MORBID OBESITY WITH BMI OF 50.0-59.9, ADULT: ICD-10-CM

## 2019-09-04 DIAGNOSIS — E78.5 HYPERLIPIDEMIA LDL GOAL <100: ICD-10-CM

## 2019-09-04 DIAGNOSIS — G47.33 OSA (OBSTRUCTIVE SLEEP APNEA): ICD-10-CM

## 2019-09-04 DIAGNOSIS — Z23 NEED FOR IMMUNIZATION AGAINST INFLUENZA: ICD-10-CM

## 2019-09-04 DIAGNOSIS — I10 ESSENTIAL HYPERTENSION: ICD-10-CM

## 2019-09-04 DIAGNOSIS — N25.81 SECONDARY HYPERPARATHYROIDISM OF RENAL ORIGIN: ICD-10-CM

## 2019-09-04 DIAGNOSIS — G99.0 PERIPHERAL AUTONOMIC NEUROPATHY IN DISORDERS CLASSIFIED ELSEWHERE: ICD-10-CM

## 2019-09-04 PROCEDURE — 3078F DIAST BP <80 MM HG: CPT | Mod: CPTII,S$GLB,, | Performed by: NURSE PRACTITIONER

## 2019-09-04 PROCEDURE — 3077F SYST BP >= 140 MM HG: CPT | Mod: CPTII,S$GLB,, | Performed by: NURSE PRACTITIONER

## 2019-09-04 PROCEDURE — 90686 FLU VACCINE (QUAD) GREATER THAN OR EQUAL TO 3YO PRESERVATIVE FREE IM: ICD-10-PCS | Mod: S$GLB,,, | Performed by: NURSE PRACTITIONER

## 2019-09-04 PROCEDURE — 3077F PR MOST RECENT SYSTOLIC BLOOD PRESSURE >= 140 MM HG: ICD-10-PCS | Mod: CPTII,S$GLB,, | Performed by: NURSE PRACTITIONER

## 2019-09-04 PROCEDURE — 90686 IIV4 VACC NO PRSV 0.5 ML IM: CPT | Mod: S$GLB,,, | Performed by: NURSE PRACTITIONER

## 2019-09-04 PROCEDURE — 3078F PR MOST RECENT DIASTOLIC BLOOD PRESSURE < 80 MM HG: ICD-10-PCS | Mod: CPTII,S$GLB,, | Performed by: NURSE PRACTITIONER

## 2019-09-04 PROCEDURE — 3045F PR MOST RECENT HEMOGLOBIN A1C LEVEL 7.0-9.0%: ICD-10-PCS | Mod: CPTII,S$GLB,, | Performed by: NURSE PRACTITIONER

## 2019-09-04 PROCEDURE — 99214 PR OFFICE/OUTPT VISIT, EST, LEVL IV, 30-39 MIN: ICD-10-PCS | Mod: 25,S$GLB,, | Performed by: NURSE PRACTITIONER

## 2019-09-04 PROCEDURE — 90471 FLU VACCINE (QUAD) GREATER THAN OR EQUAL TO 3YO PRESERVATIVE FREE IM: ICD-10-PCS | Mod: S$GLB,,, | Performed by: NURSE PRACTITIONER

## 2019-09-04 PROCEDURE — 90471 IMMUNIZATION ADMIN: CPT | Mod: S$GLB,,, | Performed by: NURSE PRACTITIONER

## 2019-09-04 PROCEDURE — 99999 PR PBB SHADOW E&M-EST. PATIENT-LVL IV: ICD-10-PCS | Mod: PBBFAC,,, | Performed by: NURSE PRACTITIONER

## 2019-09-04 PROCEDURE — 99214 OFFICE O/P EST MOD 30 MIN: CPT | Mod: 25,S$GLB,, | Performed by: NURSE PRACTITIONER

## 2019-09-04 PROCEDURE — 3008F BODY MASS INDEX DOCD: CPT | Mod: CPTII,S$GLB,, | Performed by: NURSE PRACTITIONER

## 2019-09-04 PROCEDURE — 3045F PR MOST RECENT HEMOGLOBIN A1C LEVEL 7.0-9.0%: CPT | Mod: CPTII,S$GLB,, | Performed by: NURSE PRACTITIONER

## 2019-09-04 PROCEDURE — 99999 PR PBB SHADOW E&M-EST. PATIENT-LVL IV: CPT | Mod: PBBFAC,,, | Performed by: NURSE PRACTITIONER

## 2019-09-04 PROCEDURE — 3008F PR BODY MASS INDEX (BMI) DOCUMENTED: ICD-10-PCS | Mod: CPTII,S$GLB,, | Performed by: NURSE PRACTITIONER

## 2019-09-04 RX ORDER — COLCHICINE 0.6 MG/1
TABLET ORAL
Qty: 30 TABLET | Refills: 5 | Status: SHIPPED | OUTPATIENT
Start: 2019-09-04 | End: 2020-03-10

## 2019-09-04 RX ORDER — LANCETS
EACH MISCELLANEOUS
Qty: 100 EACH | Refills: 11 | Status: SHIPPED | OUTPATIENT
Start: 2019-09-04 | End: 2023-11-08

## 2019-09-04 RX ORDER — INSULIN PUMP SYRINGE, 3 ML
1 EACH MISCELLANEOUS 3 TIMES DAILY
Qty: 1 EACH | Refills: 0 | Status: SHIPPED | OUTPATIENT
Start: 2019-09-04 | End: 2019-10-25 | Stop reason: SDUPTHER

## 2019-09-04 RX ORDER — INSULIN PUMP SYRINGE, 3 ML
1 EACH MISCELLANEOUS 3 TIMES DAILY
COMMUNITY
Start: 2019-09-04 | End: 2019-09-04 | Stop reason: SDUPTHER

## 2019-09-04 NOTE — PATIENT INSTRUCTIONS
Increase Lantus 26 units every night  Continue all other medications  Continue to exercise   Need to try to lose weight  Blood pressure check up in 2 weeks  HgB A1C in 3 months with follow up one week with me   Flu shot today

## 2019-09-04 NOTE — PROGRESS NOTES
Subjective:       Patient ID: David Corona Jr. is a 54 y.o. male.    Chief Complaint: Diabetes (F/U)    54-year-old male presents to the clinic today for follow-up on diabetes and hypertension.  He is also here to discuss lab results.  He states his blood sugars run anywhere from 150-160 normally.  He says he has had a blood sugar in the 70s and a few in the 200s at times. He has poor dietary habits.  He walks daily for 20 minutes.  He is compliant with all of his medications.  However, he only takes 20 units of Lantus instead of 26 like he was prescribed.  He uses CPAP machine every night.  He denies any headaches, dizziness, or blurred vision.  He denies any cardiac chest pain, heart palpitations, shortness breath, or swelling to lower extremities. He has an eye exam scheduled on 09/06.  He is also scheduled to see Nephrology this month.  He states he took his blood pressure medications this morning.  His blood pressure is 158/76.  I discussed his lab work and explained that his cholesterol was at goal. I explained to him that his hemoglobin A1 will see was 7.6.  I recommend that he try his 26 units of Lantus like prescribed.  However, if his blood sugar starts to drop below 70 he can cut back his insulin to 20 units again.    Review of Systems   Constitutional: Negative for activity change and fatigue.   Respiratory: Negative for cough, chest tightness, shortness of breath and wheezing.    Cardiovascular: Negative for chest pain, palpitations and leg swelling.   Gastrointestinal: Negative for abdominal pain, diarrhea, nausea and vomiting.   Musculoskeletal: Negative for back pain and gait problem.   Skin: Negative for color change.   Neurological: Negative for dizziness, light-headedness and headaches.       Objective:      Physical Exam   Constitutional: He is oriented to person, place, and time. He appears well-developed and well-nourished. No distress.   Eyes: Pupils are equal, round, and reactive to  light. Conjunctivae and EOM are normal. Right eye exhibits no discharge. Left eye exhibits no discharge. No scleral icterus.   Neck: Normal range of motion. Neck supple. No JVD present.   Cardiovascular: Normal rate, regular rhythm and normal heart sounds.   No murmur heard.  Pulmonary/Chest: Effort normal and breath sounds normal. No respiratory distress. He has no wheezes. He has no rales.   Abdominal: Soft. Bowel sounds are normal. There is no tenderness.   Musculoskeletal: Normal range of motion. He exhibits no edema.   Protective Sensation (w/ 10 gram monofilament):  Right: Intact  Left: Intact    Visual Inspection:  Dry Skin -  Bilateral mild fungus right great toenail    Pedal Pulses:   Right: Present  Left: Present    Posterior tibialis:   Right:Present  Left: Present     Neurological: He is alert and oriented to person, place, and time.   Skin: Skin is warm and dry. He is not diaphoretic.   Psychiatric: He has a normal mood and affect.       Assessment:       1. Essential hypertension    2. Hyperlipidemia LDL goal <100    3. Secondary hyperparathyroidism of renal origin    4. CKD (chronic kidney disease) stage 3, GFR 30-59 ml/min    5. TOSIN (obstructive sleep apnea)    6. Gout, unspecified cause, unspecified chronicity, unspecified site    7. Morbid obesity with BMI of 50.0-59.9, adult    8. Uncontrolled type 2 diabetes mellitus with stage 3 chronic kidney disease, with long-term current use of insulin    9. Gout, arthritis    10. Peripheral autonomic neuropathy in disorders classified elsewhere    11. Type 2 diabetes mellitus, uncontrolled, with neuropathy    12. 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 Need for immunization against influenza        Plan:         Type 2 diabetes mellitus, uncontrolled, with neuropathy  -     blood sugar diagnostic Strp; ONE TOUCH BLOOD GLUCOSE STRIPS  Use to test blood sugars  three times daily.  Dispense: 300 each; Refill: 3  -     blood-glucose meter kit; 1 each by Other route 3 (three) times daily. One Touch Glucose meter kit  Dispense: 1 each; Refill: 0  -     lancets (MICROLET LANCET) Misc; TEST THREE TIMES DAILY  Dispense: 100 each; Refill: 11    Essential hypertension  - continue current medications   - blood pressure check up in 2 weeks with the nurse    Hyperlipidemia LDL goal <100  - The current medical regimen is effective;  continue present plan and medications.    Secondary hyperparathyroidism of renal origin  - stable observe asymptomatic   - followed by Dr. Cornelius     CKD (chronic kidney disease) stage 3, GFR 30-59 ml/min  - followed by nephrology  - avoid all anti-inflammatories and stay well hydrated     TOSIN (obstructive sleep apnea)  - uses CPAP machine every night    Gout, unspecified cause, unspecified chronicity, unspecified site  -     colchicine (COLCRYS) 0.6 mg tablet; One daily  Dispense: 30 tablet; Refill: 5  - The current medical regimen is effective;  continue present plan and medications.    Morbid obesity with BMI of 50.0-59.9, adult  - The patient is asked to make an attempt to improve diet and exercise patterns to aid in medical management of this problem.    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

## 2019-09-26 ENCOUNTER — CLINICAL SUPPORT (OUTPATIENT)
Dept: FAMILY MEDICINE | Facility: CLINIC | Age: 54
End: 2019-09-26
Payer: COMMERCIAL

## 2019-09-26 VITALS — OXYGEN SATURATION: 97 % | SYSTOLIC BLOOD PRESSURE: 138 MMHG | DIASTOLIC BLOOD PRESSURE: 82 MMHG | HEART RATE: 74 BPM

## 2019-09-26 DIAGNOSIS — I10 ESSENTIAL HYPERTENSION: Primary | ICD-10-CM

## 2019-09-26 PROCEDURE — 99499 UNLISTED E&M SERVICE: CPT | Mod: S$GLB,,, | Performed by: INTERNAL MEDICINE

## 2019-09-26 PROCEDURE — 99499 NO LOS: ICD-10-PCS | Mod: S$GLB,,, | Performed by: INTERNAL MEDICINE

## 2019-09-26 PROCEDURE — 99999 PR PBB SHADOW E&M-EST. PATIENT-LVL II: CPT | Mod: PBBFAC,,,

## 2019-09-26 PROCEDURE — 99999 PR PBB SHADOW E&M-EST. PATIENT-LVL II: ICD-10-PCS | Mod: PBBFAC,,,

## 2019-09-26 NOTE — PROGRESS NOTES
Blood pressure borderline ok. Continue current medication regimen. Recommend focus on low salt diet, regular physical exercise and weight loss. Next office visit for routine follow up as instructed at last office visit.

## 2019-09-26 NOTE — PROGRESS NOTES
David Ajins . 54 y.o. male is here today for Blood Pressure check.   History of HTN yes.    Review of patient's allergies indicates:  No Known Allergies  Creatinine   Date Value Ref Range Status   06/03/2019 1.6 (H) 0.5 - 1.4 mg/dL Final     Sodium   Date Value Ref Range Status   06/03/2019 140 136 - 145 mmol/L Final     Potassium   Date Value Ref Range Status   06/03/2019 4.3 3.5 - 5.1 mmol/L Final   ]  Patient verifies taking blood pressure medications on a regular basis at the same time of the day.     Current Outpatient Medications:     aspirin (ECOTRIN) 81 MG EC tablet, Take 1 tablet (81 mg total) by mouth once daily., Disp: 90 tablet, Rfl: 3    blood pressure monitor Kit, Test BP as needed., Disp: 1 each, Rfl: 0    blood sugar diagnostic Strp, ONE TOUCH BLOOD GLUCOSE STRIPS  Use to test blood sugars three times daily., Disp: 300 each, Rfl: 3    blood-glucose meter kit, 1 each by Other route 3 (three) times daily. One Touch Glucose meter kit, Disp: 1 each, Rfl: 0    colchicine (COLCRYS) 0.6 mg tablet, One daily, Disp: 30 tablet, Rfl: 5    diltiaZEM (CARDIZEM CD) 240 MG 24 hr capsule, Take 2 capsules (480 mg total) by mouth once daily., Disp: 180 capsule, Rfl: 3    furosemide (LASIX) 40 MG tablet, TAKE 1 TABLET(40 MG) BY MOUTH TWICE DAILY, Disp: 180 tablet, Rfl: 0    glimepiride (AMARYL) 2 MG tablet, TAKE 2 TABLETS(4 MG) BY MOUTH TWICE DAILY, Disp: 120 tablet, Rfl: 0    hydrocodone-acetaminophen 5-325mg (NORCO) 5-325 mg per tablet, Take 1 tablet by mouth every 6 (six) hours as needed for Pain., Disp: 10 tablet, Rfl: 0    insulin (LANTUS SOLOSTAR U-100 INSULIN) glargine 100 units/mL (3mL) SubQ pen, ADMINISTER 26 UNITS UNDER THE SKIN EVERY NIGHT (Patient taking differently: 20 Units. ADMINISTER 26 UNITS UNDER THE SKIN EVERY NIGHT), Disp: 15 mL, Rfl: 0    insulin lispro (HUMALOG KWIKPEN INSULIN) 100 unit/mL pen, INJECT 15 UNITS UNDER THE SKIN THREE TIMES DAILY WITH MEALS, Disp: 15 mL, Rfl: 1     "lancets (MICROLET LANCET) Misc, TEST THREE TIMES DAILY, Disp: 100 each, Rfl: 11    losartan (COZAAR) 50 MG tablet, Take 1 tablet (50 mg total) by mouth once daily., Disp: 90 tablet, Rfl: 3    metoprolol succinate (TOPROL-XL) 50 MG 24 hr tablet, Take 1 tablet (50 mg total) by mouth once daily., Disp: 90 tablet, Rfl: 3    miscellaneous medical supply Kit, Nebulizer machine cleaning supplies/apparatus, Disp: 1 kit, Rfl: 0    omega-3 fatty acids 1,000 mg Cap, Take by mouth. 2 Capsule Oral Every day, Disp: , Rfl:     pen needle, diabetic (NOVOFINE 32) 32 gauge x 1/4" Ndle, USE FOUR TIMES DAILY AS NEEDED, Disp: 100 each, Rfl: 5    pioglitazone (ACTOS) 45 MG tablet, TAKE 1 TABLET(45 MG) BY MOUTH EVERY DAY, Disp: 30 tablet, Rfl: 1    pravastatin (PRAVACHOL) 20 MG tablet, TAKE 1 TABLET(20 MG) BY MOUTH EVERY DAY, Disp: 30 tablet, Rfl: 0  Does patient have record of home blood pressure readings no. .   Last dose of blood pressure medication was taken at 8:00 this am.  Patient is asymptomatic.   Complains of none.    Vitals:    09/26/19 1437 09/26/19 1459   BP: (!) 140/80 138/82   BP Location: Left arm Left arm   Patient Position: Sitting Sitting   BP Method: Thigh Cuff (Manual) Thigh Cuff (Manual)   Pulse: 76 74   SpO2: 95% 97%           informed of nurse visit.   "

## 2019-10-06 DIAGNOSIS — E11.40 CONTROLLED TYPE 2 DIABETES WITH NEUROPATHY: ICD-10-CM

## 2019-10-06 DIAGNOSIS — E78.5 HYPERLIPIDEMIA LDL GOAL <100: ICD-10-CM

## 2019-10-07 RX ORDER — PIOGLITAZONEHYDROCHLORIDE 45 MG/1
TABLET ORAL
Qty: 30 TABLET | Refills: 1 | Status: SHIPPED | OUTPATIENT
Start: 2019-10-07 | End: 2019-12-09 | Stop reason: SDUPTHER

## 2019-10-07 RX ORDER — PRAVASTATIN SODIUM 20 MG/1
TABLET ORAL
Qty: 30 TABLET | Refills: 2 | Status: SHIPPED | OUTPATIENT
Start: 2019-10-07 | End: 2019-12-05 | Stop reason: SDUPTHER

## 2019-10-14 RX ORDER — INSULIN GLARGINE 100 [IU]/ML
INJECTION, SOLUTION SUBCUTANEOUS
Qty: 15 ML | Refills: 0 | Status: SHIPPED | OUTPATIENT
Start: 2019-10-14 | End: 2019-12-01 | Stop reason: SDUPTHER

## 2019-10-14 NOTE — TELEPHONE ENCOUNTER
Informed Pt. Verbal understanding  Spoke with pt states he will call to reschedule eye appointment

## 2019-10-16 RX ORDER — INSULIN LISPRO 100 [IU]/ML
INJECTION, SOLUTION INTRAVENOUS; SUBCUTANEOUS
Qty: 15 ML | Refills: 1 | Status: SHIPPED | OUTPATIENT
Start: 2019-10-16 | End: 2020-01-02

## 2019-10-23 ENCOUNTER — TELEPHONE (OUTPATIENT)
Dept: OPHTHALMOLOGY | Facility: CLINIC | Age: 54
End: 2019-10-23

## 2019-10-23 ENCOUNTER — OFFICE VISIT (OUTPATIENT)
Dept: NEPHROLOGY | Facility: CLINIC | Age: 54
End: 2019-10-23
Payer: COMMERCIAL

## 2019-10-23 VITALS
WEIGHT: 315 LBS | HEART RATE: 83 BPM | HEIGHT: 74 IN | BODY MASS INDEX: 40.43 KG/M2 | OXYGEN SATURATION: 95 % | SYSTOLIC BLOOD PRESSURE: 160 MMHG | DIASTOLIC BLOOD PRESSURE: 80 MMHG

## 2019-10-23 DIAGNOSIS — N25.81 HYPERPARATHYROIDISM DUE TO RENAL INSUFFICIENCY: ICD-10-CM

## 2019-10-23 DIAGNOSIS — N18.30 CHRONIC KIDNEY DISEASE, STAGE III (MODERATE): ICD-10-CM

## 2019-10-23 DIAGNOSIS — Z79.4 CONTROLLED TYPE 2 DIABETES MELLITUS WITH STAGE 3 CHRONIC KIDNEY DISEASE, WITH LONG-TERM CURRENT USE OF INSULIN: Primary | ICD-10-CM

## 2019-10-23 DIAGNOSIS — N18.30 ANEMIA OF CHRONIC RENAL FAILURE, STAGE 3 (MODERATE): ICD-10-CM

## 2019-10-23 DIAGNOSIS — N18.30 CONTROLLED TYPE 2 DIABETES MELLITUS WITH STAGE 3 CHRONIC KIDNEY DISEASE, WITH LONG-TERM CURRENT USE OF INSULIN: Primary | ICD-10-CM

## 2019-10-23 DIAGNOSIS — I12.9 HYPERTENSIVE KIDNEY DISEASE WITH CHRONIC KIDNEY DISEASE, STAGE 1-4 OR UNSPECIFIED CHRONIC KIDNEY DISEASE: ICD-10-CM

## 2019-10-23 DIAGNOSIS — E11.22 CONTROLLED TYPE 2 DIABETES MELLITUS WITH STAGE 3 CHRONIC KIDNEY DISEASE, WITH LONG-TERM CURRENT USE OF INSULIN: Primary | ICD-10-CM

## 2019-10-23 DIAGNOSIS — D63.1 ANEMIA OF CHRONIC RENAL FAILURE, STAGE 3 (MODERATE): ICD-10-CM

## 2019-10-23 PROCEDURE — 3008F PR BODY MASS INDEX (BMI) DOCUMENTED: ICD-10-PCS | Mod: CPTII,S$GLB,, | Performed by: INTERNAL MEDICINE

## 2019-10-23 PROCEDURE — 99999 PR PBB SHADOW E&M-EST. PATIENT-LVL II: CPT | Mod: PBBFAC,,, | Performed by: INTERNAL MEDICINE

## 2019-10-23 PROCEDURE — 99999 PR PBB SHADOW E&M-EST. PATIENT-LVL II: ICD-10-PCS | Mod: PBBFAC,,, | Performed by: INTERNAL MEDICINE

## 2019-10-23 PROCEDURE — 99213 PR OFFICE/OUTPT VISIT, EST, LEVL III, 20-29 MIN: ICD-10-PCS | Mod: S$GLB,,, | Performed by: INTERNAL MEDICINE

## 2019-10-23 PROCEDURE — 99213 OFFICE O/P EST LOW 20 MIN: CPT | Mod: S$GLB,,, | Performed by: INTERNAL MEDICINE

## 2019-10-23 PROCEDURE — 3077F PR MOST RECENT SYSTOLIC BLOOD PRESSURE >= 140 MM HG: ICD-10-PCS | Mod: CPTII,S$GLB,, | Performed by: INTERNAL MEDICINE

## 2019-10-23 PROCEDURE — 3008F BODY MASS INDEX DOCD: CPT | Mod: CPTII,S$GLB,, | Performed by: INTERNAL MEDICINE

## 2019-10-23 PROCEDURE — 3077F SYST BP >= 140 MM HG: CPT | Mod: CPTII,S$GLB,, | Performed by: INTERNAL MEDICINE

## 2019-10-23 PROCEDURE — 3079F DIAST BP 80-89 MM HG: CPT | Mod: CPTII,S$GLB,, | Performed by: INTERNAL MEDICINE

## 2019-10-23 PROCEDURE — 3079F PR MOST RECENT DIASTOLIC BLOOD PRESSURE 80-89 MM HG: ICD-10-PCS | Mod: CPTII,S$GLB,, | Performed by: INTERNAL MEDICINE

## 2019-10-23 NOTE — TELEPHONE ENCOUNTER
----- Message from Ana Clarke MA sent at 10/23/2019  4:17 PM CDT -----  Contact: [phone]  Pt is due for an eye exam (no contacts). He would like the appt at the [facility] but I can't seem to access the schedule at that location.  Can you assist the with an appt?

## 2019-10-25 DIAGNOSIS — I10 ESSENTIAL HYPERTENSION: ICD-10-CM

## 2019-10-25 DIAGNOSIS — E11.40 CONTROLLED TYPE 2 DIABETES WITH NEUROPATHY: ICD-10-CM

## 2019-10-27 NOTE — PROGRESS NOTES
Subjective:       Patient ID: David Corona Jr. is a 54 y.o. Black or  male who presents for follow-up evaluation of Chronic Kidney Disease    HPI      Mr. Corona is a 54 year old man with past medical history of hypertension, diabetes, presenting for follow up of chronic kidney disease.  Patient reports blood sugars have been well-controlled, has not checked his blood pressure.  He otherwise denies any fever, chest pain, shortness of breath, abdominal pain, diarrhea, dysuria/hematuria.  He reports adequate fluid intake, denies any NSAID use; reports weight gain (attributed to dietary indiscretion).      Review of Systems   Constitutional: Negative for appetite change, fatigue and fever.   Respiratory: Negative for cough and shortness of breath.    Cardiovascular: Negative for chest pain and leg swelling.   Gastrointestinal: Negative for abdominal pain, constipation, diarrhea, nausea and vomiting.   Genitourinary: Negative for dysuria, flank pain, frequency, hematuria and urgency.   Musculoskeletal: Negative for arthralgias, back pain and joint swelling.   Skin: Negative for rash.   Neurological: Negative for dizziness and light-headedness.   All other systems reviewed and are negative.      Objective:      Physical Exam   Constitutional: He appears well-developed and well-nourished.   Cardiovascular: Normal rate and regular rhythm. Exam reveals no gallop and no friction rub.   No murmur heard.  Pulmonary/Chest: Effort normal and breath sounds normal. No respiratory distress. He has no wheezes. He has no rales.   Musculoskeletal: He exhibits no edema.   Neurological: He is alert.   Skin: Skin is warm and dry. No rash noted.   Vitals reviewed.      Assessment:       1. Controlled type 2 diabetes mellitus with stage 3 chronic kidney disease, with long-term current use of insulin    2. Chronic kidney disease, stage III (moderate)    3. Hypertensive kidney disease with chronic kidney disease, stage  1-4 or unspecified chronic kidney disease    4. Hyperparathyroidism due to renal insufficiency    5. Anemia of chronic renal failure, stage 3 (moderate)        Plan:     Mr. Corona is a 54 year old man with past medical history of hypertension, diabetes, presenting for follow up of chronic kidney disease stage IIIa.  Patient likely with hypertensive nephrosclerosis v. diabetic nephropathy.  Patient creatinine previously within baseline range, will continue to trend.  Stressed importance of blood pressure/glycemic control, along with weight loss, to prevent any further progression of kidney disease, patient voiced understanding.   - Anemia: Hgb at goal, no indication for erythropoetin therapy  - Bone/mineral metabolism: patient with secondary hyperparathyroidism, PTH at goal for stage CKD, will continue ergocalciferol given VitD deficiency    Return to clinic 6 months pending renal panel next month, then with renal/heme panel, urinalysis, PTH/VitD prior to next visit

## 2019-10-28 RX ORDER — GLIMEPIRIDE 2 MG/1
TABLET ORAL
Qty: 120 TABLET | Refills: 0 | Status: SHIPPED | OUTPATIENT
Start: 2019-10-28 | End: 2019-12-09 | Stop reason: SDUPTHER

## 2019-10-28 RX ORDER — INSULIN PUMP SYRINGE, 3 ML
1 EACH MISCELLANEOUS 3 TIMES DAILY
Qty: 1 EACH | Refills: 0 | Status: SHIPPED | OUTPATIENT
Start: 2019-10-28 | End: 2023-11-08

## 2019-10-28 RX ORDER — FUROSEMIDE 40 MG/1
TABLET ORAL
Qty: 180 TABLET | Refills: 0 | Status: SHIPPED | OUTPATIENT
Start: 2019-10-28 | End: 2020-02-16

## 2019-11-10 ENCOUNTER — PATIENT OUTREACH (OUTPATIENT)
Dept: ADMINISTRATIVE | Facility: OTHER | Age: 54
End: 2019-11-10

## 2019-11-14 ENCOUNTER — OFFICE VISIT (OUTPATIENT)
Dept: OPHTHALMOLOGY | Facility: CLINIC | Age: 54
End: 2019-11-14
Payer: COMMERCIAL

## 2019-11-14 DIAGNOSIS — H52.7 REFRACTIVE ERROR: ICD-10-CM

## 2019-11-14 DIAGNOSIS — H25.12 NUCLEAR SCLEROSIS, LEFT: Primary | ICD-10-CM

## 2019-11-14 DIAGNOSIS — H26.491 PCO (POSTERIOR CAPSULAR OPACIFICATION), RIGHT: ICD-10-CM

## 2019-11-14 DIAGNOSIS — E11.9 DM TYPE 2 WITHOUT RETINOPATHY: ICD-10-CM

## 2019-11-14 DIAGNOSIS — Z96.1 PSEUDOPHAKIA: ICD-10-CM

## 2019-11-14 DIAGNOSIS — I10 ESSENTIAL HYPERTENSION: ICD-10-CM

## 2019-11-14 LAB
LEFT EYE DM RETINOPATHY: NEGATIVE
RIGHT EYE DM RETINOPATHY: NEGATIVE

## 2019-11-14 PROCEDURE — 92014 PR EYE EXAM, EST PATIENT,COMPREHESV: ICD-10-PCS | Mod: S$GLB,,, | Performed by: OPHTHALMOLOGY

## 2019-11-14 PROCEDURE — 99999 PR PBB SHADOW E&M-EST. PATIENT-LVL II: ICD-10-PCS | Mod: PBBFAC,,, | Performed by: OPHTHALMOLOGY

## 2019-11-14 PROCEDURE — 99999 PR PBB SHADOW E&M-EST. PATIENT-LVL II: CPT | Mod: PBBFAC,,, | Performed by: OPHTHALMOLOGY

## 2019-11-14 PROCEDURE — 92014 COMPRE OPH EXAM EST PT 1/>: CPT | Mod: S$GLB,,, | Performed by: OPHTHALMOLOGY

## 2019-11-14 NOTE — PROGRESS NOTES
Subjective:       Patient ID: David Corona Jr. is a 54 y.o. male.    Chief Complaint: Cataract and Diabetic Eye Exam    HPI     54 y.o. Male is here for DM Check. H/o Nuclear sclerosis, left. Denies   eye pain and f/f. No itching, burning or tearing. No noticeable VA changes   since last visit. No problems with glare. Pt states that he is not ready   to have Cataract Sx, left eye at this time.     Eye Meds: No gtts     Hemoglobin A1C       Date                     Value               Ref Range             Status                08/24/2019               7.6 (H)             4.0 - 5.6 %           Final              Comment:    ADA Screening Guidelines:  5.7-6.4%  Consistent with   prediabetes  >or=6.5%  Consistent with diabetes  High levels of fetal   hemoglobin interfere with the HbA1C  assay. Heterozygous hemoglobin   variants (HbS, HgC, etc)do  not significantly interfere with this assay.     However, presence of multiple variants may affect accuracy.         11/20/2018               6.7 (H)             4.0 - 5.6 %           Final              Comment:    ADA Screening Guidelines:  5.7-6.4%  Consistent with   prediabetes  >or=6.5%  Consistent with diabetes  High levels of fetal   hemoglobin interfere with the HbA1C  assay. Heterozygous hemoglobin   variants (HbS, HgC, etc)do  not significantly interfere with this assay.     However, presence of multiple variants may affect accuracy.         05/28/2018               6.1 (H)             4.0 - 5.6 %           Final              Comment:    According to ADA guidelines, hemoglobin A1c <7.0% represents  optimal   control in non-pregnant diabetic patients. Different  metrics may apply to   specific patient populations.   Standards of Medical Care in   Diabetes-2016.  For the purpose of screening for the presence of   diabetes:  <5.7%     Consistent with the absence of diabetes  5.7-6.4%    Consistent with increasing risk for diabetes   (prediabetes)  >or=6.5%     Consistent with diabetes  Currently, no consensus exists for use of   hemoglobin A1c  for diagnosis of diabetes for children.  This Hemoglobin   A1c assay has significant interference with fetal   hemoglobin   (HbF).   The results are invalid for patients with abnormal amounts of   HbF,     including those with known Hereditary Persistence   of Fetal Hemoglobin.   Heterozygous hemoglobin variants (HbAS, HbAC,   HbAD, HbAE, HbA2) do not   significantly interfere with this assay;   however, presence of multiple   variants in a sample may impact the %   interference.    ----------    Last edited by ESTER Lopez on 11/14/2019 10:43 AM. (History)             Assessment:       1. Nuclear sclerosis, left    2. PCO (posterior capsular opacification), right    3. DM type 2 without retinopathy    4. Essential hypertension    5. Refractive error - Both Eyes    6. Pseudophakia - Right Eye        Plan:       Cataract OS- Not visually significant.  Early PCO OD- Not Visually Significant.  DM-No NPDR OU.  HTN-No retinopathy OU.  RE-Pt does not want MRx.      Control DM & HTN.  RTC 1 yr.

## 2019-11-30 ENCOUNTER — LAB VISIT (OUTPATIENT)
Dept: LAB | Facility: HOSPITAL | Age: 54
End: 2019-11-30
Attending: NURSE PRACTITIONER
Payer: COMMERCIAL

## 2019-11-30 DIAGNOSIS — Z79.4 CONTROLLED TYPE 2 DIABETES MELLITUS WITH STAGE 3 CHRONIC KIDNEY DISEASE, WITH LONG-TERM CURRENT USE OF INSULIN: ICD-10-CM

## 2019-11-30 DIAGNOSIS — N18.30 CONTROLLED TYPE 2 DIABETES MELLITUS WITH STAGE 3 CHRONIC KIDNEY DISEASE, WITH LONG-TERM CURRENT USE OF INSULIN: ICD-10-CM

## 2019-11-30 DIAGNOSIS — E11.22 CONTROLLED TYPE 2 DIABETES MELLITUS WITH STAGE 3 CHRONIC KIDNEY DISEASE, WITH LONG-TERM CURRENT USE OF INSULIN: ICD-10-CM

## 2019-11-30 LAB
25(OH)D3+25(OH)D2 SERPL-MCNC: 23 NG/ML (ref 30–96)
ALBUMIN SERPL BCP-MCNC: 3.3 G/DL (ref 3.5–5.2)
ANION GAP SERPL CALC-SCNC: 10 MMOL/L (ref 8–16)
BASOPHILS # BLD AUTO: 0.04 K/UL (ref 0–0.2)
BASOPHILS NFR BLD: 0.4 % (ref 0–1.9)
BUN SERPL-MCNC: 20 MG/DL (ref 6–20)
CALCIUM SERPL-MCNC: 9.5 MG/DL (ref 8.7–10.5)
CHLORIDE SERPL-SCNC: 103 MMOL/L (ref 95–110)
CO2 SERPL-SCNC: 26 MMOL/L (ref 23–29)
CREAT SERPL-MCNC: 1.5 MG/DL (ref 0.5–1.4)
DIFFERENTIAL METHOD: ABNORMAL
EOSINOPHIL # BLD AUTO: 0.3 K/UL (ref 0–0.5)
EOSINOPHIL NFR BLD: 2.3 % (ref 0–8)
ERYTHROCYTE [DISTWIDTH] IN BLOOD BY AUTOMATED COUNT: 14.8 % (ref 11.5–14.5)
EST. GFR  (AFRICAN AMERICAN): >60 ML/MIN/1.73 M^2
EST. GFR  (NON AFRICAN AMERICAN): 52.4 ML/MIN/1.73 M^2
ESTIMATED AVG GLUCOSE: 269 MG/DL (ref 68–131)
FERRITIN SERPL-MCNC: 142 NG/ML (ref 20–300)
GLUCOSE SERPL-MCNC: 245 MG/DL (ref 70–110)
HBA1C MFR BLD HPLC: 11 % (ref 4–5.6)
HCT VFR BLD AUTO: 43.3 % (ref 40–54)
HGB BLD-MCNC: 13.1 G/DL (ref 14–18)
IMM GRANULOCYTES # BLD AUTO: 0.05 K/UL (ref 0–0.04)
IMM GRANULOCYTES NFR BLD AUTO: 0.4 % (ref 0–0.5)
IRON SERPL-MCNC: 44 UG/DL (ref 45–160)
LYMPHOCYTES # BLD AUTO: 2.4 K/UL (ref 1–4.8)
LYMPHOCYTES NFR BLD: 20.9 % (ref 18–48)
MAGNESIUM SERPL-MCNC: 1.7 MG/DL (ref 1.6–2.6)
MCH RBC QN AUTO: 29.8 PG (ref 27–31)
MCHC RBC AUTO-ENTMCNC: 30.3 G/DL (ref 32–36)
MCV RBC AUTO: 99 FL (ref 82–98)
MONOCYTES # BLD AUTO: 1 K/UL (ref 0.3–1)
MONOCYTES NFR BLD: 8.8 % (ref 4–15)
NEUTROPHILS # BLD AUTO: 7.6 K/UL (ref 1.8–7.7)
NEUTROPHILS NFR BLD: 67.2 % (ref 38–73)
NRBC BLD-RTO: 0 /100 WBC
PHOSPHATE SERPL-MCNC: 3.6 MG/DL (ref 2.7–4.5)
PLATELET # BLD AUTO: 239 K/UL (ref 150–350)
PMV BLD AUTO: 12.2 FL (ref 9.2–12.9)
POTASSIUM SERPL-SCNC: 4.1 MMOL/L (ref 3.5–5.1)
PTH-INTACT SERPL-MCNC: 169 PG/ML (ref 9–77)
RBC # BLD AUTO: 4.39 M/UL (ref 4.6–6.2)
SATURATED IRON: 14 % (ref 20–50)
SODIUM SERPL-SCNC: 139 MMOL/L (ref 136–145)
TOTAL IRON BINDING CAPACITY: 321 UG/DL (ref 250–450)
TRANSFERRIN SERPL-MCNC: 217 MG/DL (ref 200–375)
WBC # BLD AUTO: 11.35 K/UL (ref 3.9–12.7)

## 2019-11-30 PROCEDURE — 83036 HEMOGLOBIN GLYCOSYLATED A1C: CPT

## 2019-11-30 PROCEDURE — 83970 ASSAY OF PARATHORMONE: CPT

## 2019-11-30 PROCEDURE — 36415 COLL VENOUS BLD VENIPUNCTURE: CPT | Mod: PO

## 2019-11-30 PROCEDURE — 82728 ASSAY OF FERRITIN: CPT

## 2019-11-30 PROCEDURE — 80069 RENAL FUNCTION PANEL: CPT

## 2019-11-30 PROCEDURE — 83735 ASSAY OF MAGNESIUM: CPT

## 2019-11-30 PROCEDURE — 83540 ASSAY OF IRON: CPT

## 2019-11-30 PROCEDURE — 85025 COMPLETE CBC W/AUTO DIFF WBC: CPT

## 2019-11-30 PROCEDURE — 82306 VITAMIN D 25 HYDROXY: CPT

## 2019-12-01 RX ORDER — INSULIN GLARGINE 100 [IU]/ML
INJECTION, SOLUTION SUBCUTANEOUS
Qty: 15 ML | Refills: 0 | Status: SHIPPED | OUTPATIENT
Start: 2019-12-01 | End: 2020-02-10

## 2019-12-05 DIAGNOSIS — E78.5 HYPERLIPIDEMIA LDL GOAL <100: ICD-10-CM

## 2019-12-05 RX ORDER — PRAVASTATIN SODIUM 20 MG/1
TABLET ORAL
Qty: 30 TABLET | Refills: 0 | Status: SHIPPED | OUTPATIENT
Start: 2019-12-05 | End: 2020-02-08

## 2019-12-09 DIAGNOSIS — E11.40 CONTROLLED TYPE 2 DIABETES WITH NEUROPATHY: ICD-10-CM

## 2019-12-09 RX ORDER — PIOGLITAZONEHYDROCHLORIDE 45 MG/1
TABLET ORAL
Qty: 30 TABLET | Refills: 2 | Status: SHIPPED | OUTPATIENT
Start: 2019-12-09 | End: 2020-03-02

## 2019-12-09 RX ORDER — GLIMEPIRIDE 2 MG/1
TABLET ORAL
Qty: 120 TABLET | Refills: 0 | Status: SHIPPED | OUTPATIENT
Start: 2019-12-09 | End: 2020-01-06

## 2020-01-02 RX ORDER — INSULIN LISPRO 100 [IU]/ML
INJECTION, SOLUTION INTRAVENOUS; SUBCUTANEOUS
Qty: 15 ML | Refills: 0 | Status: SHIPPED | OUTPATIENT
Start: 2020-01-02 | End: 2020-01-27

## 2020-01-05 DIAGNOSIS — E11.40 CONTROLLED TYPE 2 DIABETES WITH NEUROPATHY: ICD-10-CM

## 2020-01-06 DIAGNOSIS — E11.9 DM TYPE 2 WITHOUT RETINOPATHY: ICD-10-CM

## 2020-01-06 RX ORDER — GLIMEPIRIDE 2 MG/1
TABLET ORAL
Qty: 120 TABLET | Refills: 0 | Status: SHIPPED | OUTPATIENT
Start: 2020-01-06 | End: 2020-05-11

## 2020-01-23 ENCOUNTER — OFFICE VISIT (OUTPATIENT)
Dept: FAMILY MEDICINE | Facility: CLINIC | Age: 55
End: 2020-01-23
Payer: COMMERCIAL

## 2020-01-23 VITALS
WEIGHT: 315 LBS | BODY MASS INDEX: 40.43 KG/M2 | HEIGHT: 74 IN | SYSTOLIC BLOOD PRESSURE: 164 MMHG | DIASTOLIC BLOOD PRESSURE: 72 MMHG

## 2020-01-23 DIAGNOSIS — E11.9 DM TYPE 2 WITHOUT RETINOPATHY: ICD-10-CM

## 2020-01-23 DIAGNOSIS — G47.33 OSA (OBSTRUCTIVE SLEEP APNEA): ICD-10-CM

## 2020-01-23 DIAGNOSIS — E78.5 HYPERLIPIDEMIA LDL GOAL <100: ICD-10-CM

## 2020-01-23 DIAGNOSIS — N25.81 SECONDARY HYPERPARATHYROIDISM OF RENAL ORIGIN: ICD-10-CM

## 2020-01-23 DIAGNOSIS — N18.30 CKD (CHRONIC KIDNEY DISEASE) STAGE 3, GFR 30-59 ML/MIN: Primary | ICD-10-CM

## 2020-01-23 DIAGNOSIS — E66.01 MORBID OBESITY WITH BMI OF 50.0-59.9, ADULT: ICD-10-CM

## 2020-01-23 DIAGNOSIS — I10 ESSENTIAL HYPERTENSION: ICD-10-CM

## 2020-01-23 PROCEDURE — 99214 PR OFFICE/OUTPT VISIT, EST, LEVL IV, 30-39 MIN: ICD-10-PCS | Mod: S$GLB,,, | Performed by: NURSE PRACTITIONER

## 2020-01-23 PROCEDURE — 3077F SYST BP >= 140 MM HG: CPT | Mod: CPTII,S$GLB,, | Performed by: NURSE PRACTITIONER

## 2020-01-23 PROCEDURE — 99214 OFFICE O/P EST MOD 30 MIN: CPT | Mod: S$GLB,,, | Performed by: NURSE PRACTITIONER

## 2020-01-23 PROCEDURE — 3008F BODY MASS INDEX DOCD: CPT | Mod: CPTII,S$GLB,, | Performed by: NURSE PRACTITIONER

## 2020-01-23 PROCEDURE — 99999 PR PBB SHADOW E&M-EST. PATIENT-LVL V: ICD-10-PCS | Mod: PBBFAC,,, | Performed by: NURSE PRACTITIONER

## 2020-01-23 PROCEDURE — 3078F PR MOST RECENT DIASTOLIC BLOOD PRESSURE < 80 MM HG: ICD-10-PCS | Mod: CPTII,S$GLB,, | Performed by: NURSE PRACTITIONER

## 2020-01-23 PROCEDURE — 3046F HEMOGLOBIN A1C LEVEL >9.0%: CPT | Mod: CPTII,S$GLB,, | Performed by: NURSE PRACTITIONER

## 2020-01-23 PROCEDURE — 3077F PR MOST RECENT SYSTOLIC BLOOD PRESSURE >= 140 MM HG: ICD-10-PCS | Mod: CPTII,S$GLB,, | Performed by: NURSE PRACTITIONER

## 2020-01-23 PROCEDURE — 3046F PR MOST RECENT HEMOGLOBIN A1C LEVEL > 9.0%: ICD-10-PCS | Mod: CPTII,S$GLB,, | Performed by: NURSE PRACTITIONER

## 2020-01-23 PROCEDURE — 3008F PR BODY MASS INDEX (BMI) DOCUMENTED: ICD-10-PCS | Mod: CPTII,S$GLB,, | Performed by: NURSE PRACTITIONER

## 2020-01-23 PROCEDURE — 99999 PR PBB SHADOW E&M-EST. PATIENT-LVL V: CPT | Mod: PBBFAC,,, | Performed by: NURSE PRACTITIONER

## 2020-01-23 PROCEDURE — 3078F DIAST BP <80 MM HG: CPT | Mod: CPTII,S$GLB,, | Performed by: NURSE PRACTITIONER

## 2020-01-23 NOTE — PATIENT INSTRUCTIONS
Increase meal time insulin to 20 units with meals and Lantus 30 units at night  Refer to endocrine Chiquis Brink NP   CMP and HgB A1C in 3 months with one week follow up with Dr. Galindo for a physical

## 2020-01-23 NOTE — PROGRESS NOTES
Subjective:       Patient ID: David Corona Jr. is a 54 y.o. male.    Chief Complaint: Diabetes    54-year-old male presents to the clinic today for follow-up on diabetes.  I explained to him that his hemoglobin A1c had gone from 7.6-11.0.  He admits that he has had poor dietary habits.  He stopped exercising.  He is compliant with all of his medications.  He is currently taking Humalog 15 units with meals and Lantus 26 units every night.  I will have him increase his Humalog to 20 units with meals and his Lantus insulin to 30 units at night.  He states his blood sugars have been running anywhere from 140-150.  He denies any headaches, dizziness, or blurred vision.  He denies any cardiac chest pain, heart palpitations, shortness breath, or swelling to lower extremities.    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 He has never used smokeless tobacco. He reports that he drinks alcohol.  He reports that he does not use drugs.  Review of Systems   Respiratory: Negative for cough, shortness of breath and wheezing.    Cardiovascular: Negative for chest pain, palpitations and leg swelling.   Gastrointestinal: Negative for abdominal pain, blood in stool, constipation, diarrhea, nausea and vomiting.   Musculoskeletal: Negative for gait problem.   Skin: Negative for rash.   Neurological: Negative for dizziness, light-headedness and headaches.       Objective:      Physical Exam   Constitutional: He is oriented to person, place, and time. He appears well-developed and well-nourished. No distress.   Eyes: Pupils are equal, round, and reactive to light. Conjunctivae and EOM are normal. Right eye exhibits no discharge. Left eye exhibits no discharge. No scleral icterus.   Neck: Normal range of motion. Neck supple. No JVD present.   Cardiovascular: Normal rate, regular rhythm and normal heart sounds.   No murmur heard.  Pulmonary/Chest: Effort normal and breath sounds normal. No respiratory distress. He has no wheezes. He has no rales.   Abdominal: Soft. Bowel sounds are normal. There is no tenderness.   Musculoskeletal: Normal range of motion. He exhibits no edema.   Protective Sensation (w/ 10 gram monofilament):  Right: Intact  Left: Intact    Visual Inspection: normal  -  Bilateral    Pedal Pulses:   Right: Present  Left: Present    Posterior tibialis:   Right:Present  Left: Present     Neurological: He is alert and oriented to person, place, and time.   Skin: Skin is warm and dry. He is not diaphoretic.   Psychiatric: He has a normal mood and affect.       Assessment:       1. CKD (chronic kidney disease) stage 3, GFR 30-59 ml/min    2. DM type 2 without retinopathy    3. Hyperlipidemia LDL goal <100    4. Essential hypertension    5. Morbid obesity with BMI of 50.0-59.9, adult    6. TOSIN (obstructive sleep apnea)    7. Secondary hyperparathyroidism of renal origin    8. Type 2 diabetes mellitus,  uncontrolled, with neuropathy    9. 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 Uncontrolled type 2 diabetes with stage 3 chronic kidney disease GFR 30-59        Plan:         CKD (chronic kidney disease) stage 3, GFR 30-59 ml/min  - avoid all anti-inflammatories and stay well hydrated    DM type 2 without retinopathy  - increase mealtime insulin to 20 units with each meal and Lantus to 30 units at night    Hyperlipidemia LDL goal <100  - The current medical regimen is effective;  continue present plan and medications.    Essential hypertension  - continue current blood pressure pressure medicaitons  - blood pressure check up with nurse in 2 weeks    Morbid obesity with BMI of 50.0-59.9, adult  - The patient is asked to make an attempt to improve diet and exercise patterns to aid in medical management of this problem.    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

## 2020-01-27 RX ORDER — INSULIN LISPRO 100 [IU]/ML
INJECTION, SOLUTION INTRAVENOUS; SUBCUTANEOUS
Qty: 15 ML | Refills: 0 | Status: SHIPPED | OUTPATIENT
Start: 2020-01-27 | End: 2020-02-21

## 2020-02-07 DIAGNOSIS — E78.5 HYPERLIPIDEMIA LDL GOAL <100: ICD-10-CM

## 2020-02-08 RX ORDER — PRAVASTATIN SODIUM 20 MG/1
TABLET ORAL
Qty: 90 TABLET | Refills: 0 | Status: SHIPPED | OUTPATIENT
Start: 2020-02-08 | End: 2020-05-05

## 2020-02-10 RX ORDER — INSULIN GLARGINE 100 [IU]/ML
INJECTION, SOLUTION SUBCUTANEOUS
Qty: 15 ML | Refills: 1 | Status: SHIPPED | OUTPATIENT
Start: 2020-02-10 | End: 2020-05-19

## 2020-02-10 NOTE — TELEPHONE ENCOUNTER
Please clarify with patient the dosage of insulin he is taking. The prescription has 2 different directions.

## 2020-02-13 ENCOUNTER — CLINICAL SUPPORT (OUTPATIENT)
Dept: FAMILY MEDICINE | Facility: CLINIC | Age: 55
End: 2020-02-13
Payer: COMMERCIAL

## 2020-02-13 VITALS — HEART RATE: 79 BPM | OXYGEN SATURATION: 97 % | DIASTOLIC BLOOD PRESSURE: 74 MMHG | SYSTOLIC BLOOD PRESSURE: 138 MMHG

## 2020-02-13 DIAGNOSIS — I10 ESSENTIAL HYPERTENSION: Primary | ICD-10-CM

## 2020-02-13 PROCEDURE — 99999 PR PBB SHADOW E&M-EST. PATIENT-LVL II: CPT | Mod: PBBFAC,,,

## 2020-02-13 PROCEDURE — 99499 UNLISTED E&M SERVICE: CPT | Mod: S$GLB,,, | Performed by: INTERNAL MEDICINE

## 2020-02-13 PROCEDURE — 99499 NO LOS: ICD-10-PCS | Mod: S$GLB,,, | Performed by: INTERNAL MEDICINE

## 2020-02-13 PROCEDURE — 99999 PR PBB SHADOW E&M-EST. PATIENT-LVL II: ICD-10-PCS | Mod: PBBFAC,,,

## 2020-02-13 NOTE — PROGRESS NOTES
David Corona . 54 y.o. male is here today for Blood Pressure check.   History of HTN yes.    Review of patient's allergies indicates:  No Known Allergies  Creatinine   Date Value Ref Range Status   11/30/2019 1.5 (H) 0.5 - 1.4 mg/dL Final     Sodium   Date Value Ref Range Status   11/30/2019 139 136 - 145 mmol/L Final     Potassium   Date Value Ref Range Status   11/30/2019 4.1 3.5 - 5.1 mmol/L Final   ]  Patient verifies taking blood pressure medications on a regular basis at the same time of the day.     Current Outpatient Medications:     aspirin (ECOTRIN) 81 MG EC tablet, Take 1 tablet (81 mg total) by mouth once daily., Disp: 90 tablet, Rfl: 3    blood pressure monitor Kit, Test BP as needed., Disp: 1 each, Rfl: 0    blood sugar diagnostic Strp, ONE TOUCH BLOOD GLUCOSE STRIPS  Use to test blood sugars three times daily., Disp: 300 each, Rfl: 3    blood-glucose meter kit, 1 each by Other route 3 (three) times daily. One Touch Glucose meter kit, Disp: 1 each, Rfl: 0    colchicine (COLCRYS) 0.6 mg tablet, One daily, Disp: 30 tablet, Rfl: 5    diltiaZEM (CARDIZEM CD) 240 MG 24 hr capsule, Take 2 capsules (480 mg total) by mouth once daily., Disp: 180 capsule, Rfl: 3    furosemide (LASIX) 40 MG tablet, TAKE 1 TABLET(40 MG) BY MOUTH TWICE DAILY, Disp: 180 tablet, Rfl: 0    glimepiride (AMARYL) 2 MG tablet, TAKE 2 TABLETS BY MOUTH TWICE DAILY, WITH MEALS, Disp: 120 tablet, Rfl: 0    hydrocodone-acetaminophen 5-325mg (NORCO) 5-325 mg per tablet, Take 1 tablet by mouth every 6 (six) hours as needed for Pain. (Patient not taking: Reported on 1/23/2020), Disp: 10 tablet, Rfl: 0    insulin (LANTUS SOLOSTAR U-100 INSULIN) glargine 100 units/mL (3mL) SubQ pen, 30 units sq qhs, Disp: 15 mL, Rfl: 1    insulin lispro (HUMALOG KWIKPEN INSULIN) 100 unit/mL pen, INJECT 15 UNITS UNDER THE SKIN THREE TIMES DAILY WITH MEALS, Disp: 15 mL, Rfl: 0    lancets (MICROLET LANCET) Misc, TEST THREE TIMES DAILY, Disp: 100  "each, Rfl: 11    losartan (COZAAR) 50 MG tablet, Take 1 tablet (50 mg total) by mouth once daily., Disp: 90 tablet, Rfl: 3    metoprolol succinate (TOPROL-XL) 50 MG 24 hr tablet, Take 1 tablet (50 mg total) by mouth once daily., Disp: 90 tablet, Rfl: 3    miscellaneous medical supply Kit, Nebulizer machine cleaning supplies/apparatus, Disp: 1 kit, Rfl: 0    omega-3 fatty acids 1,000 mg Cap, Take by mouth. 2 Capsule Oral Every day, Disp: , Rfl:     pen needle, diabetic (NOVOFINE 32) 32 gauge x 1/4" Ndle, USE FOUR TIMES DAILY AS NEEDED, Disp: 100 each, Rfl: 5    pioglitazone (ACTOS) 45 MG tablet, TAKE 1 TABLET BY MOUTH EVERY DAY, Disp: 30 tablet, Rfl: 2    pravastatin (PRAVACHOL) 20 MG tablet, TAKE 1 TABLET(20 MG) BY MOUTH EVERY DAY, Disp: 90 tablet, Rfl: 0  Does patient have record of home blood pressure readings no. .   Last dose of blood pressure medication was taken at 7:05 this am.  Patient is asymptomatic.   Complains of none.    Vitals:    02/13/20 0827   BP: 138/74   BP Location: Right arm   Patient Position: Sitting   BP Method: Thigh Cuff (Manual)   Pulse: 79   SpO2: 97%         Dr. Galindo informed of nurse visit.   "

## 2020-02-15 DIAGNOSIS — I10 ESSENTIAL HYPERTENSION: ICD-10-CM

## 2020-02-16 RX ORDER — FUROSEMIDE 40 MG/1
TABLET ORAL
Qty: 180 TABLET | Refills: 0 | Status: SHIPPED | OUTPATIENT
Start: 2020-02-16 | End: 2020-05-13

## 2020-02-21 RX ORDER — INSULIN LISPRO 100 [IU]/ML
INJECTION, SOLUTION INTRAVENOUS; SUBCUTANEOUS
Qty: 15 ML | Refills: 0 | Status: SHIPPED | OUTPATIENT
Start: 2020-02-21 | End: 2020-03-22

## 2020-02-29 DIAGNOSIS — E11.40 CONTROLLED TYPE 2 DIABETES WITH NEUROPATHY: ICD-10-CM

## 2020-03-02 RX ORDER — PIOGLITAZONEHYDROCHLORIDE 45 MG/1
TABLET ORAL
Qty: 30 TABLET | Refills: 2 | Status: SHIPPED | OUTPATIENT
Start: 2020-03-02 | End: 2020-05-25

## 2020-03-10 DIAGNOSIS — M10.9 GOUT, UNSPECIFIED CAUSE, UNSPECIFIED CHRONICITY, UNSPECIFIED SITE: ICD-10-CM

## 2020-03-10 RX ORDER — COLCHICINE 0.6 MG/1
TABLET ORAL
Qty: 30 TABLET | Refills: 5 | Status: SHIPPED | OUTPATIENT
Start: 2020-03-10 | End: 2020-05-11

## 2020-03-11 RX ORDER — DILTIAZEM HYDROCHLORIDE 240 MG/1
480 CAPSULE, COATED, EXTENDED RELEASE ORAL DAILY
Qty: 180 CAPSULE | Refills: 0 | Status: SHIPPED | OUTPATIENT
Start: 2020-03-11 | End: 2020-07-04

## 2020-03-22 RX ORDER — INSULIN LISPRO 100 [IU]/ML
INJECTION, SOLUTION INTRAVENOUS; SUBCUTANEOUS
Qty: 15 ML | Refills: 0 | Status: SHIPPED | OUTPATIENT
Start: 2020-03-22 | End: 2020-05-27

## 2020-04-20 ENCOUNTER — LAB VISIT (OUTPATIENT)
Dept: LAB | Facility: HOSPITAL | Age: 55
End: 2020-04-20
Attending: INTERNAL MEDICINE
Payer: COMMERCIAL

## 2020-04-20 DIAGNOSIS — E11.9 DM TYPE 2 WITHOUT RETINOPATHY: ICD-10-CM

## 2020-04-20 LAB
ALBUMIN SERPL BCP-MCNC: 3.5 G/DL (ref 3.5–5.2)
ALP SERPL-CCNC: 133 U/L (ref 55–135)
ALT SERPL W/O P-5'-P-CCNC: 8 U/L (ref 10–44)
ANION GAP SERPL CALC-SCNC: 8 MMOL/L (ref 8–16)
AST SERPL-CCNC: 21 U/L (ref 10–40)
BILIRUB SERPL-MCNC: 0.5 MG/DL (ref 0.1–1)
BUN SERPL-MCNC: 16 MG/DL (ref 6–20)
CALCIUM SERPL-MCNC: 8.6 MG/DL (ref 8.7–10.5)
CHLORIDE SERPL-SCNC: 104 MMOL/L (ref 95–110)
CO2 SERPL-SCNC: 29 MMOL/L (ref 23–29)
CREAT SERPL-MCNC: 1.4 MG/DL (ref 0.5–1.4)
EST. GFR  (AFRICAN AMERICAN): >60 ML/MIN/1.73 M^2
EST. GFR  (NON AFRICAN AMERICAN): 57 ML/MIN/1.73 M^2
ESTIMATED AVG GLUCOSE: 174 MG/DL (ref 68–131)
GLUCOSE SERPL-MCNC: 112 MG/DL (ref 70–110)
HBA1C MFR BLD HPLC: 7.7 % (ref 4–5.6)
POTASSIUM SERPL-SCNC: 4.2 MMOL/L (ref 3.5–5.1)
PROT SERPL-MCNC: 7.7 G/DL (ref 6–8.4)
SODIUM SERPL-SCNC: 141 MMOL/L (ref 136–145)

## 2020-04-20 PROCEDURE — 36415 COLL VENOUS BLD VENIPUNCTURE: CPT | Mod: PO

## 2020-04-20 PROCEDURE — 83036 HEMOGLOBIN GLYCOSYLATED A1C: CPT

## 2020-04-20 PROCEDURE — 80053 COMPREHEN METABOLIC PANEL: CPT

## 2020-04-21 ENCOUNTER — TELEPHONE (OUTPATIENT)
Dept: FAMILY MEDICINE | Facility: CLINIC | Age: 55
End: 2020-04-21

## 2020-04-21 NOTE — TELEPHONE ENCOUNTER
I spoke with the patient and explained that his HgB A1C was 7.7 which is much improved. He needs to change his physical to a virtual visit. He verbalized understanding of above.    PreOp Instructions given:   - Verbal medication information (what to hold and what to take)   - NPO guidelines   - Arrival place directions given;   - Bathing with antibacterial soap   - Don't wear any jewelry or bring any valuables AM of surgery   - No makeup or moisturizer to face   - No perfume/cologne, powder, lotions or aftershave   Pt. verbalized understanding.   Denies any history of side effects or issues with anesthesia or sedation.    PCP CLEARANCE 2/11/19    DIFFICULT IV STICK  PT INSTRUCTED TO INCREASE PO FLUIDS TODAY - V/U

## 2020-04-21 NOTE — TELEPHONE ENCOUNTER
Spoke with the patient and changed his appt to a virtual appt and he is scheduled for annual appt.   Patient verbalized understandings.                ----- Message from Marcie Vidal sent at 4/21/2020  8:25 AM CDT -----  Contact: JENNA CRUZ JR. [[id number]]  Name of Who is Calling: JENNA CRUZ JR. [[id number]]      What is the request in detail: Pt is calling to speak to staff in regards to a missed call regarding an upcoming sae.... Please call to further assist .       Can the clinic reply by [facility]: N      What Number to Call Back if not in [facility]: [phone]

## 2020-04-24 ENCOUNTER — OFFICE VISIT (OUTPATIENT)
Dept: FAMILY MEDICINE | Facility: CLINIC | Age: 55
End: 2020-04-24
Payer: COMMERCIAL

## 2020-04-24 DIAGNOSIS — E78.5 HYPERLIPIDEMIA LDL GOAL <100: ICD-10-CM

## 2020-04-24 DIAGNOSIS — N25.81 SECONDARY HYPERPARATHYROIDISM OF RENAL ORIGIN: ICD-10-CM

## 2020-04-24 DIAGNOSIS — Z23 NEED FOR SHINGLES VACCINE: ICD-10-CM

## 2020-04-24 DIAGNOSIS — Z11.4 SCREENING FOR HIV (HUMAN IMMUNODEFICIENCY VIRUS): ICD-10-CM

## 2020-04-24 DIAGNOSIS — I10 ESSENTIAL HYPERTENSION: ICD-10-CM

## 2020-04-24 DIAGNOSIS — E66.01 MORBID OBESITY WITH BMI OF 50.0-59.9, ADULT: ICD-10-CM

## 2020-04-24 PROBLEM — E11.9 DM TYPE 2 WITHOUT RETINOPATHY: Status: RESOLVED | Noted: 2017-01-13 | Resolved: 2020-04-24

## 2020-04-24 PROCEDURE — 3051F HG A1C>EQUAL 7.0%<8.0%: CPT | Mod: CPTII,,, | Performed by: INTERNAL MEDICINE

## 2020-04-24 PROCEDURE — 3051F PR MOST RECENT HEMOGLOBIN A1C LEVEL 7.0 - < 8.0%: ICD-10-PCS | Mod: CPTII,,, | Performed by: INTERNAL MEDICINE

## 2020-04-24 PROCEDURE — 99214 PR OFFICE/OUTPT VISIT, EST, LEVL IV, 30-39 MIN: ICD-10-PCS | Mod: 95,,, | Performed by: INTERNAL MEDICINE

## 2020-04-24 PROCEDURE — 99214 OFFICE O/P EST MOD 30 MIN: CPT | Mod: 95,,, | Performed by: INTERNAL MEDICINE

## 2020-04-24 NOTE — PROGRESS NOTES
The patient location is: Home.  The chief complaint leading to consultation is: Follow-up (diabetes, HTN)     Visit type: Virtual visit with synchronous audio and video  Total time spent with patient: 9 minutes.  Each patient to whom he or she provides medical services by telemedicine is:  (1) informed of the relationship between the physician and patient and the respective role of any other health care provider with respect to management of the patient; and (2) notified that he or she may decline to receive medical services by telemedicine and may withdraw from such care at any time.      HISTORY OF PRESENT ILLNESS:  David Corona Jr. is a 54 y.o. male who presents to the clinic today for Follow-up (diabetes, HTN)  .   The patient was seen today for follow-up of his type 2 diabetes mellitus complicated by neuropathy, retinopathy, and chronic kidney disease stage 3, hypertension, and hyperlipidemia.  The patient states he has made improvements in his dietary habits.  He has stop drinking anything other than water.  He denies any problems with low blood sugars.  He recently did blood work.  He has not been checking his blood pressures at home.  He denies cardiac chest pain or shortness of breath.  He has chronic lower extremity edema which has been stable.  He realizes he needs to be more active.  He has been more sedentary due to the current isolation requirements due to the COVID-19 pandemic.      PAST MEDICAL HISTORY:  Past Medical History:   Diagnosis Date    Arthritis     Cataract     left eye     CKD (chronic kidney disease) stage 3, GFR 30-59 ml/min     Colon polyp 11/2016    Dependent edema     Diverticulosis     Gout, arthritis     Hyperlipidemia LDL goal <100     Hypertension     Morbid obesity     TOSIN (obstructive sleep apnea)     Peripheral autonomic neuropathy in disorders classified elsewhere(337.1)     Proteinuria     Type II or unspecified type diabetes mellitus with neurological  manifestations, uncontrolled(250.62)     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        PAST SURGICAL HISTORY:  Past Surgical History:   Procedure Laterality Date    CATARACT EXTRACTION W/  INTRAOCULAR LENS IMPLANT Right 2007    COLONOSCOPY N/A 11/22/2016    Procedure: COLONOSCOPY;  Surgeon: Abdi La MD;  Location: [facility] ([address] [place] [address]);  Service: Endoscopy;  Laterality: N/A;  2nd floor case/BMI 59.13/wants week of Thanksgiving       SOCIAL HISTORY:  Social History     Socioeconomic History    Marital status:      Spouse name: Not on file    Number of children: 3    Years of education: Not on file    Highest education level: Not on file   Occupational History    Occupation:      Employer: [employer]    Occupation:    Social Needs    Financial resource strain: Not hard at all    Food insecurity:     Worry: Never true     Inability: Never true    Transportation needs:     Medical: No     Non-medical: No   Tobacco Use    Smoking status: Never Smoker    Smokeless tobacco: Never Used   Substance and Sexual Activity    Alcohol use: Yes     Alcohol/week: 0.0 standard drinks     Frequency: Never     Binge frequency: Never     Comment: rarely    Drug use: No    Sexual activity: Not on file   Lifestyle    Physical activity:     Days per week: 3 days     Minutes per session: 40 min    Stress: Not at all   Relationships    Social connections:     Talks on phone: More than three times a week     Gets together: More than three times a week     Attends Islam service: Not on file     Active member of club or organization: Yes     Attends meetings of clubs or organizations: More than 4 times per year     Relationship status:    Other Topics Concern    Not on file   Social History Narrative    Not  on file       FAMILY HISTORY:  Family History   Problem Relation Age of Onset    Breast cancer Mother     Diabetes Mother     Hypertension Mother     Stroke Mother     Prostate cancer Father     Diabetes Father     Hypertension Father     No Known Problems Sister     No Known Problems Brother     Breast cancer Maternal Grandmother     Amblyopia Neg Hx     Blindness Neg Hx     Cataracts Neg Hx     Glaucoma Neg Hx     Macular degeneration Neg Hx     Retinal detachment Neg Hx     Strabismus Neg Hx        ALLERGIES AND MEDICATIONS: updated and reviewed.  Review of patient's allergies indicates:  No Known Allergies  Medication List with Changes/Refills   Current Medications    ASPIRIN (ECOTRIN) 81 MG EC TABLET    Take 1 tablet (81 mg total) by mouth once daily.    BLOOD PRESSURE MONITOR KIT    Test BP as needed.    BLOOD SUGAR DIAGNOSTIC STRP    ONE TOUCH BLOOD GLUCOSE STRIPS  Use to test blood sugars three times daily.    BLOOD-GLUCOSE METER KIT    1 each by Other route 3 (three) times daily. One Touch Glucose meter kit    COLCHICINE (COLCRYS) 0.6 MG TABLET    TAKE 1 TABLET BY MOUTH ONCE DAILY    DILTIAZEM (CARDIZEM CD) 240 MG 24 HR CAPSULE    Take 2 capsules (480 mg total) by mouth once daily.    FUROSEMIDE (LASIX) 40 MG TABLET    TAKE 1 TABLET(40 MG) BY MOUTH TWICE DAILY    GLIMEPIRIDE (AMARYL) 2 MG TABLET    TAKE 2 TABLETS BY MOUTH TWICE DAILY, WITH MEALS    HYDROCODONE-ACETAMINOPHEN 5-325MG (NORCO) 5-325 MG PER TABLET    Take 1 tablet by mouth every 6 (six) hours as needed for Pain.    INSULIN (LANTUS SOLOSTAR U-100 INSULIN) GLARGINE 100 UNITS/ML (3ML) SUBQ PEN    30 units sq qhs    INSULIN LISPRO (HUMALOG KWIKPEN INSULIN) 100 UNIT/ML PEN    INJECT 15 UNITS UNDER THE SKIN THREE TIMES DAILY WITH MEALS    LANCETS (MICROLET LANCET) MISC    TEST THREE TIMES DAILY    LOSARTAN (COZAAR) 50 MG TABLET    Take 1 tablet (50 mg total) by mouth once daily.    METOPROLOL SUCCINATE (TOPROL-XL) 50 MG 24 HR TABLET    " Take 1 tablet (50 mg total) by mouth once daily.    MISCELLANEOUS MEDICAL SUPPLY KIT    Nebulizer machine cleaning supplies/apparatus    OMEGA-3 FATTY ACIDS 1,000 MG CAP    Take by mouth. 2 Capsule Oral Every day    PEN NEEDLE, DIABETIC (NOVOFINE 32) 32 GAUGE X 1/4" NDLE    USE FOUR TIMES DAILY AS NEEDED    PIOGLITAZONE (ACTOS) 45 MG TABLET    TAKE 1 TABLET BY MOUTH EVERY DAY    PRAVASTATIN (PRAVACHOL) 20 MG TABLET    TAKE 1 TABLET(20 MG) BY MOUTH EVERY DAY         CARE TEAM:  Patient Care Team:  Riddhi Galindo MD as PCP - General (Internal Medicine)  Vick Cornelius MD as Consulting Physician (Nephrology)         REVIEW OF SYSTEMS:  Review of Systems   Constitutional: Negative for activity change, chills, fatigue, fever and unexpected weight change.   HENT: Negative for congestion, ear pain, hearing loss, rhinorrhea, sore throat, trouble swallowing and voice change.    Eyes: Negative for photophobia, pain, discharge and visual disturbance.   Respiratory: Negative for cough, chest tightness, shortness of breath and wheezing.    Cardiovascular: Positive for leg swelling (- chronic). Negative for chest pain and palpitations.   Gastrointestinal: Negative for abdominal pain, blood in stool, constipation, diarrhea, nausea and vomiting.   Endocrine: Negative for polydipsia and polyuria.   Genitourinary: Negative for difficulty urinating, dysuria, frequency, hematuria and urgency.   Musculoskeletal: Negative for arthralgias, gait problem, joint swelling, neck pain and neck stiffness.   Skin: Negative for color change and rash.   Neurological: Negative for seizures, weakness and headaches.   Hematological: Negative for adenopathy. Does not bruise/bleed easily.   Psychiatric/Behavioral: Negative for behavioral problems, confusion and dysphoric mood. The patient is not nervous/anxious.          PHYSICAL EXAM:  There were no vitals filed for this visit.          There is no height or weight on file to calculate " BMI.      General appearance - alert, well appearing, and in no distress, morbidly obese  Psychiatric - alert, oriented to person, place, and time, normal mood, behavior, speech, dress, motor activity, and thought processes  Unable to perform remainder of PE as this was a video visit      Labs:  Lab Results   Component Value Date    HGBA1C 7.7 (H) 04/20/2020    HGBA1C 11.0 (H) 11/30/2019    HGBA1C 7.6 (H) 08/24/2019      Lab Results   Component Value Date    CHOL 149 08/24/2019    CHOL 121 05/28/2018    CHOL 126 01/31/2017     Lab Results   Component Value Date    LDLCALC 80.4 08/24/2019    LDLCALC 63.4 05/28/2018    LDLCALC 72.2 01/31/2017         ASSESSMENT AND PLAN:  1. Type 2 diabetes mellitus, uncontrolled, with neuropathy/2. 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, with long-term current use of insulin/3. Uncontrolled type 2 diabetes with stage 3 chronic kidney disease GFR 30-59  Diabetes is under fair control at this time for age and comorbid conditions. He has made improvement in his control since his last office visit. We discussed diabetic diet and regular exercise. We discussed home blood sugar monitoring, if appropriate - the patient should test once daily and as needed. Continue current medication regimen. Recheck A1c in 6 months.  Diabetic complications addressed: Stable decreased kidney function. Observe. Patient counseled to avoid/minimize the use of anti-inflammatory  Medication. Discussed to stay well hydrated. Also discussed with patient that good control of blood pressure and/or diabetes, if present, will help to prevent progression. and Neuropathy pain controlled.  Patient was counseled on the need for yearly eye exam to screen for/monitor diabetic retinopathy and yearly diabetic foot exam.    4. Essential hypertension  Discussed sodium restriction, maintaining ideal body weight and regular exercise program as physiologic means to achieve  blood pressure control. The patient will strive towards this.   The current medical regimen is effective;  continue present plan and medications. Recommended patient to check home readings to monitor and see me for followup as scheduled or sooner as needed.   Patient was educated that both decongestant and anti-inflammatory medication may raise blood pressure.  The patient is not eligible for the digital hypertension program.    5. Hyperlipidemia LDL goal <100  We discussed low fat diet and regular exercise.The current medical regimen is effective;  continue present plan and medications.     6. Secondary hyperparathyroidism of renal origin  Stable. Asymptomatic. Observe.    7. Morbid obesity with BMI of 50.0-59.9, adult  The patient is asked to continue to make an attempt to improve diet and exercise patterns to aid in medical management of this problem.     8. Screening for HIV (human immunodeficiency virus)  Routine screening with next blood draw.  - HIV 1/2 Ag/Ab (4th Gen); Future    9. Need for shingles vaccine  Patient was advised to get immunization at the pharmacy.         Orders Placed This Encounter   Procedures    HIV 1/2 Ag/Ab (4th Gen)      Follow up in about 6 months (around 10/24/2020), or if symptoms worsen or fail to improve, for annual exam. or sooner as needed.

## 2020-05-05 DIAGNOSIS — E78.5 HYPERLIPIDEMIA LDL GOAL <100: ICD-10-CM

## 2020-05-05 RX ORDER — PRAVASTATIN SODIUM 20 MG/1
TABLET ORAL
Qty: 90 TABLET | Refills: 1 | Status: SHIPPED | OUTPATIENT
Start: 2020-05-05 | End: 2020-11-02

## 2020-05-10 DIAGNOSIS — E11.40 CONTROLLED TYPE 2 DIABETES WITH NEUROPATHY: ICD-10-CM

## 2020-05-10 DIAGNOSIS — M10.9 GOUT, UNSPECIFIED CAUSE, UNSPECIFIED CHRONICITY, UNSPECIFIED SITE: ICD-10-CM

## 2020-05-11 RX ORDER — GLIMEPIRIDE 2 MG/1
TABLET ORAL
Qty: 120 TABLET | Refills: 5 | Status: SHIPPED | OUTPATIENT
Start: 2020-05-11 | End: 2020-10-21

## 2020-05-11 RX ORDER — COLCHICINE 0.6 MG/1
TABLET ORAL
Qty: 30 TABLET | Refills: 5 | Status: SHIPPED | OUTPATIENT
Start: 2020-05-11 | End: 2020-11-30

## 2020-05-13 DIAGNOSIS — I10 ESSENTIAL HYPERTENSION: ICD-10-CM

## 2020-05-13 RX ORDER — FUROSEMIDE 40 MG/1
TABLET ORAL
Qty: 180 TABLET | Refills: 1 | Status: SHIPPED | OUTPATIENT
Start: 2020-05-13 | End: 2020-11-12

## 2020-05-19 RX ORDER — INSULIN GLARGINE 100 [IU]/ML
INJECTION, SOLUTION SUBCUTANEOUS
Qty: 15 ML | Refills: 1 | Status: SHIPPED | OUTPATIENT
Start: 2020-05-19 | End: 2020-08-14

## 2020-05-19 NOTE — TELEPHONE ENCOUNTER
Care Due:                  Date            Visit Type   Department     Provider  --------------------------------------------------------------------------------    Last Visit: None Found      None         None Found  Next Visit: None Scheduled  None         None Found                                                            Last  Test          Frequency    Reason                     Performed    Due Date  --------------------------------------------------------------------------------    Office Visit  12 months..  aspirin, diltiaZEM,        Not Found    Overdue                             losartan, metoprolol.....    eGFR........  12 months..  losartan.................  Not Found    Overdue    Powered by [employer]. Reference number: [id number]. 5/19/2020 12:44:25 PM   CDT

## 2020-05-23 DIAGNOSIS — E11.40 CONTROLLED TYPE 2 DIABETES WITH NEUROPATHY: ICD-10-CM

## 2020-05-25 RX ORDER — PIOGLITAZONEHYDROCHLORIDE 45 MG/1
TABLET ORAL
Qty: 30 TABLET | Refills: 4 | Status: SHIPPED | OUTPATIENT
Start: 2020-05-25 | End: 2020-10-15

## 2020-05-27 RX ORDER — INSULIN LISPRO 100 [IU]/ML
INJECTION, SOLUTION INTRAVENOUS; SUBCUTANEOUS
Qty: 15 ML | Refills: 0 | Status: SHIPPED | OUTPATIENT
Start: 2020-05-27 | End: 2020-09-08 | Stop reason: SDUPTHER

## 2020-05-27 RX ORDER — INSULIN LISPRO 100 [IU]/ML
INJECTION, SOLUTION INTRAVENOUS; SUBCUTANEOUS
Qty: 15 ML | Refills: 0 | Status: SHIPPED | OUTPATIENT
Start: 2020-05-27 | End: 2020-08-21

## 2020-06-15 ENCOUNTER — TELEPHONE (OUTPATIENT)
Dept: ENDOCRINOLOGY | Facility: CLINIC | Age: 55
End: 2020-06-15

## 2020-06-15 NOTE — TELEPHONE ENCOUNTER
Tried contacting the pt to inform him that the provider will be out of the office on the day of his appt. His appt would have to be rescheduled. The pt did not answer LVM explaining and to return call to be rescheduled.

## 2020-08-07 DIAGNOSIS — E11.9 TYPE 2 DIABETES MELLITUS WITHOUT COMPLICATION: ICD-10-CM

## 2020-08-14 DIAGNOSIS — Z11.59 NEED FOR HEPATITIS C SCREENING TEST: ICD-10-CM

## 2020-09-04 DIAGNOSIS — E11.9 TYPE 2 DIABETES MELLITUS WITHOUT COMPLICATION: ICD-10-CM

## 2020-09-09 RX ORDER — INSULIN LISPRO 100 [IU]/ML
INJECTION, SOLUTION INTRAVENOUS; SUBCUTANEOUS
Qty: 15 ML | Refills: 0 | OUTPATIENT
Start: 2020-09-09

## 2020-09-21 DIAGNOSIS — N18.30 CHRONIC KIDNEY DISEASE, STAGE III (MODERATE): Primary | ICD-10-CM

## 2020-09-26 ENCOUNTER — LAB VISIT (OUTPATIENT)
Dept: LAB | Facility: HOSPITAL | Age: 55
End: 2020-09-26
Attending: INTERNAL MEDICINE
Payer: COMMERCIAL

## 2020-09-26 DIAGNOSIS — N18.30 CHRONIC KIDNEY DISEASE, STAGE III (MODERATE): ICD-10-CM

## 2020-09-26 LAB
ALBUMIN SERPL BCP-MCNC: 3.7 G/DL (ref 3.5–5.2)
ANION GAP SERPL CALC-SCNC: 9 MMOL/L (ref 8–16)
BUN SERPL-MCNC: 19 MG/DL (ref 6–20)
CALCIUM SERPL-MCNC: 9.1 MG/DL (ref 8.7–10.5)
CHLORIDE SERPL-SCNC: 103 MMOL/L (ref 95–110)
CO2 SERPL-SCNC: 29 MMOL/L (ref 23–29)
CREAT SERPL-MCNC: 1.5 MG/DL (ref 0.5–1.4)
EST. GFR  (AFRICAN AMERICAN): >60 ML/MIN/1.73 M^2
EST. GFR  (NON AFRICAN AMERICAN): 52 ML/MIN/1.73 M^2
GLUCOSE SERPL-MCNC: 105 MG/DL (ref 70–110)
PHOSPHATE SERPL-MCNC: 3.8 MG/DL (ref 2.7–4.5)
POTASSIUM SERPL-SCNC: 4 MMOL/L (ref 3.5–5.1)
SODIUM SERPL-SCNC: 141 MMOL/L (ref 136–145)

## 2020-09-26 PROCEDURE — 80069 RENAL FUNCTION PANEL: CPT

## 2020-09-26 PROCEDURE — 36415 COLL VENOUS BLD VENIPUNCTURE: CPT | Mod: PO

## 2020-09-28 ENCOUNTER — PATIENT OUTREACH (OUTPATIENT)
Dept: ADMINISTRATIVE | Facility: OTHER | Age: 55
End: 2020-09-28

## 2020-09-29 ENCOUNTER — OFFICE VISIT (OUTPATIENT)
Dept: NEPHROLOGY | Facility: CLINIC | Age: 55
End: 2020-09-29
Payer: COMMERCIAL

## 2020-09-29 DIAGNOSIS — E11.22 CONTROLLED TYPE 2 DIABETES MELLITUS WITH STAGE 2 CHRONIC KIDNEY DISEASE, WITH LONG-TERM CURRENT USE OF INSULIN: Primary | ICD-10-CM

## 2020-09-29 DIAGNOSIS — Z79.4 CONTROLLED TYPE 2 DIABETES MELLITUS WITH STAGE 2 CHRONIC KIDNEY DISEASE, WITH LONG-TERM CURRENT USE OF INSULIN: Primary | ICD-10-CM

## 2020-09-29 DIAGNOSIS — N25.81 HYPERPARATHYROIDISM DUE TO RENAL INSUFFICIENCY: ICD-10-CM

## 2020-09-29 DIAGNOSIS — N18.2 CONTROLLED TYPE 2 DIABETES MELLITUS WITH STAGE 2 CHRONIC KIDNEY DISEASE, WITH LONG-TERM CURRENT USE OF INSULIN: Primary | ICD-10-CM

## 2020-09-29 DIAGNOSIS — I12.9 HYPERTENSIVE KIDNEY DISEASE WITH CHRONIC KIDNEY DISEASE, STAGE 1-4 OR UNSPECIFIED CHRONIC KIDNEY DISEASE: ICD-10-CM

## 2020-09-29 PROCEDURE — 3051F PR MOST RECENT HEMOGLOBIN A1C LEVEL 7.0 - < 8.0%: ICD-10-PCS | Mod: CPTII,,, | Performed by: INTERNAL MEDICINE

## 2020-09-29 PROCEDURE — 99213 PR OFFICE/OUTPT VISIT, EST, LEVL III, 20-29 MIN: ICD-10-PCS | Mod: 95,,, | Performed by: INTERNAL MEDICINE

## 2020-09-29 PROCEDURE — 3051F HG A1C>EQUAL 7.0%<8.0%: CPT | Mod: CPTII,,, | Performed by: INTERNAL MEDICINE

## 2020-09-29 PROCEDURE — 99213 OFFICE O/P EST LOW 20 MIN: CPT | Mod: 95,,, | Performed by: INTERNAL MEDICINE

## 2020-10-05 ENCOUNTER — PATIENT MESSAGE (OUTPATIENT)
Dept: ADMINISTRATIVE | Facility: HOSPITAL | Age: 55
End: 2020-10-05

## 2020-10-07 RX ORDER — INSULIN LISPRO 100 [IU]/ML
INJECTION, SOLUTION INTRAVENOUS; SUBCUTANEOUS
Qty: 15 ML | Refills: 0 | Status: SHIPPED | OUTPATIENT
Start: 2020-10-07 | End: 2020-10-21 | Stop reason: SDUPTHER

## 2020-10-11 NOTE — PROGRESS NOTES
Subjective:       Patient ID: David Corona Jr. is a 55 y.o. Black or  male who presents for follow-up evaluation of Chronic Kidney Disease    HPI    Mr. Corona is a 55 year old man with past medical history of hypertension, diabetes, presenting for follow up of chronic kidney disease.  Patient reports blood sugars have been well-controlled, along with his blood pressure.  He otherwise denies any fever, chest pain, shortness of breath, abdominal pain, diarrhea, dysuria/hematuria.  He reports adequate fluid intake, denies any NSAID use; reports weight gain (attributed to dietary indiscretion).      Review of Systems   Constitutional: Negative for appetite change, fatigue and fever.   Respiratory: Negative for cough and shortness of breath.    Cardiovascular: Negative for chest pain and leg swelling.   Gastrointestinal: Negative for abdominal pain, constipation, diarrhea, nausea and vomiting.   Genitourinary: Negative for dysuria, flank pain, frequency, hematuria and urgency.   Musculoskeletal: Negative for arthralgias, back pain and joint swelling.   Skin: Negative for rash.   Neurological: Negative for dizziness and light-headedness.   All other systems reviewed and are negative.      Objective:      Physical Exam  Vitals signs reviewed.   Constitutional:       General: He is not in acute distress.     Appearance: He is well-developed.   Pulmonary:      Effort: Pulmonary effort is normal. No respiratory distress.   Musculoskeletal:         General: No swelling.   Neurological:      Mental Status: He is alert.         Assessment:       1. Controlled type 2 diabetes mellitus with stage 2 chronic kidney disease, with long-term current use of insulin    2. Hypertensive kidney disease with chronic kidney disease, stage 1-4 or unspecified chronic kidney disease    3. Hyperparathyroidism due to renal insufficiency        Plan:     Mr. Corona is a 55 year old man with past medical history of  hypertension, diabetes, presenting for follow up of chronic kidney disease stage II/IIIa.  Patient likely with hypertensive nephrosclerosis v. diabetic nephropathy.  Patient creatinine within baseline range, will continue to trend.  Stressed importance of blood pressure/glycemic control, along with weight loss, to prevent any further progression of kidney disease, patient voiced understanding.   - Anemia: Hgb at goal, no indication for erythropoetin therapy  - Bone/mineral metabolism: patient with secondary hyperparathyroidism, PTH at goal for stage CKD, will continue ergocalciferol given VitD deficiency    Return to clinic 6 months with renal/heme panel, urinalysis, PTH/VitD prior to next visit    The patient location is: Work  The chief complaint leading to consultation is: CKD    Visit type: audiovisual    Face to Face time with patient: 15 min  20 minutes of total time spent on the encounter, which includes face to face time and non-face to face time preparing to see the patient (eg, review of tests), Obtaining and/or reviewing separately obtained history, Documenting clinical information in the electronic or other health record, Independently interpreting results (not separately reported) and communicating results to the patient/family/caregiver, or Care coordination (not separately reported).         Each patient to whom he or she provides medical services by telemedicine is:  (1) informed of the relationship between the physician and patient and the respective role of any other health care provider with respect to management of the patient; and (2) notified that he or she may decline to receive medical services by telemedicine and may withdraw from such care at any time.    Notes:

## 2020-10-15 ENCOUNTER — CLINICAL SUPPORT (OUTPATIENT)
Dept: FAMILY MEDICINE | Facility: CLINIC | Age: 55
End: 2020-10-15
Payer: COMMERCIAL

## 2020-10-15 ENCOUNTER — LAB VISIT (OUTPATIENT)
Dept: LAB | Facility: HOSPITAL | Age: 55
End: 2020-10-15
Attending: INTERNAL MEDICINE
Payer: COMMERCIAL

## 2020-10-15 DIAGNOSIS — Z11.59 NEED FOR HEPATITIS C SCREENING TEST: ICD-10-CM

## 2020-10-15 DIAGNOSIS — E11.9 TYPE 2 DIABETES MELLITUS WITHOUT COMPLICATION: ICD-10-CM

## 2020-10-15 DIAGNOSIS — Z11.4 SCREENING FOR HIV (HUMAN IMMUNODEFICIENCY VIRUS): ICD-10-CM

## 2020-10-15 DIAGNOSIS — Z23 NEED FOR INFLUENZA VACCINATION: Primary | ICD-10-CM

## 2020-10-15 LAB
BASOPHILS # BLD AUTO: 0.02 K/UL (ref 0–0.2)
BASOPHILS NFR BLD: 0.3 % (ref 0–1.9)
CHOLEST SERPL-MCNC: 148 MG/DL (ref 120–199)
CHOLEST/HDLC SERPL: 2.8 {RATIO} (ref 2–5)
DIFFERENTIAL METHOD: ABNORMAL
EOSINOPHIL # BLD AUTO: 0.2 K/UL (ref 0–0.5)
EOSINOPHIL NFR BLD: 3.3 % (ref 0–8)
ERYTHROCYTE [DISTWIDTH] IN BLOOD BY AUTOMATED COUNT: 14.6 % (ref 11.5–14.5)
ESTIMATED AVG GLUCOSE: 166 MG/DL (ref 68–131)
HBA1C MFR BLD HPLC: 7.4 % (ref 4–5.6)
HCT VFR BLD AUTO: 42.1 % (ref 40–54)
HDLC SERPL-MCNC: 52 MG/DL (ref 40–75)
HDLC SERPL: 35.1 % (ref 20–50)
HGB BLD-MCNC: 13.4 G/DL (ref 14–18)
IMM GRANULOCYTES # BLD AUTO: 0.02 K/UL (ref 0–0.04)
IMM GRANULOCYTES NFR BLD AUTO: 0.3 % (ref 0–0.5)
LDLC SERPL CALC-MCNC: 82.8 MG/DL (ref 63–159)
LYMPHOCYTES # BLD AUTO: 1.5 K/UL (ref 1–4.8)
LYMPHOCYTES NFR BLD: 23 % (ref 18–48)
MCH RBC QN AUTO: 31.4 PG (ref 27–31)
MCHC RBC AUTO-ENTMCNC: 31.8 G/DL (ref 32–36)
MCV RBC AUTO: 99 FL (ref 82–98)
MONOCYTES # BLD AUTO: 0.7 K/UL (ref 0.3–1)
MONOCYTES NFR BLD: 9.8 % (ref 4–15)
NEUTROPHILS # BLD AUTO: 4.2 K/UL (ref 1.8–7.7)
NEUTROPHILS NFR BLD: 63.3 % (ref 38–73)
NONHDLC SERPL-MCNC: 96 MG/DL
NRBC BLD-RTO: 0 /100 WBC
PLATELET # BLD AUTO: 200 K/UL (ref 150–350)
PMV BLD AUTO: 12.7 FL (ref 9.2–12.9)
RBC # BLD AUTO: 4.27 M/UL (ref 4.6–6.2)
TRIGL SERPL-MCNC: 66 MG/DL (ref 30–150)
WBC # BLD AUTO: 6.64 K/UL (ref 3.9–12.7)

## 2020-10-15 PROCEDURE — 86803 HEPATITIS C AB TEST: CPT

## 2020-10-15 PROCEDURE — 83036 HEMOGLOBIN GLYCOSYLATED A1C: CPT

## 2020-10-15 PROCEDURE — 36415 COLL VENOUS BLD VENIPUNCTURE: CPT | Mod: PO

## 2020-10-15 PROCEDURE — 90686 IIV4 VACC NO PRSV 0.5 ML IM: CPT | Mod: S$GLB,,, | Performed by: INTERNAL MEDICINE

## 2020-10-15 PROCEDURE — 90686 FLU VACCINE (QUAD) GREATER THAN OR EQUAL TO 3YO PRESERVATIVE FREE IM: ICD-10-PCS | Mod: S$GLB,,, | Performed by: INTERNAL MEDICINE

## 2020-10-15 PROCEDURE — 85025 COMPLETE CBC W/AUTO DIFF WBC: CPT

## 2020-10-15 PROCEDURE — 90471 FLU VACCINE (QUAD) GREATER THAN OR EQUAL TO 3YO PRESERVATIVE FREE IM: ICD-10-PCS | Mod: S$GLB,,, | Performed by: INTERNAL MEDICINE

## 2020-10-15 PROCEDURE — 80061 LIPID PANEL: CPT

## 2020-10-15 PROCEDURE — 90471 IMMUNIZATION ADMIN: CPT | Mod: S$GLB,,, | Performed by: INTERNAL MEDICINE

## 2020-10-15 PROCEDURE — 86703 HIV-1/HIV-2 1 RESULT ANTBDY: CPT

## 2020-10-15 PROCEDURE — 99499 NO LOS: ICD-10-PCS | Mod: S$GLB,,, | Performed by: INTERNAL MEDICINE

## 2020-10-15 PROCEDURE — 99499 UNLISTED E&M SERVICE: CPT | Mod: S$GLB,,, | Performed by: INTERNAL MEDICINE

## 2020-10-16 LAB
HCV AB SERPL QL IA: NEGATIVE
HIV 1+2 AB+HIV1 P24 AG SERPL QL IA: NEGATIVE

## 2020-10-21 ENCOUNTER — OFFICE VISIT (OUTPATIENT)
Dept: ENDOCRINOLOGY | Facility: CLINIC | Age: 55
End: 2020-10-21
Payer: COMMERCIAL

## 2020-10-21 VITALS
WEIGHT: 315 LBS | DIASTOLIC BLOOD PRESSURE: 89 MMHG | BODY MASS INDEX: 64.39 KG/M2 | HEART RATE: 77 BPM | TEMPERATURE: 99 F | SYSTOLIC BLOOD PRESSURE: 168 MMHG

## 2020-10-21 DIAGNOSIS — E66.01 MORBID OBESITY WITH BMI OF 50.0-59.9, ADULT: ICD-10-CM

## 2020-10-21 DIAGNOSIS — I10 ESSENTIAL HYPERTENSION: ICD-10-CM

## 2020-10-21 DIAGNOSIS — E78.5 HYPERLIPIDEMIA, UNSPECIFIED HYPERLIPIDEMIA TYPE: ICD-10-CM

## 2020-10-21 PROCEDURE — 3079F DIAST BP 80-89 MM HG: CPT | Mod: CPTII,S$GLB,, | Performed by: NURSE PRACTITIONER

## 2020-10-21 PROCEDURE — 99204 PR OFFICE/OUTPT VISIT, NEW, LEVL IV, 45-59 MIN: ICD-10-PCS | Mod: S$GLB,,, | Performed by: NURSE PRACTITIONER

## 2020-10-21 PROCEDURE — 3077F PR MOST RECENT SYSTOLIC BLOOD PRESSURE >= 140 MM HG: ICD-10-PCS | Mod: CPTII,S$GLB,, | Performed by: NURSE PRACTITIONER

## 2020-10-21 PROCEDURE — 3079F PR MOST RECENT DIASTOLIC BLOOD PRESSURE 80-89 MM HG: ICD-10-PCS | Mod: CPTII,S$GLB,, | Performed by: NURSE PRACTITIONER

## 2020-10-21 PROCEDURE — 99999 PR PBB SHADOW E&M-EST. PATIENT-LVL V: CPT | Mod: PBBFAC,,, | Performed by: NURSE PRACTITIONER

## 2020-10-21 PROCEDURE — 3008F PR BODY MASS INDEX (BMI) DOCUMENTED: ICD-10-PCS | Mod: CPTII,S$GLB,, | Performed by: NURSE PRACTITIONER

## 2020-10-21 PROCEDURE — 99204 OFFICE O/P NEW MOD 45 MIN: CPT | Mod: S$GLB,,, | Performed by: NURSE PRACTITIONER

## 2020-10-21 PROCEDURE — 3051F PR MOST RECENT HEMOGLOBIN A1C LEVEL 7.0 - < 8.0%: ICD-10-PCS | Mod: CPTII,S$GLB,, | Performed by: NURSE PRACTITIONER

## 2020-10-21 PROCEDURE — 3051F HG A1C>EQUAL 7.0%<8.0%: CPT | Mod: CPTII,S$GLB,, | Performed by: NURSE PRACTITIONER

## 2020-10-21 PROCEDURE — 3008F BODY MASS INDEX DOCD: CPT | Mod: CPTII,S$GLB,, | Performed by: NURSE PRACTITIONER

## 2020-10-21 PROCEDURE — 99999 PR PBB SHADOW E&M-EST. PATIENT-LVL V: ICD-10-PCS | Mod: PBBFAC,,, | Performed by: NURSE PRACTITIONER

## 2020-10-21 PROCEDURE — 3077F SYST BP >= 140 MM HG: CPT | Mod: CPTII,S$GLB,, | Performed by: NURSE PRACTITIONER

## 2020-10-21 RX ORDER — LOSARTAN POTASSIUM 50 MG/1
50 TABLET ORAL DAILY
Qty: 90 TABLET | Refills: 0 | Status: SHIPPED | OUTPATIENT
Start: 2020-10-21 | End: 2021-01-19

## 2020-10-21 RX ORDER — METOPROLOL SUCCINATE 50 MG/1
50 TABLET, EXTENDED RELEASE ORAL DAILY
Qty: 90 TABLET | Refills: 0 | Status: SHIPPED | OUTPATIENT
Start: 2020-10-21 | End: 2021-01-13 | Stop reason: SDUPTHER

## 2020-10-21 RX ORDER — DULAGLUTIDE 1.5 MG/.5ML
1.5 INJECTION, SOLUTION SUBCUTANEOUS
Qty: 4 PEN | Refills: 1 | Status: SHIPPED | OUTPATIENT
Start: 2020-10-21 | End: 2021-02-02 | Stop reason: SDUPTHER

## 2020-10-21 RX ORDER — INSULIN LISPRO 100 [IU]/ML
INJECTION, SOLUTION INTRAVENOUS; SUBCUTANEOUS
Qty: 15 ML | Refills: 3 | Status: SHIPPED | OUTPATIENT
Start: 2020-10-21 | End: 2021-03-23 | Stop reason: SDUPTHER

## 2020-10-21 RX ORDER — DULAGLUTIDE 0.75 MG/.5ML
0.75 INJECTION, SOLUTION SUBCUTANEOUS
Qty: 4 PEN | Refills: 0 | Status: SHIPPED | OUTPATIENT
Start: 2020-10-21 | End: 2021-02-02 | Stop reason: ALTCHOICE

## 2020-10-21 RX ORDER — DILTIAZEM HYDROCHLORIDE 240 MG/1
CAPSULE, COATED, EXTENDED RELEASE ORAL
Qty: 180 CAPSULE | Refills: 0 | Status: SHIPPED | OUTPATIENT
Start: 2020-10-21 | End: 2021-01-19

## 2020-10-21 NOTE — PROGRESS NOTES
CC: This 55 y.o. Black or  male  is here for evaluation of  T2DM along with comorbidities indicated in the Visit Diagnosis section of this encounter.    HPI: David Corona Jr. was diagnosed with T2DM in 1991.  Medical history: TOSIN on cpap,       New to Endocrine.   Admits that his diet has been off d/t pandemic.   BP high today at 208/93; has run out of his diltiazem (Cardizem) for the last week. Also hasn't been taking losartan and metoprolol. Needed a f/u appt for his refills.         LAST DIABETES EDUCATION: never     HOSPITALIZED FOR DIABETES OR RELATED COMPLICATIONS -  Yes - upon diagnosis.    SIGNIFICANT DIABETES MED HISTORY:   Metformin - diarrhea     PRESCRIBED DIABETES MEDICATIONS: pioglitazone 45 mg once daily, glimepiride 2 mg bid, Lantus Solostar 30 units hs, Humalog Kwikpen 20 units ac    Misses medication doses - No    DM COMPLICATIONS: nephropathy    SELF MONITORING BLOOD GLUCOSE: Checks blood glucose at home infrequent - 140-150s.     HYPOGLYCEMIC EPISODES: infrequent; most commonly occurs at lunch if he waits too long.      CURRENT DIET: eats 3 meals/day.   Breakfast 7:30, lunch is noon.     Breakfast - oatmeal at office. Lunch - brings his food to lunch. Supper - salad from a restaurant for supper.     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       BP (!) 208/93 (BP Location: Right arm, Patient Position: Sitting, BP Method: Large (Automatic))   Pulse 90   Temp 98.8 °F (37.1 °C) (Oral)   Wt (!) 227.5 kg (501 lb 8 oz)   BMI 64.39 kg/m²       ROS:   CONSTITUTIONAL: Appetite good, denies fatigue  SKIN: No rash or pruritis   EYES: No visual disturbances  RESPIRATORY: No shortness of breath or cough  CARDIAC: No chest pain or palpitations  GI: No nausea, vomiting, or diarrhea  : No urinary frequency or dysuria   MS: No arthralgias or mylagias   NEURO: No paresthesias or  tremors. No headache   OTHER: no dry mouth         PHYSICAL EXAM:  GENERAL: Well developed, well nourished. No acute distress.   PSYCH: AAOx3, appropriate mood and affect, conversant, well-groomed. Judgement and insight good.   NEURO: Cranial nerves grossly intact. Speech clear, no tremor.   NECK: Trachea midline, no thyromegaly or lymphadenopathy.   CHEST: Respirations even and unlabored. CTA bilaterally.  CARDIOVASCULAR: Regular rate and rhythm. No bruits. No murmur. 2+ pitting BLE edema.   ABDOMEN: Soft, non-tender, non-distended. Bowel sounds present.   MS: Gait steady. No clubbing.   SKIN: Normal skin turgor. Skin warm and dry. No areas of breakdown. + nuchal acanthosis nigricans.  FEET: Footware appropriate.    Protective Sensation (w/ 10 gram monofilament):  Right: Intact  Left: Intact    Visual Inspection:  Skin Breakdown -  Neither   Elongated toenails     Pedal Pulses:   Right: Present  Left: Present    Posterior tibialis:   Right:Diminshed  Left: Diminshed          Hemoglobin A1C   Date Value Ref Range Status   10/15/2020 7.4 (H) 4.0 - 5.6 % Final     Comment:     ADA Screening Guidelines:  5.7-6.4%  Consistent with prediabetes  >or=6.5%  Consistent with diabetes  High levels of fetal hemoglobin interfere with the HbA1C  assay. Heterozygous hemoglobin variants (HbS, HgC, etc)do  not significantly interfere with this assay.   However, presence of multiple variants may affect accuracy.     04/20/2020 7.7 (H) 4.0 - 5.6 % Final     Comment:     ADA Screening Guidelines:  5.7-6.4%  Consistent with prediabetes  >or=6.5%  Consistent with diabetes  High levels of fetal hemoglobin interfere with the HbA1C  assay. Heterozygous hemoglobin variants (HbS, HgC, etc)do  not significantly interfere with this assay.   However, presence of multiple variants may affect accuracy.     11/30/2019 11.0 (H) 4.0 - 5.6 % Final     Comment:     ADA Screening Guidelines:  5.7-6.4%  Consistent with prediabetes  >or=6.5%  Consistent  with diabetes  High levels of fetal hemoglobin interfere with the HbA1C  assay. Heterozygous hemoglobin variants (HbS, HgC, etc)do  not significantly interfere with this assay.   However, presence of multiple variants may affect accuracy.             Chemistry        Component Value Date/Time     09/26/2020 0820    K 4.0 09/26/2020 0820     09/26/2020 0820    CO2 29 09/26/2020 0820    BUN 19 09/26/2020 0820    CREATININE 1.5 (H) 09/26/2020 0820     09/26/2020 0820        Component Value Date/Time    CALCIUM 9.1 09/26/2020 0820    ALKPHOS 133 04/20/2020 0725    AST 21 04/20/2020 0725    ALT 8 (L) 04/20/2020 0725    BILITOT 0.5 04/20/2020 0725    ESTGFRAFRICA >60.0 09/26/2020 0820    EGFRNONAA 52.0 (A) 09/26/2020 0820          Lab Results   Component Value Date    LDLCALC 82.8 10/15/2020       Lab Results   Component Value Date    MICALBCREAT 1.8 01/31/2017         STANDARDS of CARE:        ASA:               Last eye exam:       ASSESSMENT and PLAN:    A1C GOAL: < 7 %     1. Diabetes mellitus type 2, uncontrolled, with complications  See Diabetes Educator/Registered Dietician for Medical Nutrition Therapy.   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   STOP glimepiride.     Start Trulicity 0.75 mg once weekly for 4 weeks.   Then increase to Trulicity 1.5 mg once weekly. And reduce Humalog to 15 units before each meal.     Continue pioglitazone for now and Lantus 30 units nightly.   We will try to wean off Humalog before meals.   Test glucose before each meal and bedtime, 4x/day. Or scan at least every 8 hours with the Malka. Try Skin Tac and/or Simpatch found on [employer] to help with adhesion.     Follow up in 6 weeks.     Ambulatory referral/consult to Endocrinology    Ambulatory referral/consult to Diabetes Education   2. Essential hypertension  losartan (COZAAR) 50 MG tablet    diltiaZEM (CARDIZEM CD) 240 MG 24 hr capsule    metoprolol succinate (TOPROL-XL) 50  MG 24 hr tablet   3. Morbid obesity with BMI of 50.0-59.9, adult  Increases insulin resistance.      4. Hyperlipidemia, unspecified hyperlipidemia type  Continue statin        Orders Placed This Encounter   Procedures    Ambulatory referral/consult to Diabetes Education     Standing Status:   Future     Standing Expiration Date:   10/21/2021     Referral Priority:   Routine     Referral Type:   Consultation     Referral Reason:   Specialty Services Required     Requested Specialty:   Endocrinology     Number of Visits Requested:   1     Expiration Date:   10/21/2021        Follow up in about 6 weeks (around 12/2/2020).     Thank you very much for allowing me to participate in David Corona Jr.'s care.

## 2020-10-21 NOTE — PATIENT INSTRUCTIONS
A1C goal: <7%  Fasting/premeal blood glucose goal:   2 hour post-meal blood glucose goal: less than 180        See Diabetes Educator/Registered Dietician for Medical Nutrition Therapy.   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   STOP glimepiride.     Start Trulicity 0.75 mg once weekly for 4 weeks.   Then increase to Trulicity 1.5 mg once weekly. And reduce Humalog to 15 units before each meal.     Continue pioglitazone for now and Lantus 30 units nightly.   We will try to wean off Humalog before meals.   Test glucose before each meal and bedtime, 4x/day. Or scan at least every 8 hours with the Malka. Try Skin Tac and/or Simpatch found on [employer] to help with adhesion.     Follow up in 6 weeks.     Follow up with Dr. Messer.

## 2020-10-26 ENCOUNTER — CLINICAL SUPPORT (OUTPATIENT)
Dept: FAMILY MEDICINE | Facility: CLINIC | Age: 55
End: 2020-10-26
Payer: COMMERCIAL

## 2020-10-26 DIAGNOSIS — Z23 NEED FOR ZOSTER VACCINE: Primary | ICD-10-CM

## 2020-10-26 PROCEDURE — 90750 ZOSTER RECOMBINANT VACCINE: ICD-10-PCS | Mod: S$GLB,,, | Performed by: INTERNAL MEDICINE

## 2020-10-26 PROCEDURE — 90750 HZV VACC RECOMBINANT IM: CPT | Mod: S$GLB,,, | Performed by: INTERNAL MEDICINE

## 2020-10-26 PROCEDURE — 90471 ZOSTER RECOMBINANT VACCINE: ICD-10-PCS | Mod: S$GLB,,, | Performed by: INTERNAL MEDICINE

## 2020-10-26 PROCEDURE — 99499 NO LOS: ICD-10-PCS | Mod: S$GLB,,, | Performed by: INTERNAL MEDICINE

## 2020-10-26 PROCEDURE — 99499 UNLISTED E&M SERVICE: CPT | Mod: S$GLB,,, | Performed by: INTERNAL MEDICINE

## 2020-10-26 PROCEDURE — 90471 IMMUNIZATION ADMIN: CPT | Mod: S$GLB,,, | Performed by: INTERNAL MEDICINE

## 2020-10-30 DIAGNOSIS — E11.40 CONTROLLED TYPE 2 DIABETES WITH NEUROPATHY: ICD-10-CM

## 2020-10-30 RX ORDER — GLIMEPIRIDE 2 MG/1
TABLET ORAL
Qty: 120 TABLET | Refills: 5 | OUTPATIENT
Start: 2020-10-30

## 2020-11-02 DIAGNOSIS — E78.5 HYPERLIPIDEMIA LDL GOAL <100: ICD-10-CM

## 2020-11-02 RX ORDER — PRAVASTATIN SODIUM 20 MG/1
TABLET ORAL
Qty: 90 TABLET | Refills: 1 | Status: SHIPPED | OUTPATIENT
Start: 2020-11-02 | End: 2021-04-26

## 2020-11-03 RX ORDER — INSULIN GLARGINE 100 [IU]/ML
INJECTION, SOLUTION SUBCUTANEOUS
Qty: 15 ML | Refills: 5 | Status: SHIPPED | OUTPATIENT
Start: 2020-11-03 | End: 2021-06-30 | Stop reason: SDUPTHER

## 2020-11-12 DIAGNOSIS — I10 ESSENTIAL HYPERTENSION: ICD-10-CM

## 2020-11-12 RX ORDER — FLASH GLUCOSE SENSOR
KIT MISCELLANEOUS
COMMUNITY
Start: 2020-10-21 | End: 2021-04-19 | Stop reason: SDUPTHER

## 2020-11-12 RX ORDER — FUROSEMIDE 40 MG/1
TABLET ORAL
Qty: 180 TABLET | Refills: 0 | Status: SHIPPED | OUTPATIENT
Start: 2020-11-12 | End: 2021-02-12

## 2020-11-17 ENCOUNTER — PATIENT OUTREACH (OUTPATIENT)
Dept: ADMINISTRATIVE | Facility: OTHER | Age: 55
End: 2020-11-17

## 2020-11-17 NOTE — PROGRESS NOTES
Health Maintenance Due   Topic Date Due    Eye Exam  11/14/2020     Updates were requested from care everywhere.  Chart was reviewed for overdue Proactive [facility] Encounters (BRYANNA) topics (CRS, Breast Cancer Screening, Eye exam)  Health Maintenance has been updated.  LINKS immunization registry triggered.  Immunizations were reconciled.

## 2020-11-18 ENCOUNTER — CLINICAL SUPPORT (OUTPATIENT)
Dept: DIABETES | Facility: CLINIC | Age: 55
End: 2020-11-18
Payer: COMMERCIAL

## 2020-11-18 PROCEDURE — 99999 PR PBB SHADOW E&M-EST. PATIENT-LVL I: CPT | Mod: PBBFAC,,, | Performed by: DIETITIAN, REGISTERED

## 2020-11-18 PROCEDURE — 99999 PR PBB SHADOW E&M-EST. PATIENT-LVL I: ICD-10-PCS | Mod: PBBFAC,,, | Performed by: DIETITIAN, REGISTERED

## 2020-11-18 PROCEDURE — G0108 DIAB MANAGE TRN  PER INDIV: HCPCS | Mod: S$GLB,,, | Performed by: DIETITIAN, REGISTERED

## 2020-11-18 PROCEDURE — G0108 PR DIAB MANAGE TRN  PER INDIV: ICD-10-PCS | Mod: S$GLB,,, | Performed by: DIETITIAN, REGISTERED

## 2020-11-18 NOTE — PROGRESS NOTES
Diabetes Education  Author: Rosalind Merchant RD  Date: 11/18/2020    Diabetes Care Management Summary  Discussed diabetes self management w emphasis on CHO awareness/counting, meal planning/label reading, SMBG, exercise, and meds. Also, START FreeStyle Malka CGM system.  Diabetes Education Record Assessment/Progress: Initial  Current Diabetes Risk Level: Moderate     Last A1c:   Lab Results   Component Value Date    HGBA1C 7.4 (H) 10/15/2020     Last visit with Diabetes Educator: : 11/18/2020  Diabetes Type  Diabetes Type : Type II    Diabetes History  Diabetes Diagnosis: >10 years  Current Treatment: Oral Medication, Diet, Injectable, Insulin  Reviewed Problem List with Patient: Yes    Health Maintenance was reviewed today with patient. Discussed with patient importance of routine eye exams, foot exams/foot care, blood work (i.e.: A1c, microalbumin, and lipid), dental visits, yearly flu vaccine, and pneumonia vaccine as indicated by PCP. Patient verbalized understanding.     Health Maintenance Topics with due status: Not Due       Topic Last Completion Date    Colorectal Cancer Screening 11/22/2016    TETANUS VACCINE 09/04/2017    Urine Microalbumin 09/26/2020    Lipid Panel 10/15/2020    Hemoglobin A1c 10/15/2020    Foot Exam 10/21/2020    Shingles Vaccine 10/26/2020    Low Dose Statin 11/02/2020     Health Maintenance Due   Topic Date Due    Eye Exam  11/14/2020     Nutrition  Meal Planning: skipping meals, drinks regular soda, water, eats out often, snacks between meal, artificial sweeteners  What type of sweetener do you use?: sugar  What type of beverages do you drink?: juice, water, regular soda/tea, diet soda/tea  Meal Plan 24 Hour Recall - Breakfast: Angelina sausage, egg cheese biscuit,large  fruit juice beverage  Meal Plan 24 Hour Recall - Lunch: skips actual lunch but snacks on chips and soda  Meal Plan 24 Hour Recall - Dinner: eats out often; 2 pc fried chickem red beans/rice, biscuit, Unsweet drink  Meal  Plan 24 Hour Recall - Snack: varies; often chips or snacks at playground    Monitoring   Monitoring: Other  Self Monitoring : 2-4x/day  Blood Glucose Logs: No  Do you use a personal continuous glucose monitor?: Yes(started today)  What kind of glucose monitor do you use?: Freestyle Malka Flash  Can you tell when your blood sugar is too high?: no    Exercise   Exercise Type: none(lack of motivation)  Current Diabetes Treatment   Current Treatment: Oral Medication, Diet, Injectable, Insulin  Social History  Primary Support: Self, Spouse  Educational Level: College Graduate  Occupation: socal worker [employer]  Smoking Status: Never a Smoker  Alcohol Use: Rarely  Barriers to Change  Barriers to Change: None  Learning Challenges : None    Readiness to Learn   Readiness to Learn : Eager    Cultural Influences  Cultural Influences: No    Diabetes Education Assessment/Progress  Diabetes Disease Process (diabetes disease process and treatment options): Written Materials Provided, Discussion, Individual Session, Demonstrates Understanding/Competency(verbalizes/demonstrates)  Nutrition (Incorporating nutritional management into one's lifestyle): Written Materials Provided, Discussion, Individual Session, Demonstrates Understanding/Competency (verbalizes/demonstrates), Instructed  -pt states Covid messed up his eating bc cafeteria at school closed and he was forced to skip meals and eat out more often. Diet recall notes pt eats at fast food rest often choosing fried or high fat meal combos w occ use of sugared beverages shane in morning, likes veg but eats only 3/week as well as fresh fruit. Sates he tries to drink unsweetened beverages when available  -Discussed carb vs non-carb foods, appropriate amount of carbs to have at meals/snacks and acceptiable serving sizes of individual carb items.  - Instructed on appropriate label reading and serving sizes of specific carb containing foods., portion control (hand) and using the plate  "method of meal planning.   -Encouraged decreasing portion sizes of CHO to 3-4 (range 3-5) servings or 45-60g (range 45-75g) per meal, 3 evenly-spaced meals daily and limiting snacks to mostly 0-5g CHO. If does have CHO snack occasionally, no more than 15g  Discussed carb counting apps and using internet for carb info when eating out  Pt stated she understood info diet presented and would attempt to modify intake as discussed         Physical Activity (incorporating physical activity into one's lifestyle): Written Materials Provided, Discussion, Individual Session, Demonstrates Understanding/Competency (verbalizes/demonstrates)  -pt states he will start walking more at the park when working evenings  -Reviewed difference between active lifestyle and structured physical activity. Discussed benefits of physical activity on BG control and encouraged pt to start a walking program for a total of 150 minutes per week while  keeping 3 exercise components in mind: Frequency- 3-5x/wk, Duration- 30 min, Intensity- can say name but "not sing a song"      Medications (states correct name, dose, onset, peak, duration, side effects & timing of meds): Written Materials Provided, Discussion, Individual Session, Demonstrates Understanding/Competency(verbalizes/demonstrates)  Monitoring (monitoring blood glucose/other parameters & using results): Demonstration, Written Materials Provided, Discussion, Return Demonstration, Individual Session, Video, Demonstrates Understanding/Competency (verbalizes/demonstrates), Instructed  Reviewed content of kit: reader,  sensors (1), and applicator  Reviewed YouTube video and start-up guide.   Set up the pt cell phone w ZEESHAN and   added the glucose range to   Demonstrated sensor insertion technique and assisted pt in placing the first sensor on his leftarm  Scanned the sensor for start up  Reviewed the significance of the information presented on the recorder once set-up complete. Informed pt  " "BG data available in 60 minutes with each new sensor application/insertion, Recommended pt scan sensor before every meal/at least once every 5 hours during day. Informed pt of 8 hour limit on sensor data collection  Reviewed the components of the data reports, discussed contraindicated exposure to X-rays, MRI, CT scans, and noted affect of Vit C  and Aspirin intake on BG readings  Explained the replacement process of sensors every 14 days. Suggestions provided on how to prevent unplanned removal of sensor and how to replace  Pt performed sensor placement and reader set-up and scanning correctly. Pt reminded to bring reader to clinic visit to download BG records. Pt invited to sent data to [facility] [employer]      Acute Complications (preventing, detecting, and treating acute complications): Written Materials Provided, Discussion, Individual Session, Demonstrates Understanding/Competency (verbalizes/demonstrates)  -Reviewed s/s, causes, and treatment of hyperglycemia and hypoglycemia and use of  "rule of 15" w/ hypoglycemia.    Chronic Complications (preventing, detecting, and treating chronic complications): Discussion, Individual Session, Written Materials Provided, Demonstrates Understanding/Competency (verbalizes/demonstrates)  Clinical (diabetes, other pertinent medical history, and relevant comorbidities reviewed during visit): Discussion, Individual Session, Demonstrates Understanding/Competency (verbalizes/demonstrates)  Cognitive (knowledge of self-management skills, functional health literacy): Discussion, Individual Session, Demonstrates Understanding/Competency (verbalizes/demonstrates)  -Arrives with adequate health management knowledge - poor specific knowledge of diabetes management. Leaves with increased knowledge base -    Psychosocial (emotional response to diabetes): Discussion, Individual Session  Diabetes Distress and Support Systems: Not Covered/Deferred  Behavioral (readiness for change, " lifestyle practices, self-care behaviors): Discussion, Individual Session, Comprehends Key Points  -Appears  motivated to make recommended changes    Goals  Patient has selected/evaluated goals during today's session: Yes, selected  Healthy Eating: Set(start carb counting, eat 3 meals/day, limit carbs to 60 g/meal)  Start Date: 11/18/20  Target Date: 12/21/20  Physical Activity: Set(start PA 3x/wk, 30 min duration)  Start Date: 11/18/20  Target Date: 11/25/20     Diabetes Care Plan/Intervention  Education Plan/Intervention: Individual Follow-Up DSMT    Diabetes Meal Plan  Restrictions: Restricted Carbohydrate, Low Sodium  Carbohydrate Per Meal: 45-60g, 60-75g  Carbohydrate Per Snack : 15-20g    Today's Self-Management Care Plan was developed with the patient's input and is based on barriers identified during today's assessment.  The long and short-term goals in the care plan were written with the patient/caregiver's input. The patient has agreed to work toward these goals to improve his overall diabetes control.    The patient received a copy of today's self-management plan and verbalized understanding of the care plan, goals, and all of today's instructions.    The patient was encouraged to communicate with his physician and care team regarding his condition(s) and treatment.  I provided the patient with my contact information today and encouraged him to contact me via phone or patient portal as needed.     Education Units of Time   Time Spent: 60 min

## 2020-11-29 DIAGNOSIS — E11.40 CONTROLLED TYPE 2 DIABETES WITH NEUROPATHY: ICD-10-CM

## 2020-11-29 DIAGNOSIS — M10.9 GOUT, UNSPECIFIED CAUSE, UNSPECIFIED CHRONICITY, UNSPECIFIED SITE: ICD-10-CM

## 2020-11-30 RX ORDER — PIOGLITAZONEHYDROCHLORIDE 45 MG/1
45 TABLET ORAL DAILY
Qty: 30 TABLET | Refills: 0 | Status: SHIPPED | OUTPATIENT
Start: 2020-11-30 | End: 2020-12-29 | Stop reason: SDUPTHER

## 2020-11-30 RX ORDER — COLCHICINE 0.6 MG/1
TABLET ORAL
Qty: 30 TABLET | Refills: 5 | Status: SHIPPED | OUTPATIENT
Start: 2020-11-30 | End: 2021-05-31

## 2020-12-03 ENCOUNTER — OFFICE VISIT (OUTPATIENT)
Dept: FAMILY MEDICINE | Facility: CLINIC | Age: 55
End: 2020-12-03
Payer: COMMERCIAL

## 2020-12-03 VITALS
DIASTOLIC BLOOD PRESSURE: 78 MMHG | SYSTOLIC BLOOD PRESSURE: 156 MMHG | HEART RATE: 85 BPM | WEIGHT: 315 LBS | OXYGEN SATURATION: 97 % | TEMPERATURE: 98 F | HEIGHT: 74 IN | BODY MASS INDEX: 40.43 KG/M2

## 2020-12-03 DIAGNOSIS — E78.5 HYPERLIPIDEMIA LDL GOAL <100: ICD-10-CM

## 2020-12-03 DIAGNOSIS — I10 ESSENTIAL HYPERTENSION: ICD-10-CM

## 2020-12-03 DIAGNOSIS — N18.31 STAGE 3A CHRONIC KIDNEY DISEASE: ICD-10-CM

## 2020-12-03 DIAGNOSIS — E66.01 MORBID OBESITY WITH BMI OF 50.0-59.9, ADULT: ICD-10-CM

## 2020-12-03 DIAGNOSIS — N25.81 SECONDARY HYPERPARATHYROIDISM OF RENAL ORIGIN: ICD-10-CM

## 2020-12-03 DIAGNOSIS — G47.33 OSA (OBSTRUCTIVE SLEEP APNEA): ICD-10-CM

## 2020-12-03 PROCEDURE — 3008F BODY MASS INDEX DOCD: CPT | Mod: CPTII,S$GLB,, | Performed by: NURSE PRACTITIONER

## 2020-12-03 PROCEDURE — 1126F PR PAIN SEVERITY QUANTIFIED, NO PAIN PRESENT: ICD-10-PCS | Mod: S$GLB,,, | Performed by: NURSE PRACTITIONER

## 2020-12-03 PROCEDURE — 99999 PR PBB SHADOW E&M-EST. PATIENT-LVL V: ICD-10-PCS | Mod: PBBFAC,,, | Performed by: NURSE PRACTITIONER

## 2020-12-03 PROCEDURE — 99214 OFFICE O/P EST MOD 30 MIN: CPT | Mod: S$GLB,,, | Performed by: NURSE PRACTITIONER

## 2020-12-03 PROCEDURE — 3077F PR MOST RECENT SYSTOLIC BLOOD PRESSURE >= 140 MM HG: ICD-10-PCS | Mod: CPTII,S$GLB,, | Performed by: NURSE PRACTITIONER

## 2020-12-03 PROCEDURE — 3077F SYST BP >= 140 MM HG: CPT | Mod: CPTII,S$GLB,, | Performed by: NURSE PRACTITIONER

## 2020-12-03 PROCEDURE — 3051F HG A1C>EQUAL 7.0%<8.0%: CPT | Mod: CPTII,S$GLB,, | Performed by: NURSE PRACTITIONER

## 2020-12-03 PROCEDURE — 3078F PR MOST RECENT DIASTOLIC BLOOD PRESSURE < 80 MM HG: ICD-10-PCS | Mod: CPTII,S$GLB,, | Performed by: NURSE PRACTITIONER

## 2020-12-03 PROCEDURE — 3008F PR BODY MASS INDEX (BMI) DOCUMENTED: ICD-10-PCS | Mod: CPTII,S$GLB,, | Performed by: NURSE PRACTITIONER

## 2020-12-03 PROCEDURE — 3072F LOW RISK FOR RETINOPATHY: CPT | Mod: S$GLB,,, | Performed by: NURSE PRACTITIONER

## 2020-12-03 PROCEDURE — 99999 PR PBB SHADOW E&M-EST. PATIENT-LVL V: CPT | Mod: PBBFAC,,, | Performed by: NURSE PRACTITIONER

## 2020-12-03 PROCEDURE — 3072F PR LOW RISK FOR RETINOPATHY: ICD-10-PCS | Mod: S$GLB,,, | Performed by: NURSE PRACTITIONER

## 2020-12-03 PROCEDURE — 3078F DIAST BP <80 MM HG: CPT | Mod: CPTII,S$GLB,, | Performed by: NURSE PRACTITIONER

## 2020-12-03 PROCEDURE — 99214 PR OFFICE/OUTPT VISIT, EST, LEVL IV, 30-39 MIN: ICD-10-PCS | Mod: S$GLB,,, | Performed by: NURSE PRACTITIONER

## 2020-12-03 PROCEDURE — 1126F AMNT PAIN NOTED NONE PRSNT: CPT | Mod: S$GLB,,, | Performed by: NURSE PRACTITIONER

## 2020-12-03 PROCEDURE — 3051F PR MOST RECENT HEMOGLOBIN A1C LEVEL 7.0 - < 8.0%: ICD-10-PCS | Mod: CPTII,S$GLB,, | Performed by: NURSE PRACTITIONER

## 2020-12-03 NOTE — PROGRESS NOTES
Subjective:       Patient ID: David Corona Jr. is a 55 y.o. male.    Chief Complaint: Hypertension, Hyperlipidemia, and Diabetes    55-year-old male presents to the clinic today for follow-up on hypertension, hyperlipidemia, and diabetes.  His blood pressure today is 156/78.  He is not taking his blood pressure medications yet today.  He will return in 2 weeks to have a blood pressure checkup with the nurse.  He needs to schedule an eye exam before the end of the year.  He has fair dietary habits.  Walks 6 days a week for 20 min at a time.  He is compliant with all of his medications.  He saw dietitian recently who put him on a 1600 calorie diet.  He states he is trying to follow this closely as possible.  He denies any headaches, dizziness, or blurred vision.  He denies any cardiac chest pain, heart palpitations, shortness breath, or swelling to lower extremities. He uses his CPAP machine every night. He missed a appointment with Chiquis Brink yesterday so he needs to reschedule. I did discuss his lab results with him at this visit. I explained that his diabetes was much better controlled.     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 He has never used smokeless tobacco. He reports current alcohol use. He reports that he does not use drugs.  Review of Systems   Constitutional: Negative for activity change and fatigue.   Respiratory: Negative for cough, chest tightness, shortness of breath and wheezing.    Cardiovascular: Negative for chest pain, palpitations and leg swelling.   Gastrointestinal: Negative for abdominal pain, diarrhea, nausea and vomiting.   Musculoskeletal: Negative for back pain and gait problem.   Skin: Negative for color change.   Neurological: Negative for dizziness, light-headedness and headaches.       Objective:      Physical Exam  Constitutional:       General: He is not in acute distress.     Appearance: Normal appearance. He is obese. He is not ill-appearing, toxic-appearing or diaphoretic.   Cardiovascular:      Rate and Rhythm: Normal rate and regular rhythm.      Heart sounds: Normal heart sounds. No murmur.   Pulmonary:      Effort: Pulmonary effort is normal.      Breath sounds: Normal breath sounds. No wheezing or rhonchi.   Abdominal:      General: Bowel sounds are normal.      Palpations: Abdomen is soft.      Tenderness: There is no abdominal tenderness.   Musculoskeletal: Normal range of motion.         General: No swelling.   Skin:     General: Skin is warm and dry.   Neurological:      Mental Status: He is alert and oriented to person, place, and time.   Psychiatric:         Mood and Affect: Mood normal.         Behavior: Behavior normal.         Thought Content: Thought content normal.         Judgment: Judgment normal.         Assessment:       1. 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 Type 2 diabetes mellitus, uncontrolled, with neuropathy    3. Essential hypertension    4. Hyperlipidemia LDL goal <100    5. Secondary hyperparathyroidism of renal origin    6. Morbid obesity with BMI of 50.0-59.9, adult    7. Stage 3a chronic kidney disease    8. TOSIN (obstructive sleep apnea)    9. 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    Essential hypertension  - continue all current medications   - blood pressure check up with nurse in 2 weeks    Hyperlipidemia LDL goal <100  - The current medical regimen is effective;  continue present plan and medications.    Secondary hyperparathyroidism of renal origin  - stable, observe, asymptomatic     Morbid obesity with BMI of 50.0-59.9, adult  - The patient is asked to make an attempt to improve diet and exercise patterns to aid in medical management of this problem.    Stage 3a chronic kidney disease  - avoid all anti-inflammatories and stay well hydrated     TOSIN (obstructive sleep apnea)  - continue using CPAP machine every night     Uncontrolled type 2 diabetes mellitus with stage 3 chronic kidney disease, with long-term current use of insulin  - The current medical regimen is effective;  continue present plan and medications.

## 2020-12-03 NOTE — PATIENT INSTRUCTIONS
Need to call and schedule a appointment with eye doctor before end of the year   Need to watch diet closely and continue with exercise program   Continue all current medications   Blood pressure check up with nurse in 2 weeks  Follow up with Dr. Galindo in 3 months for a physical

## 2020-12-10 ENCOUNTER — PATIENT MESSAGE (OUTPATIENT)
Dept: FAMILY MEDICINE | Facility: CLINIC | Age: 55
End: 2020-12-10

## 2020-12-28 RX ORDER — BLOOD SUGAR DIAGNOSTIC
STRIP MISCELLANEOUS
Qty: 100 EACH | Refills: 5 | Status: SHIPPED | OUTPATIENT
Start: 2020-12-28 | End: 2022-04-25

## 2020-12-29 ENCOUNTER — CLINICAL SUPPORT (OUTPATIENT)
Dept: FAMILY MEDICINE | Facility: CLINIC | Age: 55
End: 2020-12-29
Payer: COMMERCIAL

## 2020-12-29 DIAGNOSIS — Z23 NEED FOR SHINGLES VACCINE: Primary | ICD-10-CM

## 2020-12-29 DIAGNOSIS — E11.40 CONTROLLED TYPE 2 DIABETES WITH NEUROPATHY: ICD-10-CM

## 2020-12-29 PROCEDURE — 90750 ZOSTER RECOMBINANT VACCINE: ICD-10-PCS | Mod: S$GLB,,, | Performed by: INTERNAL MEDICINE

## 2020-12-29 PROCEDURE — 99499 UNLISTED E&M SERVICE: CPT | Mod: S$GLB,,, | Performed by: INTERNAL MEDICINE

## 2020-12-29 PROCEDURE — 90471 IMMUNIZATION ADMIN: CPT | Mod: S$GLB,,, | Performed by: INTERNAL MEDICINE

## 2020-12-29 PROCEDURE — 90750 HZV VACC RECOMBINANT IM: CPT | Mod: S$GLB,,, | Performed by: INTERNAL MEDICINE

## 2020-12-29 PROCEDURE — 90471 ZOSTER RECOMBINANT VACCINE: ICD-10-PCS | Mod: S$GLB,,, | Performed by: INTERNAL MEDICINE

## 2020-12-29 PROCEDURE — 99499 NO LOS: ICD-10-PCS | Mod: S$GLB,,, | Performed by: INTERNAL MEDICINE

## 2020-12-29 RX ORDER — PIOGLITAZONEHYDROCHLORIDE 45 MG/1
45 TABLET ORAL DAILY
Qty: 90 TABLET | Refills: 0 | Status: SHIPPED | OUTPATIENT
Start: 2020-12-29 | End: 2021-01-24

## 2020-12-29 NOTE — PROGRESS NOTES
SHINGRIX  Dose#2,Lot#[id number] Given IM in Right Deltoid, tolerated well. Patient instructed to waited 15 minutes ,VIS form given

## 2021-01-13 DIAGNOSIS — I10 ESSENTIAL HYPERTENSION: ICD-10-CM

## 2021-01-13 RX ORDER — METOPROLOL SUCCINATE 50 MG/1
50 TABLET, EXTENDED RELEASE ORAL DAILY
Qty: 90 TABLET | Refills: 0 | Status: SHIPPED | OUTPATIENT
Start: 2021-01-13 | End: 2021-03-25

## 2021-01-19 DIAGNOSIS — I10 ESSENTIAL HYPERTENSION: ICD-10-CM

## 2021-01-19 RX ORDER — LOSARTAN POTASSIUM 50 MG/1
50 TABLET ORAL DAILY
Qty: 30 TABLET | Refills: 0 | Status: SHIPPED | OUTPATIENT
Start: 2021-01-19 | End: 2021-02-15

## 2021-01-19 RX ORDER — DILTIAZEM HYDROCHLORIDE 240 MG/1
CAPSULE, COATED, EXTENDED RELEASE ORAL
Qty: 60 CAPSULE | Refills: 0 | Status: SHIPPED | OUTPATIENT
Start: 2021-01-19 | End: 2021-02-22

## 2021-01-26 ENCOUNTER — PATIENT OUTREACH (OUTPATIENT)
Dept: ADMINISTRATIVE | Facility: OTHER | Age: 56
End: 2021-01-26

## 2021-01-28 ENCOUNTER — LAB VISIT (OUTPATIENT)
Dept: LAB | Facility: HOSPITAL | Age: 56
End: 2021-01-28
Attending: NURSE PRACTITIONER
Payer: COMMERCIAL

## 2021-01-28 ENCOUNTER — OFFICE VISIT (OUTPATIENT)
Dept: ENDOCRINOLOGY | Facility: CLINIC | Age: 56
End: 2021-01-28
Payer: COMMERCIAL

## 2021-01-28 VITALS
BODY MASS INDEX: 63.94 KG/M2 | DIASTOLIC BLOOD PRESSURE: 80 MMHG | TEMPERATURE: 98 F | WEIGHT: 315 LBS | HEART RATE: 71 BPM | SYSTOLIC BLOOD PRESSURE: 149 MMHG

## 2021-01-28 DIAGNOSIS — E78.5 HYPERLIPIDEMIA, UNSPECIFIED HYPERLIPIDEMIA TYPE: ICD-10-CM

## 2021-01-28 DIAGNOSIS — I10 ESSENTIAL HYPERTENSION: ICD-10-CM

## 2021-01-28 DIAGNOSIS — E66.01 MORBID OBESITY, UNSPECIFIED OBESITY TYPE: ICD-10-CM

## 2021-01-28 PROCEDURE — 99999 PR PBB SHADOW E&M-EST. PATIENT-LVL V: CPT | Mod: PBBFAC,,, | Performed by: NURSE PRACTITIONER

## 2021-01-28 PROCEDURE — 99999 PR PBB SHADOW E&M-EST. PATIENT-LVL V: ICD-10-PCS | Mod: PBBFAC,,, | Performed by: NURSE PRACTITIONER

## 2021-01-28 PROCEDURE — 36415 COLL VENOUS BLD VENIPUNCTURE: CPT | Mod: PN

## 2021-01-28 PROCEDURE — 3051F HG A1C>EQUAL 7.0%<8.0%: CPT | Mod: CPTII,S$GLB,, | Performed by: NURSE PRACTITIONER

## 2021-01-28 PROCEDURE — 3079F PR MOST RECENT DIASTOLIC BLOOD PRESSURE 80-89 MM HG: ICD-10-PCS | Mod: CPTII,S$GLB,, | Performed by: NURSE PRACTITIONER

## 2021-01-28 PROCEDURE — 3077F SYST BP >= 140 MM HG: CPT | Mod: CPTII,S$GLB,, | Performed by: NURSE PRACTITIONER

## 2021-01-28 PROCEDURE — 1126F AMNT PAIN NOTED NONE PRSNT: CPT | Mod: S$GLB,,, | Performed by: NURSE PRACTITIONER

## 2021-01-28 PROCEDURE — 3008F BODY MASS INDEX DOCD: CPT | Mod: CPTII,S$GLB,, | Performed by: NURSE PRACTITIONER

## 2021-01-28 PROCEDURE — 3077F PR MOST RECENT SYSTOLIC BLOOD PRESSURE >= 140 MM HG: ICD-10-PCS | Mod: CPTII,S$GLB,, | Performed by: NURSE PRACTITIONER

## 2021-01-28 PROCEDURE — 3051F PR MOST RECENT HEMOGLOBIN A1C LEVEL 7.0 - < 8.0%: ICD-10-PCS | Mod: CPTII,S$GLB,, | Performed by: NURSE PRACTITIONER

## 2021-01-28 PROCEDURE — 3008F PR BODY MASS INDEX (BMI) DOCUMENTED: ICD-10-PCS | Mod: CPTII,S$GLB,, | Performed by: NURSE PRACTITIONER

## 2021-01-28 PROCEDURE — 99214 OFFICE O/P EST MOD 30 MIN: CPT | Mod: S$GLB,,, | Performed by: NURSE PRACTITIONER

## 2021-01-28 PROCEDURE — 99214 PR OFFICE/OUTPT VISIT, EST, LEVL IV, 30-39 MIN: ICD-10-PCS | Mod: S$GLB,,, | Performed by: NURSE PRACTITIONER

## 2021-01-28 PROCEDURE — 1126F PR PAIN SEVERITY QUANTIFIED, NO PAIN PRESENT: ICD-10-PCS | Mod: S$GLB,,, | Performed by: NURSE PRACTITIONER

## 2021-01-28 PROCEDURE — 83036 HEMOGLOBIN GLYCOSYLATED A1C: CPT

## 2021-01-28 PROCEDURE — 3079F DIAST BP 80-89 MM HG: CPT | Mod: CPTII,S$GLB,, | Performed by: NURSE PRACTITIONER

## 2021-01-28 RX ORDER — DULAGLUTIDE 1.5 MG/.5ML
1.5 INJECTION, SOLUTION SUBCUTANEOUS
Qty: 4 PEN | Refills: 1 | OUTPATIENT
Start: 2021-01-28

## 2021-01-29 ENCOUNTER — TELEPHONE (OUTPATIENT)
Dept: ENDOCRINOLOGY | Facility: CLINIC | Age: 56
End: 2021-01-29

## 2021-01-29 LAB
ESTIMATED AVG GLUCOSE: 183 MG/DL (ref 68–131)
HBA1C MFR BLD: 8 % (ref 4–5.6)

## 2021-02-17 ENCOUNTER — PATIENT OUTREACH (OUTPATIENT)
Dept: ADMINISTRATIVE | Facility: HOSPITAL | Age: 56
End: 2021-02-17

## 2021-02-26 ENCOUNTER — IMMUNIZATION (OUTPATIENT)
Dept: INTERNAL MEDICINE | Facility: CLINIC | Age: 56
End: 2021-02-26
Payer: COMMERCIAL

## 2021-02-26 ENCOUNTER — PATIENT OUTREACH (OUTPATIENT)
Dept: ADMINISTRATIVE | Facility: OTHER | Age: 56
End: 2021-02-26

## 2021-02-26 DIAGNOSIS — Z23 NEED FOR VACCINATION: Primary | ICD-10-CM

## 2021-02-26 PROCEDURE — 0001A COVID-19, MRNA, LNP-S, PF, 30 MCG/0.3 ML DOSE VACCINE: CPT | Mod: CV19,,, | Performed by: INTERNAL MEDICINE

## 2021-02-26 PROCEDURE — 91300 COVID-19, MRNA, LNP-S, PF, 30 MCG/0.3 ML DOSE VACCINE: CPT | Mod: ,,, | Performed by: INTERNAL MEDICINE

## 2021-02-26 PROCEDURE — 0001A COVID-19, MRNA, LNP-S, PF, 30 MCG/0.3 ML DOSE VACCINE: ICD-10-PCS | Mod: CV19,,, | Performed by: INTERNAL MEDICINE

## 2021-02-26 PROCEDURE — 91300 COVID-19, MRNA, LNP-S, PF, 30 MCG/0.3 ML DOSE VACCINE: ICD-10-PCS | Mod: ,,, | Performed by: INTERNAL MEDICINE

## 2021-03-01 ENCOUNTER — OFFICE VISIT (OUTPATIENT)
Dept: ENDOCRINOLOGY | Facility: CLINIC | Age: 56
End: 2021-03-01
Payer: COMMERCIAL

## 2021-03-01 VITALS
DIASTOLIC BLOOD PRESSURE: 84 MMHG | WEIGHT: 315 LBS | TEMPERATURE: 98 F | SYSTOLIC BLOOD PRESSURE: 146 MMHG | BODY MASS INDEX: 64.17 KG/M2 | HEART RATE: 76 BPM

## 2021-03-01 DIAGNOSIS — E66.01 MORBID OBESITY, UNSPECIFIED OBESITY TYPE: ICD-10-CM

## 2021-03-01 DIAGNOSIS — E78.5 HYPERLIPIDEMIA, UNSPECIFIED HYPERLIPIDEMIA TYPE: ICD-10-CM

## 2021-03-01 PROCEDURE — 3008F BODY MASS INDEX DOCD: CPT | Mod: CPTII,S$GLB,, | Performed by: NURSE PRACTITIONER

## 2021-03-01 PROCEDURE — 3052F HG A1C>EQUAL 8.0%<EQUAL 9.0%: CPT | Mod: CPTII,S$GLB,, | Performed by: NURSE PRACTITIONER

## 2021-03-01 PROCEDURE — 3079F PR MOST RECENT DIASTOLIC BLOOD PRESSURE 80-89 MM HG: ICD-10-PCS | Mod: CPTII,S$GLB,, | Performed by: NURSE PRACTITIONER

## 2021-03-01 PROCEDURE — 3008F PR BODY MASS INDEX (BMI) DOCUMENTED: ICD-10-PCS | Mod: CPTII,S$GLB,, | Performed by: NURSE PRACTITIONER

## 2021-03-01 PROCEDURE — 3077F SYST BP >= 140 MM HG: CPT | Mod: CPTII,S$GLB,, | Performed by: NURSE PRACTITIONER

## 2021-03-01 PROCEDURE — 1126F PR PAIN SEVERITY QUANTIFIED, NO PAIN PRESENT: ICD-10-PCS | Mod: S$GLB,,, | Performed by: NURSE PRACTITIONER

## 2021-03-01 PROCEDURE — 99214 PR OFFICE/OUTPT VISIT, EST, LEVL IV, 30-39 MIN: ICD-10-PCS | Mod: S$GLB,,, | Performed by: NURSE PRACTITIONER

## 2021-03-01 PROCEDURE — 95251 CONT GLUC MNTR ANALYSIS I&R: CPT | Mod: S$GLB,,, | Performed by: NURSE PRACTITIONER

## 2021-03-01 PROCEDURE — 99999 PR PBB SHADOW E&M-EST. PATIENT-LVL V: CPT | Mod: PBBFAC,,, | Performed by: NURSE PRACTITIONER

## 2021-03-01 PROCEDURE — 99999 PR PBB SHADOW E&M-EST. PATIENT-LVL V: ICD-10-PCS | Mod: PBBFAC,,, | Performed by: NURSE PRACTITIONER

## 2021-03-01 PROCEDURE — 3052F PR MOST RECENT HEMOGLOBIN A1C LEVEL 8.0 - < 9.0%: ICD-10-PCS | Mod: CPTII,S$GLB,, | Performed by: NURSE PRACTITIONER

## 2021-03-01 PROCEDURE — 3077F PR MOST RECENT SYSTOLIC BLOOD PRESSURE >= 140 MM HG: ICD-10-PCS | Mod: CPTII,S$GLB,, | Performed by: NURSE PRACTITIONER

## 2021-03-01 PROCEDURE — 99214 OFFICE O/P EST MOD 30 MIN: CPT | Mod: S$GLB,,, | Performed by: NURSE PRACTITIONER

## 2021-03-01 PROCEDURE — 3079F DIAST BP 80-89 MM HG: CPT | Mod: CPTII,S$GLB,, | Performed by: NURSE PRACTITIONER

## 2021-03-01 PROCEDURE — 95251 PR GLUCOSE MONITOR, 72 HOUR, PHYS INTERP: ICD-10-PCS | Mod: S$GLB,,, | Performed by: NURSE PRACTITIONER

## 2021-03-01 PROCEDURE — 1126F AMNT PAIN NOTED NONE PRSNT: CPT | Mod: S$GLB,,, | Performed by: NURSE PRACTITIONER

## 2021-03-01 RX ORDER — DULAGLUTIDE 4.5 MG/.5ML
4.5 INJECTION, SOLUTION SUBCUTANEOUS
Qty: 4 PEN | Refills: 1 | Status: SHIPPED | OUTPATIENT
Start: 2021-03-01 | End: 2021-06-30 | Stop reason: ALTCHOICE

## 2021-03-01 RX ORDER — DULAGLUTIDE 3 MG/.5ML
INJECTION, SOLUTION SUBCUTANEOUS
Qty: 4 PEN | Refills: 0 | Status: SHIPPED | OUTPATIENT
Start: 2021-03-01 | End: 2021-06-30 | Stop reason: ALTCHOICE

## 2021-03-18 DIAGNOSIS — I10 ESSENTIAL HYPERTENSION: ICD-10-CM

## 2021-03-19 ENCOUNTER — IMMUNIZATION (OUTPATIENT)
Dept: INTERNAL MEDICINE | Facility: CLINIC | Age: 56
End: 2021-03-19
Payer: COMMERCIAL

## 2021-03-19 DIAGNOSIS — Z23 NEED FOR VACCINATION: Primary | ICD-10-CM

## 2021-03-19 PROCEDURE — 91300 COVID-19, MRNA, LNP-S, PF, 30 MCG/0.3 ML DOSE VACCINE: CPT | Mod: PBBFAC | Performed by: INTERNAL MEDICINE

## 2021-03-19 PROCEDURE — 0002A COVID-19, MRNA, LNP-S, PF, 30 MCG/0.3 ML DOSE VACCINE: CPT | Mod: PBBFAC | Performed by: INTERNAL MEDICINE

## 2021-03-19 RX ORDER — LOSARTAN POTASSIUM 50 MG/1
50 TABLET ORAL DAILY
Qty: 90 TABLET | Refills: 0 | Status: SHIPPED | OUTPATIENT
Start: 2021-03-19 | End: 2021-06-14

## 2021-03-23 RX ORDER — INSULIN LISPRO 100 [IU]/ML
INJECTION, SOLUTION INTRAVENOUS; SUBCUTANEOUS
Qty: 15 ML | Refills: 5 | Status: SHIPPED | OUTPATIENT
Start: 2021-03-23 | End: 2021-04-08 | Stop reason: SDUPTHER

## 2021-03-24 DIAGNOSIS — I10 ESSENTIAL HYPERTENSION: ICD-10-CM

## 2021-03-24 RX ORDER — DILTIAZEM HYDROCHLORIDE 240 MG/1
CAPSULE, COATED, EXTENDED RELEASE ORAL
Qty: 60 CAPSULE | Refills: 0 | Status: SHIPPED | OUTPATIENT
Start: 2021-03-24 | End: 2021-04-20

## 2021-03-25 DIAGNOSIS — I10 ESSENTIAL HYPERTENSION: ICD-10-CM

## 2021-03-25 RX ORDER — METOPROLOL SUCCINATE 50 MG/1
TABLET, EXTENDED RELEASE ORAL
Qty: 90 TABLET | Refills: 0 | Status: SHIPPED | OUTPATIENT
Start: 2021-03-25 | End: 2021-06-21

## 2021-04-08 RX ORDER — INSULIN LISPRO 100 [IU]/ML
INJECTION, SOLUTION INTRAVENOUS; SUBCUTANEOUS
Qty: 15 ML | Refills: 5 | Status: SHIPPED | OUTPATIENT
Start: 2021-04-08 | End: 2021-06-14

## 2021-04-19 RX ORDER — FLASH GLUCOSE SENSOR
KIT MISCELLANEOUS
Qty: 2 KIT | Refills: 11 | Status: SHIPPED | OUTPATIENT
Start: 2021-04-19 | End: 2021-10-18

## 2021-04-20 DIAGNOSIS — I10 ESSENTIAL HYPERTENSION: ICD-10-CM

## 2021-04-20 RX ORDER — DILTIAZEM HYDROCHLORIDE 240 MG/1
CAPSULE, COATED, EXTENDED RELEASE ORAL
Qty: 60 CAPSULE | Refills: 0 | Status: SHIPPED | OUTPATIENT
Start: 2021-04-20 | End: 2021-05-20

## 2021-04-22 ENCOUNTER — OFFICE VISIT (OUTPATIENT)
Dept: OPHTHALMOLOGY | Facility: CLINIC | Age: 56
End: 2021-04-22
Payer: COMMERCIAL

## 2021-04-22 DIAGNOSIS — H26.491 PCO (POSTERIOR CAPSULAR OPACIFICATION), RIGHT: ICD-10-CM

## 2021-04-22 DIAGNOSIS — H25.12 NUCLEAR SCLEROSIS, LEFT: Primary | ICD-10-CM

## 2021-04-22 DIAGNOSIS — Z96.1 PSEUDOPHAKIA: ICD-10-CM

## 2021-04-22 DIAGNOSIS — H52.7 REFRACTIVE ERROR: ICD-10-CM

## 2021-04-22 DIAGNOSIS — E11.9 DM TYPE 2 WITHOUT RETINOPATHY: ICD-10-CM

## 2021-04-22 DIAGNOSIS — I10 ESSENTIAL HYPERTENSION: ICD-10-CM

## 2021-04-22 LAB
LEFT EYE DM RETINOPATHY: NEGATIVE
RIGHT EYE DM RETINOPATHY: NEGATIVE

## 2021-04-22 PROCEDURE — 2023F DILAT RTA XM W/O RTNOPTHY: CPT | Mod: S$GLB,,, | Performed by: OPHTHALMOLOGY

## 2021-04-22 PROCEDURE — 99999 PR PBB SHADOW E&M-EST. PATIENT-LVL IV: ICD-10-PCS | Mod: PBBFAC,,, | Performed by: OPHTHALMOLOGY

## 2021-04-22 PROCEDURE — 1126F AMNT PAIN NOTED NONE PRSNT: CPT | Mod: S$GLB,,, | Performed by: OPHTHALMOLOGY

## 2021-04-22 PROCEDURE — 92014 PR EYE EXAM, EST PATIENT,COMPREHESV: ICD-10-PCS | Mod: S$GLB,,, | Performed by: OPHTHALMOLOGY

## 2021-04-22 PROCEDURE — 99999 PR PBB SHADOW E&M-EST. PATIENT-LVL IV: CPT | Mod: PBBFAC,,, | Performed by: OPHTHALMOLOGY

## 2021-04-22 PROCEDURE — 2023F PR DILATED RETINAL EXAM W/O EVID OF RETINOPATHY: ICD-10-PCS | Mod: S$GLB,,, | Performed by: OPHTHALMOLOGY

## 2021-04-22 PROCEDURE — 1126F PR PAIN SEVERITY QUANTIFIED, NO PAIN PRESENT: ICD-10-PCS | Mod: S$GLB,,, | Performed by: OPHTHALMOLOGY

## 2021-04-22 PROCEDURE — 92014 COMPRE OPH EXAM EST PT 1/>: CPT | Mod: S$GLB,,, | Performed by: OPHTHALMOLOGY

## 2021-04-26 DIAGNOSIS — E78.5 HYPERLIPIDEMIA LDL GOAL <100: ICD-10-CM

## 2021-04-26 RX ORDER — PRAVASTATIN SODIUM 20 MG/1
TABLET ORAL
Qty: 90 TABLET | Refills: 0 | Status: SHIPPED | OUTPATIENT
Start: 2021-04-26 | End: 2021-07-29

## 2021-04-27 LAB
CHOLESTEROL, TOTAL: 135 (ref 100–200)
CHOLESTEROL, TOTAL: 2.5
HBA1C MFR BLD: 7.4 % (ref 4.8–5.6)
HDLC SERPL-MCNC: 55 MG/DL
LDLC SERPL CALC-MCNC: 52 MG/DL
TRIGL SERPL-MCNC: 144 MG/DL
VLDL CHOLESTEROL: 29 MG/DL (ref 5–40)

## 2021-05-07 ENCOUNTER — PATIENT OUTREACH (OUTPATIENT)
Dept: ADMINISTRATIVE | Facility: HOSPITAL | Age: 56
End: 2021-05-07

## 2021-05-20 DIAGNOSIS — I10 ESSENTIAL HYPERTENSION: ICD-10-CM

## 2021-05-20 RX ORDER — DILTIAZEM HYDROCHLORIDE 240 MG/1
CAPSULE, COATED, EXTENDED RELEASE ORAL
Qty: 60 CAPSULE | Refills: 0 | Status: SHIPPED | OUTPATIENT
Start: 2021-05-20 | End: 2021-06-24 | Stop reason: SDUPTHER

## 2021-05-25 ENCOUNTER — LAB VISIT (OUTPATIENT)
Dept: LAB | Facility: HOSPITAL | Age: 56
End: 2021-05-25
Attending: NURSE PRACTITIONER
Payer: COMMERCIAL

## 2021-05-25 LAB
ALBUMIN/CREAT UR: 16.7 UG/MG (ref 0–30)
CREAT UR-MCNC: 377 MG/DL (ref 23–375)
MICROALBUMIN UR DL<=1MG/L-MCNC: 63 UG/ML

## 2021-05-25 PROCEDURE — 82570 ASSAY OF URINE CREATININE: CPT | Performed by: NURSE PRACTITIONER

## 2021-05-25 PROCEDURE — 82043 UR ALBUMIN QUANTITATIVE: CPT | Performed by: NURSE PRACTITIONER

## 2021-06-19 DIAGNOSIS — E11.40 CONTROLLED TYPE 2 DIABETES WITH NEUROPATHY: ICD-10-CM

## 2021-06-20 DIAGNOSIS — I10 ESSENTIAL HYPERTENSION: ICD-10-CM

## 2021-06-21 RX ORDER — DULAGLUTIDE 4.5 MG/.5ML
INJECTION, SOLUTION SUBCUTANEOUS
OUTPATIENT
Start: 2021-06-21

## 2021-06-21 RX ORDER — METOPROLOL SUCCINATE 50 MG/1
TABLET, EXTENDED RELEASE ORAL
Qty: 90 TABLET | Refills: 0 | Status: SHIPPED | OUTPATIENT
Start: 2021-06-21 | End: 2021-09-27

## 2021-06-21 RX ORDER — PIOGLITAZONEHYDROCHLORIDE 45 MG/1
TABLET ORAL
Qty: 90 TABLET | Refills: 0 | Status: SHIPPED | OUTPATIENT
Start: 2021-06-21 | End: 2021-06-30 | Stop reason: SDUPTHER

## 2021-06-24 DIAGNOSIS — I10 ESSENTIAL HYPERTENSION: ICD-10-CM

## 2021-06-24 RX ORDER — DILTIAZEM HYDROCHLORIDE 240 MG/1
CAPSULE, COATED, EXTENDED RELEASE ORAL
Qty: 60 CAPSULE | Refills: 0 | Status: SHIPPED | OUTPATIENT
Start: 2021-06-24 | End: 2021-07-18

## 2021-06-30 ENCOUNTER — OFFICE VISIT (OUTPATIENT)
Dept: ENDOCRINOLOGY | Facility: CLINIC | Age: 56
End: 2021-06-30
Payer: COMMERCIAL

## 2021-06-30 VITALS
TEMPERATURE: 99 F | BODY MASS INDEX: 63.4 KG/M2 | WEIGHT: 315 LBS | SYSTOLIC BLOOD PRESSURE: 156 MMHG | HEART RATE: 90 BPM | DIASTOLIC BLOOD PRESSURE: 84 MMHG

## 2021-06-30 DIAGNOSIS — I10 ESSENTIAL HYPERTENSION: ICD-10-CM

## 2021-06-30 DIAGNOSIS — E66.01 MORBID OBESITY, UNSPECIFIED OBESITY TYPE: ICD-10-CM

## 2021-06-30 DIAGNOSIS — E78.5 HYPERLIPIDEMIA, UNSPECIFIED HYPERLIPIDEMIA TYPE: ICD-10-CM

## 2021-06-30 PROCEDURE — 3008F BODY MASS INDEX DOCD: CPT | Mod: CPTII,S$GLB,, | Performed by: NURSE PRACTITIONER

## 2021-06-30 PROCEDURE — 99214 PR OFFICE/OUTPT VISIT, EST, LEVL IV, 30-39 MIN: ICD-10-PCS | Mod: S$GLB,,, | Performed by: NURSE PRACTITIONER

## 2021-06-30 PROCEDURE — 3051F HG A1C>EQUAL 7.0%<8.0%: CPT | Mod: CPTII,S$GLB,, | Performed by: NURSE PRACTITIONER

## 2021-06-30 PROCEDURE — 3008F PR BODY MASS INDEX (BMI) DOCUMENTED: ICD-10-PCS | Mod: CPTII,S$GLB,, | Performed by: NURSE PRACTITIONER

## 2021-06-30 PROCEDURE — 3051F PR MOST RECENT HEMOGLOBIN A1C LEVEL 7.0 - < 8.0%: ICD-10-PCS | Mod: CPTII,S$GLB,, | Performed by: NURSE PRACTITIONER

## 2021-06-30 PROCEDURE — 99214 OFFICE O/P EST MOD 30 MIN: CPT | Mod: S$GLB,,, | Performed by: NURSE PRACTITIONER

## 2021-06-30 PROCEDURE — 99999 PR PBB SHADOW E&M-EST. PATIENT-LVL IV: CPT | Mod: PBBFAC,,, | Performed by: NURSE PRACTITIONER

## 2021-06-30 PROCEDURE — 1126F AMNT PAIN NOTED NONE PRSNT: CPT | Mod: S$GLB,,, | Performed by: NURSE PRACTITIONER

## 2021-06-30 PROCEDURE — 1126F PR PAIN SEVERITY QUANTIFIED, NO PAIN PRESENT: ICD-10-PCS | Mod: S$GLB,,, | Performed by: NURSE PRACTITIONER

## 2021-06-30 PROCEDURE — 99999 PR PBB SHADOW E&M-EST. PATIENT-LVL IV: ICD-10-PCS | Mod: PBBFAC,,, | Performed by: NURSE PRACTITIONER

## 2021-06-30 RX ORDER — INSULIN LISPRO 100 [IU]/ML
INJECTION, SOLUTION INTRAVENOUS; SUBCUTANEOUS
Qty: 15 ML | Refills: 5 | Status: SHIPPED | OUTPATIENT
Start: 2021-06-30 | End: 2021-07-12

## 2021-06-30 RX ORDER — PIOGLITAZONEHYDROCHLORIDE 45 MG/1
TABLET ORAL
Qty: 90 TABLET | Refills: 1 | Status: SHIPPED | OUTPATIENT
Start: 2021-06-30 | End: 2021-10-18 | Stop reason: SDUPTHER

## 2021-06-30 RX ORDER — INSULIN GLARGINE 100 [IU]/ML
INJECTION, SOLUTION SUBCUTANEOUS
Qty: 15 ML | Refills: 5 | Status: SHIPPED | OUTPATIENT
Start: 2021-06-30 | End: 2021-10-18 | Stop reason: SDUPTHER

## 2021-06-30 RX ORDER — DULAGLUTIDE 4.5 MG/.5ML
4.5 INJECTION, SOLUTION SUBCUTANEOUS
Qty: 4 PEN | Refills: 5 | Status: SHIPPED | OUTPATIENT
Start: 2021-06-30 | End: 2021-10-18 | Stop reason: SDUPTHER

## 2021-07-29 DIAGNOSIS — E78.5 HYPERLIPIDEMIA LDL GOAL <100: ICD-10-CM

## 2021-07-29 RX ORDER — PRAVASTATIN SODIUM 20 MG/1
TABLET ORAL
Qty: 90 TABLET | Refills: 0 | Status: SHIPPED | OUTPATIENT
Start: 2021-07-29 | End: 2021-10-28

## 2021-08-07 ENCOUNTER — LAB VISIT (OUTPATIENT)
Dept: LAB | Facility: HOSPITAL | Age: 56
End: 2021-08-07
Attending: INTERNAL MEDICINE
Payer: COMMERCIAL

## 2021-08-07 LAB
ALBUMIN SERPL BCP-MCNC: 3.4 G/DL (ref 3.5–5.2)
ALP SERPL-CCNC: 129 U/L (ref 55–135)
ALT SERPL W/O P-5'-P-CCNC: 8 U/L (ref 10–44)
ANION GAP SERPL CALC-SCNC: 6 MMOL/L (ref 8–16)
AST SERPL-CCNC: 21 U/L (ref 10–40)
BILIRUB SERPL-MCNC: 0.6 MG/DL (ref 0.1–1)
BUN SERPL-MCNC: 12 MG/DL (ref 6–20)
CALCIUM SERPL-MCNC: 9 MG/DL (ref 8.7–10.5)
CHLORIDE SERPL-SCNC: 106 MMOL/L (ref 95–110)
CO2 SERPL-SCNC: 27 MMOL/L (ref 23–29)
CREAT SERPL-MCNC: 1.2 MG/DL (ref 0.5–1.4)
EST. GFR  (AFRICAN AMERICAN): >60 ML/MIN/1.73 M^2
EST. GFR  (NON AFRICAN AMERICAN): >60 ML/MIN/1.73 M^2
ESTIMATED AVG GLUCOSE: 163 MG/DL (ref 68–131)
GLUCOSE SERPL-MCNC: 132 MG/DL (ref 70–110)
HBA1C MFR BLD: 7.3 % (ref 4–5.6)
POTASSIUM SERPL-SCNC: 3.8 MMOL/L (ref 3.5–5.1)
PROT SERPL-MCNC: 7.2 G/DL (ref 6–8.4)
SODIUM SERPL-SCNC: 139 MMOL/L (ref 136–145)

## 2021-08-07 PROCEDURE — 80053 COMPREHEN METABOLIC PANEL: CPT | Performed by: INTERNAL MEDICINE

## 2021-08-07 PROCEDURE — 83036 HEMOGLOBIN GLYCOSYLATED A1C: CPT | Performed by: INTERNAL MEDICINE

## 2021-08-07 PROCEDURE — 36415 COLL VENOUS BLD VENIPUNCTURE: CPT | Mod: PO | Performed by: INTERNAL MEDICINE

## 2021-08-13 ENCOUNTER — TELEPHONE (OUTPATIENT)
Dept: FAMILY MEDICINE | Facility: CLINIC | Age: 56
End: 2021-08-13

## 2021-08-15 DIAGNOSIS — I10 ESSENTIAL HYPERTENSION: ICD-10-CM

## 2021-08-15 RX ORDER — FUROSEMIDE 40 MG/1
TABLET ORAL
Qty: 180 TABLET | Refills: 0 | Status: SHIPPED | OUTPATIENT
Start: 2021-08-15 | End: 2021-11-17

## 2021-09-07 DIAGNOSIS — I10 ESSENTIAL HYPERTENSION: ICD-10-CM

## 2021-09-07 RX ORDER — LOSARTAN POTASSIUM 50 MG/1
TABLET ORAL
Qty: 90 TABLET | Refills: 0 | Status: SHIPPED | OUTPATIENT
Start: 2021-09-07 | End: 2021-09-14

## 2021-09-14 ENCOUNTER — PATIENT OUTREACH (OUTPATIENT)
Dept: ADMINISTRATIVE | Facility: HOSPITAL | Age: 56
End: 2021-09-14

## 2021-09-14 DIAGNOSIS — I10 ESSENTIAL HYPERTENSION: ICD-10-CM

## 2021-09-14 RX ORDER — LOSARTAN POTASSIUM 50 MG/1
TABLET ORAL
Qty: 90 TABLET | Refills: 0 | Status: SHIPPED | OUTPATIENT
Start: 2021-09-14 | End: 2022-11-07 | Stop reason: SDUPTHER

## 2021-09-25 ENCOUNTER — LAB VISIT (OUTPATIENT)
Dept: LAB | Facility: HOSPITAL | Age: 56
End: 2021-09-25
Attending: NURSE PRACTITIONER
Payer: COMMERCIAL

## 2021-09-25 LAB
ESTIMATED AVG GLUCOSE: 169 MG/DL (ref 68–131)
HBA1C MFR BLD: 7.5 % (ref 4–5.6)

## 2021-09-25 PROCEDURE — 83036 HEMOGLOBIN GLYCOSYLATED A1C: CPT | Performed by: NURSE PRACTITIONER

## 2021-09-25 PROCEDURE — 36415 COLL VENOUS BLD VENIPUNCTURE: CPT | Mod: PO | Performed by: NURSE PRACTITIONER

## 2021-09-27 DIAGNOSIS — I10 ESSENTIAL HYPERTENSION: ICD-10-CM

## 2021-09-27 RX ORDER — METOPROLOL SUCCINATE 50 MG/1
TABLET, EXTENDED RELEASE ORAL
Qty: 90 TABLET | Refills: 0 | Status: SHIPPED | OUTPATIENT
Start: 2021-09-27 | End: 2021-12-30

## 2021-09-29 ENCOUNTER — PATIENT OUTREACH (OUTPATIENT)
Dept: ADMINISTRATIVE | Facility: OTHER | Age: 56
End: 2021-09-29

## 2021-09-30 ENCOUNTER — PATIENT OUTREACH (OUTPATIENT)
Dept: ADMINISTRATIVE | Facility: HOSPITAL | Age: 56
End: 2021-09-30

## 2021-10-18 ENCOUNTER — PATIENT OUTREACH (OUTPATIENT)
Dept: ADMINISTRATIVE | Facility: HOSPITAL | Age: 56
End: 2021-10-18

## 2021-10-18 ENCOUNTER — OFFICE VISIT (OUTPATIENT)
Dept: ENDOCRINOLOGY | Facility: CLINIC | Age: 56
End: 2021-10-18
Payer: COMMERCIAL

## 2021-10-18 VITALS
BODY MASS INDEX: 62.47 KG/M2 | HEART RATE: 86 BPM | SYSTOLIC BLOOD PRESSURE: 148 MMHG | WEIGHT: 315 LBS | TEMPERATURE: 99 F | DIASTOLIC BLOOD PRESSURE: 90 MMHG

## 2021-10-18 DIAGNOSIS — E78.5 HYPERLIPIDEMIA, UNSPECIFIED HYPERLIPIDEMIA TYPE: ICD-10-CM

## 2021-10-18 DIAGNOSIS — E66.01 MORBID OBESITY, UNSPECIFIED OBESITY TYPE: ICD-10-CM

## 2021-10-18 PROCEDURE — 3080F DIAST BP >= 90 MM HG: CPT | Mod: CPTII,S$GLB,, | Performed by: NURSE PRACTITIONER

## 2021-10-18 PROCEDURE — 3051F HG A1C>EQUAL 7.0%<8.0%: CPT | Mod: CPTII,S$GLB,, | Performed by: NURSE PRACTITIONER

## 2021-10-18 PROCEDURE — 99999 PR PBB SHADOW E&M-EST. PATIENT-LVL V: CPT | Mod: PBBFAC,,, | Performed by: NURSE PRACTITIONER

## 2021-10-18 PROCEDURE — 3061F NEG MICROALBUMINURIA REV: CPT | Mod: CPTII,S$GLB,, | Performed by: NURSE PRACTITIONER

## 2021-10-18 PROCEDURE — 90686 FLU VACCINE (QUAD) GREATER THAN OR EQUAL TO 3YO PRESERVATIVE FREE IM: ICD-10-PCS | Mod: S$GLB,,, | Performed by: NURSE PRACTITIONER

## 2021-10-18 PROCEDURE — 3077F PR MOST RECENT SYSTOLIC BLOOD PRESSURE >= 140 MM HG: ICD-10-PCS | Mod: CPTII,S$GLB,, | Performed by: NURSE PRACTITIONER

## 2021-10-18 PROCEDURE — 1159F PR MEDICATION LIST DOCUMENTED IN MEDICAL RECORD: ICD-10-PCS | Mod: CPTII,S$GLB,, | Performed by: NURSE PRACTITIONER

## 2021-10-18 PROCEDURE — 1160F RVW MEDS BY RX/DR IN RCRD: CPT | Mod: CPTII,S$GLB,, | Performed by: NURSE PRACTITIONER

## 2021-10-18 PROCEDURE — 3080F PR MOST RECENT DIASTOLIC BLOOD PRESSURE >= 90 MM HG: ICD-10-PCS | Mod: CPTII,S$GLB,, | Performed by: NURSE PRACTITIONER

## 2021-10-18 PROCEDURE — 99214 PR OFFICE/OUTPT VISIT, EST, LEVL IV, 30-39 MIN: ICD-10-PCS | Mod: 25,S$GLB,, | Performed by: NURSE PRACTITIONER

## 2021-10-18 PROCEDURE — 3051F PR MOST RECENT HEMOGLOBIN A1C LEVEL 7.0 - < 8.0%: ICD-10-PCS | Mod: CPTII,S$GLB,, | Performed by: NURSE PRACTITIONER

## 2021-10-18 PROCEDURE — 1159F MED LIST DOCD IN RCRD: CPT | Mod: CPTII,S$GLB,, | Performed by: NURSE PRACTITIONER

## 2021-10-18 PROCEDURE — 1160F PR REVIEW ALL MEDS BY PRESCRIBER/CLIN PHARMACIST DOCUMENTED: ICD-10-PCS | Mod: CPTII,S$GLB,, | Performed by: NURSE PRACTITIONER

## 2021-10-18 PROCEDURE — 90686 IIV4 VACC NO PRSV 0.5 ML IM: CPT | Mod: S$GLB,,, | Performed by: NURSE PRACTITIONER

## 2021-10-18 PROCEDURE — 4010F PR ACE/ARB THEARPY RXD/TAKEN: ICD-10-PCS | Mod: CPTII,S$GLB,, | Performed by: NURSE PRACTITIONER

## 2021-10-18 PROCEDURE — 4010F ACE/ARB THERAPY RXD/TAKEN: CPT | Mod: CPTII,S$GLB,, | Performed by: NURSE PRACTITIONER

## 2021-10-18 PROCEDURE — 3008F PR BODY MASS INDEX (BMI) DOCUMENTED: ICD-10-PCS | Mod: CPTII,S$GLB,, | Performed by: NURSE PRACTITIONER

## 2021-10-18 PROCEDURE — 3077F SYST BP >= 140 MM HG: CPT | Mod: CPTII,S$GLB,, | Performed by: NURSE PRACTITIONER

## 2021-10-18 PROCEDURE — 3008F BODY MASS INDEX DOCD: CPT | Mod: CPTII,S$GLB,, | Performed by: NURSE PRACTITIONER

## 2021-10-18 PROCEDURE — 90471 FLU VACCINE (QUAD) GREATER THAN OR EQUAL TO 3YO PRESERVATIVE FREE IM: ICD-10-PCS | Mod: S$GLB,,, | Performed by: NURSE PRACTITIONER

## 2021-10-18 PROCEDURE — 99999 PR PBB SHADOW E&M-EST. PATIENT-LVL V: ICD-10-PCS | Mod: PBBFAC,,, | Performed by: NURSE PRACTITIONER

## 2021-10-18 PROCEDURE — 3066F PR DOCUMENTATION OF TREATMENT FOR NEPHROPATHY: ICD-10-PCS | Mod: CPTII,S$GLB,, | Performed by: NURSE PRACTITIONER

## 2021-10-18 PROCEDURE — 3066F NEPHROPATHY DOC TX: CPT | Mod: CPTII,S$GLB,, | Performed by: NURSE PRACTITIONER

## 2021-10-18 PROCEDURE — 90471 IMMUNIZATION ADMIN: CPT | Mod: S$GLB,,, | Performed by: NURSE PRACTITIONER

## 2021-10-18 PROCEDURE — 99214 OFFICE O/P EST MOD 30 MIN: CPT | Mod: 25,S$GLB,, | Performed by: NURSE PRACTITIONER

## 2021-10-18 PROCEDURE — 3061F PR NEG MICROALBUMINURIA RESULT DOCUMENTED/REVIEW: ICD-10-PCS | Mod: CPTII,S$GLB,, | Performed by: NURSE PRACTITIONER

## 2021-10-18 RX ORDER — DULAGLUTIDE 4.5 MG/.5ML
4.5 INJECTION, SOLUTION SUBCUTANEOUS
Qty: 4 PEN | Refills: 11 | Status: SHIPPED | OUTPATIENT
Start: 2021-10-18 | End: 2022-08-05 | Stop reason: ALTCHOICE

## 2021-10-18 RX ORDER — FLASH GLUCOSE SENSOR
KIT MISCELLANEOUS
Qty: 2 KIT | Refills: 11 | Status: SHIPPED | OUTPATIENT
Start: 2021-10-18 | End: 2022-05-05 | Stop reason: SDUPTHER

## 2021-10-18 RX ORDER — PIOGLITAZONEHYDROCHLORIDE 45 MG/1
TABLET ORAL
Qty: 90 TABLET | Refills: 2 | Status: SHIPPED | OUTPATIENT
Start: 2021-10-18 | End: 2022-03-17 | Stop reason: SDUPTHER

## 2021-10-18 RX ORDER — INSULIN GLARGINE 100 [IU]/ML
INJECTION, SOLUTION SUBCUTANEOUS
Qty: 15 ML | Refills: 5 | Status: SHIPPED | OUTPATIENT
Start: 2021-10-18 | End: 2022-05-05 | Stop reason: SDUPTHER

## 2021-10-18 RX ORDER — DAPAGLIFLOZIN 5 MG/1
5 TABLET, FILM COATED ORAL DAILY
Qty: 30 TABLET | Refills: 3 | Status: SHIPPED | OUTPATIENT
Start: 2021-10-18 | End: 2022-01-18

## 2021-10-18 RX ORDER — INSULIN LISPRO 100 [IU]/ML
INJECTION, SOLUTION INTRAVENOUS; SUBCUTANEOUS
Qty: 15 ML | Refills: 5 | Status: SHIPPED | OUTPATIENT
Start: 2021-10-18 | End: 2022-04-25

## 2021-10-25 ENCOUNTER — TELEPHONE (OUTPATIENT)
Dept: ENDOCRINOLOGY | Facility: CLINIC | Age: 56
End: 2021-10-25
Payer: COMMERCIAL

## 2021-11-01 ENCOUNTER — OFFICE VISIT (OUTPATIENT)
Dept: FAMILY MEDICINE | Facility: CLINIC | Age: 56
End: 2021-11-01
Payer: COMMERCIAL

## 2021-11-01 VITALS
HEIGHT: 74 IN | WEIGHT: 315 LBS | HEART RATE: 86 BPM | SYSTOLIC BLOOD PRESSURE: 140 MMHG | BODY MASS INDEX: 40.43 KG/M2 | OXYGEN SATURATION: 96 % | DIASTOLIC BLOOD PRESSURE: 70 MMHG | TEMPERATURE: 98 F

## 2021-11-01 DIAGNOSIS — M25.432 PAIN AND SWELLING OF LEFT WRIST: ICD-10-CM

## 2021-11-01 DIAGNOSIS — E78.5 HYPERLIPIDEMIA LDL GOAL <100: ICD-10-CM

## 2021-11-01 DIAGNOSIS — G47.33 OSA (OBSTRUCTIVE SLEEP APNEA): ICD-10-CM

## 2021-11-01 DIAGNOSIS — R60.9 DEPENDENT EDEMA: ICD-10-CM

## 2021-11-01 DIAGNOSIS — N25.81 SECONDARY HYPERPARATHYROIDISM OF RENAL ORIGIN: ICD-10-CM

## 2021-11-01 DIAGNOSIS — Z12.11 COLON CANCER SCREENING: ICD-10-CM

## 2021-11-01 DIAGNOSIS — M10.9 GOUT, ARTHRITIS: ICD-10-CM

## 2021-11-01 DIAGNOSIS — Z00.00 ROUTINE MEDICAL EXAM: Primary | ICD-10-CM

## 2021-11-01 DIAGNOSIS — E66.01 MORBID OBESITY WITH BMI OF 50.0-59.9, ADULT: ICD-10-CM

## 2021-11-01 DIAGNOSIS — I10 ESSENTIAL HYPERTENSION: ICD-10-CM

## 2021-11-01 DIAGNOSIS — M25.532 PAIN AND SWELLING OF LEFT WRIST: ICD-10-CM

## 2021-11-01 DIAGNOSIS — Z12.5 PROSTATE CANCER SCREENING: ICD-10-CM

## 2021-11-01 PROCEDURE — 3008F PR BODY MASS INDEX (BMI) DOCUMENTED: ICD-10-PCS | Mod: CPTII,S$GLB,, | Performed by: INTERNAL MEDICINE

## 2021-11-01 PROCEDURE — 4010F ACE/ARB THERAPY RXD/TAKEN: CPT | Mod: CPTII,S$GLB,, | Performed by: INTERNAL MEDICINE

## 2021-11-01 PROCEDURE — 4010F PR ACE/ARB THEARPY RXD/TAKEN: ICD-10-PCS | Mod: CPTII,S$GLB,, | Performed by: INTERNAL MEDICINE

## 2021-11-01 PROCEDURE — 3051F HG A1C>EQUAL 7.0%<8.0%: CPT | Mod: CPTII,S$GLB,, | Performed by: INTERNAL MEDICINE

## 2021-11-01 PROCEDURE — 99396 PREV VISIT EST AGE 40-64: CPT | Mod: S$GLB,,, | Performed by: INTERNAL MEDICINE

## 2021-11-01 PROCEDURE — 99999 PR PBB SHADOW E&M-EST. PATIENT-LVL V: CPT | Mod: PBBFAC,,, | Performed by: INTERNAL MEDICINE

## 2021-11-01 PROCEDURE — 1160F RVW MEDS BY RX/DR IN RCRD: CPT | Mod: CPTII,S$GLB,, | Performed by: INTERNAL MEDICINE

## 2021-11-01 PROCEDURE — 3078F DIAST BP <80 MM HG: CPT | Mod: CPTII,S$GLB,, | Performed by: INTERNAL MEDICINE

## 2021-11-01 PROCEDURE — 1159F MED LIST DOCD IN RCRD: CPT | Mod: CPTII,S$GLB,, | Performed by: INTERNAL MEDICINE

## 2021-11-01 PROCEDURE — 3066F PR DOCUMENTATION OF TREATMENT FOR NEPHROPATHY: ICD-10-PCS | Mod: CPTII,S$GLB,, | Performed by: INTERNAL MEDICINE

## 2021-11-01 PROCEDURE — 3061F PR NEG MICROALBUMINURIA RESULT DOCUMENTED/REVIEW: ICD-10-PCS | Mod: CPTII,S$GLB,, | Performed by: INTERNAL MEDICINE

## 2021-11-01 PROCEDURE — 1160F PR REVIEW ALL MEDS BY PRESCRIBER/CLIN PHARMACIST DOCUMENTED: ICD-10-PCS | Mod: CPTII,S$GLB,, | Performed by: INTERNAL MEDICINE

## 2021-11-01 PROCEDURE — 3061F NEG MICROALBUMINURIA REV: CPT | Mod: CPTII,S$GLB,, | Performed by: INTERNAL MEDICINE

## 2021-11-01 PROCEDURE — 3077F PR MOST RECENT SYSTOLIC BLOOD PRESSURE >= 140 MM HG: ICD-10-PCS | Mod: CPTII,S$GLB,, | Performed by: INTERNAL MEDICINE

## 2021-11-01 PROCEDURE — 3078F PR MOST RECENT DIASTOLIC BLOOD PRESSURE < 80 MM HG: ICD-10-PCS | Mod: CPTII,S$GLB,, | Performed by: INTERNAL MEDICINE

## 2021-11-01 PROCEDURE — 3077F SYST BP >= 140 MM HG: CPT | Mod: CPTII,S$GLB,, | Performed by: INTERNAL MEDICINE

## 2021-11-01 PROCEDURE — 3051F PR MOST RECENT HEMOGLOBIN A1C LEVEL 7.0 - < 8.0%: ICD-10-PCS | Mod: CPTII,S$GLB,, | Performed by: INTERNAL MEDICINE

## 2021-11-01 PROCEDURE — 3008F BODY MASS INDEX DOCD: CPT | Mod: CPTII,S$GLB,, | Performed by: INTERNAL MEDICINE

## 2021-11-01 PROCEDURE — 3066F NEPHROPATHY DOC TX: CPT | Mod: CPTII,S$GLB,, | Performed by: INTERNAL MEDICINE

## 2021-11-01 PROCEDURE — 99396 PR PREVENTIVE VISIT,EST,40-64: ICD-10-PCS | Mod: S$GLB,,, | Performed by: INTERNAL MEDICINE

## 2021-11-01 PROCEDURE — 1159F PR MEDICATION LIST DOCUMENTED IN MEDICAL RECORD: ICD-10-PCS | Mod: CPTII,S$GLB,, | Performed by: INTERNAL MEDICINE

## 2021-11-01 PROCEDURE — 99999 PR PBB SHADOW E&M-EST. PATIENT-LVL V: ICD-10-PCS | Mod: PBBFAC,,, | Performed by: INTERNAL MEDICINE

## 2021-11-02 ENCOUNTER — TELEPHONE (OUTPATIENT)
Dept: FAMILY MEDICINE | Facility: CLINIC | Age: 56
End: 2021-11-02
Payer: COMMERCIAL

## 2021-11-02 DIAGNOSIS — M79.642 PAIN OF LEFT HAND: Primary | ICD-10-CM

## 2021-11-02 RX ORDER — IBUPROFEN 600 MG/1
600 TABLET ORAL NIGHTLY PRN
Qty: 30 TABLET | Refills: 0 | Status: SHIPPED | OUTPATIENT
Start: 2021-11-02 | End: 2022-01-24

## 2021-11-09 DIAGNOSIS — M25.532 LEFT WRIST PAIN: Primary | ICD-10-CM

## 2021-11-10 ENCOUNTER — OFFICE VISIT (OUTPATIENT)
Dept: ORTHOPEDICS | Facility: CLINIC | Age: 56
End: 2021-11-10
Payer: COMMERCIAL

## 2021-11-10 ENCOUNTER — APPOINTMENT (OUTPATIENT)
Dept: RADIOLOGY | Facility: HOSPITAL | Age: 56
End: 2021-11-10
Attending: ORTHOPAEDIC SURGERY
Payer: COMMERCIAL

## 2021-11-10 VITALS
DIASTOLIC BLOOD PRESSURE: 80 MMHG | HEIGHT: 74 IN | BODY MASS INDEX: 40.43 KG/M2 | SYSTOLIC BLOOD PRESSURE: 138 MMHG | HEART RATE: 88 BPM | RESPIRATION RATE: 18 BRPM | WEIGHT: 315 LBS | OXYGEN SATURATION: 96 %

## 2021-11-10 DIAGNOSIS — M25.532 PAIN AND SWELLING OF LEFT WRIST: Primary | ICD-10-CM

## 2021-11-10 DIAGNOSIS — M25.532 LEFT WRIST PAIN: ICD-10-CM

## 2021-11-10 DIAGNOSIS — M25.432 PAIN AND SWELLING OF LEFT WRIST: Primary | ICD-10-CM

## 2021-11-10 PROCEDURE — 3079F DIAST BP 80-89 MM HG: CPT | Mod: CPTII,S$GLB,, | Performed by: ORTHOPAEDIC SURGERY

## 2021-11-10 PROCEDURE — 3061F NEG MICROALBUMINURIA REV: CPT | Mod: CPTII,S$GLB,, | Performed by: ORTHOPAEDIC SURGERY

## 2021-11-10 PROCEDURE — 3008F PR BODY MASS INDEX (BMI) DOCUMENTED: ICD-10-PCS | Mod: CPTII,S$GLB,, | Performed by: ORTHOPAEDIC SURGERY

## 2021-11-10 PROCEDURE — 99999 PR PBB SHADOW E&M-EST. PATIENT-LVL V: CPT | Mod: PBBFAC,,, | Performed by: ORTHOPAEDIC SURGERY

## 2021-11-10 PROCEDURE — 99203 OFFICE O/P NEW LOW 30 MIN: CPT | Mod: S$GLB,,, | Performed by: ORTHOPAEDIC SURGERY

## 2021-11-10 PROCEDURE — 3051F HG A1C>EQUAL 7.0%<8.0%: CPT | Mod: CPTII,S$GLB,, | Performed by: ORTHOPAEDIC SURGERY

## 2021-11-10 PROCEDURE — 99999 PR PBB SHADOW E&M-EST. PATIENT-LVL V: ICD-10-PCS | Mod: PBBFAC,,, | Performed by: ORTHOPAEDIC SURGERY

## 2021-11-10 PROCEDURE — 1159F PR MEDICATION LIST DOCUMENTED IN MEDICAL RECORD: ICD-10-PCS | Mod: CPTII,S$GLB,, | Performed by: ORTHOPAEDIC SURGERY

## 2021-11-10 PROCEDURE — 3075F SYST BP GE 130 - 139MM HG: CPT | Mod: CPTII,S$GLB,, | Performed by: ORTHOPAEDIC SURGERY

## 2021-11-10 PROCEDURE — 99203 PR OFFICE/OUTPT VISIT, NEW, LEVL III, 30-44 MIN: ICD-10-PCS | Mod: S$GLB,,, | Performed by: ORTHOPAEDIC SURGERY

## 2021-11-10 PROCEDURE — 73110 X-RAY EXAM OF WRIST: CPT | Mod: TC,FY,PN,LT

## 2021-11-10 PROCEDURE — 3079F PR MOST RECENT DIASTOLIC BLOOD PRESSURE 80-89 MM HG: ICD-10-PCS | Mod: CPTII,S$GLB,, | Performed by: ORTHOPAEDIC SURGERY

## 2021-11-10 PROCEDURE — 3008F BODY MASS INDEX DOCD: CPT | Mod: CPTII,S$GLB,, | Performed by: ORTHOPAEDIC SURGERY

## 2021-11-10 PROCEDURE — 3066F PR DOCUMENTATION OF TREATMENT FOR NEPHROPATHY: ICD-10-PCS | Mod: CPTII,S$GLB,, | Performed by: ORTHOPAEDIC SURGERY

## 2021-11-10 PROCEDURE — 73110 X-RAY EXAM OF WRIST: CPT | Mod: 26,LT,, | Performed by: RADIOLOGY

## 2021-11-10 PROCEDURE — 4010F PR ACE/ARB THEARPY RXD/TAKEN: ICD-10-PCS | Mod: CPTII,S$GLB,, | Performed by: ORTHOPAEDIC SURGERY

## 2021-11-10 PROCEDURE — 1159F MED LIST DOCD IN RCRD: CPT | Mod: CPTII,S$GLB,, | Performed by: ORTHOPAEDIC SURGERY

## 2021-11-10 PROCEDURE — 3066F NEPHROPATHY DOC TX: CPT | Mod: CPTII,S$GLB,, | Performed by: ORTHOPAEDIC SURGERY

## 2021-11-10 PROCEDURE — 3061F PR NEG MICROALBUMINURIA RESULT DOCUMENTED/REVIEW: ICD-10-PCS | Mod: CPTII,S$GLB,, | Performed by: ORTHOPAEDIC SURGERY

## 2021-11-10 PROCEDURE — 4010F ACE/ARB THERAPY RXD/TAKEN: CPT | Mod: CPTII,S$GLB,, | Performed by: ORTHOPAEDIC SURGERY

## 2021-11-10 PROCEDURE — 3075F PR MOST RECENT SYSTOLIC BLOOD PRESS GE 130-139MM HG: ICD-10-PCS | Mod: CPTII,S$GLB,, | Performed by: ORTHOPAEDIC SURGERY

## 2021-11-10 PROCEDURE — 73110 XR WRIST COMPLETE 3 VIEWS LEFT: ICD-10-PCS | Mod: 26,LT,, | Performed by: RADIOLOGY

## 2021-11-10 PROCEDURE — 3051F PR MOST RECENT HEMOGLOBIN A1C LEVEL 7.0 - < 8.0%: ICD-10-PCS | Mod: CPTII,S$GLB,, | Performed by: ORTHOPAEDIC SURGERY

## 2021-11-17 DIAGNOSIS — I10 ESSENTIAL HYPERTENSION: ICD-10-CM

## 2021-11-17 RX ORDER — FUROSEMIDE 40 MG/1
TABLET ORAL
Qty: 180 TABLET | Refills: 1 | Status: SHIPPED | OUTPATIENT
Start: 2021-11-17 | End: 2022-05-16

## 2021-11-22 ENCOUNTER — TELEPHONE (OUTPATIENT)
Dept: FAMILY MEDICINE | Facility: CLINIC | Age: 56
End: 2021-11-22
Payer: COMMERCIAL

## 2021-11-29 ENCOUNTER — CLINICAL SUPPORT (OUTPATIENT)
Dept: REHABILITATION | Facility: HOSPITAL | Age: 56
End: 2021-11-29
Attending: ORTHOPAEDIC SURGERY
Payer: COMMERCIAL

## 2021-11-29 ENCOUNTER — DOCUMENTATION ONLY (OUTPATIENT)
Dept: REHABILITATION | Facility: HOSPITAL | Age: 56
End: 2021-11-29
Payer: COMMERCIAL

## 2021-11-29 DIAGNOSIS — M25.532 PAIN AND SWELLING OF LEFT WRIST: Primary | ICD-10-CM

## 2021-11-29 DIAGNOSIS — M25.432 PAIN AND SWELLING OF LEFT WRIST: Primary | ICD-10-CM

## 2021-11-30 DIAGNOSIS — M10.9 GOUT, UNSPECIFIED CAUSE, UNSPECIFIED CHRONICITY, UNSPECIFIED SITE: ICD-10-CM

## 2021-11-30 RX ORDER — COLCHICINE 0.6 MG/1
TABLET ORAL
Qty: 90 TABLET | Refills: 1 | Status: SHIPPED | OUTPATIENT
Start: 2021-11-30 | End: 2021-12-27 | Stop reason: SDUPTHER

## 2021-12-15 ENCOUNTER — PATIENT MESSAGE (OUTPATIENT)
Dept: ADMINISTRATIVE | Facility: HOSPITAL | Age: 56
End: 2021-12-15
Payer: COMMERCIAL

## 2021-12-22 DIAGNOSIS — I10 ESSENTIAL HYPERTENSION: ICD-10-CM

## 2021-12-27 DIAGNOSIS — I10 ESSENTIAL HYPERTENSION: ICD-10-CM

## 2021-12-27 DIAGNOSIS — M10.9 GOUT, UNSPECIFIED CAUSE, UNSPECIFIED CHRONICITY, UNSPECIFIED SITE: ICD-10-CM

## 2021-12-29 DIAGNOSIS — I10 ESSENTIAL HYPERTENSION: ICD-10-CM

## 2021-12-30 RX ORDER — DILTIAZEM HYDROCHLORIDE 240 MG/1
CAPSULE, COATED, EXTENDED RELEASE ORAL
Qty: 180 CAPSULE | Refills: 3 | Status: SHIPPED | OUTPATIENT
Start: 2021-12-30 | End: 2022-12-16

## 2021-12-31 RX ORDER — METOPROLOL SUCCINATE 50 MG/1
50 TABLET, EXTENDED RELEASE ORAL DAILY
Qty: 90 TABLET | Refills: 0 | OUTPATIENT
Start: 2021-12-31

## 2021-12-31 RX ORDER — METOPROLOL SUCCINATE 50 MG/1
TABLET, EXTENDED RELEASE ORAL
Qty: 90 TABLET | Refills: 0 | Status: SHIPPED | OUTPATIENT
Start: 2021-12-31 | End: 2022-06-21

## 2021-12-31 RX ORDER — METOPROLOL SUCCINATE 50 MG/1
50 TABLET, EXTENDED RELEASE ORAL DAILY
Qty: 90 TABLET | Refills: 0 | Status: SHIPPED | OUTPATIENT
Start: 2021-12-31 | End: 2022-05-05

## 2021-12-31 RX ORDER — DILTIAZEM HYDROCHLORIDE 240 MG/1
480 CAPSULE, COATED, EXTENDED RELEASE ORAL DAILY
Qty: 180 CAPSULE | Refills: 0 | OUTPATIENT
Start: 2021-12-31

## 2021-12-31 RX ORDER — DILTIAZEM HYDROCHLORIDE 240 MG/1
480 CAPSULE, COATED, EXTENDED RELEASE ORAL DAILY
Qty: 180 CAPSULE | Refills: 3 | OUTPATIENT
Start: 2021-12-31

## 2021-12-31 RX ORDER — COLCHICINE 0.6 MG/1
0.6 TABLET ORAL DAILY
Qty: 90 TABLET | Refills: 0 | Status: SHIPPED | OUTPATIENT
Start: 2021-12-31 | End: 2022-08-08

## 2022-01-15 ENCOUNTER — LAB VISIT (OUTPATIENT)
Dept: LAB | Facility: HOSPITAL | Age: 57
End: 2022-01-15
Attending: NURSE PRACTITIONER
Payer: COMMERCIAL

## 2022-01-15 DIAGNOSIS — I10 ESSENTIAL HYPERTENSION: ICD-10-CM

## 2022-01-15 LAB
BASOPHILS # BLD AUTO: 0.03 K/UL (ref 0–0.2)
BASOPHILS NFR BLD: 0.4 % (ref 0–1.9)
DIFFERENTIAL METHOD: ABNORMAL
EOSINOPHIL # BLD AUTO: 0.2 K/UL (ref 0–0.5)
EOSINOPHIL NFR BLD: 3.3 % (ref 0–8)
ERYTHROCYTE [DISTWIDTH] IN BLOOD BY AUTOMATED COUNT: 15.6 % (ref 11.5–14.5)
ESTIMATED AVG GLUCOSE: 160 MG/DL (ref 68–131)
HBA1C MFR BLD: 7.2 % (ref 4–5.6)
HCT VFR BLD AUTO: 42.1 % (ref 40–54)
HGB BLD-MCNC: 13.2 G/DL (ref 14–18)
IMM GRANULOCYTES # BLD AUTO: 0.02 K/UL (ref 0–0.04)
IMM GRANULOCYTES NFR BLD AUTO: 0.3 % (ref 0–0.5)
LYMPHOCYTES # BLD AUTO: 2 K/UL (ref 1–4.8)
LYMPHOCYTES NFR BLD: 29.6 % (ref 18–48)
MCH RBC QN AUTO: 30.6 PG (ref 27–31)
MCHC RBC AUTO-ENTMCNC: 31.4 G/DL (ref 32–36)
MCV RBC AUTO: 98 FL (ref 82–98)
MONOCYTES # BLD AUTO: 0.7 K/UL (ref 0.3–1)
MONOCYTES NFR BLD: 10.2 % (ref 4–15)
NEUTROPHILS # BLD AUTO: 3.9 K/UL (ref 1.8–7.7)
NEUTROPHILS NFR BLD: 56.2 % (ref 38–73)
NRBC BLD-RTO: 0 /100 WBC
PLATELET # BLD AUTO: 219 K/UL (ref 150–450)
PMV BLD AUTO: 12 FL (ref 9.2–12.9)
RBC # BLD AUTO: 4.32 M/UL (ref 4.6–6.2)
WBC # BLD AUTO: 6.89 K/UL (ref 3.9–12.7)

## 2022-01-15 PROCEDURE — 85025 COMPLETE CBC W/AUTO DIFF WBC: CPT | Performed by: INTERNAL MEDICINE

## 2022-01-15 PROCEDURE — 36415 COLL VENOUS BLD VENIPUNCTURE: CPT | Mod: PO | Performed by: INTERNAL MEDICINE

## 2022-01-15 PROCEDURE — 83036 HEMOGLOBIN GLYCOSYLATED A1C: CPT | Performed by: NURSE PRACTITIONER

## 2022-01-18 ENCOUNTER — OFFICE VISIT (OUTPATIENT)
Dept: ENDOCRINOLOGY | Facility: CLINIC | Age: 57
End: 2022-01-18
Payer: COMMERCIAL

## 2022-01-18 VITALS
TEMPERATURE: 99 F | BODY MASS INDEX: 61.71 KG/M2 | HEART RATE: 90 BPM | DIASTOLIC BLOOD PRESSURE: 86 MMHG | WEIGHT: 315 LBS | SYSTOLIC BLOOD PRESSURE: 143 MMHG

## 2022-01-18 DIAGNOSIS — E66.01 MORBID OBESITY, UNSPECIFIED OBESITY TYPE: ICD-10-CM

## 2022-01-18 DIAGNOSIS — E78.5 HYPERLIPIDEMIA, UNSPECIFIED HYPERLIPIDEMIA TYPE: ICD-10-CM

## 2022-01-18 PROCEDURE — 1159F PR MEDICATION LIST DOCUMENTED IN MEDICAL RECORD: ICD-10-PCS | Mod: CPTII,S$GLB,, | Performed by: NURSE PRACTITIONER

## 2022-01-18 PROCEDURE — 3079F DIAST BP 80-89 MM HG: CPT | Mod: CPTII,S$GLB,, | Performed by: NURSE PRACTITIONER

## 2022-01-18 PROCEDURE — 99214 OFFICE O/P EST MOD 30 MIN: CPT | Mod: S$GLB,,, | Performed by: NURSE PRACTITIONER

## 2022-01-18 PROCEDURE — 95251 PR GLUCOSE MONITOR, 72 HOUR, PHYS INTERP: ICD-10-PCS | Mod: S$GLB,,, | Performed by: NURSE PRACTITIONER

## 2022-01-18 PROCEDURE — 95251 CONT GLUC MNTR ANALYSIS I&R: CPT | Mod: S$GLB,,, | Performed by: NURSE PRACTITIONER

## 2022-01-18 PROCEDURE — 99999 PR PBB SHADOW E&M-EST. PATIENT-LVL V: ICD-10-PCS | Mod: PBBFAC,,, | Performed by: NURSE PRACTITIONER

## 2022-01-18 PROCEDURE — 3051F PR MOST RECENT HEMOGLOBIN A1C LEVEL 7.0 - < 8.0%: ICD-10-PCS | Mod: CPTII,S$GLB,, | Performed by: NURSE PRACTITIONER

## 2022-01-18 PROCEDURE — 99214 PR OFFICE/OUTPT VISIT, EST, LEVL IV, 30-39 MIN: ICD-10-PCS | Mod: S$GLB,,, | Performed by: NURSE PRACTITIONER

## 2022-01-18 PROCEDURE — 3077F SYST BP >= 140 MM HG: CPT | Mod: CPTII,S$GLB,, | Performed by: NURSE PRACTITIONER

## 2022-01-18 PROCEDURE — 3008F PR BODY MASS INDEX (BMI) DOCUMENTED: ICD-10-PCS | Mod: CPTII,S$GLB,, | Performed by: NURSE PRACTITIONER

## 2022-01-18 PROCEDURE — 3008F BODY MASS INDEX DOCD: CPT | Mod: CPTII,S$GLB,, | Performed by: NURSE PRACTITIONER

## 2022-01-18 PROCEDURE — 3077F PR MOST RECENT SYSTOLIC BLOOD PRESSURE >= 140 MM HG: ICD-10-PCS | Mod: CPTII,S$GLB,, | Performed by: NURSE PRACTITIONER

## 2022-01-18 PROCEDURE — 3072F PR LOW RISK FOR RETINOPATHY: ICD-10-PCS | Mod: CPTII,S$GLB,, | Performed by: NURSE PRACTITIONER

## 2022-01-18 PROCEDURE — 1159F MED LIST DOCD IN RCRD: CPT | Mod: CPTII,S$GLB,, | Performed by: NURSE PRACTITIONER

## 2022-01-18 PROCEDURE — 3072F LOW RISK FOR RETINOPATHY: CPT | Mod: CPTII,S$GLB,, | Performed by: NURSE PRACTITIONER

## 2022-01-18 PROCEDURE — 1160F PR REVIEW ALL MEDS BY PRESCRIBER/CLIN PHARMACIST DOCUMENTED: ICD-10-PCS | Mod: CPTII,S$GLB,, | Performed by: NURSE PRACTITIONER

## 2022-01-18 PROCEDURE — 1160F RVW MEDS BY RX/DR IN RCRD: CPT | Mod: CPTII,S$GLB,, | Performed by: NURSE PRACTITIONER

## 2022-01-18 PROCEDURE — 99999 PR PBB SHADOW E&M-EST. PATIENT-LVL V: CPT | Mod: PBBFAC,,, | Performed by: NURSE PRACTITIONER

## 2022-01-18 PROCEDURE — 3051F HG A1C>EQUAL 7.0%<8.0%: CPT | Mod: CPTII,S$GLB,, | Performed by: NURSE PRACTITIONER

## 2022-01-18 PROCEDURE — 3079F PR MOST RECENT DIASTOLIC BLOOD PRESSURE 80-89 MM HG: ICD-10-PCS | Mod: CPTII,S$GLB,, | Performed by: NURSE PRACTITIONER

## 2022-01-18 RX ORDER — DAPAGLIFLOZIN 10 MG/1
10 TABLET, FILM COATED ORAL DAILY
Qty: 30 TABLET | Refills: 5 | Status: SHIPPED | OUTPATIENT
Start: 2022-01-18 | End: 2022-05-05 | Stop reason: SDUPTHER

## 2022-01-18 NOTE — PROGRESS NOTES
CC: This 56 y.o. Black or  male  is here for evaluation of  T2DM along with comorbidities indicated in the Visit Diagnosis section of this encounter.    HPI: David Corona Jr. was diagnosed with T2DM in 1991.  Medical history: TOSIN on cpap,         Prior visit 6/30/21  a1c is up from 7.4 to 7.9%.   He has increased Trulicity to 4.5 mg weekly. Denies GI s/e. States appetite is the same. But he has lost 6 lb since March Admits he's been cheating sometimes on his diet.    Plan Pt declines starting extra medications.  Improve diet.   Make sure to scan glucoses regularly - at least every 8 hours. Try before meals and bedtime.   Return to clinic in 3 months with labs prior. Pt is open to additional medications at next visit if diabetes is still uncontrolled.       Prior visit 10/18/21  Most recent A1c is 7.5% last month. He has increased Trulicity to 4.5 mg once weekly. Denies GI s/e. His appetite is mildly lower.   He has lost 7 lb since last visit.   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   Plan Start Farxiga 5 mg once daily in the morning, with goal of weaning down on Humalog and helping with weight control. Reviewed MOA and side effects.   Reduce Humalog from 14 to 10 units before meals. May need to reduce to 8 units before meals if your blood sugars drop less than 80 after Humalog doses.   Continue Trulicity 4.5 mg weekly, pioglitazone 45 mg once daily, and Lantus 30 units nightly.   Scan glucose 4x/day before meals and bedtime with Malka sensors. Prescription sent for Libre2 sensors. Set low alert on at 75.   Return to clinic in 3 months with labs prior.     Interval history  a1c is down from 7.5 to 7.2%. However, the 90 day CGM average is 150, closer to A1c of 6.9%.   Enjoys using Trulicity. Appetite has been lower since being in Trulicity 4.5 mg.   He has started Farxiga 5 mg. Denies  symptoms, no dizziness.   Continues to take Humalog 14 units ac,  not 10 units ac like previously advised.   + 6 lb weight loss since October.       LAST DIABETES EDUCATION: 11/2020    HOSPITALIZED FOR DIABETES OR RELATED COMPLICATIONS -  Yes - upon diagnosis.    SIGNIFICANT DIABETES MED HISTORY:   Metformin - diarrhea   Glimepiride stopped and Trulicity started at initial visit 10/2020  Declines VGo insulin delivery device       PRESCRIBED DIABETES MEDICATIONS:  Farxiga 5 mg once daily  pioglitazone 45 mg once daily  Trulicity 4.5 mg once weekly  Lantus Solostar 30 units hs  Humalog Kwikpen 14 units ac    Misses medication doses - No    DM COMPLICATIONS: nephropathy    SELF MONITORING BLOOD GLUCOSE: Checks blood glucose at home with Malka with phone sae.   CGM interpretation: BGs overall stable; see Media for report. Highest BGs are postprandial, after breakfast and supper.   14 day average 147, GMI 6.8%     HYPOGLYCEMIC EPISODES: none       CURRENT DIET: eats 2 meals/day - no lunch. No cold drinks. Drinks sparkling water and lemonade (diluted). Believes his diet is decent.  Breakfast was oatmeal (2 packets cooked with water), Sprite Zero.     CURRENT EXERCISE: walks 5-6 days a week when he's at the playground at work - 20 minutes each time.     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       BP (!) 143/86   Pulse 90   Temp 98.9 °F (37.2 °C)   Wt (!) 218 kg (480 lb 9.6 oz)   BMI 61.71 kg/m²       ROS:   CONSTITUTIONAL: Appetite good, denies fatigue  GI: negative.   : NEGATIVE         PHYSICAL EXAM:  GENERAL: Well developed, well nourished. No acute distress.   PSYCH: AAOx3, appropriate mood and affect, conversant, well-groomed. Judgement and insight good.   NEURO: Cranial nerves grossly intact. Speech clear, no tremor.   CHEST: Respirations even and unlabored.        Hemoglobin A1C   Date Value Ref Range Status   01/15/2022 7.2 (H) 4.0 - 5.6 % Final     Comment:     ADA Screening Guidelines:  5.7-6.4%   Consistent with prediabetes  >or=6.5%  Consistent with diabetes    High levels of fetal hemoglobin interfere with the HbA1C  assay. Heterozygous hemoglobin variants (HbS, HgC, etc)do  not significantly interfere with this assay.   However, presence of multiple variants may affect accuracy.     09/25/2021 7.5 (H) 4.0 - 5.6 % Final     Comment:     ADA Screening Guidelines:  5.7-6.4%  Consistent with prediabetes  >or=6.5%  Consistent with diabetes    High levels of fetal hemoglobin interfere with the HbA1C  assay. Heterozygous hemoglobin variants (HbS, HgC, etc)do  not significantly interfere with this assay.   However, presence of multiple variants may affect accuracy.     08/07/2021 7.3 (H) 4.0 - 5.6 % Final     Comment:     ADA Screening Guidelines:  5.7-6.4%  Consistent with prediabetes  >or=6.5%  Consistent with diabetes    High levels of fetal hemoglobin interfere with the HbA1C  assay. Heterozygous hemoglobin variants (HbS, HgC, etc)do  not significantly interfere with this assay.   However, presence of multiple variants may affect accuracy.     04/27/2021 7.4 (H) 4.8 - 5.6 % Final     Comment:     [employer]           Chemistry        Component Value Date/Time     08/07/2021 0810    K 3.8 08/07/2021 0810     08/07/2021 0810    CO2 27 08/07/2021 0810    BUN 12 08/07/2021 0810    CREATININE 1.2 08/07/2021 0810     (H) 08/07/2021 0810        Component Value Date/Time    CALCIUM 9.0 08/07/2021 0810    ALKPHOS 129 08/07/2021 0810    AST 21 08/07/2021 0810    ALT 8 (L) 08/07/2021 0810    BILITOT 0.6 08/07/2021 0810    ESTGFRAFRICA >60 08/07/2021 0810    EGFRNONAA >60 08/07/2021 0810          Lab Results   Component Value Date    LDLCALC 52 04/27/2021        Ref. Range 4/27/2021 00:00 4/27/2021 00:00   Triglycerides Latest Ref Range: <150 mg/dL 144    Cholesterol, Total Latest Ref Range: 100 - 200  2.5 135   HDL Latest Ref Range: > OR = 40 mg/dL 55    LDL Cholesterol External Latest Ref Range:  <130 mg/dL 52          Lab Results   Component Value Date    MICALBCREAT 16.7 05/25/2021         ASSESSMENT and PLAN:    A1C GOAL: < 7 %       1. Type 2 diabetes mellitus, uncontrolled, with neuropathy  Increase Farxiga to 10 mg once daily.   Continue Humalog 14 units before meals, pioglitazone, Lantus 30 units.   Return to clinic in 3 months with labs prior.     Hemoglobin A1C   2. Morbid obesity, unspecified obesity type  Discussed switching from Trulicity to Wegovy (semaglutide 1.7 mg weekly) to help with weight loss, but pt chooses to stay with Trulicity 4.5 mg weekly.     Increase physical activity    3. Hyperlipidemia, unspecified hyperlipidemia type  Lipid Panel       Orders Placed This Encounter   Procedures    Hemoglobin A1C     Standing Status:   Future     Standing Expiration Date:   3/19/2023    Lipid Panel     Standing Status:   Future     Standing Expiration Date:   1/18/2023        Follow up in about 3 months (around 4/18/2022).

## 2022-01-18 NOTE — PATIENT INSTRUCTIONS
Discussed switching from Trulicity to Wegovy (semaglutide 1.7 mg weekly) to help with weight loss, but pt chooses to stay with Trulicity 4.5 mg weekly.     Increase Farxiga to 10 mg once daily.   Continue Humalog 14 units before meals, pioglitazone, Lantus 30 units.   Return to clinic in 3 months with labs prior.

## 2022-01-21 DIAGNOSIS — M79.642 PAIN OF LEFT HAND: ICD-10-CM

## 2022-01-24 RX ORDER — IBUPROFEN 600 MG/1
TABLET ORAL
Qty: 30 TABLET | Refills: 0 | Status: SHIPPED | OUTPATIENT
Start: 2022-01-24 | End: 2022-11-14

## 2022-01-26 DIAGNOSIS — E78.5 HYPERLIPIDEMIA LDL GOAL <100: ICD-10-CM

## 2022-01-26 DIAGNOSIS — Z01.818 PRE-OP TESTING: ICD-10-CM

## 2022-01-26 DIAGNOSIS — Z12.11 SPECIAL SCREENING FOR MALIGNANT NEOPLASMS, COLON: Primary | ICD-10-CM

## 2022-01-26 RX ORDER — POLYETHYLENE GLYCOL 3350, SODIUM SULFATE ANHYDROUS, SODIUM BICARBONATE, SODIUM CHLORIDE, POTASSIUM CHLORIDE 236; 22.74; 6.74; 5.86; 2.97 G/4L; G/4L; G/4L; G/4L; G/4L
4 POWDER, FOR SOLUTION ORAL ONCE
Qty: 4000 ML | Refills: 0 | Status: SHIPPED | OUTPATIENT
Start: 2022-01-26 | End: 2022-01-26

## 2022-01-26 NOTE — TELEPHONE ENCOUNTER
Care Due:                  Date            Visit Type   Department     Provider  --------------------------------------------------------------------------------                                             [facility]                                           MED/ INTERNAL  Christiane Gupta  Last Visit: 11-      None         MED/ PEDS      Maryuri Galindo  Next Visit: None Scheduled  None         None Found                                                            Last  Test          Frequency    Reason                     Performed    Due Date  --------------------------------------------------------------------------------    Lipid Panel.  12 months..  pravastatin..............  Not Found    Overdue    Powered by [employer]. Reference number: [id number].   1/26/2022 8:50:39 AM CST

## 2022-02-02 DIAGNOSIS — E78.5 HYPERLIPIDEMIA LDL GOAL <100: ICD-10-CM

## 2022-02-02 NOTE — TELEPHONE ENCOUNTER
No new care gaps identified.  Powered by [employer]. Reference number: [id number].   2/02/2022 9:55:13 AM CST

## 2022-02-04 RX ORDER — PRAVASTATIN SODIUM 20 MG/1
20 TABLET ORAL DAILY
Qty: 90 TABLET | Refills: 0 | Status: SHIPPED | OUTPATIENT
Start: 2022-02-04 | End: 2022-05-06

## 2022-02-09 RX ORDER — PRAVASTATIN SODIUM 20 MG/1
TABLET ORAL
Qty: 90 TABLET | Refills: 0 | OUTPATIENT
Start: 2022-02-09

## 2022-02-25 ENCOUNTER — LAB VISIT (OUTPATIENT)
Dept: FAMILY MEDICINE | Facility: CLINIC | Age: 57
End: 2022-02-25
Payer: COMMERCIAL

## 2022-02-25 DIAGNOSIS — Z01.818 PRE-OP TESTING: ICD-10-CM

## 2022-02-25 PROCEDURE — U0003 INFECTIOUS AGENT DETECTION BY NUCLEIC ACID (DNA OR RNA); SEVERE ACUTE RESPIRATORY SYNDROME CORONAVIRUS 2 (SARS-COV-2) (CORONAVIRUS DISEASE [COVID-19]), AMPLIFIED PROBE TECHNIQUE, MAKING USE OF HIGH THROUGHPUT TECHNOLOGIES AS DESCRIBED BY CMS-2020-01-R: HCPCS | Performed by: FAMILY MEDICINE

## 2022-02-25 PROCEDURE — U0005 INFEC AGEN DETEC AMPLI PROBE: HCPCS | Performed by: FAMILY MEDICINE

## 2022-02-26 LAB
SARS-COV-2 RNA RESP QL NAA+PROBE: NOT DETECTED
SARS-COV-2- CYCLE NUMBER: NORMAL

## 2022-02-28 ENCOUNTER — HOSPITAL ENCOUNTER (OUTPATIENT)
Facility: HOSPITAL | Age: 57
Discharge: HOME | End: 2022-02-28
Attending: INTERNAL MEDICINE | Admitting: INTERNAL MEDICINE
Payer: COMMERCIAL

## 2022-02-28 ENCOUNTER — ANESTHESIA (OUTPATIENT)
Dept: ENDOSCOPY | Facility: HOSPITAL | Age: 57
End: 2022-02-28
Payer: COMMERCIAL

## 2022-02-28 ENCOUNTER — ANESTHESIA EVENT (OUTPATIENT)
Dept: ENDOSCOPY | Facility: HOSPITAL | Age: 57
End: 2022-02-28
Payer: COMMERCIAL

## 2022-02-28 VITALS
TEMPERATURE: 98 F | HEIGHT: 74 IN | SYSTOLIC BLOOD PRESSURE: 155 MMHG | BODY MASS INDEX: 40.43 KG/M2 | DIASTOLIC BLOOD PRESSURE: 71 MMHG | WEIGHT: 315 LBS | RESPIRATION RATE: 18 BRPM | OXYGEN SATURATION: 98 % | HEART RATE: 86 BPM

## 2022-02-28 DIAGNOSIS — Z86.010 HISTORY OF COLON POLYPS: Primary | ICD-10-CM

## 2022-02-28 LAB
POCT GLUCOSE: 116 MG/DL (ref 70–110)
POCT GLUCOSE: 137 MG/DL (ref 70–110)

## 2022-02-28 PROCEDURE — 27201012 HC FORCEPS, HOT/COLD, DISP: Performed by: INTERNAL MEDICINE

## 2022-02-28 PROCEDURE — 37000009 HC ANESTHESIA EA ADD 15 MINS: Performed by: INTERNAL MEDICINE

## 2022-02-28 PROCEDURE — 88305 TISSUE EXAM BY PATHOLOGIST: CPT | Mod: 26,,, | Performed by: STUDENT IN AN ORGANIZED HEALTH CARE EDUCATION/TRAINING PROGRAM

## 2022-02-28 PROCEDURE — 88305 TISSUE EXAM BY PATHOLOGIST: CPT | Performed by: STUDENT IN AN ORGANIZED HEALTH CARE EDUCATION/TRAINING PROGRAM

## 2022-02-28 PROCEDURE — 37000008 HC ANESTHESIA 1ST 15 MINUTES: Performed by: INTERNAL MEDICINE

## 2022-02-28 PROCEDURE — 63600175 PHARM REV CODE 636 W HCPCS: Performed by: NURSE ANESTHETIST, CERTIFIED REGISTERED

## 2022-02-28 PROCEDURE — 82962 GLUCOSE BLOOD TEST: CPT | Performed by: INTERNAL MEDICINE

## 2022-02-28 PROCEDURE — D9220A PRA ANESTHESIA: ICD-10-PCS | Mod: 33,,, | Performed by: NURSE ANESTHETIST, CERTIFIED REGISTERED

## 2022-02-28 PROCEDURE — D9220A PRA ANESTHESIA: Mod: 33,,, | Performed by: NURSE ANESTHETIST, CERTIFIED REGISTERED

## 2022-02-28 PROCEDURE — 45380 COLONOSCOPY AND BIOPSY: CPT | Mod: PT | Performed by: INTERNAL MEDICINE

## 2022-02-28 PROCEDURE — D9220A PRA ANESTHESIA: Mod: 33,,, | Performed by: ANESTHESIOLOGY

## 2022-02-28 PROCEDURE — D9220A PRA ANESTHESIA: ICD-10-PCS | Mod: 33,,, | Performed by: ANESTHESIOLOGY

## 2022-02-28 PROCEDURE — 45380 COLONOSCOPY AND BIOPSY: CPT | Mod: 33,,, | Performed by: INTERNAL MEDICINE

## 2022-02-28 PROCEDURE — 45380 PR COLONOSCOPY,BIOPSY: ICD-10-PCS | Mod: 33,,, | Performed by: INTERNAL MEDICINE

## 2022-02-28 PROCEDURE — 25000003 PHARM REV CODE 250: Performed by: INTERNAL MEDICINE

## 2022-02-28 PROCEDURE — 88305 TISSUE EXAM BY PATHOLOGIST: ICD-10-PCS | Mod: 26,,, | Performed by: STUDENT IN AN ORGANIZED HEALTH CARE EDUCATION/TRAINING PROGRAM

## 2022-02-28 RX ORDER — SODIUM CHLORIDE 9 MG/ML
INJECTION, SOLUTION INTRAVENOUS CONTINUOUS
Status: DISCONTINUED | OUTPATIENT
Start: 2022-02-28 | End: 2022-02-28 | Stop reason: HOSPADM

## 2022-02-28 RX ORDER — ONDANSETRON 2 MG/ML
4 INJECTION INTRAMUSCULAR; INTRAVENOUS DAILY PRN
Status: DISCONTINUED | OUTPATIENT
Start: 2022-02-28 | End: 2022-02-28 | Stop reason: HOSPADM

## 2022-02-28 RX ORDER — PROCHLORPERAZINE EDISYLATE 5 MG/ML
INJECTION INTRAMUSCULAR; INTRAVENOUS
Status: DISCONTINUED | OUTPATIENT
Start: 2022-02-28 | End: 2022-02-28

## 2022-02-28 RX ORDER — MIDAZOLAM HYDROCHLORIDE 1 MG/ML
INJECTION, SOLUTION INTRAMUSCULAR; INTRAVENOUS
Status: DISCONTINUED | OUTPATIENT
Start: 2022-02-28 | End: 2022-02-28

## 2022-02-28 RX ORDER — LIDOCAINE HCL/PF 100 MG/5ML
SYRINGE (ML) INTRAVENOUS
Status: DISCONTINUED | OUTPATIENT
Start: 2022-02-28 | End: 2022-02-28

## 2022-02-28 RX ORDER — PROPOFOL 10 MG/ML
VIAL (ML) INTRAVENOUS
Status: DISCONTINUED | OUTPATIENT
Start: 2022-02-28 | End: 2022-02-28

## 2022-02-28 RX ORDER — PROPOFOL 10 MG/ML
VIAL (ML) INTRAVENOUS CONTINUOUS PRN
Status: DISCONTINUED | OUTPATIENT
Start: 2022-02-28 | End: 2022-02-28

## 2022-02-28 RX ADMIN — Medication 100 MG: at 07:02

## 2022-02-28 RX ADMIN — Medication 80 MG: at 07:02

## 2022-02-28 RX ADMIN — SODIUM CHLORIDE: 0.9 INJECTION, SOLUTION INTRAVENOUS at 07:02

## 2022-02-28 RX ADMIN — PROCHLORPERAZINE EDISYLATE 10 MG: 5 INJECTION INTRAMUSCULAR; INTRAVENOUS at 07:02

## 2022-02-28 RX ADMIN — PROPOFOL 150 MCG/KG/MIN: 10 INJECTION, EMULSION INTRAVENOUS at 07:02

## 2022-02-28 RX ADMIN — MIDAZOLAM 2 MG: 1 INJECTION INTRAMUSCULAR; INTRAVENOUS at 07:02

## 2022-02-28 NOTE — PLAN OF CARE
Discharge instructions reviewed with patient and family. Patient vitals stable, no nausea or vomiting present. Patient pain under control. Patient ready for discharge.

## 2022-02-28 NOTE — ANESTHESIA PREPROCEDURE EVALUATION
02/28/2022  David Corona Jr. is a 56 y.o., male.  Pre-operative evaluation for Procedure(s) (LRB):  COLONOSCOPY (N/A)        Patient Active Problem List   Diagnosis    Uncontrolled type 2 diabetes with stage 3 chronic kidney disease GFR 30-59    Morbid obesity with BMI of 50.0-59.9, adult    CKD (chronic kidney disease) stage 3, GFR 30-59 ml/min    Type 2 diabetes mellitus, uncontrolled, with neuropathy    Hyperlipidemia LDL goal <100    Essential hypertension    TOSIN (obstructive sleep apnea)    Dependent edema    Venous stasis of lower extremity    Gout, arthritis    Pseudophakia - Right Eye    Secondary hyperparathyroidism of renal origin    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     Current Outpatient Medications on File Prior to Encounter   Medication Sig Dispense Refill    aspirin (ECOTRIN) 81 MG EC tablet Take 1 tablet (81 mg total) by mouth once daily. 90 tablet 3    blood pressure monitor Kit Test BP as needed. 1 each 0    blood sugar diagnostic Strp ONE TOUCH BLOOD GLUCOSE STRIPS  Use to test blood sugars three times daily. 300 each 3    blood-glucose meter kit 1 each by Other route 3 (three) times daily. One Touch Glucose meter kit 1 each 0    diabetic supplies, miscellan. Kit Please change sensor every 14 days. FreeStyle Malka Sensor 30-day supply (2 sensors/month) 2 kit 5    dulaglutide (TRULICITY) 4.5 mg/0.5 mL pen injector Inject 4.5 mg into the skin every 7 days. 4 pen 11    flash glucose sensor (FREESTYLE MALKA 2 SENSOR) Kit  "Change sensor every 14 days. 2 kit 11    hydrocodone-acetaminophen 5-325mg (NORCO) 5-325 mg per tablet Take 1 tablet by mouth every 6 (six) hours as needed for Pain. 10 tablet 0    insulin (LANTUS SOLOSTAR U-100 INSULIN) glargine 100 units/mL (3mL) SubQ pen ADMINISTER 30 UNITS UNDER THE SKIN EVERY NIGHT AT BEDTIME 15 mL 5    insulin lispro (HUMALOG KWIKPEN INSULIN) 100 unit/mL pen ADMINISTER 10 UNITS UNDER THE SKIN THREE TIMES DAILY WITH MEALS 15 mL 5    lancets (MICROLET LANCET) Misc TEST THREE TIMES DAILY 100 each 11    losartan (COZAAR) 50 MG tablet TAKE 1 TABLET(50 MG) BY MOUTH EVERY DAY 90 tablet 0    miscellaneous medical supply Kit Nebulizer machine cleaning supplies/apparatus 1 kit 0    omega-3 fatty acids 1,000 mg Cap Take by mouth. 2 Capsule Oral Every day      pen needle, diabetic (NOVOFINE 32) 32 gauge x 1/4" Ndle USE FOUR TIMES DAILY AS NEEDED 100 each 5    pioglitazone (ACTOS) 45 MG tablet TAKE 1 TABLET(45 MG) BY MOUTH EVERY DAY 90 tablet 2       Past Surgical History:   Procedure Laterality Date    CATARACT EXTRACTION W/  INTRAOCULAR LENS IMPLANT Right 2007    COLONOSCOPY N/A 11/22/2016    Procedure: COLONOSCOPY;  Surgeon: Abdi La MD;  Location: [address]);  Service: Endoscopy;  Laterality: N/A;  2nd floor case/BMI 59.13/wants week of Thanksgiving       Social History     Socioeconomic History    Marital status:     Number of children: 3   Occupational History    Occupation:      Employer: [employer]    Occupation:    Tobacco Use    Smoking status: Never Smoker    Smokeless tobacco: Never Used   Substance and Sexual Activity    Alcohol use: Yes     Alcohol/week: 0.0 standard drinks     Comment: rarely    Drug use: No     Social Determinants of Health     Financial Resource Strain: Low Risk     Difficulty of Paying Living Expenses: Not hard at all   Food Insecurity: No Food Insecurity    Worried About " Running Out of Food in the Last Year: Never true    Ran Out of Food in the Last Year: Never true   Transportation Needs: No Transportation Needs    Lack of Transportation (Medical): No    Lack of Transportation (Non-Medical): No   Physical Activity: Insufficiently Active    Days of Exercise per Week: 4 days    Minutes of Exercise per Session: 10 min   Stress: No Stress Concern Present    Feeling of Stress : Not at all   Social Connections: Unknown    Frequency of Communication with Friends and Family: More than three times a week    Frequency of Social Gatherings with Friends and Family: More than three times a week    Active Member of Clubs or Organizations: Yes    Attends Club or Organization Meetings: More than 4 times per year    Marital Status:    Housing Stability: Low Risk     Unable to Pay for Housing in the Last Year: No    Number of Places Lived in the Last Year: 2    Unstable Housing in the Last Year: No         Vital Signs Range (Last 24H):         CBC: No results for input(s): WBC, RBC, HGB, HCT, PLT, MCV, MCH, MCHC in the last 72 hours.    CMP: No results for input(s): NA, K, CL, CO2, BUN, CREATININE, GLU, MG, PHOS, CALCIUM, ALBUMIN, PROT, ALKPHOS, ALT, AST, BILITOT in the last 72 hours.    INR  No results for input(s): PT, INR, PROTIME, APTT in the last 72 hours.        Diagnostic Studies:      EKG:  Normal sinus rhythm   Cannot rule out Anterior infarct (cited on or before 16-JAN-2019)   Abnormal ECG   When compared with ECG of 16-JAN-2019 14:20,   Premature ventricular complexes are no longer Present   Confirmed by Deja TORRES, Shai BUTCHER ([id number]) on 5/8/2019 9:27:22 PM     Pre-op Assessment    I have reviewed the Patient Summary Reports.     I have reviewed the Nursing Notes. I have reviewed the NPO Status.   I have reviewed the Medications.     Review of Systems  Anesthesia Hx:  No problems with previous Anesthesia  History of prior surgery of interest to airway management or  planning: Denies Family Hx of Anesthesia complications.   Denies Personal Hx of Anesthesia complications.   Social:  No Alcohol Use, Non-Smoker    Hematology/Oncology:  Hematology Normal   Oncology Normal     EENT/Dental:EENT/Dental Normal   Cardiovascular:   Hypertension    Pulmonary:   Sleep Apnea    Renal/:   Chronic Renal Disease    Hepatic/GI:  Hepatic/GI Normal    Musculoskeletal:  Musculoskeletal Normal    Neurological:  Neurology Normal    Endocrine:   Diabetes  Morbid Obesity / BMI > 40  Dermatological:  Skin Normal    Psych:  Psychiatric Normal           Physical Exam  General: Well nourished, Cooperative, Alert and Oriented    Airway:  Mallampati: III / I  Mouth Opening: Normal  TM Distance: Normal  Tongue: Normal  Neck ROM: Normal ROM    Dental:  Intact    Chest/Lungs:  Clear to auscultation, Normal Respiratory Rate    Heart:  Rate: Normal  Rhythm: Regular Rhythm  Sounds: Normal        Anesthesia Plan  Type of Anesthesia, risks & benefits discussed:    Anesthesia Type: Gen Natural Airway, Gen ETT  Intra-op Monitoring Plan: Standard ASA Monitors  Post Op Pain Control Plan:   (medical reason for not using multimodal pain management)  Induction:  IV  Informed Consent: Informed consent signed with the Patient and all parties understand the risks and agree with anesthesia plan.  All questions answered.   ASA Score: 3  Day of Surgery Review of History & Physical: H&P Update referred to the surgeon/provider.    Ready For Surgery From Anesthesia Perspective.     .

## 2022-02-28 NOTE — TRANSFER OF CARE
"Anesthesia Transfer of Care Note    Patient: David Corona Jr.    Procedure(s) Performed: Procedure(s) (LRB):  COLONOSCOPY (N/A)    Patient location: [facility]    Anesthesia Type: general    Transport from OR: Transported from OR on 100% O2 by closed face mask with adequate spontaneous ventilation    Post pain: adequate analgesia    Post assessment: no apparent anesthetic complications    Post vital signs: stable    Level of consciousness: awake    Nausea/Vomiting: no nausea/vomiting    Transfer of care protocol was followed      Last vitals:   Visit Vitals  BP (!) 172/87 (BP Location: Left arm, Patient Position: Lying)   Pulse 90   Temp 36.6 °C (97.9 °F) (Temporal)   Resp 18   Ht 6' 2" (1.88 m)   Wt (!) 217.7 kg (480 lb)   SpO2 97%   BMI 61.63 kg/m²     "

## 2022-02-28 NOTE — PROVATION PATIENT INSTRUCTIONS
Discharge Summary/Instructions after an Endoscopic Procedure  Patient Name: David Corona  Patient MRN: [id number]  Patient YOB: 1965 Monday, February 28, 2022  Jessi Martinez MD  Dear patient,  As a result of recent federal legislation (The Federal Cures Act), you may   receive lab or pathology results from your procedure in your [facility]   account before your physician is able to contact you. Your physician or   their representative will relay the results to you with their   recommendations at their soonest availability.  Thank you,  RESTRICTIONS:  During your procedure today, you received medications for sedation.  These   medications may affect your judgment, balance and coordination.  Therefore,   for 24 hours, you have the following restrictions:   - DO NOT drive a car, operate machinery, make legal/financial decisions,   sign important papers or drink alcohol.    ACTIVITY:  Today: no heavy lifting, straining or running due to procedural   sedation/anesthesia.  The following day: return to full activity including work.  DIET:  Eat and drink normally unless instructed otherwise.     TREATMENT FOR COMMON SIDE EFFECTS:  - Mild abdominal pain, nausea, belching, bloating or excessive gas:  rest,   eat lightly and use a heating pad.  - Sore Throat: treat with throat lozenges and/or gargle with warm salt   water.  - Because air was used during the procedure, expelling large amounts of air   from your rectum or belching is normal.  - If a bowel prep was taken, you may not have a bowel movement for 1-3 days.    This is normal.  SYMPTOMS TO WATCH FOR AND REPORT TO YOUR PHYSICIAN:  1. Abdominal pain or bloating, other than gas cramps.  2. Chest pain.  3. Back pain.  4. Signs of infection such as: chills or fever occurring within 24 hours   after the procedure.  5. Rectal bleeding, which would show as bright red, maroon, or black stools.   (A tablespoon of blood from the rectum is not serious, especially  if   hemorrhoids are present.)  6. Vomiting.  7. Weakness or dizziness.  GO DIRECTLY TO THE NEAREST EMERGENCY ROOM IF YOU HAVE ANY OF THE FOLLOWING:      Difficulty breathing              Chills and/or fever over 101 F   Persistent vomiting and/or vomiting blood   Severe abdominal pain   Severe chest pain   Black, tarry stools   Bleeding- more than one tablespoon   Any other symptom or condition that you feel may need urgent attention  Your doctor recommends these additional instructions:  If any biopsies were taken, your doctors clinic will contact you in 1 to 2   weeks with any results.  - Discharge patient to home.   - Resume previous diet.   - Continue present medications.   - Await pathology results.   - Repeat colonoscopy in 5 years for surveillance.  For questions, problems or results please call your physician - Jessi Martinez MD at Work:  ( [phone].  [facility] [place], EMERGENCY ROOM PHONE NUMBER: [phone]  IF A COMPLICATION OR EMERGENCY SITUATION ARISES AND YOU ARE UNABLE TO REACH   YOUR PHYSICIAN - GO DIRECTLY TO THE EMERGENCY ROOM.  Jessi Martinez MD  2/28/2022 7:52:15 AM  This report has been verified and signed electronically.  Dear patient,  As a result of recent federal legislation (The Federal Cures Act), you may   receive lab or pathology results from your procedure in your [facility]   account before your physician is able to contact you. Your physician or   their representative will relay the results to you with their   recommendations at their soonest availability.  Thank you,  PROVATION

## 2022-02-28 NOTE — H&P
Short Stay Endoscopy History and Physical    PCP - Riddhi Galindo MD    Procedure - Colonoscopy  ASA - per anesthesia  Mallampati - per anesthesia  History of Anesthesia problems - no  Family history Anesthesia problems - no   Plan of anesthesia - General    HPI:  This is a 56 y.o. male here for evaluation of : personal history of colon polyps      ROS:  Constitutional: No fevers, chills, No weight loss  CV: No chest pain  Pulm: No cough, No shortness of breath  GI: see HPI  Derm: No rash    Medical History:  has a past medical history of Arthritis, Cataract, CKD (chronic kidney disease) stage 3, GFR 30-59 ml/min, Colon polyp (11/2016), Dependent edema, Diverticulosis, Gout, arthritis, Hyperlipidemia LDL goal <100, Hypertension, Morbid obesity, TOSIN (obstructive sleep apnea), Peripheral autonomic neuropathy in disorders classified elsewhere(337.1), Proteinuria, Type II or unspecified type diabetes mellitus with neurological manifestations, uncontrolled(250.62), 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    Surgical History:  has a past surgical history that includes Colonoscopy (N/A, 11/22/2016) and Cataract extraction w/  intraocular lens implant (Right, 2007).    Family History: family history includes Breast cancer in his maternal grandmother and mother; Diabetes in his father and mother; Hypertension in his father and mother; No Known Problems in his brother and sister; Prostate cancer in his father; Stroke in his mother.. Otherwise no colon cancer, inflammatory bowel disease, or GI malignancies.    Social History:  reports that he has never smoked. He has never used smokeless tobacco. He reports current alcohol use. He reports that he does not use drugs.    Review of patient's allergies indicates:  No Known Allergies    Medications:   Medications Prior to Admission    Medication Sig Dispense Refill Last Dose    aspirin (ECOTRIN) 81 MG EC tablet Take 1 tablet (81 mg total) by mouth once daily. 90 tablet 3     blood pressure monitor Kit Test BP as needed. 1 each 0     blood sugar diagnostic Strp ONE TOUCH BLOOD GLUCOSE STRIPS  Use to test blood sugars three times daily. 300 each 3     blood-glucose meter kit 1 each by Other route 3 (three) times daily. One Touch Glucose meter kit 1 each 0     colchicine (COLCRYS) 0.6 mg tablet Take 1 tablet (0.6 mg total) by mouth once daily. 90 tablet 0     dapagliflozin (FARXIGA) 10 mg tablet Take 1 tablet (10 mg total) by mouth once daily. 30 tablet 5     diabetic supplies, Circuportcellan. Kit Please change sensor every 14 days. FreeStyle Malka Sensor 30-day supply (2 sensors/month) 2 kit 5     diltiaZEM (CARDIZEM CD) 240 MG 24 hr capsule TAKE 2 CAPSULES BY MOUTH EVERY  capsule 3     dulaglutide (TRULICITY) 4.5 mg/0.5 mL pen injector Inject 4.5 mg into the skin every 7 days. 4 pen 11     flash glucose sensor (FREESTYLE MALKA 2 SENSOR) Kit Change sensor every 14 days. 2 kit 11     furosemide (LASIX) 40 MG tablet TAKE 1 TABLET(40 MG) BY MOUTH TWICE DAILY 180 tablet 1     hydrocodone-acetaminophen 5-325mg (NORCO) 5-325 mg per tablet Take 1 tablet by mouth every 6 (six) hours as needed for Pain. 10 tablet 0     ibuprofen (ADVIL,MOTRIN) 600 MG tablet TAKE 1 TABLET(600 MG) BY MOUTH EVERY NIGHT AS NEEDED FOR PAIN 30 tablet 0     insulin (LANTUS SOLOSTAR U-100 INSULIN) glargine 100 units/mL (3mL) SubQ pen ADMINISTER 30 UNITS UNDER THE SKIN EVERY NIGHT AT BEDTIME 15 mL 5     insulin lispro (HUMALOG KWIKPEN INSULIN) 100 unit/mL pen ADMINISTER 10 UNITS UNDER THE SKIN THREE TIMES DAILY WITH MEALS 15 mL 5     lancets (MICROLET LANCET) Misc TEST THREE TIMES DAILY 100 each 11     losartan (COZAAR) 50 MG tablet TAKE 1 TABLET(50 MG) BY MOUTH EVERY DAY 90 tablet 0     metoprolol succinate (TOPROL-XL) 50 MG 24 hr tablet TAKE 1 TABLET(50 MG)  "BY MOUTH EVERY DAY 90 tablet 0     metoprolol succinate (TOPROL-XL) 50 MG 24 hr tablet Take 1 tablet (50 mg total) by mouth once daily. 90 tablet 0     miscellaneous medical supply Kit Nebulizer machine cleaning supplies/apparatus 1 kit 0     omega-3 fatty acids 1,000 mg Cap Take by mouth. 2 Capsule Oral Every day       pen needle, diabetic (NOVOFINE 32) 32 gauge x 1/4" Ndle USE FOUR TIMES DAILY AS NEEDED 100 each 5     pioglitazone (ACTOS) 45 MG tablet TAKE 1 TABLET(45 MG) BY MOUTH EVERY DAY 90 tablet 2     pravastatin (PRAVACHOL) 20 MG tablet Take 1 tablet (20 mg total) by mouth once daily. 90 tablet 0          Physical Exam:    Vital Signs: There were no vitals filed for this visit.    Gen: NAD, lying comfortably  HENT: NCAT, oropharynx clear  Eyes: anicteric sclerae, EOMI grossly  Neck: supple, no visible masses/goiter  Cardiac: RRR  Lungs: non-labored breathing  Abd: soft, NT/ND, normoactive BS  Ext: no LE edema, warm, well perfused  Skin: skin intact on exposed body parts, no visible rashes, lesions  Neuro: A&Ox4, neuro exam grossly intact, moves all extremities  Psych: appropriate mood, affect        Labs:  Lab Results   Component Value Date    WBC 6.89 01/15/2022    HGB 13.2 (L) 01/15/2022    HCT 42.1 01/15/2022     01/15/2022    CHOL 148 10/15/2020    TRIG 144 04/27/2021    HDL 55 04/27/2021    ALT 8 (L) 08/07/2021    AST 21 08/07/2021     08/07/2021    K 3.8 08/07/2021     08/07/2021    CREATININE 1.2 08/07/2021    BUN 12 08/07/2021    CO2 27 08/07/2021    TSH 1.03 06/15/2009    PSA 0.65 06/15/2009    HGBA1C 7.2 (H) 01/15/2022       Plan:  Colonoscopy for surveillance due to a personal history of colon polyps.    I have explained the risks and benefits of endoscopy procedures to the patient including but not limited to bleeding, perforation, infection, and death.  The patient was asked if they understand and allowed to ask any further questions to their satisfaction.    Jessi RUIZ" MD Michelle

## 2022-03-01 NOTE — ANESTHESIA POSTPROCEDURE EVALUATION
Anesthesia Post Evaluation    Patient: David Corona Jr.    Procedure(s) Performed: Procedure(s) (LRB):  COLONOSCOPY (N/A)    Final Anesthesia Type: general      Patient location during evaluation: PACU  Patient participation: Yes- Able to Participate  Level of consciousness: awake and alert  Post-procedure vital signs: reviewed and stable  Pain management: adequate  Airway patency: patent    PONV status at discharge: No PONV  Anesthetic complications: no      Cardiovascular status: blood pressure returned to baseline  Respiratory status: unassisted, spontaneous ventilation and room air  Hydration status: euvolemic  Follow-up not needed.          Vitals Value Taken Time   /71 02/28/22 0821   Temp 36.6 °C (97.9 °F) 02/28/22 0757   Pulse 87 02/28/22 0821   Resp 17 02/28/22 0821   SpO2 98 % 02/28/22 0821   Vitals shown include unvalidated device data.      No case tracking events are documented in the log.      Pain/López Score: López Score: 10 (2/28/2022  7:57 AM)

## 2022-03-04 LAB
FINAL PATHOLOGIC DIAGNOSIS: NORMAL
GROSS: NORMAL
Lab: NORMAL

## 2022-03-11 ENCOUNTER — PATIENT MESSAGE (OUTPATIENT)
Dept: FAMILY MEDICINE | Facility: CLINIC | Age: 57
End: 2022-03-11
Payer: COMMERCIAL

## 2022-03-17 RX ORDER — PIOGLITAZONEHYDROCHLORIDE 45 MG/1
TABLET ORAL
Qty: 90 TABLET | Refills: 2 | Status: SHIPPED | OUTPATIENT
Start: 2022-03-17 | End: 2022-11-03 | Stop reason: SDUPTHER

## 2022-04-16 ENCOUNTER — LAB VISIT (OUTPATIENT)
Dept: LAB | Facility: HOSPITAL | Age: 57
End: 2022-04-16
Attending: INTERNAL MEDICINE
Payer: COMMERCIAL

## 2022-04-16 DIAGNOSIS — E78.5 HYPERLIPIDEMIA, UNSPECIFIED HYPERLIPIDEMIA TYPE: ICD-10-CM

## 2022-04-16 DIAGNOSIS — Z12.5 PROSTATE CANCER SCREENING: ICD-10-CM

## 2022-04-16 LAB
CHOLEST SERPL-MCNC: 137 MG/DL (ref 120–199)
CHOLEST/HDLC SERPL: 2.7 {RATIO} (ref 2–5)
COMPLEXED PSA SERPL-MCNC: 1.1 NG/ML (ref 0–4)
ESTIMATED AVG GLUCOSE: 209 MG/DL (ref 68–131)
HBA1C MFR BLD: 8.9 % (ref 4–5.6)
HDLC SERPL-MCNC: 50 MG/DL (ref 40–75)
HDLC SERPL: 36.5 % (ref 20–50)
LDLC SERPL CALC-MCNC: 69.2 MG/DL (ref 63–159)
NONHDLC SERPL-MCNC: 87 MG/DL
TRIGL SERPL-MCNC: 89 MG/DL (ref 30–150)

## 2022-04-16 PROCEDURE — 80061 LIPID PANEL: CPT | Performed by: NURSE PRACTITIONER

## 2022-04-16 PROCEDURE — 84153 ASSAY OF PSA TOTAL: CPT | Performed by: INTERNAL MEDICINE

## 2022-04-16 PROCEDURE — 83036 HEMOGLOBIN GLYCOSYLATED A1C: CPT | Performed by: NURSE PRACTITIONER

## 2022-04-16 PROCEDURE — 36415 COLL VENOUS BLD VENIPUNCTURE: CPT | Mod: PO | Performed by: INTERNAL MEDICINE

## 2022-04-20 ENCOUNTER — TELEPHONE (OUTPATIENT)
Dept: FAMILY MEDICINE | Facility: CLINIC | Age: 57
End: 2022-04-20
Payer: COMMERCIAL

## 2022-04-20 NOTE — TELEPHONE ENCOUNTER
----- Message from Riddhi Galindo MD sent at 4/18/2022 10:05 AM CDT -----  Please call patient to schedule office visit (due in May) and address health maintenance.

## 2022-04-23 NOTE — TELEPHONE ENCOUNTER
Care Due:                  Date            Visit Type   Department     Provider  --------------------------------------------------------------------------------                                 -         [facility]                              PRIMARY      MED/ INTERNAL  [facility]  Last Visit: 11-      CARE ([facility])   MED/ PEDS      Maryuri Galindo                              [facility]                              PRIMARY      MED/ INTERNAL  Laurentia Candy  Next Visit: 06-      CARE ([facility])   MED/ PEDS      Maryuri Galindo                                                            Last  Test          Frequency    Reason                     Performed    Due Date  --------------------------------------------------------------------------------    Uric Acid...  12 months..  colchicine...............  Not Found    Overdue    Powered by [employer]. Reference number: [id number].   4/23/2022 10:47:05 AM CDT

## 2022-04-25 RX ORDER — INSULIN LISPRO 100 [IU]/ML
INJECTION, SOLUTION INTRAVENOUS; SUBCUTANEOUS
Qty: 15 ML | Refills: 0 | Status: SHIPPED | OUTPATIENT
Start: 2022-04-25 | End: 2022-05-05

## 2022-04-25 RX ORDER — BLOOD SUGAR DIAGNOSTIC
STRIP MISCELLANEOUS
Qty: 400 EACH | Refills: 11 | Status: SHIPPED | OUTPATIENT
Start: 2022-04-25

## 2022-04-25 NOTE — TELEPHONE ENCOUNTER
Refill Routing Note   Medication(s) are not appropriate for processing by [facility] for the following reason(s):      - Patient has been seen in the ED/Hospital since the last PCP visit    [facility] action(s):    Medication-related problems identified: Requires labs     Medication Therapy Plan: Labs (uric acid) overdue; Last PCP Visit: 11/1/2021 Last Admission: 2/28/2022        Appointments  past 12m or future 3m with PCP    Date Provider   Last Visit   11/1/2021 Riddhi Galindo MD   Next Visit   6/20/2022 Riddhi Galindo MD   ED visits in past 90 days: 0        Note composed:11:36 AM 04/25/2022

## 2022-05-05 ENCOUNTER — OFFICE VISIT (OUTPATIENT)
Dept: ENDOCRINOLOGY | Facility: CLINIC | Age: 57
End: 2022-05-05
Payer: COMMERCIAL

## 2022-05-05 VITALS
WEIGHT: 315 LBS | SYSTOLIC BLOOD PRESSURE: 159 MMHG | TEMPERATURE: 99 F | BODY MASS INDEX: 60.99 KG/M2 | HEART RATE: 78 BPM | DIASTOLIC BLOOD PRESSURE: 84 MMHG

## 2022-05-05 DIAGNOSIS — E78.5 HYPERLIPIDEMIA, UNSPECIFIED HYPERLIPIDEMIA TYPE: ICD-10-CM

## 2022-05-05 DIAGNOSIS — E78.5 HYPERLIPIDEMIA LDL GOAL <100: ICD-10-CM

## 2022-05-05 DIAGNOSIS — E66.01 MORBID OBESITY, UNSPECIFIED OBESITY TYPE: ICD-10-CM

## 2022-05-05 PROCEDURE — 95251 PR GLUCOSE MONITOR, 72 HOUR, PHYS INTERP: ICD-10-PCS | Mod: S$GLB,,, | Performed by: NURSE PRACTITIONER

## 2022-05-05 PROCEDURE — 1160F RVW MEDS BY RX/DR IN RCRD: CPT | Mod: CPTII,S$GLB,, | Performed by: NURSE PRACTITIONER

## 2022-05-05 PROCEDURE — 3052F PR MOST RECENT HEMOGLOBIN A1C LEVEL 8.0 - < 9.0%: ICD-10-PCS | Mod: CPTII,S$GLB,, | Performed by: NURSE PRACTITIONER

## 2022-05-05 PROCEDURE — 3052F HG A1C>EQUAL 8.0%<EQUAL 9.0%: CPT | Mod: CPTII,S$GLB,, | Performed by: NURSE PRACTITIONER

## 2022-05-05 PROCEDURE — 99214 PR OFFICE/OUTPT VISIT, EST, LEVL IV, 30-39 MIN: ICD-10-PCS | Mod: S$GLB,,, | Performed by: NURSE PRACTITIONER

## 2022-05-05 PROCEDURE — 1159F PR MEDICATION LIST DOCUMENTED IN MEDICAL RECORD: ICD-10-PCS | Mod: CPTII,S$GLB,, | Performed by: NURSE PRACTITIONER

## 2022-05-05 PROCEDURE — 3008F BODY MASS INDEX DOCD: CPT | Mod: CPTII,S$GLB,, | Performed by: NURSE PRACTITIONER

## 2022-05-05 PROCEDURE — 3077F SYST BP >= 140 MM HG: CPT | Mod: CPTII,S$GLB,, | Performed by: NURSE PRACTITIONER

## 2022-05-05 PROCEDURE — 3072F LOW RISK FOR RETINOPATHY: CPT | Mod: CPTII,S$GLB,, | Performed by: NURSE PRACTITIONER

## 2022-05-05 PROCEDURE — 1160F PR REVIEW ALL MEDS BY PRESCRIBER/CLIN PHARMACIST DOCUMENTED: ICD-10-PCS | Mod: CPTII,S$GLB,, | Performed by: NURSE PRACTITIONER

## 2022-05-05 PROCEDURE — 3008F PR BODY MASS INDEX (BMI) DOCUMENTED: ICD-10-PCS | Mod: CPTII,S$GLB,, | Performed by: NURSE PRACTITIONER

## 2022-05-05 PROCEDURE — 3079F PR MOST RECENT DIASTOLIC BLOOD PRESSURE 80-89 MM HG: ICD-10-PCS | Mod: CPTII,S$GLB,, | Performed by: NURSE PRACTITIONER

## 2022-05-05 PROCEDURE — 3079F DIAST BP 80-89 MM HG: CPT | Mod: CPTII,S$GLB,, | Performed by: NURSE PRACTITIONER

## 2022-05-05 PROCEDURE — 95251 CONT GLUC MNTR ANALYSIS I&R: CPT | Mod: S$GLB,,, | Performed by: NURSE PRACTITIONER

## 2022-05-05 PROCEDURE — 99214 OFFICE O/P EST MOD 30 MIN: CPT | Mod: S$GLB,,, | Performed by: NURSE PRACTITIONER

## 2022-05-05 PROCEDURE — 99999 PR PBB SHADOW E&M-EST. PATIENT-LVL V: CPT | Mod: PBBFAC,,, | Performed by: NURSE PRACTITIONER

## 2022-05-05 PROCEDURE — 3077F PR MOST RECENT SYSTOLIC BLOOD PRESSURE >= 140 MM HG: ICD-10-PCS | Mod: CPTII,S$GLB,, | Performed by: NURSE PRACTITIONER

## 2022-05-05 PROCEDURE — 99999 PR PBB SHADOW E&M-EST. PATIENT-LVL V: ICD-10-PCS | Mod: PBBFAC,,, | Performed by: NURSE PRACTITIONER

## 2022-05-05 PROCEDURE — 3072F PR LOW RISK FOR RETINOPATHY: ICD-10-PCS | Mod: CPTII,S$GLB,, | Performed by: NURSE PRACTITIONER

## 2022-05-05 PROCEDURE — 1159F MED LIST DOCD IN RCRD: CPT | Mod: CPTII,S$GLB,, | Performed by: NURSE PRACTITIONER

## 2022-05-05 RX ORDER — INSULIN GLARGINE 100 [IU]/ML
INJECTION, SOLUTION SUBCUTANEOUS
Qty: 30 ML | Refills: 2 | Status: SHIPPED | OUTPATIENT
Start: 2022-05-05 | End: 2022-05-13

## 2022-05-05 RX ORDER — INSULIN LISPRO-AABC 200 [IU]/ML
14 INJECTION, SOLUTION SUBCUTANEOUS
Qty: 9 PEN | Refills: 2 | Status: SHIPPED | OUTPATIENT
Start: 2022-05-05 | End: 2022-11-03

## 2022-05-05 RX ORDER — FLASH GLUCOSE SENSOR
KIT MISCELLANEOUS
Qty: 2 KIT | Refills: 11 | Status: SHIPPED | OUTPATIENT
Start: 2022-05-05 | End: 2022-11-03

## 2022-05-05 RX ORDER — DAPAGLIFLOZIN 10 MG/1
10 TABLET, FILM COATED ORAL DAILY
Qty: 90 TABLET | Refills: 2 | Status: SHIPPED | OUTPATIENT
Start: 2022-05-05 | End: 2022-11-03 | Stop reason: SDUPTHER

## 2022-05-05 NOTE — PATIENT INSTRUCTIONS
Discussed how to adjust Humalog dose based on carb content of foods. Make sure to lower Humalog dose or skip it if eating low carb. May need to take 20 units for large meals.   Improve diet.   No other changes to meds.   Return to clinic in 3 months with labs prior.

## 2022-05-05 NOTE — TELEPHONE ENCOUNTER
Refill Routing Note   Medication(s) are not appropriate for processing by [facility] for the following reason(s):      - Patient has been seen in the ED/Hospital since the last PCP visit    [facility] action(s):  Defer          Medication reconciliation completed: No     Appointments  past 12m or future 3m with PCP    Date Provider   Last Visit   11/1/2021 Riddhi Galindo MD   Next Visit   6/20/2022 Riddhi Galindo MD   ED visits in past 90 days: 0        Note composed:3:18 PM 05/05/2022

## 2022-05-05 NOTE — TELEPHONE ENCOUNTER
Care Due:                  Date            Visit Type   Department     Provider  --------------------------------------------------------------------------------                                [facility]                              PRIMARY      MED/ INTERNAL  [facility]  Last Visit: 11-      CARE ([facility])   MED/ PEDS      Maryuri Galindo                              [facility]                              PRIMARY      MED/ INTERNAL  Laurentia [facility]  Next Visit: 06-      CARE ([facility])   MED/ PEDS      Maryuri Galindo                                                            Last  Test          Frequency    Reason                     Performed    Due Date  --------------------------------------------------------------------------------    CMP.........  12 months..  colchicine, losartan,      08- 08-                             pravastatin..............    [facility] Embedded Care Gaps. Reference number: [id number]. 5/05/2022   2:56:56 PM CDT

## 2022-05-05 NOTE — PROGRESS NOTES
CC: This 56 y.o. Black or  male  is here for evaluation of  T2DM along with comorbidities indicated in the Visit Diagnosis section of this encounter.    HPI: David Corona Jr. was diagnosed with T2DM in 1991.  Medical history: TOSIN on cpap,         Prior visit 1/2022  a1c is down from 7.5 to 7.2%. However, the 90 day CGM average is 150, closer to A1c of 6.9%.   Enjoys using Trulicity. Appetite has been lower since being in Trulicity 4.5 mg.   He has started Farxiga 5 mg. Denies  symptoms, no dizziness.   Continues to take Humalog 14 units ac, not 10 units ac like previously advised.   + 6 lb weight loss since October.   Plan Increase Farxiga to 10 mg once daily.   Continue Humalog 14 units before meals, pioglitazone, Lantus 30 units.   Return to clinic in 3 months with labs prior.       Interval history  a1c is up from 7.2 to 8.9%.   He has been busy with his elderly mother who has been sick.   Diet was off in the last few months d/t helping out his family. Likes bedtime snacks. Intends to start new diet low carb.         LAST DIABETES EDUCATION: 11/2020    HOSPITALIZED FOR DIABETES OR RELATED COMPLICATIONS -  Yes - upon diagnosis.    SIGNIFICANT DIABETES MED HISTORY:   Metformin - diarrhea   Glimepiride stopped and Trulicity started at initial visit 10/2020  Declines VGo insulin delivery device   Farxiga started 10/2021    PRESCRIBED DIABETES MEDICATIONS:  Farxiga 10 mg once daily  pioglitazone 45 mg once daily  Trulicity 4.5 mg once weekly  Lantus Solostar 30 units hs  Humalog Kwikpen 14 units ac    Misses medication doses - No    DM COMPLICATIONS: nephropathy    SELF MONITORING BLOOD GLUCOSE: Checks blood glucose at home with Malka with phone sae.   CGM interpretation: BGs overall higher than at lov d/t diet, carb/insulin mismatch. No hypoglycemia.         HYPOGLYCEMIC EPISODES: none       CURRENT DIET: eats 2 meals/day - no lunch. No cold drinks. Drinks sparkling water and lemonade  (diluted). Believes his diet is decent.  Breakfast was oatmeal (2 packets cooked with water) with fruit, Sprite Zero.     CURRENT EXERCISE: walks  - but less than before. Intends to start walking more with coaching.     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       BP (!) 159/84   Pulse 78   Temp 98.5 °F (36.9 °C)   Wt (!) 215.5 kg (475 lb)   BMI 60.99 kg/m²       ROS:   CONSTITUTIONAL: Appetite good, denies fatigue  GI: negative.   : NEGATIVE         PHYSICAL EXAM:  GENERAL: Well developed, well nourished. No acute distress.   PSYCH: AAOx3, appropriate mood and affect, conversant, well-groomed. Judgement and insight good.   NEURO: Cranial nerves grossly intact. Speech clear, no tremor.   CHEST: Respirations even and unlabored.        Hemoglobin A1C   Date Value Ref Range Status   04/16/2022 8.9 (H) 4.0 - 5.6 % Final     Comment:     ADA Screening Guidelines:  5.7-6.4%  Consistent with prediabetes  >or=6.5%  Consistent with diabetes    High levels of fetal hemoglobin interfere with the HbA1C  assay. Heterozygous hemoglobin variants (HbS, HgC, etc)do  not significantly interfere with this assay.   However, presence of multiple variants may affect accuracy.     01/15/2022 7.2 (H) 4.0 - 5.6 % Final     Comment:     ADA Screening Guidelines:  5.7-6.4%  Consistent with prediabetes  >or=6.5%  Consistent with diabetes    High levels of fetal hemoglobin interfere with the HbA1C  assay. Heterozygous hemoglobin variants (HbS, HgC, etc)do  not significantly interfere with this assay.   However, presence of multiple variants may affect accuracy.     09/25/2021 7.5 (H) 4.0 - 5.6 % Final     Comment:     ADA Screening Guidelines:  5.7-6.4%  Consistent with prediabetes  >or=6.5%  Consistent with diabetes    High levels of fetal hemoglobin interfere with the HbA1C  assay. Heterozygous hemoglobin variants (HbS, HgC, etc)do  not significantly interfere with  this assay.   However, presence of multiple variants may affect accuracy.     04/27/2021 7.4 (H) 4.8 - 5.6 % Final     Comment:     Re-Compose           Chemistry        Component Value Date/Time     08/07/2021 0810    K 3.8 08/07/2021 0810     08/07/2021 0810    CO2 27 08/07/2021 0810    BUN 12 08/07/2021 0810    CREATININE 1.2 08/07/2021 0810     (H) 08/07/2021 0810        Component Value Date/Time    CALCIUM 9.0 08/07/2021 0810    ALKPHOS 129 08/07/2021 0810    AST 21 08/07/2021 0810    ALT 8 (L) 08/07/2021 0810    BILITOT 0.6 08/07/2021 0810    ESTGFRAFRICA >60 08/07/2021 0810    EGFRNONAA >60 08/07/2021 0810          Lab Results   Component Value Date    LDLCALC 69.2 04/16/2022      Latest Reference Range & Units 04/16/22 08:23   Cholesterol 120 - 199 mg/dL 137 [1]   HDL 40 - 75 mg/dL 50 [2]   HDL/Cholesterol Ratio 20.0 - 50.0 % 36.5   LDL Cholesterol External 63.0 - 159.0 mg/dL 69.2 [3]   Non-HDL Cholesterol mg/dL 87 [4]   Total Cholesterol/HDL Ratio 2.0 - 5.0  2.7   Triglycerides 30 - 150 mg/dL 89 [5]         Lab Results   Component Value Date    MICALBCREAT 16.7 05/25/2021         ASSESSMENT and PLAN:    A1C GOAL: < 7 %       1. Type 2 diabetes mellitus, uncontrolled, with neuropathy  Discussed how to adjust Humalog dose based on carb content of foods. Make sure to lower Humalog dose or skip it if eating low carb. May need to take 20 units for large meals.   Improve diet.   No other changes to meds.   Return to clinic in 3 months with labs prior.    insulin (LANTUS SOLOSTAR U-100 INSULIN) glargine 100 units/mL (3mL) SubQ pen    Hemoglobin A1C    Microalbumin/Creatinine Ratio, Urine   2. Morbid obesity, unspecified obesity type  Increases insulin resistance.      3. Hyperlipidemia, unspecified hyperlipidemia type  Continue pravastatin.      Orders Placed This Encounter   Procedures    Hemoglobin A1C     Standing Status:   Future     Standing Expiration Date:   7/4/2023     Microalbumin/Creatinine Ratio, Urine     Standing Status:   Future     Standing Expiration Date:   7/4/2023     Order Specific Question:   Specimen Source     Answer:   Urine        Follow up in about 3 months (around 8/5/2022).

## 2022-05-06 RX ORDER — PRAVASTATIN SODIUM 20 MG/1
TABLET ORAL
Qty: 90 TABLET | Refills: 0 | Status: SHIPPED | OUTPATIENT
Start: 2022-05-06 | End: 2022-06-20 | Stop reason: SDUPTHER

## 2022-05-13 RX ORDER — INSULIN GLARGINE-YFGN 100 [IU]/ML
INJECTION, SOLUTION SUBCUTANEOUS
Qty: 30 ML | Refills: 2 | Status: SHIPPED | OUTPATIENT
Start: 2022-05-13 | End: 2022-11-03

## 2022-05-13 RX ORDER — INSULIN GLARGINE 100 [IU]/ML
INJECTION, SOLUTION SUBCUTANEOUS
Qty: 30 ML | Refills: 2 | Status: SHIPPED | OUTPATIENT
Start: 2022-05-13 | End: 2022-05-13 | Stop reason: ALTCHOICE

## 2022-05-13 NOTE — TELEPHONE ENCOUNTER
----- Message from Whit Bedolla sent at 5/13/2022  2:47 PM CDT -----  Contact: [facility] [phone]  Type:  Pharmacy Calling to Clarify an RX    Name of Caller: Jodie    Pharmacy Name: [facility]    Prescription Name: LANTUS SOLOSTAR U-100 INSULIN glargine 100 units/mL (3mL) SubQ pen    What do they need to clarify? Medication not covered. Calling to get it changed to Semglee, which is covered by the insurance.     Best Call Back Number: [phone]

## 2022-05-16 DIAGNOSIS — I10 ESSENTIAL HYPERTENSION: ICD-10-CM

## 2022-05-16 RX ORDER — FUROSEMIDE 40 MG/1
TABLET ORAL
Qty: 180 TABLET | Refills: 0 | Status: SHIPPED | OUTPATIENT
Start: 2022-05-16 | End: 2022-06-20 | Stop reason: SDUPTHER

## 2022-05-16 NOTE — TELEPHONE ENCOUNTER
Refill Routing Note   Medication(s) are not appropriate for processing by [facility] for the following reason(s):      - Patient has been seen in the ED/Hospital since the last PCP visit    [facility] action(s):  Defer          Medication reconciliation completed: No     Appointments  past 12m or future 3m with PCP    Date Provider   Last Visit   11/1/2021 Riddhi Galindo MD   Next Visit   6/20/2022 Riddhi Galindo MD   ED visits in past 90 days: 0        Note composed:3:12 PM 05/16/2022

## 2022-05-16 NOTE — TELEPHONE ENCOUNTER
No new care gaps identified.  [facility] Embedded Care Gaps. Reference number: [id number]. 5/16/2022   8:54:06 AM ELVERT

## 2022-06-01 ENCOUNTER — PATIENT MESSAGE (OUTPATIENT)
Dept: ADMINISTRATIVE | Facility: HOSPITAL | Age: 57
End: 2022-06-01
Payer: COMMERCIAL

## 2022-06-20 ENCOUNTER — OFFICE VISIT (OUTPATIENT)
Dept: FAMILY MEDICINE | Facility: CLINIC | Age: 57
End: 2022-06-20
Payer: COMMERCIAL

## 2022-06-20 VITALS
OXYGEN SATURATION: 96 % | WEIGHT: 315 LBS | DIASTOLIC BLOOD PRESSURE: 82 MMHG | TEMPERATURE: 98 F | BODY MASS INDEX: 40.43 KG/M2 | SYSTOLIC BLOOD PRESSURE: 156 MMHG | HEART RATE: 77 BPM | HEIGHT: 74 IN

## 2022-06-20 DIAGNOSIS — I87.8 VENOUS STASIS OF LOWER EXTREMITY: ICD-10-CM

## 2022-06-20 DIAGNOSIS — N25.81 SECONDARY HYPERPARATHYROIDISM OF RENAL ORIGIN: ICD-10-CM

## 2022-06-20 DIAGNOSIS — I10 ESSENTIAL HYPERTENSION: ICD-10-CM

## 2022-06-20 DIAGNOSIS — E78.5 HYPERLIPIDEMIA LDL GOAL <100: ICD-10-CM

## 2022-06-20 DIAGNOSIS — G47.33 OSA (OBSTRUCTIVE SLEEP APNEA): ICD-10-CM

## 2022-06-20 DIAGNOSIS — R60.9 DEPENDENT EDEMA: ICD-10-CM

## 2022-06-20 DIAGNOSIS — E66.01 MORBID OBESITY WITH BMI OF 50.0-59.9, ADULT: ICD-10-CM

## 2022-06-20 PROCEDURE — 3008F BODY MASS INDEX DOCD: CPT | Mod: CPTII,S$GLB,, | Performed by: INTERNAL MEDICINE

## 2022-06-20 PROCEDURE — 99214 OFFICE O/P EST MOD 30 MIN: CPT | Mod: S$GLB,,, | Performed by: INTERNAL MEDICINE

## 2022-06-20 PROCEDURE — 3077F PR MOST RECENT SYSTOLIC BLOOD PRESSURE >= 140 MM HG: ICD-10-PCS | Mod: CPTII,S$GLB,, | Performed by: INTERNAL MEDICINE

## 2022-06-20 PROCEDURE — 3008F PR BODY MASS INDEX (BMI) DOCUMENTED: ICD-10-PCS | Mod: CPTII,S$GLB,, | Performed by: INTERNAL MEDICINE

## 2022-06-20 PROCEDURE — 3072F LOW RISK FOR RETINOPATHY: CPT | Mod: CPTII,S$GLB,, | Performed by: INTERNAL MEDICINE

## 2022-06-20 PROCEDURE — 3077F SYST BP >= 140 MM HG: CPT | Mod: CPTII,S$GLB,, | Performed by: INTERNAL MEDICINE

## 2022-06-20 PROCEDURE — 99999 PR PBB SHADOW E&M-EST. PATIENT-LVL V: ICD-10-PCS | Mod: PBBFAC,,, | Performed by: INTERNAL MEDICINE

## 2022-06-20 PROCEDURE — 1159F MED LIST DOCD IN RCRD: CPT | Mod: CPTII,S$GLB,, | Performed by: INTERNAL MEDICINE

## 2022-06-20 PROCEDURE — 1160F RVW MEDS BY RX/DR IN RCRD: CPT | Mod: CPTII,S$GLB,, | Performed by: INTERNAL MEDICINE

## 2022-06-20 PROCEDURE — 3052F PR MOST RECENT HEMOGLOBIN A1C LEVEL 8.0 - < 9.0%: ICD-10-PCS | Mod: CPTII,S$GLB,, | Performed by: INTERNAL MEDICINE

## 2022-06-20 PROCEDURE — 3072F PR LOW RISK FOR RETINOPATHY: ICD-10-PCS | Mod: CPTII,S$GLB,, | Performed by: INTERNAL MEDICINE

## 2022-06-20 PROCEDURE — 99999 PR PBB SHADOW E&M-EST. PATIENT-LVL V: CPT | Mod: PBBFAC,,, | Performed by: INTERNAL MEDICINE

## 2022-06-20 PROCEDURE — 3079F PR MOST RECENT DIASTOLIC BLOOD PRESSURE 80-89 MM HG: ICD-10-PCS | Mod: CPTII,S$GLB,, | Performed by: INTERNAL MEDICINE

## 2022-06-20 PROCEDURE — 3052F HG A1C>EQUAL 8.0%<EQUAL 9.0%: CPT | Mod: CPTII,S$GLB,, | Performed by: INTERNAL MEDICINE

## 2022-06-20 PROCEDURE — 3079F DIAST BP 80-89 MM HG: CPT | Mod: CPTII,S$GLB,, | Performed by: INTERNAL MEDICINE

## 2022-06-20 PROCEDURE — 1160F PR REVIEW ALL MEDS BY PRESCRIBER/CLIN PHARMACIST DOCUMENTED: ICD-10-PCS | Mod: CPTII,S$GLB,, | Performed by: INTERNAL MEDICINE

## 2022-06-20 PROCEDURE — 99214 PR OFFICE/OUTPT VISIT, EST, LEVL IV, 30-39 MIN: ICD-10-PCS | Mod: S$GLB,,, | Performed by: INTERNAL MEDICINE

## 2022-06-20 PROCEDURE — 1159F PR MEDICATION LIST DOCUMENTED IN MEDICAL RECORD: ICD-10-PCS | Mod: CPTII,S$GLB,, | Performed by: INTERNAL MEDICINE

## 2022-06-20 RX ORDER — FUROSEMIDE 40 MG/1
40 TABLET ORAL 2 TIMES DAILY
Qty: 180 TABLET | Refills: 1 | Status: SHIPPED | OUTPATIENT
Start: 2022-06-20 | End: 2022-11-16

## 2022-06-20 RX ORDER — PRAVASTATIN SODIUM 20 MG/1
20 TABLET ORAL DAILY
Qty: 90 TABLET | Refills: 1 | Status: SHIPPED | OUTPATIENT
Start: 2022-06-20 | End: 2023-01-16

## 2022-06-20 NOTE — PROGRESS NOTES
274}  HISTORY OF PRESENT ILLNESS:  David Corona Jr. is a 57 y.o. male who presents to the clinic today for CHW Follow Up Visit  .      The patient presents to clinic today for follow-up of his type 2 diabetes mellitus complicated by chronic kidney disease stage 3/neuropathy/retinopathy, hypertension, and hyper lipid he reports that he has lost a few lb since his last office visit with me.  He is seeing endocrinology for his diabetes control.  He has a continuous glucose monitor which he states is very helpful for him to better control his blood sugars.  He reports that he is currently not checking his blood pressure at home on a regular basis, but plans to start doing so.  He denies cardiac chest pain or shortness of breath.  He has chronic stable lower extremity edema.  He is on a CPAP machine for his obstructive sleep apnea.  He states his machine is more than 5 years old and he would like to get a new 1.       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        PAST SURGICAL HISTORY:  Past Surgical History:   Procedure Laterality Date    CATARACT EXTRACTION W/  INTRAOCULAR LENS IMPLANT Right 2007    COLONOSCOPY N/A 11/22/2016    Procedure: COLONOSCOPY;  Surgeon: Abdi La MD;  Location: [facility] ([address] [place] [address]);   Service: Endoscopy;  Laterality: N/A;  2nd floor case/BMI 59.13/wants week of Thanksgiving    COLONOSCOPY N/A 2/28/2022    Procedure: COLONOSCOPY;  Surgeon: Jessi Martinez MD;  Location: [facility] ([address] [place] [address]);  Service: Endoscopy;  Laterality: N/A;  BMI 61.71  covid test [place] 2/25 2/24 pt confirmed appt and new arrival time-Kpvt       SOCIAL HISTORY:  Social History     Socioeconomic History    Marital status:     Number of children: 3   Occupational History    Occupation:      Employer: [employer]    Occupation:    Tobacco Use    Smoking status: Never Smoker    Smokeless tobacco: Never Used   Substance and Sexual Activity    Alcohol use: Yes     Alcohol/week: 0.0 standard drinks     Comment: rarely    Drug use: No     Social Determinants of Health     Financial Resource Strain: Low Risk     Difficulty of Paying Living Expenses: Not hard at all   Food Insecurity: No Food Insecurity    Worried About Running Out of Food in the Last Year: Never true    Ran Out of Food in the Last Year: Never true   Transportation Needs: No Transportation Needs    Lack of Transportation (Medical): No    Lack of Transportation (Non-Medical): No   Physical Activity: Insufficiently Active    Days of Exercise per Week: 4 days    Minutes of Exercise per Session: 10 min   Stress: No Stress Concern Present    Feeling of Stress : Not at all   Social Connections: Unknown    Frequency of Communication with Friends and Family: More than three times a week    Frequency of Social Gatherings with Friends and Family: More than three times a week    Active Member of Clubs or Organizations: Yes    Attends Club or Organization Meetings: More than 4 times per year    Marital Status:    Housing Stability: Low Risk     Unable to Pay for Housing in the Last Year: No    Number of Places Lived in the Last Year: 2    Unstable Housing in the Last Year: No        FAMILY HISTORY:  Family History   Problem Relation Age of Onset    Breast cancer Mother     Diabetes Mother     Hypertension Mother     Stroke Mother     Prostate cancer Father     Diabetes Father     Hypertension Father     No Known Problems Sister     No Known Problems Brother     Breast cancer Maternal Grandmother     Amblyopia Neg Hx     Blindness Neg Hx     Cataracts Neg Hx     Glaucoma Neg Hx     Macular degeneration Neg Hx     Retinal detachment Neg Hx     Strabismus Neg Hx        ALLERGIES AND MEDICATIONS: updated and reviewed.  Review of patient's allergies indicates:  No Known Allergies  Medication List with Changes/Refills   Current Medications    ASPIRIN (ECOTRIN) 81 MG EC TABLET    Take 1 tablet (81 mg total) by mouth once daily.    BLOOD PRESSURE MONITOR KIT    Test BP as needed.    BLOOD SUGAR DIAGNOSTIC STRP    ONE TOUCH BLOOD GLUCOSE STRIPS  Use to test blood sugars three times daily.    BLOOD-GLUCOSE METER KIT    1 each by Other route 3 (three) times daily. One Touch Glucose meter kit    COLCHICINE (COLCRYS) 0.6 MG TABLET    Take 1 tablet (0.6 mg total) by mouth once daily.    DAPAGLIFLOZIN (FARXIGA) 10 MG TABLET    Take 1 tablet (10 mg total) by mouth once daily.    DILTIAZEM (CARDIZEM CD) 240 MG 24 HR CAPSULE    TAKE 2 CAPSULES BY MOUTH EVERY DAY    DULAGLUTIDE (TRULICITY) 4.5 MG/0.5 ML PEN INJECTOR    Inject 4.5 mg into the skin every 7 days.    FLASH GLUCOSE SENSOR (FREESTYLE JAYLEN 2 SENSOR) KIT    Change sensor every 14 days.    HYDROCODONE-ACETAMINOPHEN 5-325MG (NORCO) 5-325 MG PER TABLET    Take 1 tablet by mouth every 6 (six) hours as needed for Pain.    IBUPROFEN (ADVIL,MOTRIN) 600 MG TABLET    TAKE 1 TABLET(600 MG) BY MOUTH EVERY NIGHT AS NEEDED FOR PAIN    INSULIN GLARGINE-YFGN (SEMGLEE,INSULIN GLARG-YFGN,PEN) 100 UNIT/ML (3 ML) INPN    Inject 30 units once daily    INSULIN LISPRO (HUMALOG KWIKPEN INSULIN) 100 UNIT/ML PEN    INJECT 14 UNITS UNDER THE SKIN THREE  "TIMES DAILY WITH MEALS    INSULIN LISPRO-AABC (LYUMJEV KWIKPEN U-200 INSULIN) 200 UNIT/ML (3 ML) INPN    Inject 14 Units into the skin 3 (three) times daily before meals.    LANCETS (MICROLET LANCET) MISC    TEST THREE TIMES DAILY    LOSARTAN (COZAAR) 50 MG TABLET    TAKE 1 TABLET(50 MG) BY MOUTH EVERY DAY    METOPROLOL SUCCINATE (TOPROL-XL) 50 MG 24 HR TABLET    TAKE 1 TABLET(50 MG) BY MOUTH EVERY DAY    MISCELLANEOUS MEDICAL SUPPLY KIT    Nebulizer machine cleaning supplies/apparatus    OMEGA-3 FATTY ACIDS 1,000 MG CAP    Take by mouth. 2 Capsule Oral Every day    PEN NEEDLE, DIABETIC (NOVOFINE 32) 32 GAUGE X 1/4" NDLE    USE FOUR TIMES DAILY AS NEEDED    PIOGLITAZONE (ACTOS) 45 MG TABLET    TAKE 1 TABLET(45 MG) BY MOUTH EVERY DAY   Changed and/or Refilled Medications    Modified Medication Previous Medication    FUROSEMIDE (LASIX) 40 MG TABLET furosemide (LASIX) 40 MG tablet       Take 1 tablet (40 mg total) by mouth 2 (two) times a day.    TAKE 1 TABLET(40 MG) BY MOUTH TWICE DAILY    PRAVASTATIN (PRAVACHOL) 20 MG TABLET pravastatin (PRAVACHOL) 20 MG tablet       Take 1 tablet (20 mg total) by mouth once daily.    TAKE 1 TABLET(20 MG) BY MOUTH EVERY DAY         CARE TEAM:  Patient Care Team:  Riddhi Galindo MD as PCP - General (Internal Medicine)  Vick Cornelius MD as Consulting Physician (Nephrology)  Munira Garnett LPN as Care Coordinator         REVIEW OF SYSTEMS:  Review of Systems   Constitutional: Negative for chills and fever.   HENT: Negative for congestion.    Respiratory: Negative for cough and shortness of breath.    Cardiovascular: Positive for leg swelling. Negative for chest pain and palpitations.   Gastrointestinal: Negative for abdominal pain.   Genitourinary: Negative for dysuria.   Musculoskeletal: Positive for arthralgias (- mild; stable; chronic).   Neurological: Negative for tremors.   Hematological: Negative for adenopathy.   Psychiatric/Behavioral: Negative for dysphoric mood. " "        PHYSICAL EXAM: 274}  Vitals:    06/20/22 1433   BP: (!) 156/82   Pulse: 77   Temp: 97.9 °F (36.6 °C)     Weight: (!) 216.6 kg (477 lb 8.3 oz)   Height: 6' 2" (188 cm)   Body mass index is 61.31 kg/m².      General appearance - alert, well appearing, and in no distress, morbidly obese  Psychiatric - alert, oriented to person, place, and time, normal behavior, speech, dress, motor activity and thought process  Eyes - pupils equal and reactive, extraocular eye movements intact, sclera anicteric  Mouth - not examined; patient wearing mask due to Covid 19 pandemic  Neck - supple, no significant adenopathy, carotids upstroke normal bilaterally, no bruits  Lymphatics - no palpable cervical lymphadenopathy  Chest - clear to auscultation, no wheezes, rales or rhonchi, symmetric air entry  Heart - normal rate and regular rhythm, no gallops noted  Musculoskeletal - patient noted to have Moderate osteoarthritic changes to both knee joints. No joint effusions noted.  Extremities - pedal edema 1-2+, venous stasis dermatitis noted - moderate bilateral, lymphedema noted - moderate  Skin - normal coloration, no suspicious skin lesions      Labs:  Lab Results   Component Value Date    HGBA1C 8.9 (H) 04/16/2022    HGBA1C 7.2 (H) 01/15/2022    HGBA1C 7.5 (H) 09/25/2021      Lab Results   Component Value Date    CHOL 137 04/16/2022    CHOL 148 10/15/2020    CHOL 149 08/24/2019     Lab Results   Component Value Date    LDLCALC 69.2 04/16/2022    LDLCALC 52 04/27/2021    LDLCALC 82.8 10/15/2020         ASSESSMENT AND PLAN:  274}  1. Uncontrolled type 2 diabetes with stage 3 chronic kidney disease GFR 30-59/2. Type 2 diabetes mellitus, uncontrolled, with neuropathy/3. 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, with long-term current use of insulin  Lab Results   Component Value Date    HGBA1C 8.9 (H) 04/16/2022     Diabetes is not controlled at this time (due to hyperglycemia) " for age and comorbid conditions.   We discussed diabetic diet and regular exercise.   We discussed home blood sugar monitoring, if appropriate - the patient has a CGM.   Continue current medication regimen. Patient is followed by endocrinology. He will do better with diabetic diet and regular physical activity.  Diabetic complications addressed: Stable decreased kidney function. Observe. Patient counseled to avoid/minimize the use of anti-inflammatory  Medication. Discussed to stay well hydrated. Also discussed with patient that good control of blood pressure and/or diabetes, if present, will help to prevent progression. Neuropathy pain controlled. Discussed daily foot exam using a mirror.  Patient was counseled on the need for yearly eye exam to screen for/monitor diabetic retinopathy and yearly diabetic foot exam.    4. Essential hypertension  Blood pressure is not controlled today.   We discussed low salt diet and regular exercise. Patient reports that they have not taken any decongestant or anti-inflammatory medication recently (patient educated that these medications can increase blood pressure).    Medication changes made today: None.   Patient will call in one week with home blood pressure readings.  Patient also asked to check blood pressure at home on a regular basis and bring recordings to next office visit.   Patient is not eligible to enroll at this time in the digital hypertension program at this time.   - furosemide (LASIX) 40 MG tablet; Take 1 tablet (40 mg total) by mouth 2 (two) times a day.  Dispense: 180 tablet; Refill: 1    5. Hyperlipidemia LDL goal <100  Lab Results   Component Value Date    LDLCALC 69.2 04/16/2022     We discussed low fat diet and regular exercise.The current medical regimen is effective;  continue present plan and medications.   - pravastatin (PRAVACHOL) 20 MG tablet; Take 1 tablet (20 mg total) by mouth once daily.  Dispense: 90 tablet; Refill: 1    6. Secondary  hyperparathyroidism of renal origin  Stable. Asymptomatic. Observe.    7. Dependent edema/8. Venous stasis of lower extremity  The current medical regimen is effective;  continue present plan and medications.     9. TOSIN (obstructive sleep apnea)  CPAP compliance: yes. The patient reports that they continue to benefit from regular use of their CPAP machine. I have placed an order today for him to get a new machine.  We discussed the potential ramifications of untreated sleep apnea, which could include daytime sleepiness, hypertension, heart disease including CHF, sudden death while sleeping and/or stroke. The patient was advised to abstain from driving should they feel sleepy or drowsy.  We discussed potential treatment options, which could include weight loss, body positioning, continuous positive airway pressure (CPAP), or referral for surgical consideration.   - CPAP FOR HOME USE    10. Morbid obesity with BMI of 50.0-59.9, adult  BMI Readings from Last 3 Encounters:   06/20/22 61.31 kg/m²   05/05/22 60.99 kg/m²   02/28/22 61.63 kg/m²     The patient is asked to continue to make an attempt to improve diet and exercise patterns to aid in medical management of this problem.          Orders Placed This Encounter   Procedures    CPAP FOR HOME USE      Follow up in about 6 months (around 12/20/2022), or if symptoms worsen or fail to improve, for annual exam. or sooner as needed.

## 2022-06-21 RX ORDER — METOPROLOL SUCCINATE 50 MG/1
TABLET, EXTENDED RELEASE ORAL
Qty: 90 TABLET | Refills: 1 | Status: SHIPPED | OUTPATIENT
Start: 2022-06-21 | End: 2022-12-14

## 2022-06-21 NOTE — TELEPHONE ENCOUNTER
Refill Routing Note   Medication(s) are not appropriate for processing by [facility] for the following reason(s):      - Required vitals are abnormal    [facility] action(s):  Defer          Medication reconciliation completed: No     Appointments  past 12m or future 3m with PCP    Date Provider   Last Visit   6/20/2022 Riddhi Galindo MD   Next Visit   Visit date not found Riddhi Galindo MD   ED visits in past 90 days: 0        Note composed:1:33 PM 06/21/2022

## 2022-06-21 NOTE — TELEPHONE ENCOUNTER
No new care gaps identified.  [facility] Embedded Care Gaps. Reference number: [id number]. 6/20/2022   9:40:23 PM CDT

## 2022-07-07 ENCOUNTER — TELEPHONE (OUTPATIENT)
Dept: FAMILY MEDICINE | Facility: CLINIC | Age: 57
End: 2022-07-07
Payer: COMMERCIAL

## 2022-07-07 NOTE — TELEPHONE ENCOUNTER
----- Message from Riddhi Galindo MD sent at 7/6/2022  9:20 AM CDT -----  Regarding: please call patient for home BP reading/update chart  Please call patient for home BP reading and update with telephone encounter.

## 2022-08-05 ENCOUNTER — OFFICE VISIT (OUTPATIENT)
Dept: ENDOCRINOLOGY | Facility: CLINIC | Age: 57
End: 2022-08-05
Payer: COMMERCIAL

## 2022-08-05 VITALS
TEMPERATURE: 98 F | SYSTOLIC BLOOD PRESSURE: 152 MMHG | WEIGHT: 315 LBS | HEART RATE: 77 BPM | DIASTOLIC BLOOD PRESSURE: 88 MMHG | BODY MASS INDEX: 61.11 KG/M2

## 2022-08-05 DIAGNOSIS — E78.5 HYPERLIPIDEMIA, UNSPECIFIED HYPERLIPIDEMIA TYPE: ICD-10-CM

## 2022-08-05 DIAGNOSIS — E66.01 MORBID OBESITY, UNSPECIFIED OBESITY TYPE: ICD-10-CM

## 2022-08-05 PROCEDURE — 1159F MED LIST DOCD IN RCRD: CPT | Mod: CPTII,S$GLB,, | Performed by: NURSE PRACTITIONER

## 2022-08-05 PROCEDURE — 99999 PR PBB SHADOW E&M-EST. PATIENT-LVL V: ICD-10-PCS | Mod: PBBFAC,,, | Performed by: NURSE PRACTITIONER

## 2022-08-05 PROCEDURE — 3052F PR MOST RECENT HEMOGLOBIN A1C LEVEL 8.0 - < 9.0%: ICD-10-PCS | Mod: CPTII,S$GLB,, | Performed by: NURSE PRACTITIONER

## 2022-08-05 PROCEDURE — 3072F PR LOW RISK FOR RETINOPATHY: ICD-10-PCS | Mod: CPTII,S$GLB,, | Performed by: NURSE PRACTITIONER

## 2022-08-05 PROCEDURE — 3077F PR MOST RECENT SYSTOLIC BLOOD PRESSURE >= 140 MM HG: ICD-10-PCS | Mod: CPTII,S$GLB,, | Performed by: NURSE PRACTITIONER

## 2022-08-05 PROCEDURE — 1160F PR REVIEW ALL MEDS BY PRESCRIBER/CLIN PHARMACIST DOCUMENTED: ICD-10-PCS | Mod: CPTII,S$GLB,, | Performed by: NURSE PRACTITIONER

## 2022-08-05 PROCEDURE — 3008F PR BODY MASS INDEX (BMI) DOCUMENTED: ICD-10-PCS | Mod: CPTII,S$GLB,, | Performed by: NURSE PRACTITIONER

## 2022-08-05 PROCEDURE — 95251 CONT GLUC MNTR ANALYSIS I&R: CPT | Mod: S$GLB,,, | Performed by: NURSE PRACTITIONER

## 2022-08-05 PROCEDURE — 99214 PR OFFICE/OUTPT VISIT, EST, LEVL IV, 30-39 MIN: ICD-10-PCS | Mod: S$GLB,,, | Performed by: NURSE PRACTITIONER

## 2022-08-05 PROCEDURE — 99214 OFFICE O/P EST MOD 30 MIN: CPT | Mod: S$GLB,,, | Performed by: NURSE PRACTITIONER

## 2022-08-05 PROCEDURE — 1160F RVW MEDS BY RX/DR IN RCRD: CPT | Mod: CPTII,S$GLB,, | Performed by: NURSE PRACTITIONER

## 2022-08-05 PROCEDURE — 99999 PR PBB SHADOW E&M-EST. PATIENT-LVL V: CPT | Mod: PBBFAC,,, | Performed by: NURSE PRACTITIONER

## 2022-08-05 PROCEDURE — 3077F SYST BP >= 140 MM HG: CPT | Mod: CPTII,S$GLB,, | Performed by: NURSE PRACTITIONER

## 2022-08-05 PROCEDURE — 3008F BODY MASS INDEX DOCD: CPT | Mod: CPTII,S$GLB,, | Performed by: NURSE PRACTITIONER

## 2022-08-05 PROCEDURE — 3079F PR MOST RECENT DIASTOLIC BLOOD PRESSURE 80-89 MM HG: ICD-10-PCS | Mod: CPTII,S$GLB,, | Performed by: NURSE PRACTITIONER

## 2022-08-05 PROCEDURE — 1159F PR MEDICATION LIST DOCUMENTED IN MEDICAL RECORD: ICD-10-PCS | Mod: CPTII,S$GLB,, | Performed by: NURSE PRACTITIONER

## 2022-08-05 PROCEDURE — 3072F LOW RISK FOR RETINOPATHY: CPT | Mod: CPTII,S$GLB,, | Performed by: NURSE PRACTITIONER

## 2022-08-05 PROCEDURE — 3052F HG A1C>EQUAL 8.0%<EQUAL 9.0%: CPT | Mod: CPTII,S$GLB,, | Performed by: NURSE PRACTITIONER

## 2022-08-05 PROCEDURE — 3079F DIAST BP 80-89 MM HG: CPT | Mod: CPTII,S$GLB,, | Performed by: NURSE PRACTITIONER

## 2022-08-05 PROCEDURE — 95251 PR GLUCOSE MONITOR, 72 HOUR, PHYS INTERP: ICD-10-PCS | Mod: S$GLB,,, | Performed by: NURSE PRACTITIONER

## 2022-08-05 RX ORDER — TIRZEPATIDE 5 MG/.5ML
5 INJECTION, SOLUTION SUBCUTANEOUS
Qty: 2 ML | Refills: 3 | Status: SHIPPED | OUTPATIENT
Start: 2022-08-05 | End: 2022-09-12

## 2022-08-05 NOTE — PROGRESS NOTES
CC: This 57 y.o. Black or  male  is here for evaluation of  T2DM along with comorbidities indicated in the Visit Diagnosis section of this encounter.    HPI: David Corona Jr. was diagnosed with T2DM in 1991.  Medical history: TOSIN on cpap            Prior visit 5/2022  a1c is up from 7.2 to 8.9%.   He has been busy with his elderly mother who has been sick.   Diet was off in the last few months d/t helping out his family. Likes bedtime snacks. Intends to start new diet low carb.   Plan Discussed how to adjust Humalog dose based on carb content of foods. Make sure to lower Humalog dose or skip it if eating low carb. May need to take 20 units for large meals.   Improve diet.   No other changes to meds.   Return to clinic in 3 months with labs prior.      Interval history  No recent A1c available.   States he's been monitoring his diet better. Has not been adjusting Humalog based off meal content. Continues to take just 14 units before each meal.       LAST DIABETES EDUCATION: 11/2020    HOSPITALIZED FOR DIABETES OR RELATED COMPLICATIONS -  Yes - upon diagnosis.    SIGNIFICANT DIABETES MED HISTORY:   Metformin - diarrhea   Glimepiride stopped and Trulicity started at initial visit 10/2020  Declines VGo insulin delivery device   Farxiga started 10/2021    PRESCRIBED DIABETES MEDICATIONS:  Farxiga 10 mg once daily  pioglitazone 45 mg once daily  Trulicity 4.5 mg once weekly  Semglee 30 units hs  Humalog Kwikpen 14 units ac (will switch to Lyumjev when he runs out)     Misses medication doses - No    DM COMPLICATIONS: nephropathy    SELF MONITORING BLOOD GLUCOSE: Checks blood glucose at home with Malka with phone sae.   CGM interpretation: BGs similar to previous visit d/t carb/insulin mismatch. BGs best controlled overnight.            HYPOGLYCEMIC EPISODES: none       CURRENT DIET: eats 2 meals/day - no lunch. No cold drinks.     Diet recall: breakfast - biscuit with sweet tea.     CURRENT EXERCISE:  likes to go walking for 20 minutes a day.     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       BP (!) 152/88   Pulse 77   Temp 98.2 °F (36.8 °C)   Wt (!) 215.9 kg (476 lb)   BMI 61.11 kg/m²       ROS:   CONSTITUTIONAL: Appetite good, denies fatigue  GI: negative.   : NEGATIVE         PHYSICAL EXAM:  GENERAL: Well developed, well nourished. No acute distress.   PSYCH: AAOx3, appropriate mood and affect, conversant, well-groomed. Judgement and insight good.   NEURO: Cranial nerves grossly intact. Speech clear, no tremor.   CHEST: Respirations even and unlabored.        Hemoglobin A1C   Date Value Ref Range Status   04/16/2022 8.9 (H) 4.0 - 5.6 % Final     Comment:     ADA Screening Guidelines:  5.7-6.4%  Consistent with prediabetes  >or=6.5%  Consistent with diabetes    High levels of fetal hemoglobin interfere with the HbA1C  assay. Heterozygous hemoglobin variants (HbS, HgC, etc)do  not significantly interfere with this assay.   However, presence of multiple variants may affect accuracy.     01/15/2022 7.2 (H) 4.0 - 5.6 % Final     Comment:     ADA Screening Guidelines:  5.7-6.4%  Consistent with prediabetes  >or=6.5%  Consistent with diabetes    High levels of fetal hemoglobin interfere with the HbA1C  assay. Heterozygous hemoglobin variants (HbS, HgC, etc)do  not significantly interfere with this assay.   However, presence of multiple variants may affect accuracy.     09/25/2021 7.5 (H) 4.0 - 5.6 % Final     Comment:     ADA Screening Guidelines:  5.7-6.4%  Consistent with prediabetes  >or=6.5%  Consistent with diabetes    High levels of fetal hemoglobin interfere with the HbA1C  assay. Heterozygous hemoglobin variants (HbS, HgC, etc)do  not significantly interfere with this assay.   However, presence of multiple variants may affect accuracy.     04/27/2021 7.4 (H) 4.8 - 5.6 % Final     Comment:     [employer]           Chemistry         Component Value Date/Time     08/07/2021 0810    K 3.8 08/07/2021 0810     08/07/2021 0810    CO2 27 08/07/2021 0810    BUN 12 08/07/2021 0810    CREATININE 1.2 08/07/2021 0810     (H) 08/07/2021 0810        Component Value Date/Time    CALCIUM 9.0 08/07/2021 0810    ALKPHOS 129 08/07/2021 0810    AST 21 08/07/2021 0810    ALT 8 (L) 08/07/2021 0810    BILITOT 0.6 08/07/2021 0810    ESTGFRAFRICA >60 08/07/2021 0810    EGFRNONAA >60 08/07/2021 0810          Lab Results   Component Value Date    LDLCALC 69.2 04/16/2022      Latest Reference Range & Units 04/16/22 08:23   Cholesterol 120 - 199 mg/dL 137 [1]   HDL 40 - 75 mg/dL 50 [2]   HDL/Cholesterol Ratio 20.0 - 50.0 % 36.5   LDL Cholesterol External 63.0 - 159.0 mg/dL 69.2 [3]   Non-HDL Cholesterol mg/dL 87 [4]   Total Cholesterol/HDL Ratio 2.0 - 5.0  2.7   Triglycerides 30 - 150 mg/dL 89 [5]         Lab Results   Component Value Date    MICALBCREAT 16.7 05/25/2021         ASSESSMENT and PLAN:    A1C GOAL: < 7 %     1. Type 2 diabetes mellitus, uncontrolled, with neuropathy  Try Skin Tac and/or Simpatch to help with Malka sensor adhesion. You can find them on [employer].   Increase Humalog from 14 to 18 units before meals. (Ok to take 14 units for smaller meal).   Switch from Trulicity 4.5 mg to Mounjaro 5 mg once weekly. Contact office if you desire a higher dose of Mounjaro.   Continue Semglee, pioglitazone, and Farxiga.   Return to clinic in 3 months with labs prior.       Hemoglobin A1C   2. Morbid obesity, unspecified obesity type  Increases insulin resistance.      3. Hyperlipidemia, unspecified hyperlipidemia type  Continue pravastatin          Orders Placed This Encounter   Procedures    Hemoglobin A1C     Standing Status:   Future     Standing Expiration Date:   10/4/2023        Follow up in about 3 months (around 11/5/2022).

## 2022-08-05 NOTE — PATIENT INSTRUCTIONS
Try Skin Tac and/or Simpatch to help with Malka sensor adhesion. You can find them on [employer].   Increase Humalog from 14 to 18 units before meals. (Ok to take 14 units for smaller meal).   Switch from Trulicity 4.5 mg to Mounjaro 5 mg once weekly. Contact office if you desire a higher dose of Mounjaro.   Continue Semglee, pioglitazone, and Farxiga.   Return to clinic in 3 months with labs prior.

## 2022-08-06 DIAGNOSIS — M10.9 GOUT, UNSPECIFIED CAUSE, UNSPECIFIED CHRONICITY, UNSPECIFIED SITE: ICD-10-CM

## 2022-08-07 NOTE — TELEPHONE ENCOUNTER
Care Due:                  Date            Visit Type   Department     Provider  --------------------------------------------------------------------------------                                [facility]      MED/ INTERNAL  [facility] Candy  Last Visit: 06-      CARE ([facility])   MED/ PEDS      Juan Carloskelshazia Galindo                              [facility]      MED/ INTERNAL  Laurentia Candy  Next Visit: 12-      CARE ([facility])   MED/ PEDS      Juan Carloskeler  Josie                                                            Last  Test          Frequency    Reason                     Performed    Due Date  --------------------------------------------------------------------------------    CMP.........  12 months..  colchicine, furosemide,    08- 08-                             losartan, pravastatin....    Uric Acid...  12 months..  colchicine...............  Not Found    Overdue    [employer] Embedded Care Gaps. Reference number: [id number]. 8/06/2022   10:04:25 PM CDT

## 2022-08-08 RX ORDER — COLCHICINE 0.6 MG/1
TABLET ORAL
Qty: 90 TABLET | Refills: 1 | Status: SHIPPED | OUTPATIENT
Start: 2022-08-08 | End: 2022-11-07

## 2022-08-08 NOTE — TELEPHONE ENCOUNTER
Refill Routing Note   Medication(s) are not appropriate for processing by [facility] for the following reason(s):      - Required laboratory values are outdated    [facility] action(s):  Defer Medication-related problems identified: Requires labs     Medication Therapy Plan: needs CMP and uric acid  Medication reconciliation completed: No     Appointments  past 12m or future 3m with PCP    Date Provider   Last Visit   6/20/2022 Riddhi Galindo MD   Next Visit   12/21/2022 Riddhi Galindo MD   ED visits in past 90 days: 0        Note composed:9:34 PM 08/07/2022

## 2022-08-13 ENCOUNTER — LAB VISIT (OUTPATIENT)
Dept: LAB | Facility: HOSPITAL | Age: 57
End: 2022-08-13
Attending: NURSE PRACTITIONER
Payer: COMMERCIAL

## 2022-08-13 LAB
ALBUMIN/CREAT UR: 22.2 UG/MG (ref 0–30)
CREAT UR-MCNC: 108 MG/DL (ref 23–375)
MICROALBUMIN UR DL<=1MG/L-MCNC: 24 UG/ML

## 2022-08-13 PROCEDURE — 82570 ASSAY OF URINE CREATININE: CPT | Performed by: NURSE PRACTITIONER

## 2022-08-13 PROCEDURE — 82043 UR ALBUMIN QUANTITATIVE: CPT | Performed by: NURSE PRACTITIONER

## 2022-09-12 ENCOUNTER — TELEPHONE (OUTPATIENT)
Dept: ENDOCRINOLOGY | Facility: CLINIC | Age: 57
End: 2022-09-12
Payer: COMMERCIAL

## 2022-09-12 RX ORDER — TIRZEPATIDE 5 MG/.5ML
5 INJECTION, SOLUTION SUBCUTANEOUS
Qty: 4 PEN | Refills: 3 | Status: SHIPPED | OUTPATIENT
Start: 2022-09-12 | End: 2022-11-03

## 2022-09-30 ENCOUNTER — PATIENT MESSAGE (OUTPATIENT)
Dept: FAMILY MEDICINE | Facility: CLINIC | Age: 57
End: 2022-09-30
Payer: COMMERCIAL

## 2022-10-03 ENCOUNTER — OFFICE VISIT (OUTPATIENT)
Dept: OPHTHALMOLOGY | Facility: CLINIC | Age: 57
End: 2022-10-03
Payer: COMMERCIAL

## 2022-10-03 DIAGNOSIS — H52.7 REFRACTIVE ERROR: ICD-10-CM

## 2022-10-03 DIAGNOSIS — H25.12 NUCLEAR SCLEROSIS, LEFT: Primary | ICD-10-CM

## 2022-10-03 DIAGNOSIS — H26.491 PCO (POSTERIOR CAPSULAR OPACIFICATION), RIGHT: ICD-10-CM

## 2022-10-03 DIAGNOSIS — I10 ESSENTIAL HYPERTENSION: ICD-10-CM

## 2022-10-03 DIAGNOSIS — Z96.1 PSEUDOPHAKIA: ICD-10-CM

## 2022-10-03 DIAGNOSIS — E11.9 DM TYPE 2 WITHOUT RETINOPATHY: ICD-10-CM

## 2022-10-03 PROCEDURE — 3066F NEPHROPATHY DOC TX: CPT | Mod: CPTII,S$GLB,, | Performed by: OPHTHALMOLOGY

## 2022-10-03 PROCEDURE — 3061F NEG MICROALBUMINURIA REV: CPT | Mod: CPTII,S$GLB,, | Performed by: OPHTHALMOLOGY

## 2022-10-03 PROCEDURE — 2023F PR DILATED RETINAL EXAM W/O EVID OF RETINOPATHY: ICD-10-PCS | Mod: CPTII,S$GLB,, | Performed by: OPHTHALMOLOGY

## 2022-10-03 PROCEDURE — 2023F DILAT RTA XM W/O RTNOPTHY: CPT | Mod: CPTII,S$GLB,, | Performed by: OPHTHALMOLOGY

## 2022-10-03 PROCEDURE — 3066F PR DOCUMENTATION OF TREATMENT FOR NEPHROPATHY: ICD-10-PCS | Mod: CPTII,S$GLB,, | Performed by: OPHTHALMOLOGY

## 2022-10-03 PROCEDURE — 92014 PR EYE EXAM, EST PATIENT,COMPREHESV: ICD-10-PCS | Mod: S$GLB,,, | Performed by: OPHTHALMOLOGY

## 2022-10-03 PROCEDURE — 3052F PR MOST RECENT HEMOGLOBIN A1C LEVEL 8.0 - < 9.0%: ICD-10-PCS | Mod: CPTII,S$GLB,, | Performed by: OPHTHALMOLOGY

## 2022-10-03 PROCEDURE — 1160F RVW MEDS BY RX/DR IN RCRD: CPT | Mod: CPTII,S$GLB,, | Performed by: OPHTHALMOLOGY

## 2022-10-03 PROCEDURE — 1160F PR REVIEW ALL MEDS BY PRESCRIBER/CLIN PHARMACIST DOCUMENTED: ICD-10-PCS | Mod: CPTII,S$GLB,, | Performed by: OPHTHALMOLOGY

## 2022-10-03 PROCEDURE — 3052F HG A1C>EQUAL 8.0%<EQUAL 9.0%: CPT | Mod: CPTII,S$GLB,, | Performed by: OPHTHALMOLOGY

## 2022-10-03 PROCEDURE — 99999 PR PBB SHADOW E&M-EST. PATIENT-LVL III: CPT | Mod: PBBFAC,,, | Performed by: OPHTHALMOLOGY

## 2022-10-03 PROCEDURE — 1159F PR MEDICATION LIST DOCUMENTED IN MEDICAL RECORD: ICD-10-PCS | Mod: CPTII,S$GLB,, | Performed by: OPHTHALMOLOGY

## 2022-10-03 PROCEDURE — 1159F MED LIST DOCD IN RCRD: CPT | Mod: CPTII,S$GLB,, | Performed by: OPHTHALMOLOGY

## 2022-10-03 PROCEDURE — 3061F PR NEG MICROALBUMINURIA RESULT DOCUMENTED/REVIEW: ICD-10-PCS | Mod: CPTII,S$GLB,, | Performed by: OPHTHALMOLOGY

## 2022-10-03 PROCEDURE — 99999 PR PBB SHADOW E&M-EST. PATIENT-LVL III: ICD-10-PCS | Mod: PBBFAC,,, | Performed by: OPHTHALMOLOGY

## 2022-10-03 PROCEDURE — 92014 COMPRE OPH EXAM EST PT 1/>: CPT | Mod: S$GLB,,, | Performed by: OPHTHALMOLOGY

## 2022-10-03 NOTE — PROGRESS NOTES
Subjective:       Patient ID: David Corona Jr. is a 57 y.o. male.    Chief Complaint: Concerns About Ocular Health    HPI    DLS: 4/22/21    Pt here for yearly eye exam and states no changes or complaints since last   exam. Pt states vision is doing okay.    1. NS OS  2. PCO OD  3. Type 2 DM no DR  4. HTN Retinopathy OU  5. PCIOL OD  6. Refractive Error  Last edited by Meera Rivera MA on 10/3/2022  9:24 AM.             Assessment:       1. Nuclear sclerosis, left    2. PCO (posterior capsular opacification), right    3. DM type 2 without retinopathy    4. Essential hypertension    5. Refractive error - Both Eyes    6. Pseudophakia - Right Eye          Plan:       Cataract OS- Not visually significant per Pt.    Early PCO OD- Not Visually Significant.   DM-No NPDR OU.  HTN-No retinopathy OU.  RE-Pt wants MRx.      Control DM & HTN.  Give MRx.  RTC 1 yr.

## 2022-10-29 ENCOUNTER — LAB VISIT (OUTPATIENT)
Dept: LAB | Facility: HOSPITAL | Age: 57
End: 2022-10-29
Attending: NURSE PRACTITIONER
Payer: COMMERCIAL

## 2022-10-29 LAB
ALBUMIN SERPL BCP-MCNC: 3.3 G/DL (ref 3.5–5.2)
ALP SERPL-CCNC: 146 U/L (ref 55–135)
ALT SERPL W/O P-5'-P-CCNC: 10 U/L (ref 10–44)
ANION GAP SERPL CALC-SCNC: 10 MMOL/L (ref 8–16)
AST SERPL-CCNC: 28 U/L (ref 10–40)
BILIRUB SERPL-MCNC: 0.5 MG/DL (ref 0.1–1)
BUN SERPL-MCNC: 14 MG/DL (ref 6–20)
CALCIUM SERPL-MCNC: 9.1 MG/DL (ref 8.7–10.5)
CHLORIDE SERPL-SCNC: 105 MMOL/L (ref 95–110)
CO2 SERPL-SCNC: 25 MMOL/L (ref 23–29)
CREAT SERPL-MCNC: 1.4 MG/DL (ref 0.5–1.4)
EST. GFR  (NO RACE VARIABLE): 59 ML/MIN/1.73 M^2
ESTIMATED AVG GLUCOSE: 174 MG/DL (ref 68–131)
GLUCOSE SERPL-MCNC: 127 MG/DL (ref 70–110)
HBA1C MFR BLD: 7.7 % (ref 4–5.6)
POTASSIUM SERPL-SCNC: 4 MMOL/L (ref 3.5–5.1)
PROT SERPL-MCNC: 7.3 G/DL (ref 6–8.4)
SODIUM SERPL-SCNC: 140 MMOL/L (ref 136–145)

## 2022-10-29 PROCEDURE — 36415 COLL VENOUS BLD VENIPUNCTURE: CPT | Mod: PO | Performed by: INTERNAL MEDICINE

## 2022-10-29 PROCEDURE — 83036 HEMOGLOBIN GLYCOSYLATED A1C: CPT | Performed by: NURSE PRACTITIONER

## 2022-10-29 PROCEDURE — 80053 COMPREHEN METABOLIC PANEL: CPT | Performed by: INTERNAL MEDICINE

## 2022-11-03 ENCOUNTER — OFFICE VISIT (OUTPATIENT)
Dept: ENDOCRINOLOGY | Facility: CLINIC | Age: 57
End: 2022-11-03
Payer: COMMERCIAL

## 2022-11-03 VITALS
TEMPERATURE: 99 F | HEART RATE: 76 BPM | BODY MASS INDEX: 60.22 KG/M2 | SYSTOLIC BLOOD PRESSURE: 179 MMHG | WEIGHT: 315 LBS | DIASTOLIC BLOOD PRESSURE: 100 MMHG

## 2022-11-03 DIAGNOSIS — Z79.4 TYPE 2 DIABETES MELLITUS WITH HYPERGLYCEMIA, WITH LONG-TERM CURRENT USE OF INSULIN: Primary | ICD-10-CM

## 2022-11-03 DIAGNOSIS — E78.5 HYPERLIPIDEMIA, UNSPECIFIED HYPERLIPIDEMIA TYPE: ICD-10-CM

## 2022-11-03 DIAGNOSIS — E66.01 MORBID OBESITY, UNSPECIFIED OBESITY TYPE: ICD-10-CM

## 2022-11-03 DIAGNOSIS — I10 ESSENTIAL HYPERTENSION: ICD-10-CM

## 2022-11-03 DIAGNOSIS — E11.65 TYPE 2 DIABETES MELLITUS WITH HYPERGLYCEMIA, WITH LONG-TERM CURRENT USE OF INSULIN: Primary | ICD-10-CM

## 2022-11-03 PROCEDURE — 3061F PR NEG MICROALBUMINURIA RESULT DOCUMENTED/REVIEW: ICD-10-PCS | Mod: CPTII,S$GLB,, | Performed by: NURSE PRACTITIONER

## 2022-11-03 PROCEDURE — 3080F DIAST BP >= 90 MM HG: CPT | Mod: CPTII,S$GLB,, | Performed by: NURSE PRACTITIONER

## 2022-11-03 PROCEDURE — 3008F PR BODY MASS INDEX (BMI) DOCUMENTED: ICD-10-PCS | Mod: CPTII,S$GLB,, | Performed by: NURSE PRACTITIONER

## 2022-11-03 PROCEDURE — 95251 PR GLUCOSE MONITOR, 72 HOUR, PHYS INTERP: ICD-10-PCS | Mod: S$GLB,,, | Performed by: NURSE PRACTITIONER

## 2022-11-03 PROCEDURE — 99999 PR PBB SHADOW E&M-EST. PATIENT-LVL III: CPT | Mod: PBBFAC,,, | Performed by: NURSE PRACTITIONER

## 2022-11-03 PROCEDURE — 3008F BODY MASS INDEX DOCD: CPT | Mod: CPTII,S$GLB,, | Performed by: NURSE PRACTITIONER

## 2022-11-03 PROCEDURE — 3080F PR MOST RECENT DIASTOLIC BLOOD PRESSURE >= 90 MM HG: ICD-10-PCS | Mod: CPTII,S$GLB,, | Performed by: NURSE PRACTITIONER

## 2022-11-03 PROCEDURE — 3072F PR LOW RISK FOR RETINOPATHY: ICD-10-PCS | Mod: CPTII,S$GLB,, | Performed by: NURSE PRACTITIONER

## 2022-11-03 PROCEDURE — 3061F NEG MICROALBUMINURIA REV: CPT | Mod: CPTII,S$GLB,, | Performed by: NURSE PRACTITIONER

## 2022-11-03 PROCEDURE — 3077F SYST BP >= 140 MM HG: CPT | Mod: CPTII,S$GLB,, | Performed by: NURSE PRACTITIONER

## 2022-11-03 PROCEDURE — 99999 PR PBB SHADOW E&M-EST. PATIENT-LVL III: ICD-10-PCS | Mod: PBBFAC,,, | Performed by: NURSE PRACTITIONER

## 2022-11-03 PROCEDURE — 3051F PR MOST RECENT HEMOGLOBIN A1C LEVEL 7.0 - < 8.0%: ICD-10-PCS | Mod: CPTII,S$GLB,, | Performed by: NURSE PRACTITIONER

## 2022-11-03 PROCEDURE — 3051F HG A1C>EQUAL 7.0%<8.0%: CPT | Mod: CPTII,S$GLB,, | Performed by: NURSE PRACTITIONER

## 2022-11-03 PROCEDURE — 95251 CONT GLUC MNTR ANALYSIS I&R: CPT | Mod: S$GLB,,, | Performed by: NURSE PRACTITIONER

## 2022-11-03 PROCEDURE — 3072F LOW RISK FOR RETINOPATHY: CPT | Mod: CPTII,S$GLB,, | Performed by: NURSE PRACTITIONER

## 2022-11-03 PROCEDURE — 3066F NEPHROPATHY DOC TX: CPT | Mod: CPTII,S$GLB,, | Performed by: NURSE PRACTITIONER

## 2022-11-03 PROCEDURE — 99214 PR OFFICE/OUTPT VISIT, EST, LEVL IV, 30-39 MIN: ICD-10-PCS | Mod: S$GLB,,, | Performed by: NURSE PRACTITIONER

## 2022-11-03 PROCEDURE — 3066F PR DOCUMENTATION OF TREATMENT FOR NEPHROPATHY: ICD-10-PCS | Mod: CPTII,S$GLB,, | Performed by: NURSE PRACTITIONER

## 2022-11-03 PROCEDURE — 3077F PR MOST RECENT SYSTOLIC BLOOD PRESSURE >= 140 MM HG: ICD-10-PCS | Mod: CPTII,S$GLB,, | Performed by: NURSE PRACTITIONER

## 2022-11-03 PROCEDURE — 99214 OFFICE O/P EST MOD 30 MIN: CPT | Mod: S$GLB,,, | Performed by: NURSE PRACTITIONER

## 2022-11-03 RX ORDER — INSULIN GLARGINE-YFGN 100 [IU]/ML
INJECTION, SOLUTION SUBCUTANEOUS
Qty: 15 ML | Refills: 2 | Status: SHIPPED | OUTPATIENT
Start: 2022-11-03 | End: 2023-03-16

## 2022-11-03 RX ORDER — BLOOD-GLUCOSE SENSOR
EACH MISCELLANEOUS
Qty: 3 KIT | Refills: 11 | Status: SHIPPED | OUTPATIENT
Start: 2022-11-03 | End: 2023-03-16 | Stop reason: SDUPTHER

## 2022-11-03 RX ORDER — DAPAGLIFLOZIN 10 MG/1
10 TABLET, FILM COATED ORAL DAILY
Qty: 90 TABLET | Refills: 2 | Status: SHIPPED | OUTPATIENT
Start: 2022-11-03 | End: 2023-03-16 | Stop reason: SDUPTHER

## 2022-11-03 RX ORDER — INSULIN LISPRO-AABC 200 [IU]/ML
8 INJECTION, SOLUTION SUBCUTANEOUS
Qty: 5 PEN | Refills: 1 | Status: SHIPPED | OUTPATIENT
Start: 2022-11-03 | End: 2023-03-16

## 2022-11-03 RX ORDER — PIOGLITAZONEHYDROCHLORIDE 45 MG/1
TABLET ORAL
Qty: 90 TABLET | Refills: 2 | Status: SHIPPED | OUTPATIENT
Start: 2022-11-03 | End: 2023-07-03 | Stop reason: SDUPTHER

## 2022-11-03 RX ORDER — TIRZEPATIDE 7.5 MG/.5ML
7.5 INJECTION, SOLUTION SUBCUTANEOUS
Qty: 12 PEN | Refills: 0 | Status: SHIPPED | OUTPATIENT
Start: 2022-11-03 | End: 2023-02-20 | Stop reason: SDUPTHER

## 2022-11-03 NOTE — PATIENT INSTRUCTIONS
Continue Farxiga and pioglitazone.   Increase Mounjaro from 5 to 7.5 mg once weekly and then decrease Humalog from 14 to 8 units before meals.   Skip Humalog if blood sugar less than 90.   Reduce Semglee from 30 to 20 units once daily.     If blood sugars keep dropping less than 80, then contact clinic.   Change from Malka 2 to Malka 3 CGM sensors.   Return to clinic in 3 months with labs prior.     BP goal is at least < 140/90, ideally < 130/80

## 2022-11-03 NOTE — PROGRESS NOTES
CC: This 57 y.o. Black or  male  is here for evaluation of  T2DM along with comorbidities indicated in the Visit Diagnosis section of this encounter.    HPI: David Corona Jr. was diagnosed with T2DM in 1991.  Medical history: TOSIN on cpap        Prior visit 8/5/22  No recent A1c available.   States he's been monitoring his diet better. Has not been adjusting Humalog based off meal content. Continues to take just 14 units before each meal.   Plan Try Skin Tac and/or Simpatch to help with Malka sensor adhesion. You can find them on [employer].   Increase Humalog from 14 to 18 units before meals. (Ok to take 14 units for smaller meal).   Switch from Trulicity 4.5 mg to Mounjaro 5 mg once weekly. Contact office if you desire a higher dose of Mounjaro.   Continue Semglee, pioglitazone, and Farxiga.   Return to clinic in 3 months with labs prior.     Interval history  A1c is down from 8.5 to 7.7%.   Pt started Mounjaro last month and has noted it's much better at lowering BGs compared to Trulicity. Tolerating it well. He is averaging Humalog 14 units ac, not skipping doses.   7 lb weight loss since lov.   He has not been eating as much, appetite is lower.       LAST DIABETES EDUCATION: 11/2020    HOSPITALIZED FOR DIABETES OR RELATED COMPLICATIONS -  Yes - upon diagnosis.    SIGNIFICANT DIABETES MED HISTORY:   Metformin - diarrhea   Glimepiride stopped and Trulicity started at initial visit 10/2020  Declines VGo insulin delivery device   Farxiga started 10/2021    PRESCRIBED DIABETES MEDICATIONS:  Farxiga 10 mg once daily  pioglitazone 45 mg once daily  Mounjaro 5 mg once weekly  Semglee 30 units hs  Humalog Kwikpen 14 units ac (will switch to Lyumjev when he runs out)     Misses medication doses - No    DM COMPLICATIONS: nephropathy    SELF MONITORING BLOOD GLUCOSE: Checks blood glucose at home with Malka with phone sae.   CGM interpretation: BGs very stable, BGs as low as 60s. Occasionally BGs after meals  into the 200s.             HYPOGLYCEMIC EPISODES: denies symptoms in the 70s. No BGs < 70.        CURRENT DIET: eats 2 meals/day - no lunch. No cold drinks.       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       BP (!) 179/100   Pulse 76   Temp 98.5 °F (36.9 °C)   Wt (!) 212.7 kg (469 lb)   BMI 60.22 kg/m²       ROS:   CONSTITUTIONAL: Appetite good, denies fatigue  GI: Denies n/v, constipation, or diarrhea.           PHYSICAL EXAM:  GENERAL: Well developed, well nourished. No acute distress.   PSYCH: AAOx3, appropriate mood and affect, conversant, well-groomed. Judgement and insight good.   NEURO: Cranial nerves grossly intact. Speech clear, no tremor.   CHEST: Respirations even and unlabored.        Hemoglobin A1C   Date Value Ref Range Status   10/29/2022 7.7 (H) 4.0 - 5.6 % Final     Comment:     ADA Screening Guidelines:  5.7-6.4%  Consistent with prediabetes  >or=6.5%  Consistent with diabetes    High levels of fetal hemoglobin interfere with the HbA1C  assay. Heterozygous hemoglobin variants (HbS, HgC, etc)do  not significantly interfere with this assay.   However, presence of multiple variants may affect accuracy.     08/13/2022 8.5 (H) 4.0 - 5.6 % Final     Comment:     ADA Screening Guidelines:  5.7-6.4%  Consistent with prediabetes  >or=6.5%  Consistent with diabetes    High levels of fetal hemoglobin interfere with the HbA1C  assay. Heterozygous hemoglobin variants (HbS, HgC, etc)do  not significantly interfere with this assay.   However, presence of multiple variants may affect accuracy.     04/16/2022 8.9 (H) 4.0 - 5.6 % Final     Comment:     ADA Screening Guidelines:  5.7-6.4%  Consistent with prediabetes  >or=6.5%  Consistent with diabetes    High levels of fetal hemoglobin interfere with the HbA1C  assay. Heterozygous hemoglobin variants (HbS, HgC, etc)do  not  significantly interfere with this assay.   However, presence of multiple variants may affect accuracy.     04/27/2021 7.4 (H) 4.8 - 5.6 % Final     Comment:     Datanomic           Chemistry        Component Value Date/Time     10/29/2022 0813    K 4.0 10/29/2022 0813     10/29/2022 0813    CO2 25 10/29/2022 0813    BUN 14 10/29/2022 0813    CREATININE 1.4 10/29/2022 0813     (H) 10/29/2022 0813        Component Value Date/Time    CALCIUM 9.1 10/29/2022 0813    ALKPHOS 146 (H) 10/29/2022 0813    AST 28 10/29/2022 0813    ALT 10 10/29/2022 0813    BILITOT 0.5 10/29/2022 0813    ESTGFRAFRICA >60 08/07/2021 0810    EGFRNONAA >60 08/07/2021 0810          Lab Results   Component Value Date    LDLCALC 69.2 04/16/2022      Latest Reference Range & Units 04/16/22 08:23   Cholesterol 120 - 199 mg/dL 137 [1]   HDL 40 - 75 mg/dL 50 [2]   HDL/Cholesterol Ratio 20.0 - 50.0 % 36.5   LDL Cholesterol External 63.0 - 159.0 mg/dL 69.2 [3]   Non-HDL Cholesterol mg/dL 87 [4]   Total Cholesterol/HDL Ratio 2.0 - 5.0  2.7   Triglycerides 30 - 150 mg/dL 89 [5]         Lab Results   Component Value Date    MICALBCREAT 22.2 08/13/2022         ASSESSMENT and PLAN:    A1C GOAL: < 7 %     1. Type 2 diabetes mellitus with hyperglycemia, with long-term current use of insulin  Pt has had significant progress with Mounjaro. Advised:     Continue Farxiga and pioglitazone.   Increase Mounjaro from 5 to 7.5 mg once weekly and then decrease Humalog from 14 to 8 units before meals.   Skip Humalog if blood sugar less than 90.   Reduce Semglee from 30 to 20 units once daily.     If blood sugars keep dropping less than 80, then contact clinic.   Change from Malka 2 to Malka 3 CGM sensors.   Return to clinic in 3 months with labs prior.     Hemoglobin A1C    dapagliflozin (FARXIGA) 10 mg tablet    insulin lispro-aabc (LYUMJEV KWIKPEN U-200 INSULIN) 200 unit/mL (3 mL) pen      2. Hyperlipidemia, unspecified hyperlipidemia type   Continue pravastatin       3. Essential hypertension  Pt has not been taking losartan, needs to resume.   Advised him BP goal is at least < 140/90, ideally < 130/80      4. Morbid obesity, unspecified obesity type  tirzepatide (MOUNJARO) 7.5 mg/0.5 mL PnIj          Orders Placed This Encounter   Procedures    Hemoglobin A1C     Standing Status:   Future     Standing Expiration Date:   1/2/2024          Follow up in about 3 months (around 2/3/2023).

## 2022-11-17 ENCOUNTER — TELEPHONE (OUTPATIENT)
Dept: PHARMACY | Facility: CLINIC | Age: 57
End: 2022-11-17
Payer: COMMERCIAL

## 2022-12-06 ENCOUNTER — PATIENT MESSAGE (OUTPATIENT)
Dept: ENDOCRINOLOGY | Facility: CLINIC | Age: 57
End: 2022-12-06
Payer: COMMERCIAL

## 2023-01-11 ENCOUNTER — PATIENT MESSAGE (OUTPATIENT)
Dept: ENDOCRINOLOGY | Facility: CLINIC | Age: 58
End: 2023-01-11
Payer: COMMERCIAL

## 2023-01-15 DIAGNOSIS — E78.5 HYPERLIPIDEMIA LDL GOAL <100: ICD-10-CM

## 2023-01-15 NOTE — TELEPHONE ENCOUNTER
Care Due:                  Date            Visit Type   Department     Provider  --------------------------------------------------------------------------------                                [facility]                              PRIMARY      MED/ INTERNAL  [facility] Candy  Last Visit: 06-      CARE ([facility])   MED/ PEDS      Maryuri Galindo                              [facility]/ INTERNAL  Laurentia Candy  Next Visit: 02-      CARE ([facility])   MED/ PEDS      Joseer  Josie                                                            Last  Test          Frequency    Reason                     Performed    Due Date  --------------------------------------------------------------------------------    Lipid Panel.  12 months..  pravastatin..............  04- 04-    Uric Acid...  12 months..  colchicine...............  Not Found    Overdue    [employer] Embedded Care Gaps. Reference number: [id number]. 1/15/2023   3:37:48 AM CST

## 2023-01-16 RX ORDER — PRAVASTATIN SODIUM 20 MG/1
TABLET ORAL
Qty: 90 TABLET | Refills: 0 | Status: SHIPPED | OUTPATIENT
Start: 2023-01-16 | End: 2023-04-10

## 2023-01-16 NOTE — TELEPHONE ENCOUNTER
Refill Decision Note   David Corona  is requesting a refill authorization.  Brief Assessment and Rationale for Refill:  Approve    -Medication-Related Problems Identified: Requires labs  Medication Therapy Plan:       Medication Reconciliation Completed: No   Comments:     Provider Staff:     Action is required for this patient.   Please see care gap opportunities below in Care Due Message.     Thanks!  [facility]     Appointments      Date Provider   Last Visit   6/20/2022 Riddhi Galindo MD   Next Visit   2/27/2023 Riddhi Galindo MD     Note composed:12:47 PM 01/16/2023           Note composed:12:47 PM 01/16/2023

## 2023-02-06 ENCOUNTER — LAB VISIT (OUTPATIENT)
Dept: LAB | Facility: HOSPITAL | Age: 58
End: 2023-02-06
Attending: NURSE PRACTITIONER
Payer: COMMERCIAL

## 2023-02-06 DIAGNOSIS — Z79.4 TYPE 2 DIABETES MELLITUS WITH HYPERGLYCEMIA, WITH LONG-TERM CURRENT USE OF INSULIN: ICD-10-CM

## 2023-02-06 DIAGNOSIS — E11.65 TYPE 2 DIABETES MELLITUS WITH HYPERGLYCEMIA, WITH LONG-TERM CURRENT USE OF INSULIN: ICD-10-CM

## 2023-02-06 LAB
ESTIMATED AVG GLUCOSE: 151 MG/DL (ref 68–131)
HBA1C MFR BLD: 6.9 % (ref 4–5.6)

## 2023-02-06 PROCEDURE — 36415 COLL VENOUS BLD VENIPUNCTURE: CPT | Mod: PO | Performed by: NURSE PRACTITIONER

## 2023-02-06 PROCEDURE — 83036 HEMOGLOBIN GLYCOSYLATED A1C: CPT | Performed by: NURSE PRACTITIONER

## 2023-02-20 DIAGNOSIS — E66.01 MORBID OBESITY, UNSPECIFIED OBESITY TYPE: ICD-10-CM

## 2023-02-20 RX ORDER — TIRZEPATIDE 7.5 MG/.5ML
7.5 INJECTION, SOLUTION SUBCUTANEOUS
Qty: 12 PEN | Refills: 0 | Status: SHIPPED | OUTPATIENT
Start: 2023-02-20 | End: 2023-03-16

## 2023-03-16 ENCOUNTER — OFFICE VISIT (OUTPATIENT)
Dept: ENDOCRINOLOGY | Facility: CLINIC | Age: 58
End: 2023-03-16
Payer: COMMERCIAL

## 2023-03-16 VITALS
TEMPERATURE: 98 F | SYSTOLIC BLOOD PRESSURE: 161 MMHG | DIASTOLIC BLOOD PRESSURE: 94 MMHG | WEIGHT: 315 LBS | BODY MASS INDEX: 61.76 KG/M2 | HEART RATE: 71 BPM

## 2023-03-16 DIAGNOSIS — Z79.4 TYPE 2 DIABETES MELLITUS WITHOUT COMPLICATION, WITH LONG-TERM CURRENT USE OF INSULIN: Primary | ICD-10-CM

## 2023-03-16 DIAGNOSIS — E11.9 TYPE 2 DIABETES MELLITUS WITHOUT COMPLICATION, WITH LONG-TERM CURRENT USE OF INSULIN: Primary | ICD-10-CM

## 2023-03-16 DIAGNOSIS — I10 ESSENTIAL HYPERTENSION: ICD-10-CM

## 2023-03-16 DIAGNOSIS — E66.01 MORBID OBESITY, UNSPECIFIED OBESITY TYPE: ICD-10-CM

## 2023-03-16 PROCEDURE — 3077F PR MOST RECENT SYSTOLIC BLOOD PRESSURE >= 140 MM HG: ICD-10-PCS | Mod: CPTII,S$GLB,, | Performed by: NURSE PRACTITIONER

## 2023-03-16 PROCEDURE — 3044F HG A1C LEVEL LT 7.0%: CPT | Mod: CPTII,S$GLB,, | Performed by: NURSE PRACTITIONER

## 2023-03-16 PROCEDURE — 1159F MED LIST DOCD IN RCRD: CPT | Mod: CPTII,S$GLB,, | Performed by: NURSE PRACTITIONER

## 2023-03-16 PROCEDURE — 99999 PR PBB SHADOW E&M-EST. PATIENT-LVL IV: CPT | Mod: PBBFAC,,, | Performed by: NURSE PRACTITIONER

## 2023-03-16 PROCEDURE — 99214 OFFICE O/P EST MOD 30 MIN: CPT | Mod: S$GLB,,, | Performed by: NURSE PRACTITIONER

## 2023-03-16 PROCEDURE — 4010F ACE/ARB THERAPY RXD/TAKEN: CPT | Mod: CPTII,S$GLB,, | Performed by: NURSE PRACTITIONER

## 2023-03-16 PROCEDURE — 3072F PR LOW RISK FOR RETINOPATHY: ICD-10-PCS | Mod: CPTII,S$GLB,, | Performed by: NURSE PRACTITIONER

## 2023-03-16 PROCEDURE — 95251 CONT GLUC MNTR ANALYSIS I&R: CPT | Mod: S$GLB,,, | Performed by: NURSE PRACTITIONER

## 2023-03-16 PROCEDURE — 3044F PR MOST RECENT HEMOGLOBIN A1C LEVEL <7.0%: ICD-10-PCS | Mod: CPTII,S$GLB,, | Performed by: NURSE PRACTITIONER

## 2023-03-16 PROCEDURE — 3008F PR BODY MASS INDEX (BMI) DOCUMENTED: ICD-10-PCS | Mod: CPTII,S$GLB,, | Performed by: NURSE PRACTITIONER

## 2023-03-16 PROCEDURE — 3077F SYST BP >= 140 MM HG: CPT | Mod: CPTII,S$GLB,, | Performed by: NURSE PRACTITIONER

## 2023-03-16 PROCEDURE — 95251 PR GLUCOSE MONITOR, 72 HOUR, PHYS INTERP: ICD-10-PCS | Mod: S$GLB,,, | Performed by: NURSE PRACTITIONER

## 2023-03-16 PROCEDURE — 99999 PR PBB SHADOW E&M-EST. PATIENT-LVL IV: ICD-10-PCS | Mod: PBBFAC,,, | Performed by: NURSE PRACTITIONER

## 2023-03-16 PROCEDURE — 1160F PR REVIEW ALL MEDS BY PRESCRIBER/CLIN PHARMACIST DOCUMENTED: ICD-10-PCS | Mod: CPTII,S$GLB,, | Performed by: NURSE PRACTITIONER

## 2023-03-16 PROCEDURE — 99214 PR OFFICE/OUTPT VISIT, EST, LEVL IV, 30-39 MIN: ICD-10-PCS | Mod: S$GLB,,, | Performed by: NURSE PRACTITIONER

## 2023-03-16 PROCEDURE — 1160F RVW MEDS BY RX/DR IN RCRD: CPT | Mod: CPTII,S$GLB,, | Performed by: NURSE PRACTITIONER

## 2023-03-16 PROCEDURE — 1159F PR MEDICATION LIST DOCUMENTED IN MEDICAL RECORD: ICD-10-PCS | Mod: CPTII,S$GLB,, | Performed by: NURSE PRACTITIONER

## 2023-03-16 PROCEDURE — 3072F LOW RISK FOR RETINOPATHY: CPT | Mod: CPTII,S$GLB,, | Performed by: NURSE PRACTITIONER

## 2023-03-16 PROCEDURE — 3080F PR MOST RECENT DIASTOLIC BLOOD PRESSURE >= 90 MM HG: ICD-10-PCS | Mod: CPTII,S$GLB,, | Performed by: NURSE PRACTITIONER

## 2023-03-16 PROCEDURE — 4010F PR ACE/ARB THEARPY RXD/TAKEN: ICD-10-PCS | Mod: CPTII,S$GLB,, | Performed by: NURSE PRACTITIONER

## 2023-03-16 PROCEDURE — 3008F BODY MASS INDEX DOCD: CPT | Mod: CPTII,S$GLB,, | Performed by: NURSE PRACTITIONER

## 2023-03-16 PROCEDURE — 3080F DIAST BP >= 90 MM HG: CPT | Mod: CPTII,S$GLB,, | Performed by: NURSE PRACTITIONER

## 2023-03-16 RX ORDER — INSULIN GLARGINE-YFGN 100 [IU]/ML
INJECTION, SOLUTION SUBCUTANEOUS
Qty: 15 ML | Refills: 2 | Status: SHIPPED | OUTPATIENT
Start: 2023-03-16 | End: 2023-04-05 | Stop reason: CLARIF

## 2023-03-16 RX ORDER — DAPAGLIFLOZIN 10 MG/1
10 TABLET, FILM COATED ORAL DAILY
Qty: 90 TABLET | Refills: 2 | Status: SHIPPED | OUTPATIENT
Start: 2023-03-16 | End: 2023-04-12 | Stop reason: CLARIF

## 2023-03-16 RX ORDER — BLOOD-GLUCOSE SENSOR
EACH MISCELLANEOUS
Qty: 3 EACH | Refills: 11 | Status: SHIPPED | OUTPATIENT
Start: 2023-03-16 | End: 2024-03-19 | Stop reason: SDUPTHER

## 2023-03-16 RX ORDER — INSULIN LISPRO 100 [IU]/ML
INJECTION, SOLUTION INTRAVENOUS; SUBCUTANEOUS
COMMUNITY
End: 2023-07-03 | Stop reason: SDUPTHER

## 2023-03-16 RX ORDER — TIRZEPATIDE 10 MG/.5ML
INJECTION, SOLUTION SUBCUTANEOUS
Qty: 4 PEN | Refills: 0 | Status: SHIPPED | OUTPATIENT
Start: 2023-03-16 | End: 2023-06-05 | Stop reason: ALTCHOICE

## 2023-03-16 RX ORDER — TIRZEPATIDE 12.5 MG/.5ML
INJECTION, SOLUTION SUBCUTANEOUS
Qty: 4 PEN | Refills: 2 | Status: SHIPPED | OUTPATIENT
Start: 2023-03-16 | End: 2023-07-03

## 2023-03-16 NOTE — PROGRESS NOTES
CC: This 57 y.o. Black or  male  is here for evaluation of  T2DM along with comorbidities indicated in the Visit Diagnosis section of this encounter.    HPI: David Corona Jr. was diagnosed with T2DM in 1991.  Medical history: TOSIN on cpap      Prior visit 11/2022  A1c is down from 8.5 to 7.7%.   Pt started Mounjaro last month and has noted it's much better at lowering BGs compared to Trulicity. Tolerating it well. He is averaging Humalog 14 units ac, not skipping doses.   7 lb weight loss since lov.   He has not been eating as much, appetite is lower.   Plan Pt has had significant progress with Mounjaro. Advised:   Continue Farxiga and pioglitazone.   Increase Mounjaro from 5 to 7.5 mg once weekly and then decrease Humalog from 14 to 8 units before meals.   Skip Humalog if blood sugar less than 90.   Reduce Semglee from 30 to 20 units once daily.   If blood sugars keep dropping less than 80, then contact clinic.   Change from Malka 2 to Malka 3 CGM sensors.   Return to clinic in 3 months with labs prior.       Interval hx  A1c is down from 7.7 to 6.9%, the lowest it's been in years.   He has gained 12 lb since lov however.   Admits he's been snacking. Mounjaro has not significantly cut his appetite. 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   Needs a PA for Libre2 and Farxiga.     He is taking Semglee 30 units and Humalog 10 units ac, not 20 and 8 units ac as advised at lov.     Pt unsure about pursuing bariatric sx.         LAST DIABETES EDUCATION: 11/2020    HOSPITALIZED FOR DIABETES OR RELATED COMPLICATIONS -  Yes - upon diagnosis.    SIGNIFICANT DIABETES MED HISTORY:   Metformin - diarrhea   Glimepiride stopped and Trulicity started at initial visit 10/2020; switched from Trulicity to Mounjaro 8/2022  Declines VGo insulin delivery device   Farxiga started 10/2021      PRESCRIBED DIABETES MEDICATIONS:  Farxiga 10 mg once daily  pioglitazone 45 mg once  daily  Mounjaro 7.5 mg once weekly  Semglee 30 units hs  Humalog Kwikpen 14 units ac (will switch to Lyumjev when he runs out)     Misses medication doses - No  Pt takes Humalog 0-2x/day. If he does take Humalog  it's before breakfast.     DM COMPLICATIONS: nephropathy    SELF MONITORING BLOOD GLUCOSE: Checks blood glucose at home with Malka with phone sae.   CGM interpretation: BGs very stable with minimal postprandial excursions; no hypoglycemia.               HYPOGLYCEMIC EPISODES: none. No BGs < 70.        CURRENT DIET: eats 2 meals/day - no lunch. No cold drinks.       CURRENT EXERCISE: will resume walking.       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       BP (!) 161/94   Pulse 71   Temp 98 °F (36.7 °C)   Wt (!) 218.2 kg (481 lb)   BMI 61.76 kg/m²       ROS:   CONSTITUTIONAL: Appetite good, denies fatigue  GI: Denies n/v, constipation, or diarrhea.   Other: denies edema or shortness of breath         PHYSICAL EXAM:  GENERAL: Well developed, well nourished. No acute distress.   PSYCH: AAOx3, appropriate mood and affect, conversant, well-groomed. Judgement and insight good.   NEURO: Cranial nerves grossly intact. Speech clear, no tremor.   CHEST: Respirations even and unlabored.        Hemoglobin A1C   Date Value Ref Range Status   02/06/2023 6.9 (H) 4.0 - 5.6 % Final     Comment:     ADA Screening Guidelines:  5.7-6.4%  Consistent with prediabetes  >or=6.5%  Consistent with diabetes    High levels of fetal hemoglobin interfere with the HbA1C  assay. Heterozygous hemoglobin variants (HbS, HgC, etc)do  not significantly interfere with this assay.   However, presence of multiple variants may affect accuracy.     10/29/2022 7.7 (H) 4.0 - 5.6 % Final     Comment:     ADA Screening Guidelines:  5.7-6.4%  Consistent with prediabetes  >or=6.5%  Consistent with diabetes    High levels of fetal hemoglobin interfere with the HbA1C  assay.  Heterozygous hemoglobin variants (HbS, HgC, etc)do  not significantly interfere with this assay.   However, presence of multiple variants may affect accuracy.     08/13/2022 8.5 (H) 4.0 - 5.6 % Final     Comment:     ADA Screening Guidelines:  5.7-6.4%  Consistent with prediabetes  >or=6.5%  Consistent with diabetes    High levels of fetal hemoglobin interfere with the HbA1C  assay. Heterozygous hemoglobin variants (HbS, HgC, etc)do  not significantly interfere with this assay.   However, presence of multiple variants may affect accuracy.     04/27/2021 7.4 (H) 4.8 - 5.6 % Final     Comment:     [email or web address]           Chemistry        Component Value Date/Time     10/29/2022 0813    K 4.0 10/29/2022 0813     10/29/2022 0813    CO2 25 10/29/2022 0813    BUN 14 10/29/2022 0813    CREATININE 1.4 10/29/2022 0813     (H) 10/29/2022 0813        Component Value Date/Time    CALCIUM 9.1 10/29/2022 0813    ALKPHOS 146 (H) 10/29/2022 0813    AST 28 10/29/2022 0813    ALT 10 10/29/2022 0813    BILITOT 0.5 10/29/2022 0813    ESTGFRAFRICA >60 08/07/2021 0810    EGFRNONAA >60 08/07/2021 0810          Lab Results   Component Value Date    LDLCALC 69.2 04/16/2022      Latest Reference Range & Units 04/16/22 08:23   Cholesterol 120 - 199 mg/dL 137 [1]   HDL 40 - 75 mg/dL 50 [2]   HDL/Cholesterol Ratio 20.0 - 50.0 % 36.5   LDL Cholesterol External 63.0 - 159.0 mg/dL 69.2 [3]   Non-HDL Cholesterol mg/dL 87 [4]   Total Cholesterol/HDL Ratio 2.0 - 5.0  2.7   Triglycerides 30 - 150 mg/dL 89 [5]         Lab Results   Component Value Date    MICALBCREAT 22.2 08/13/2022         ASSESSMENT and PLAN:    A1C GOAL: < 7 %     1. Type 2 diabetes mellitus without complication, with long-term current use of insulin  Increase Mounjaro from 7.5 mg to 10 mg once weekly for 1 month. After a month, if no severe nausea or diarrhea, then increase to 12.5 mg once weekly.   Continue Semglee at 30  units once daily but reduce to 24  units when Mounjaro is increased to 12.5 mg once weekly.   Stop Humalog fixed dosing before meals. Take Humalog based off correction scale: 150-200=+2, 201-250=+4; 251-300=+6; 301-350=+8, over 350=+10 units    Continue Farxiga and pioglitazone.     Return to clinic in 3 months with labs prior.     dapagliflozin (FARXIGA) 10 mg tablet    Hemoglobin A1C    Lipid Panel    tirzepatide (MOUNJARO) 10 mg/0.5 mL PnIj    tirzepatide (MOUNJARO) 12.5 mg/0.5 mL PnIj      2. Essential hypertension  Message sent to PCP       3. Morbid obesity, unspecified obesity type  tirzepatide (MOUNJARO) 10 mg/0.5 mL PnIj    tirzepatide (MOUNJARO) 12.5 mg/0.5 mL PnIj              Orders Placed This Encounter   Procedures    Hemoglobin A1C     Standing Status:   Future     Standing Expiration Date:   5/14/2024    Lipid Panel     Standing Status:   Future     Standing Expiration Date:   3/15/2024          Follow up in about 3 months (around 6/16/2023).

## 2023-03-16 NOTE — Clinical Note
Jose Galindo, wanted to let you know that his BP continues to be high, SBPs usually > 150.  Thanks, Chiquis

## 2023-03-16 NOTE — PATIENT INSTRUCTIONS
Increase Mounjaro from 7.5 mg to 10 mg once weekly for 1 month. After a month, if no severe nausea or diarrhea, then increase to 12.5 mg once weekly.   Continue Semglee at 30  units once daily but reduce to 24 units when Mounjaro is increased to 12.5 mg once weekly.   Stop Humalog fixed dosing before meals. Take Humalog based off correction scale: 150-200=+2, 201-250=+4; 251-300=+6; 301-350=+8, over 350=+10 units    Continue Farxiga and pioglitazone.     Return to clinic in 3 months with labs prior.

## 2023-03-20 ENCOUNTER — TELEPHONE (OUTPATIENT)
Dept: FAMILY MEDICINE | Facility: CLINIC | Age: 58
End: 2023-03-20
Payer: COMMERCIAL

## 2023-03-30 ENCOUNTER — CLINICAL SUPPORT (OUTPATIENT)
Dept: FAMILY MEDICINE | Facility: CLINIC | Age: 58
End: 2023-03-30
Payer: COMMERCIAL

## 2023-03-30 ENCOUNTER — TELEPHONE (OUTPATIENT)
Dept: FAMILY MEDICINE | Facility: CLINIC | Age: 58
End: 2023-03-30

## 2023-03-30 VITALS — SYSTOLIC BLOOD PRESSURE: 170 MMHG | HEART RATE: 92 BPM | DIASTOLIC BLOOD PRESSURE: 86 MMHG

## 2023-03-30 VITALS — SYSTOLIC BLOOD PRESSURE: 146 MMHG | DIASTOLIC BLOOD PRESSURE: 80 MMHG

## 2023-03-30 DIAGNOSIS — I10 ESSENTIAL HYPERTENSION: Primary | ICD-10-CM

## 2023-03-30 PROCEDURE — 99999 PR PBB SHADOW E&M-EST. PATIENT-LVL II: CPT | Mod: PBBFAC,,,

## 2023-03-30 PROCEDURE — 99999 PR PBB SHADOW E&M-EST. PATIENT-LVL II: ICD-10-PCS | Mod: PBBFAC,,,

## 2023-03-30 NOTE — TELEPHONE ENCOUNTER
----- Message from Zahira Minesh sent at 3/30/2023 10:09 AM CDT -----  Regarding: [phone]  Type: Patient Call Back    Who called:self    What is the request in detail: pt states he is notifying the doctor and nurse in regards to his most recent b/p check . He states his blood pressure is 146/80    Can the clinic reply by [facility]?no    Would the patient rather a call back or a response via [facility]? Call back    Best call back number:[phone]      Additional Information:

## 2023-03-30 NOTE — PROGRESS NOTES
Please let patient know that I am concerned about his overall health and his BP is too high. I have placed a referral for him to see cardiology to address his BP.

## 2023-03-30 NOTE — TELEPHONE ENCOUNTER
Please see my other message - I have placed a referral for cardiology to address his BP and get it under control.

## 2023-03-30 NOTE — PROGRESS NOTES
David Corona . 57 y.o. male is here today for Blood Pressure check.   History of HTN yes.    Review of patient's allergies indicates:  No Known Allergies  Creatinine   Date Value Ref Range Status   10/29/2022 1.4 0.5 - 1.4 mg/dL Final     Sodium   Date Value Ref Range Status   10/29/2022 140 136 - 145 mmol/L Final     Potassium   Date Value Ref Range Status   10/29/2022 4.0 3.5 - 5.1 mmol/L Final   ]  Patient verifies taking blood pressure medications on a regular basis at the same time of the day.     Current Outpatient Medications:     aspirin (ECOTRIN) 81 MG EC tablet, Take 1 tablet (81 mg total) by mouth once daily., Disp: 90 tablet, Rfl: 3    blood pressure monitor Kit, Test BP as needed., Disp: 1 each, Rfl: 0    blood sugar diagnostic Strp, ONE TOUCH BLOOD GLUCOSE STRIPS  Use to test blood sugars three times daily., Disp: 300 each, Rfl: 3    blood-glucose meter kit, 1 each by Other route 3 (three) times daily. One Touch Glucose meter kit, Disp: 1 each, Rfl: 0    blood-glucose sensor (FREESTYLE JAYLEN 3 SENSOR) [employer]care, Change every 14 days, Disp: 3 each, Rfl: 11    colchicine (COLCRYS) 0.6 mg tablet, TAKE 1 TABLET(0.6 MG) BY MOUTH EVERY DAY, Disp: 90 tablet, Rfl: 0    dapagliflozin (FARXIGA) 10 mg tablet, Take 1 tablet (10 mg total) by mouth once daily., Disp: 90 tablet, Rfl: 2    diltiaZEM (CARDIZEM CD) 240 MG 24 hr capsule, TAKE 2 CAPSULES BY MOUTH EVERY DAY, Disp: 180 capsule, Rfl: 0    furosemide (LASIX) 40 MG tablet, TAKE 1 TABLET(40 MG) BY MOUTH TWICE DAILY, Disp: 180 tablet, Rfl: 0    ibuprofen (ADVIL,MOTRIN) 600 MG tablet, TAKE 1 TABLET(600 MG) BY MOUTH EVERY NIGHT AS NEEDED FOR PAIN, Disp: 30 tablet, Rfl: 0    insulin glargine-yfgn (SEMGLEE,INSULIN GLARG-YFGN,PEN) 100 unit/mL (3 mL) InPn, Inject 24 units once daily, Disp: 15 mL, Rfl: 2    insulin lispro 100 unit/mL pen, Inject into the skin. 150-200=+2, 201-250=+4; 251-300=+6; 301-350=+8, over 350=+10 units, Disp: , Rfl:     lancets (MICROLET  "LANCET) Misc, TEST THREE TIMES DAILY, Disp: 100 each, Rfl: 11    losartan (COZAAR) 50 MG tablet, TAKE 1 TABLET(50 MG) BY MOUTH EVERY DAY, Disp: 90 tablet, Rfl: 0    metoprolol succinate (TOPROL-XL) 50 MG 24 hr tablet, TAKE 1 TABLET(50 MG) BY MOUTH EVERY DAY, Disp: 90 tablet, Rfl: 0    omega-3 fatty acids 1,000 mg Cap, Take 2 capsules by mouth once daily., Disp: , Rfl:     pen needle, diabetic (NOVOFINE 32) 32 gauge x 1/4" Ndle, USE FOUR TIMES DAILY AS NEEDED, Disp: 400 each, Rfl: 11    pioglitazone (ACTOS) 45 MG tablet, TAKE 1 TABLET(45 MG) BY MOUTH EVERY DAY, Disp: 90 tablet, Rfl: 2    pravastatin (PRAVACHOL) 20 MG tablet, TAKE 1 TABLET(20 MG) BY MOUTH EVERY DAY, Disp: 90 tablet, Rfl: 0    tirzepatide (MOUNJARO) 10 mg/0.5 mL PnIj, Start Mounjaro 10 mg once weekly for 4 weeks. Then increase to Mounjaro 12.5 mg once weekly., Disp: 4 pen, Rfl: 0    tirzepatide (MOUNJARO) 12.5 mg/0.5 mL PnIj, Start Mounjaro 10 mg once weekly for 4 weeks. Then increase to Mounjaro 12.5 mg once weekly., Disp: 4 pen, Rfl: 2  Does patient have record of home blood pressure readings no. Patient said that he has a blood pressure machine at home but have not been using. Encourage monitoring blood pressure daily.   Last dose of blood pressure medication was taken at 7:15 AM.  Patient is asymptomatic. .    BP: (!) 170/86 , Pulse: 92 .    Patient said that he could not wait the 15 min for recheck on blood pressure because he has to get back to work. 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  Dr. Galindo notified.    "

## 2023-04-05 ENCOUNTER — PATIENT MESSAGE (OUTPATIENT)
Dept: ENDOCRINOLOGY | Facility: CLINIC | Age: 58
End: 2023-04-05
Payer: COMMERCIAL

## 2023-04-05 DIAGNOSIS — E11.9 TYPE 2 DIABETES MELLITUS WITHOUT COMPLICATION, WITH LONG-TERM CURRENT USE OF INSULIN: Primary | ICD-10-CM

## 2023-04-05 DIAGNOSIS — Z79.4 TYPE 2 DIABETES MELLITUS WITHOUT COMPLICATION, WITH LONG-TERM CURRENT USE OF INSULIN: Primary | ICD-10-CM

## 2023-04-05 RX ORDER — INSULIN GLARGINE 100 [IU]/ML
INJECTION, SOLUTION SUBCUTANEOUS
Qty: 15 ML | Refills: 5 | Status: SHIPPED | OUTPATIENT
Start: 2023-04-05 | End: 2023-05-29

## 2023-04-12 ENCOUNTER — PATIENT MESSAGE (OUTPATIENT)
Dept: ENDOCRINOLOGY | Facility: CLINIC | Age: 58
End: 2023-04-12
Payer: COMMERCIAL

## 2023-04-12 DIAGNOSIS — Z79.4 TYPE 2 DIABETES MELLITUS WITHOUT COMPLICATION, WITH LONG-TERM CURRENT USE OF INSULIN: Primary | ICD-10-CM

## 2023-04-12 DIAGNOSIS — E11.9 TYPE 2 DIABETES MELLITUS WITHOUT COMPLICATION, WITH LONG-TERM CURRENT USE OF INSULIN: Primary | ICD-10-CM

## 2023-04-12 RX ORDER — DAPAGLIFLOZIN 10 MG/1
10 TABLET, FILM COATED ORAL DAILY
Qty: 90 TABLET | Refills: 2 | Status: SHIPPED | OUTPATIENT
Start: 2023-04-12 | End: 2023-10-03 | Stop reason: SDUPTHER

## 2023-05-13 DIAGNOSIS — I10 ESSENTIAL HYPERTENSION: ICD-10-CM

## 2023-05-14 NOTE — TELEPHONE ENCOUNTER
No care due was identified.  [facility] Embedded Care Due Messages. Reference number: [id number].   5/13/2023 9:16:45 PM CDT

## 2023-05-14 NOTE — TELEPHONE ENCOUNTER
Refill Routing Note   Medication(s) are not appropriate for processing by [facility] for the following reason(s):      Required vitals abnormal    [facility] action(s):  Defer None identified            Appointments  past 12m or future 3m with PCP    Date Provider   Last Visit   6/20/2022 Riddhi Galindo MD   Next Visit   6/9/2023 Riddhi Galindo MD   ED visits in past 90 days: 0        Note composed:1:55 PM 05/14/2023

## 2023-05-15 ENCOUNTER — TELEPHONE (OUTPATIENT)
Dept: BARIATRICS | Facility: CLINIC | Age: 58
End: 2023-05-15
Payer: COMMERCIAL

## 2023-05-15 ENCOUNTER — OFFICE VISIT (OUTPATIENT)
Dept: CARDIOLOGY | Facility: CLINIC | Age: 58
End: 2023-05-15
Payer: COMMERCIAL

## 2023-05-15 VITALS
SYSTOLIC BLOOD PRESSURE: 142 MMHG | OXYGEN SATURATION: 95 % | HEIGHT: 74 IN | DIASTOLIC BLOOD PRESSURE: 80 MMHG | BODY MASS INDEX: 40.43 KG/M2 | WEIGHT: 315 LBS | RESPIRATION RATE: 18 BRPM | HEART RATE: 79 BPM

## 2023-05-15 DIAGNOSIS — I10 ESSENTIAL HYPERTENSION: Primary | ICD-10-CM

## 2023-05-15 DIAGNOSIS — R94.31 ABNORMAL EKG: ICD-10-CM

## 2023-05-15 DIAGNOSIS — N18.31 STAGE 3A CHRONIC KIDNEY DISEASE: ICD-10-CM

## 2023-05-15 DIAGNOSIS — E66.01 MORBID OBESITY, UNSPECIFIED OBESITY TYPE: ICD-10-CM

## 2023-05-15 DIAGNOSIS — E78.2 MIXED HYPERLIPIDEMIA: ICD-10-CM

## 2023-05-15 DIAGNOSIS — E11.65: ICD-10-CM

## 2023-05-15 DIAGNOSIS — E11.3219: ICD-10-CM

## 2023-05-15 PROCEDURE — 3044F PR MOST RECENT HEMOGLOBIN A1C LEVEL <7.0%: ICD-10-PCS | Mod: CPTII,S$GLB,, | Performed by: INTERNAL MEDICINE

## 2023-05-15 PROCEDURE — 1159F MED LIST DOCD IN RCRD: CPT | Mod: CPTII,S$GLB,, | Performed by: INTERNAL MEDICINE

## 2023-05-15 PROCEDURE — 3044F HG A1C LEVEL LT 7.0%: CPT | Mod: CPTII,S$GLB,, | Performed by: INTERNAL MEDICINE

## 2023-05-15 PROCEDURE — 3079F PR MOST RECENT DIASTOLIC BLOOD PRESSURE 80-89 MM HG: ICD-10-PCS | Mod: CPTII,S$GLB,, | Performed by: INTERNAL MEDICINE

## 2023-05-15 PROCEDURE — 4010F PR ACE/ARB THEARPY RXD/TAKEN: ICD-10-PCS | Mod: CPTII,S$GLB,, | Performed by: INTERNAL MEDICINE

## 2023-05-15 PROCEDURE — 93000 EKG 12-LEAD: ICD-10-PCS | Mod: S$GLB,,, | Performed by: INTERNAL MEDICINE

## 2023-05-15 PROCEDURE — 1160F RVW MEDS BY RX/DR IN RCRD: CPT | Mod: CPTII,S$GLB,, | Performed by: INTERNAL MEDICINE

## 2023-05-15 PROCEDURE — 3008F PR BODY MASS INDEX (BMI) DOCUMENTED: ICD-10-PCS | Mod: CPTII,S$GLB,, | Performed by: INTERNAL MEDICINE

## 2023-05-15 PROCEDURE — 3072F PR LOW RISK FOR RETINOPATHY: ICD-10-PCS | Mod: CPTII,S$GLB,, | Performed by: INTERNAL MEDICINE

## 2023-05-15 PROCEDURE — 99999 PR PBB SHADOW E&M-EST. PATIENT-LVL V: CPT | Mod: PBBFAC,,, | Performed by: INTERNAL MEDICINE

## 2023-05-15 PROCEDURE — 1159F PR MEDICATION LIST DOCUMENTED IN MEDICAL RECORD: ICD-10-PCS | Mod: CPTII,S$GLB,, | Performed by: INTERNAL MEDICINE

## 2023-05-15 PROCEDURE — 99204 OFFICE O/P NEW MOD 45 MIN: CPT | Mod: S$GLB,,, | Performed by: INTERNAL MEDICINE

## 2023-05-15 PROCEDURE — 3072F LOW RISK FOR RETINOPATHY: CPT | Mod: CPTII,S$GLB,, | Performed by: INTERNAL MEDICINE

## 2023-05-15 PROCEDURE — 1160F PR REVIEW ALL MEDS BY PRESCRIBER/CLIN PHARMACIST DOCUMENTED: ICD-10-PCS | Mod: CPTII,S$GLB,, | Performed by: INTERNAL MEDICINE

## 2023-05-15 PROCEDURE — 3079F DIAST BP 80-89 MM HG: CPT | Mod: CPTII,S$GLB,, | Performed by: INTERNAL MEDICINE

## 2023-05-15 PROCEDURE — 99204 PR OFFICE/OUTPT VISIT, NEW, LEVL IV, 45-59 MIN: ICD-10-PCS | Mod: S$GLB,,, | Performed by: INTERNAL MEDICINE

## 2023-05-15 PROCEDURE — 3077F SYST BP >= 140 MM HG: CPT | Mod: CPTII,S$GLB,, | Performed by: INTERNAL MEDICINE

## 2023-05-15 PROCEDURE — 3077F PR MOST RECENT SYSTOLIC BLOOD PRESSURE >= 140 MM HG: ICD-10-PCS | Mod: CPTII,S$GLB,, | Performed by: INTERNAL MEDICINE

## 2023-05-15 PROCEDURE — 4010F ACE/ARB THERAPY RXD/TAKEN: CPT | Mod: CPTII,S$GLB,, | Performed by: INTERNAL MEDICINE

## 2023-05-15 PROCEDURE — 99999 PR PBB SHADOW E&M-EST. PATIENT-LVL V: ICD-10-PCS | Mod: PBBFAC,,, | Performed by: INTERNAL MEDICINE

## 2023-05-15 PROCEDURE — 93000 ELECTROCARDIOGRAM COMPLETE: CPT | Mod: S$GLB,,, | Performed by: INTERNAL MEDICINE

## 2023-05-15 PROCEDURE — 3008F BODY MASS INDEX DOCD: CPT | Mod: CPTII,S$GLB,, | Performed by: INTERNAL MEDICINE

## 2023-05-15 RX ORDER — FUROSEMIDE 40 MG/1
TABLET ORAL
Qty: 180 TABLET | Refills: 0 | Status: SHIPPED | OUTPATIENT
Start: 2023-05-15 | End: 2023-06-07

## 2023-05-15 RX ORDER — ATORVASTATIN CALCIUM 40 MG/1
40 TABLET, FILM COATED ORAL NIGHTLY
Qty: 90 TABLET | Refills: 3 | Status: SHIPPED | OUTPATIENT
Start: 2023-05-15

## 2023-05-15 RX ORDER — LOSARTAN POTASSIUM 100 MG/1
100 TABLET ORAL DAILY
Qty: 90 TABLET | Refills: 3 | Status: SHIPPED | OUTPATIENT
Start: 2023-05-15

## 2023-05-15 NOTE — TELEPHONE ENCOUNTER
----- Message from Genevieve Jose, Patient Care Assistant sent at 5/15/2023  2:55 PM CDT -----  Regarding: Dr. Messer Referral  Please reach out to patient and assist with scheduling for Morbid obesity, unspecified obesity type [E66.01] per the request of Dr. Messer.    Thank you,     Genevieve Jose MA

## 2023-05-15 NOTE — PROGRESS NOTES
CARDIOVASCULAR CONSULTATION    REASON FOR CONSULT:   David Corona Jr. is a 58 y.o. male who presents for eval/mgmt of HTN.    PCP: Josie  HISTORY OF PRESENT ILLNESS:   Last seen June 2019.    The patient is seen at the request of his primary care physician for evaluation of hypertension.  It has been greater than 3 years since his last office visit.  He reports generally asymptomatic status without angina, dyspnea, palpitations, or syncope.  He tells me he is exercising by walking.  He denies any PND, orthopnea, or lower extremity edema.  There has been no melena, hematuria, or claudicant symptoms.      Family history appears to be negative for premature CAD.      The patient denies tobacco use, alcohol excess, or illicit drug use.    CARDIOVASCULAR HISTORY:   TOSIN on CPAP    PAST MEDICAL HISTORY:     Past Medical History:   Diagnosis Date    Arthritis     Cataract     left eye     CKD (chronic kidney disease) stage 3, GFR 30-59 ml/min     Colon polyp 11/2016    Dependent edema     Diverticulosis     Gout, arthritis     Hyperlipidemia LDL goal <100     Hypertension     Morbid obesity     TOSIN (obstructive sleep apnea)     Peripheral autonomic neuropathy in disorders classified elsewhere(337.1)     Proteinuria     Type II or unspecified type diabetes mellitus with neurological manifestations, uncontrolled(250.62)     Venous stasis of lower extremity        PAST SURGICAL HISTORY:     Past Surgical History:   Procedure Laterality Date    CATARACT EXTRACTION W/  INTRAOCULAR LENS IMPLANT Right 2007    COLONOSCOPY N/A 11/22/2016    Procedure: COLONOSCOPY;  Surgeon: Abdi La MD;  Location: [facility] ([address] [place] [address]);  Service: Endoscopy;  Laterality: N/A;  2nd floor case/BMI 59.13/wants week of Thanksgiving    COLONOSCOPY N/A 02/28/2022    Procedure: COLONOSCOPY;  Surgeon: Jessi Martinez MD;  Location: [facility] ([address] [place] [address]);  Service: Endoscopy;  Laterality: N/A;  BMI 61.71  covid test [place] 2/25 2/24 pt confirmed  appt and new arrival time-Kpvt       ALLERGIES AND MEDICATION:   Review of patient's allergies indicates:  No Known Allergies     Medication List            Accurate as of May 15, 2023  2:29 PM. If you have any questions, ask your nurse or doctor.                CONTINUE taking these medications      aspirin 81 MG EC tablet  Commonly known as: ECOTRIN  Take 1 tablet (81 mg total) by mouth once daily.     blood sugar diagnostic Strp  ONE TOUCH BLOOD GLUCOSE STRIPS  Use to test blood sugars three times daily.     blood-glucose meter kit  1 each by Other route 3 (three) times daily. One Touch Glucose meter kit     colchicine 0.6 mg tablet  Commonly known as: COLCRYS  TAKE 1 TABLET(0.6 MG) BY MOUTH EVERY DAY     diltiaZEM 240 MG 24 hr capsule  Commonly known as: CARDIZEM CD  TAKE 2 CAPSULES BY MOUTH EVERY DAY     FARXIGA 10 mg tablet  Generic drug: dapagliflozin  Take 1 tablet (10 mg total) by mouth once daily.     FREESTYLE JAYLEN 3 SENSOR [place]  Generic drug: blood-glucose sensor  Change every 14 days     furosemide 40 MG tablet  Commonly known as: LASIX  TAKE 1 TABLET(40 MG) BY MOUTH TWICE DAILY     ibuprofen 600 MG tablet  Commonly known as: ADVIL,MOTRIN  TAKE 1 TABLET(600 MG) BY MOUTH EVERY NIGHT AS NEEDED FOR PAIN     insulin glargine 100 units/mL SubQ pen  Commonly known as: BASAGLAR KWIKPEN U-100 INSULIN  Inject 24 units once daily     insulin lispro 100 unit/mL pen     lancets Misc  Commonly known as: MICROLET LANCET  TEST THREE TIMES DAILY     losartan 50 MG tablet  Commonly known as: COZAAR  TAKE 1 TABLET(50 MG) BY MOUTH EVERY DAY     metoprolol succinate 50 MG 24 hr tablet  Commonly known as: TOPROL-XL  TAKE 1 TABLET(50 MG) BY MOUTH EVERY DAY     * MOUNJARO 10 mg/0.5 mL Pnij  Generic drug: tirzepatide  Start Mounjaro 10 mg once weekly for 4 weeks. Then increase to Mounjaro 12.5 mg once weekly.     * MOUNJARO 12.5 mg/0.5 mL Pnij  Generic drug: tirzepatide  Start Mounjaro 10 mg once weekly for 4 weeks. Then  "increase to Mounjaro 12.5 mg once weekly.     omega-3 fatty acids 1,000 mg Cap     pen needle, diabetic 32 gauge x 1/4" Ndle  Commonly known as: NOVOFINE 32  USE FOUR TIMES DAILY AS NEEDED     pioglitazone 45 MG tablet  Commonly known as: ACTOS  TAKE 1 TABLET(45 MG) BY MOUTH EVERY DAY     pravastatin 20 MG tablet  Commonly known as: PRAVACHOL  TAKE 1 TABLET(20 MG) BY MOUTH EVERY DAY           * This list has 2 medication(s) that are the same as other medications prescribed for you. Read the directions carefully, and ask your doctor or other care provider to review them with you.                STOP taking these medications      blood pressure monitor Kit  Stopped by: Kavon Messer MD              SOCIAL HISTORY:     Social History     Socioeconomic History    Marital status:     Number of children: 3   Occupational History    Occupation:      Employer: [email or web address]    Occupation:    Tobacco Use    Smoking status: Never    Smokeless tobacco: Never   Substance and Sexual Activity    Alcohol use: Yes     Alcohol/week: 0.0 standard drinks     Comment: rarely    Drug use: No     Social Determinants of Health     Financial Resource Strain: Low Risk     Difficulty of Paying Living Expenses: Not hard at all   Food Insecurity: No Food Insecurity    Worried About Running Out of Food in the Last Year: Never true    Ran Out of Food in the Last Year: Never true   Transportation Needs: No Transportation Needs    Lack of Transportation (Medical): No    Lack of Transportation (Non-Medical): No   Physical Activity: Unknown    Days of Exercise per Week: 4 days   Stress: No Stress Concern Present    Feeling of Stress : Not at all   Social Connections: Unknown    Frequency of Communication with Friends and Family: More than three times a week    Frequency of Social Gatherings with Friends and Family: More than three times a week    Active Member of Clubs or Organizations: " "Yes    Attends Club or Organization Meetings: 1 to 4 times per year    Marital Status:    Housing Stability: Unknown    Unable to Pay for Housing in the Last Year: No    Unstable Housing in the Last Year: No       FAMILY HISTORY:     Family History   Problem Relation Age of Onset    Breast cancer Mother     Diabetes Mother     Hypertension Mother     Stroke Mother     Prostate cancer Father     Diabetes Father     Hypertension Father     No Known Problems Sister     No Known Problems Brother     Breast cancer Maternal Grandmother     Amblyopia Neg Hx     Blindness Neg Hx     Cataracts Neg Hx     Glaucoma Neg Hx     Macular degeneration Neg Hx     Retinal detachment Neg Hx     Strabismus Neg Hx        REVIEW OF SYSTEMS:   Review of Systems   Constitutional:  Negative for chills, diaphoresis and fever.   HENT:  Negative for nosebleeds.    Eyes:  Negative for blurred vision, double vision and photophobia.   Respiratory:  Negative for hemoptysis, shortness of breath and wheezing.    Cardiovascular:  Negative for chest pain, palpitations, orthopnea, claudication, leg swelling and PND.   Gastrointestinal:  Negative for abdominal pain, blood in stool, heartburn, melena, nausea and vomiting.   Genitourinary:  Negative for flank pain and hematuria.   Musculoskeletal:  Negative for falls, myalgias and neck pain.   Skin:  Negative for rash.   Neurological:  Negative for dizziness, seizures, loss of consciousness, weakness and headaches.   Endo/Heme/Allergies:  Negative for polydipsia. Does not bruise/bleed easily.   Psychiatric/Behavioral:  Negative for depression and memory loss. The patient is not nervous/anxious.      PHYSICAL EXAM:     Vitals:    05/15/23 1421   BP: (!) 142/80   Pulse: 79   Resp: 18    Body mass index is 60.23 kg/m².  Weight: (!) 212.8 kg (469 lb 2.2 oz)   Height: 6' 2" (188 cm)     Physical Exam  Vitals reviewed.   Constitutional:       General: He is not in acute distress.     Appearance: He is " well-developed. He is obese. He is not ill-appearing, toxic-appearing or diaphoretic.   HENT:      Head: Normocephalic and atraumatic.   Eyes:      General: No scleral icterus.     Extraocular Movements: Extraocular movements intact.      Conjunctiva/sclera: Conjunctivae normal.      Pupils: Pupils are equal, round, and reactive to light.   Neck:      Thyroid: No thyromegaly.      Vascular: Normal carotid pulses. No carotid bruit or JVD.      Trachea: Trachea normal. No tracheal deviation.   Cardiovascular:      Rate and Rhythm: Normal rate and regular rhythm.      Pulses:           Carotid pulses are 2+ on the right side and 2+ on the left side.     Heart sounds: S1 normal and S2 normal. Heart sounds are distant. No murmur heard.    No friction rub. No gallop.   Pulmonary:      Effort: Pulmonary effort is normal. No respiratory distress.      Breath sounds: Normal breath sounds. No stridor. No wheezing, rhonchi or rales.   Chest:      Chest wall: No tenderness.   Abdominal:      General: There is no distension.      Palpations: Abdomen is soft.   Musculoskeletal:         General: No swelling or tenderness. Normal range of motion.      Cervical back: Normal range of motion and neck supple. No edema or rigidity.      Right lower leg: No edema.      Left lower leg: No edema.   Feet:      Right foot:      Skin integrity: No ulcer.      Left foot:      Skin integrity: No ulcer.   Skin:     General: Skin is warm and dry.      Coloration: Skin is not jaundiced.   Neurological:      General: No focal deficit present.      Mental Status: He is alert and oriented to person, place, and time.      Cranial Nerves: No cranial nerve deficit.   Psychiatric:         Mood and Affect: Mood normal.         Speech: Speech normal.         Behavior: Behavior normal. Behavior is cooperative.       DATA:   EKG: (personally reviewed tracing(s))  5/15/23 SR 74, PVC, PRWP    Laboratory:  CBC:  Recent Labs   Lab 10/15/20  0823 01/15/22  0828    WBC 6.64 6.89   Hemoglobin 13.4 L 13.2 L   Hematocrit 42.1 42.1   Platelets 200 219         CHEMISTRIES:  Recent Labs   Lab 09/26/20  0820 08/07/21  0810 10/29/22  0813   Glucose 105 132 H 127 H   Sodium 141 139 140   Potassium 4.0 3.8 4.0   BUN 19 12 14   Creatinine 1.5 H 1.2 1.4   eGFR if African American >60.0 >60  --    eGFR if non  52.0 A >60  --    Calcium 9.1 9.0 9.1         CARDIAC BIOMARKERS:        COAGS:        LIPIDS/LFTS:  Recent Labs   Lab 10/15/20  0823 04/27/21  0000 08/07/21  0810 04/16/22  0823 10/29/22  0813   Cholesterol 148  --   --  137  --    Triglycerides 66 144  --  89  --    HDL 52 55  --  50  --    LDL Cholesterol 82.8 52  --  69.2  --    Non-HDL Cholesterol 96  --   --  87  --    AST  --   --  21  --  28   ALT  --   --  8 L  --  10         Cardiovascular Testing:  none    ASSESSMENT:   # HTN, uncontrolled  # CKD3a, creat 1.4->1.8 (with losartan 100mg qd)->1.6 (on losartan 50mg qd)  # BMI 60, down 3 unit vs last OV  # TOSIN on CPAP  # DM  # HLP on prava 20mg    PLAN:   Cont med rx  Inc losartan 100mg qd  Check BMP 1 week  Digital med enrollment  Pt has f/u with PCP in 2 weeks for BP check  Change prava to atorva 40mg qhs  Check echo  [facility] referral  Diet/exercise/weight loss  RTC 6 months with lipids/LFT (Nov 2023)    Kavon Messer MD, FACC

## 2023-05-18 ENCOUNTER — TELEPHONE (OUTPATIENT)
Dept: PHARMACY | Facility: CLINIC | Age: 58
End: 2023-05-18
Payer: COMMERCIAL

## 2023-05-18 DIAGNOSIS — M79.642 PAIN OF LEFT HAND: ICD-10-CM

## 2023-05-18 RX ORDER — IBUPROFEN 600 MG/1
TABLET ORAL
Qty: 30 TABLET | Refills: 0 | Status: SHIPPED | OUTPATIENT
Start: 2023-05-18 | End: 2023-08-24

## 2023-05-26 ENCOUNTER — HOSPITAL ENCOUNTER (OUTPATIENT)
Dept: CARDIOLOGY | Facility: HOSPITAL | Age: 58
Discharge: HOME | End: 2023-05-26
Attending: INTERNAL MEDICINE
Payer: COMMERCIAL

## 2023-05-26 DIAGNOSIS — I10 ESSENTIAL HYPERTENSION: ICD-10-CM

## 2023-05-26 DIAGNOSIS — R94.31 ABNORMAL EKG: ICD-10-CM

## 2023-05-26 LAB
ASCENDING AORTA: 3.38 CM
CV ECHO LV RWT: 0.49 CM
DOP CALC LVOT AREA: 3.6 CM2
DOP CALC LVOT DIAMETER: 2.14 CM
DOP CALC MV VTI: 22.7 CM
E WAVE DECELERATION TIME: 117.76 MSEC
E/A RATIO: 0.97
ECHO LV POSTERIOR WALL: 1.28 CM (ref 0.6–1.1)
EJECTION FRACTION: 65 %
FRACTIONAL SHORTENING: 33 % (ref 28–44)
INTERVENTRICULAR SEPTUM: 1.29 CM (ref 0.6–1.1)
LA MAJOR: 6.46 CM
LEFT ATRIUM SIZE: 4.75 CM
LEFT INTERNAL DIMENSION IN SYSTOLE: 3.5 CM (ref 2.1–4)
LEFT VENTRICLE DIASTOLIC VOLUME: 130.83 ML
LEFT VENTRICLE SYSTOLIC VOLUME: 50.82 ML
LEFT VENTRICULAR INTERNAL DIMENSION IN DIASTOLE: 5.22 CM (ref 3.5–6)
LEFT VENTRICULAR MASS: 275.57 G
MV MEAN GRADIENT: 2 MMHG
MV PEAK A VEL: 0.99 M/S
MV PEAK E VEL: 0.96 M/S
MV PEAK GRADIENT: 4 MMHG
MV STENOSIS PRESSURE HALF TIME: 34.15 MS
MV VALVE AREA P 1/2 METHOD: 6.44 CM2
PISA TR MAX VEL: 1.4 M/S
PV PEAK VELOCITY: 1.28 CM/S
RA MAJOR: 5.24 CM
RA PRESSURE: 3 MMHG
RA WIDTH: 4.1 CM
SINUS: 3.24 CM
STJ: 2.32 CM
TR MAX PG: 8 MMHG
TRICUSPID ANNULAR PLANE SYSTOLIC EXCURSION: 2.38 CM
TV REST PULMONARY ARTERY PRESSURE: 11 MMHG

## 2023-05-26 PROCEDURE — 93308 ECHO (CUPID ONLY): ICD-10-PCS | Mod: 26,,, | Performed by: INTERNAL MEDICINE

## 2023-05-26 PROCEDURE — C8924 2D TTE W OR W/O FOL W/CON,FU: HCPCS

## 2023-05-26 PROCEDURE — 93308 TTE F-UP OR LMTD: CPT | Mod: 26,,, | Performed by: INTERNAL MEDICINE

## 2023-05-29 ENCOUNTER — PATIENT MESSAGE (OUTPATIENT)
Dept: CARDIOLOGY | Facility: CLINIC | Age: 58
End: 2023-05-29
Payer: COMMERCIAL

## 2023-05-29 RX ORDER — INSULIN GLARGINE 100 [IU]/ML
INJECTION, SOLUTION SUBCUTANEOUS
Qty: 15 ML | Refills: 5 | Status: SHIPPED | OUTPATIENT
Start: 2023-05-29 | End: 2023-07-03 | Stop reason: SDUPTHER

## 2023-06-03 ENCOUNTER — LAB VISIT (OUTPATIENT)
Dept: LAB | Facility: HOSPITAL | Age: 58
End: 2023-06-03
Attending: INTERNAL MEDICINE
Payer: COMMERCIAL

## 2023-06-03 DIAGNOSIS — Z79.4 TYPE 2 DIABETES MELLITUS WITHOUT COMPLICATION, WITH LONG-TERM CURRENT USE OF INSULIN: ICD-10-CM

## 2023-06-03 DIAGNOSIS — E11.9 TYPE 2 DIABETES MELLITUS WITHOUT COMPLICATION, WITH LONG-TERM CURRENT USE OF INSULIN: ICD-10-CM

## 2023-06-03 LAB
CHOLEST SERPL-MCNC: 111 MG/DL (ref 120–199)
CHOLEST/HDLC SERPL: 2.5 {RATIO} (ref 2–5)
ESTIMATED AVG GLUCOSE: 154 MG/DL (ref 68–131)
HBA1C MFR BLD: 7 % (ref 4–5.6)
HDLC SERPL-MCNC: 44 MG/DL (ref 40–75)
HDLC SERPL: 39.6 % (ref 20–50)
LDLC SERPL CALC-MCNC: 50.2 MG/DL (ref 63–159)
NONHDLC SERPL-MCNC: 67 MG/DL
TRIGL SERPL-MCNC: 84 MG/DL (ref 30–150)

## 2023-06-03 PROCEDURE — 80061 LIPID PANEL: CPT | Performed by: NURSE PRACTITIONER

## 2023-06-03 PROCEDURE — 83036 HEMOGLOBIN GLYCOSYLATED A1C: CPT | Performed by: NURSE PRACTITIONER

## 2023-06-05 ENCOUNTER — PATIENT MESSAGE (OUTPATIENT)
Dept: ADMINISTRATIVE | Facility: OTHER | Age: 58
End: 2023-06-05
Payer: COMMERCIAL

## 2023-06-05 ENCOUNTER — OFFICE VISIT (OUTPATIENT)
Dept: FAMILY MEDICINE | Facility: CLINIC | Age: 58
End: 2023-06-05
Payer: COMMERCIAL

## 2023-06-05 VITALS
WEIGHT: 315 LBS | HEIGHT: 74 IN | OXYGEN SATURATION: 94 % | SYSTOLIC BLOOD PRESSURE: 124 MMHG | HEART RATE: 84 BPM | BODY MASS INDEX: 40.43 KG/M2 | DIASTOLIC BLOOD PRESSURE: 70 MMHG | TEMPERATURE: 99 F

## 2023-06-05 DIAGNOSIS — N25.81 SECONDARY HYPERPARATHYROIDISM OF RENAL ORIGIN: ICD-10-CM

## 2023-06-05 DIAGNOSIS — I10 ESSENTIAL HYPERTENSION: ICD-10-CM

## 2023-06-05 DIAGNOSIS — G47.33 OSA (OBSTRUCTIVE SLEEP APNEA): ICD-10-CM

## 2023-06-05 DIAGNOSIS — Z12.5 PROSTATE CANCER SCREENING: ICD-10-CM

## 2023-06-05 DIAGNOSIS — E66.01 MORBID OBESITY WITH BMI OF 50.0-59.9, ADULT: ICD-10-CM

## 2023-06-05 DIAGNOSIS — Z00.00 ROUTINE MEDICAL EXAM: Primary | ICD-10-CM

## 2023-06-05 DIAGNOSIS — E78.5 HYPERLIPIDEMIA LDL GOAL <100: ICD-10-CM

## 2023-06-05 DIAGNOSIS — E11.40 CONTROLLED TYPE 2 DIABETES WITH NEUROPATHY: ICD-10-CM

## 2023-06-05 PROCEDURE — 3074F SYST BP LT 130 MM HG: CPT | Mod: CPTII,S$GLB,, | Performed by: INTERNAL MEDICINE

## 2023-06-05 PROCEDURE — 3078F DIAST BP <80 MM HG: CPT | Mod: CPTII,S$GLB,, | Performed by: INTERNAL MEDICINE

## 2023-06-05 PROCEDURE — 99999 PR PBB SHADOW E&M-EST. PATIENT-LVL V: ICD-10-PCS | Mod: PBBFAC,,, | Performed by: INTERNAL MEDICINE

## 2023-06-05 PROCEDURE — 3051F HG A1C>EQUAL 7.0%<8.0%: CPT | Mod: CPTII,S$GLB,, | Performed by: INTERNAL MEDICINE

## 2023-06-05 PROCEDURE — 3008F PR BODY MASS INDEX (BMI) DOCUMENTED: ICD-10-PCS | Mod: CPTII,S$GLB,, | Performed by: INTERNAL MEDICINE

## 2023-06-05 PROCEDURE — 3074F PR MOST RECENT SYSTOLIC BLOOD PRESSURE < 130 MM HG: ICD-10-PCS | Mod: CPTII,S$GLB,, | Performed by: INTERNAL MEDICINE

## 2023-06-05 PROCEDURE — 3072F LOW RISK FOR RETINOPATHY: CPT | Mod: CPTII,S$GLB,, | Performed by: INTERNAL MEDICINE

## 2023-06-05 PROCEDURE — 1160F PR REVIEW ALL MEDS BY PRESCRIBER/CLIN PHARMACIST DOCUMENTED: ICD-10-PCS | Mod: CPTII,S$GLB,, | Performed by: INTERNAL MEDICINE

## 2023-06-05 PROCEDURE — 1159F PR MEDICATION LIST DOCUMENTED IN MEDICAL RECORD: ICD-10-PCS | Mod: CPTII,S$GLB,, | Performed by: INTERNAL MEDICINE

## 2023-06-05 PROCEDURE — 99396 PREV VISIT EST AGE 40-64: CPT | Mod: S$GLB,,, | Performed by: INTERNAL MEDICINE

## 2023-06-05 PROCEDURE — 99999 PR PBB SHADOW E&M-EST. PATIENT-LVL V: CPT | Mod: PBBFAC,,, | Performed by: INTERNAL MEDICINE

## 2023-06-05 PROCEDURE — 3078F PR MOST RECENT DIASTOLIC BLOOD PRESSURE < 80 MM HG: ICD-10-PCS | Mod: CPTII,S$GLB,, | Performed by: INTERNAL MEDICINE

## 2023-06-05 PROCEDURE — 1160F RVW MEDS BY RX/DR IN RCRD: CPT | Mod: CPTII,S$GLB,, | Performed by: INTERNAL MEDICINE

## 2023-06-05 PROCEDURE — 99396 PR PREVENTIVE VISIT,EST,40-64: ICD-10-PCS | Mod: S$GLB,,, | Performed by: INTERNAL MEDICINE

## 2023-06-05 PROCEDURE — 3051F PR MOST RECENT HEMOGLOBIN A1C LEVEL 7.0 - < 8.0%: ICD-10-PCS | Mod: CPTII,S$GLB,, | Performed by: INTERNAL MEDICINE

## 2023-06-05 PROCEDURE — 4010F PR ACE/ARB THEARPY RXD/TAKEN: ICD-10-PCS | Mod: CPTII,S$GLB,, | Performed by: INTERNAL MEDICINE

## 2023-06-05 PROCEDURE — 3008F BODY MASS INDEX DOCD: CPT | Mod: CPTII,S$GLB,, | Performed by: INTERNAL MEDICINE

## 2023-06-05 PROCEDURE — 4010F ACE/ARB THERAPY RXD/TAKEN: CPT | Mod: CPTII,S$GLB,, | Performed by: INTERNAL MEDICINE

## 2023-06-05 PROCEDURE — 3072F PR LOW RISK FOR RETINOPATHY: ICD-10-PCS | Mod: CPTII,S$GLB,, | Performed by: INTERNAL MEDICINE

## 2023-06-05 PROCEDURE — 1159F MED LIST DOCD IN RCRD: CPT | Mod: CPTII,S$GLB,, | Performed by: INTERNAL MEDICINE

## 2023-06-05 NOTE — PROGRESS NOTES
CHIEF COMPLAINT:   Chief Complaint   Patient presents with    Follow-up         HISTORY OF PRESENT ILLNESS:  David Corona Jr. is a 58 y.o. male who presents to the clinic today for a routine medical physical exam. His last physical exam was approximately 1 years(s) ago.    Objective    PAST MEDICAL HISTORY:  Past Medical History:   Diagnosis Date    Arthritis     Cataract     left eye     CKD (chronic kidney disease) stage 3, GFR 30-59 ml/min     Colon polyp 11/2016    Dependent edema     Diverticulosis     Gout, arthritis     Hyperlipidemia LDL goal <100     Hypertension     Morbid obesity     TOSIN (obstructive sleep apnea)     Peripheral autonomic neuropathy in disorders classified elsewhere(337.1)     Proteinuria     Type II or unspecified type diabetes mellitus with neurological manifestations, uncontrolled(250.62)     Venous stasis of lower extremity        PAST SURGICAL HISTORY:  Past Surgical History:   Procedure Laterality Date    CATARACT EXTRACTION W/  INTRAOCULAR LENS IMPLANT Right 2007    COLONOSCOPY N/A 11/22/2016    Procedure: COLONOSCOPY;  Surgeon: Abdi La MD;  Location: [facility] ([facility]);  Service: Endoscopy;  Laterality: N/A;  2nd floor case/BMI 59.13/wants week of Thanksgiving    COLONOSCOPY N/A 02/28/2022    Procedure: COLONOSCOPY;  Surgeon: Jessi Martinez MD;  Location: [facility] ([facility]);  Service: Endoscopy;  Laterality: N/A;  BMI 61.71  covid test [place] 2/25 2/24 pt confirmed appt and new arrival time-Kpvt       SOCIAL HISTORY:  Social History     Socioeconomic History    Marital status:     Number of children: 3   Occupational History    Occupation:      Employer: [employer]    Occupation:    Tobacco Use    Smoking status: Never    Smokeless tobacco: Never   Substance and Sexual Activity    Alcohol use: Yes     Alcohol/week: 0.0 standard drinks     Comment: rarely    Drug use: No     Social Determinants of  Health     Financial Resource Strain: Low Risk     Difficulty of Paying Living Expenses: Not hard at all   Food Insecurity: No Food Insecurity    Worried About Running Out of Food in the Last Year: Never true    Ran Out of Food in the Last Year: Never true   Transportation Needs: No Transportation Needs    Lack of Transportation (Medical): No    Lack of Transportation (Non-Medical): No   Physical Activity: Unknown    Days of Exercise per Week: 4 days   Stress: No Stress Concern Present    Feeling of Stress : Not at all   Social Connections: Unknown    Frequency of Communication with Friends and Family: More than three times a week    Frequency of Social Gatherings with Friends and Family: More than three times a week    Active Member of Clubs or Organizations: Yes    Attends Club or Organization Meetings: 1 to 4 times per year    Marital Status:    Housing Stability: Unknown    Unable to Pay for Housing in the Last Year: No    Unstable Housing in the Last Year: No       FAMILY HISTORY:  Family History   Problem Relation Age of Onset    Breast cancer Mother     Diabetes Mother     Hypertension Mother     Stroke Mother     Prostate cancer Father     Diabetes Father     Hypertension Father     No Known Problems Sister     No Known Problems Brother     Breast cancer Maternal Grandmother     Amblyopia Neg Hx     Blindness Neg Hx     Cataracts Neg Hx     Glaucoma Neg Hx     Macular degeneration Neg Hx     Retinal detachment Neg Hx     Strabismus Neg Hx        ALLERGIES AND MEDICATIONS: updated and reviewed.  Review of patient's allergies indicates:  No Known Allergies  Medication List with Changes/Refills   Current Medications    ASPIRIN (ECOTRIN) 81 MG EC TABLET    Take 1 tablet (81 mg total) by mouth once daily.    ATORVASTATIN (LIPITOR) 40 MG TABLET    Take 1 tablet (40 mg total) by mouth every evening.    BLOOD SUGAR DIAGNOSTIC STRP    ONE TOUCH BLOOD GLUCOSE STRIPS  Use to test blood sugars three times daily.  "   BLOOD-GLUCOSE METER KIT    1 each by Other route 3 (three) times daily. One Touch Glucose meter kit    BLOOD-GLUCOSE SENSOR (FREESTYLE JAYLEN 3 SENSOR) CECILY    Change every 14 days    COLCHICINE (COLCRYS) 0.6 MG TABLET    TAKE 1 TABLET(0.6 MG) BY MOUTH EVERY DAY    DAPAGLIFLOZIN (FARXIGA) 10 MG TABLET    Take 1 tablet (10 mg total) by mouth once daily.    DILTIAZEM (CARDIZEM CD) 240 MG 24 HR CAPSULE    TAKE 2 CAPSULES BY MOUTH EVERY DAY    FUROSEMIDE (LASIX) 40 MG TABLET    TAKE 1 TABLET(40 MG) BY MOUTH TWICE DAILY    IBUPROFEN (ADVIL,MOTRIN) 600 MG TABLET    TAKE 1 TABLET(600 MG) BY MOUTH EVERY NIGHT AS NEEDED FOR PAIN    INSULIN (BASAGLAR KWIKPEN U-100 INSULIN) GLARGINE 100 UNITS/ML SUBQ PEN    Inject 24 units once daily    INSULIN LISPRO 100 UNIT/ML PEN    Inject into the skin. 150-200=+2, 201-250=+4; 251-300=+6; 301-350=+8, over 350=+10 units    LANCETS (MICROLET LANCET) MISC    TEST THREE TIMES DAILY    LOSARTAN (COZAAR) 100 MG TABLET    Take 1 tablet (100 mg total) by mouth once daily.    METOPROLOL SUCCINATE (TOPROL-XL) 50 MG 24 HR TABLET    TAKE 1 TABLET(50 MG) BY MOUTH EVERY DAY    OMEGA-3 FATTY ACIDS 1,000 MG CAP    Take 2 capsules by mouth once daily.    PEN NEEDLE, DIABETIC (NOVOFINE 32) 32 GAUGE X 1/4" NDLE    USE FOUR TIMES DAILY AS NEEDED    PIOGLITAZONE (ACTOS) 45 MG TABLET    TAKE 1 TABLET(45 MG) BY MOUTH EVERY DAY    TIRZEPATIDE (MOUNJARO) 12.5 MG/0.5 ML PNIJ    Start Mounjaro 10 mg once weekly for 4 weeks. Then increase to Mounjaro 12.5 mg once weekly.   Discontinued Medications    TIRZEPATIDE (MOUNJARO) 10 MG/0.5 ML PNIJ    Start Mounjaro 10 mg once weekly for 4 weeks. Then increase to Mounjaro 12.5 mg once weekly.         CARE TEAM:  Patient Care Team:  Riddhi Galindo MD as PCP - General (Internal Medicine)  Vick Cornelius MD as Consulting Physician (Nephrology)  Munira Garnett LPN as Care Coordinator              SCREENING HISTORY:  Health Maintenance         Date Due Completion " "Date    COVID-19 Vaccine (5 - Pfizer series) 09/01/2022 7/7/2022    Foot Exam 11/01/2022 11/1/2021    PROSTATE-SPECIFIC ANTIGEN 04/16/2023 4/16/2022    Diabetes Urine Screening 08/13/2023 8/13/2022    Eye Exam 10/03/2023 10/3/2022    Override on 1/30/2012: Done    Hemoglobin A1c 12/03/2023 6/3/2023    Lipid Panel 06/03/2024 6/3/2023    Low Dose Statin 06/05/2024 6/5/2023    Colorectal Cancer Screening 02/28/2027 2/28/2022    TETANUS VACCINE 09/04/2027 9/4/2017    Pneumococcal Vaccines (Age 0-64) (3 - PPSV23 if available, else PCV20) 05/07/2031 2/1/2019              REVIEW OF SYSTEMS:   The patient reports: they are improving their dietary habits  - he has decreased appetite with the Mounjaro and therefore is overall consuming fewer calories; he is trying to make healthier dietary choices .  The patient reports  : that they do not exercise regularly, but stay active - he does a lot of walking at work.  Review of Systems   Constitutional:  Negative for chills and fever.   HENT:  Negative for congestion.    Respiratory:  Positive for shortness of breath (stable/baseline). Negative for cough.    Cardiovascular:  Positive for leg swelling (chronic; stable). Negative for chest pain.   Gastrointestinal:  Negative for abdominal pain.   Genitourinary:  Negative for dysuria.   Musculoskeletal:  Positive for arthralgias (chronic; stable).   Skin:  Negative for rash.   ROS : patient denies: difficulty initiating urination          PHYSICAL EXAM:  274}  Vitals:    06/05/23 1531   BP: 124/70   BP Location: Left arm   Patient Position: Sitting   BP Method: Large (Manual)   Pulse: 84   Temp: 98.5 °F (36.9 °C)   TempSrc: Oral   SpO2: (!) 94%   Weight: (!) 214.1 kg (472 lb 0.1 oz)   Height: 6' 2" (1.88 m)       Body mass index is 60.6 kg/m².    General appearance - alert, well appearing, and in no distress, morbidly obese  Psychiatric - alert, oriented to person, place, and time, normal behavior, speech, dress, motor activity and " thought process  Eyes - pupils equal and reactive, extraocular eye movements intact, sclera anicteric  Mouth - not examined  Neck - supple, no significant adenopathy, carotids upstroke normal bilaterally, no bruits  Lymphatics - no palpable cervical lymphadenopathy  Chest - clear to auscultation, no wheezes, rales or rhonchi, symmetric air entry  Heart - normal rate and regular rhythm  Neurological - alert, normal speech, no focal findings; cranial nerves II through XII intact  Musculoskeletal - patient noted to have Moderate osteoarthritic changes to both knee joints. No joint effusions noted.  Extremities - pedal edema 1+, lymphedema noted - moderate  Skin - no suspicious skin lesions      Labs:  Lab Results   Component Value Date    HGBA1C 7.0 (H) 06/03/2023    HGBA1C 6.9 (H) 02/06/2023    HGBA1C 7.7 (H) 10/29/2022      Lab Results   Component Value Date    CHOL 111 (L) 06/03/2023    CHOL 137 04/16/2022    CHOL 148 10/15/2020     Lab Results   Component Value Date    LDLCALC 50.2 (L) 06/03/2023    LDLCALC 69.2 04/16/2022    LDLCALC 52 04/27/2021           ASSESSMENT AND PLAN: 274}  1. Routine medical exam  Counseled on age appropriate medical preventative services including age appropriate cancer screenings, age appropriate eye and dental exams, over all nutritional health, need for a consistent exercise regimen, and an over all push towards maintaining a vigorous and active lifestyle.  Counseled on age appropriate vaccines and discussed upcoming health care needs based on age/gender. Discussed good sleep hygiene and stress management.    2. Controlled type 2 diabetes with neuropathy  Lab Results   Component Value Date    HGBA1C 7.0 (H) 06/03/2023     Diabetes is under fair control at this time (due to hyperglycemia) for age and comorbid conditions.   We discussed diabetic diet and regular exercise.   We discussed home blood sugar monitoring, if appropriate - the patient should test twice daily and as needed.    Continue current medication regimen. Patient is followed by endocrinology.  Diabetic complications addressed: Neuropathy pain controlled.   Patient was counseled on the need for yearly eye exam to screen for/monitor diabetic retinopathy and yearly diabetic foot exam.  -     Ambulatory referral/consult to Podiatry; Future; Expected date: 06/12/2023    3. Secondary hyperparathyroidism of renal origin  Stable. Asymptomatic. Observe.    4. Essential hypertension  BP Readings from Last 1 Encounters:   06/05/23 124/70      The current medical regimen is effective;  continue present plan and medications. Recommended patient to check home readings to monitor and see me for followup as scheduled or sooner as needed.   Discussed sodium restriction, maintaining ideal body weight and regular exercise program as physiologic means to continue to achieve blood pressure control in addition to medication compliance.  Patient was educated that both decongestant and anti-inflammatory medication may raise blood pressure.  The patient is interested in signing up for the digital hypertension program    5. Hyperlipidemia LDL goal <100  Lab Results   Component Value Date    CHOL 111 (L) 06/03/2023     Lab Results   Component Value Date    HDL 44 06/03/2023     Lab Results   Component Value Date    LDLCALC 50.2 (L) 06/03/2023     Lab Results   Component Value Date    TRIG 84 06/03/2023     Lab Results   Component Value Date    LDLCALC 50.2 (L) 06/03/2023     We discussed low fat diet and regular exercise.The current medical regimen is effective;  continue present plan and medications.     6. TOSIN (obstructive sleep apnea)  CPAP compliance: yes. The patient reports that they continue to benefit from regular use of their CPAP machine..  We discussed the potential ramifications of untreated sleep apnea, which could include daytime sleepiness, hypertension, heart disease including CHF, sudden death while sleeping and/or stroke. The patient was  advised to abstain from driving should they feel sleepy or drowsy.  We discussed potential treatment options, which could include weight loss, body positioning, continuous positive airway pressure (CPAP), or referral for surgical consideration.   Overview:  - uses CPAP regularly      7. Morbid obesity with BMI of 50.0-59.9, adult  BMI Readings from Last 3 Encounters:   06/05/23 60.60 kg/m²   05/15/23 60.23 kg/m²   03/16/23 61.76 kg/m²     The patient is asked to make an attempt to improve diet and exercise patterns to aid in medical management of this problem.  He was given the phone number to schedule for the bariatric seminar.    8. Prostate cancer screening  The natural history of prostate cancer and ongoing controversy regarding screening and potential treatment outcomes of prostate cancer has been discussed with the patient. The meaning of a false positive PSA and a false negative PSA has been discussed. He indicates understanding of the limitations of this screening test and wishes  to proceed with screening PSA testing.  -     PSA, Screening; Future; Expected date: 06/05/2023           Orders Placed This Encounter   Procedures    PSA, Screening    Ambulatory referral/consult to Podiatry       Follow up in about 6 months (around 12/5/2023), or if symptoms worsen or fail to improve, for follow up chronic medical conditions.. or sooner as needed.

## 2023-06-05 NOTE — PATIENT INSTRUCTIONS
Bariatric Surgery: please call [phone] to [facility] for a seminar or visit [email or web address].

## 2023-06-06 RX ORDER — DILTIAZEM HYDROCHLORIDE 240 MG/1
CAPSULE, COATED, EXTENDED RELEASE ORAL
Qty: 180 CAPSULE | Refills: 3 | Status: SHIPPED | OUTPATIENT
Start: 2023-06-06

## 2023-06-06 RX ORDER — METOPROLOL SUCCINATE 50 MG/1
TABLET, EXTENDED RELEASE ORAL
Qty: 90 TABLET | Refills: 3 | Status: SHIPPED | OUTPATIENT
Start: 2023-06-06

## 2023-06-06 NOTE — TELEPHONE ENCOUNTER
No care due was identified.  [facility] Embedded Care Due Messages. Reference number: [id number].   6/05/2023 11:09:22 PM CDT

## 2023-06-06 NOTE — TELEPHONE ENCOUNTER
Refill Decision Note   David Corona  is requesting a refill authorization.  Brief Assessment and Rationale for Refill:  Approve     Medication Therapy Plan:         Comments:     Note composed:1:39 PM 06/06/2023             Appointments     Last Visit   6/5/2023 Riddhi Galindo MD   Next Visit   Visit date not found Riddhi Galindo MD

## 2023-06-20 ENCOUNTER — OFFICE VISIT (OUTPATIENT)
Dept: PODIATRY | Facility: CLINIC | Age: 58
End: 2023-06-20
Payer: COMMERCIAL

## 2023-06-20 VITALS — BODY MASS INDEX: 40.43 KG/M2 | WEIGHT: 315 LBS | HEIGHT: 74 IN

## 2023-06-20 DIAGNOSIS — M20.40 HAMMER TOE, UNSPECIFIED LATERALITY: ICD-10-CM

## 2023-06-20 DIAGNOSIS — E11.40 CONTROLLED TYPE 2 DIABETES WITH NEUROPATHY: Primary | ICD-10-CM

## 2023-06-20 PROCEDURE — 3072F PR LOW RISK FOR RETINOPATHY: ICD-10-PCS | Mod: CPTII,S$GLB,, | Performed by: PODIATRIST

## 2023-06-20 PROCEDURE — 3008F BODY MASS INDEX DOCD: CPT | Mod: CPTII,S$GLB,, | Performed by: PODIATRIST

## 2023-06-20 PROCEDURE — 3051F HG A1C>EQUAL 7.0%<8.0%: CPT | Mod: CPTII,S$GLB,, | Performed by: PODIATRIST

## 2023-06-20 PROCEDURE — 99999 PR PBB SHADOW E&M-EST. PATIENT-LVL IV: ICD-10-PCS | Mod: PBBFAC,,, | Performed by: PODIATRIST

## 2023-06-20 PROCEDURE — 3008F PR BODY MASS INDEX (BMI) DOCUMENTED: ICD-10-PCS | Mod: CPTII,S$GLB,, | Performed by: PODIATRIST

## 2023-06-20 PROCEDURE — 99203 OFFICE O/P NEW LOW 30 MIN: CPT | Mod: S$GLB,,, | Performed by: PODIATRIST

## 2023-06-20 PROCEDURE — 1159F PR MEDICATION LIST DOCUMENTED IN MEDICAL RECORD: ICD-10-PCS | Mod: CPTII,S$GLB,, | Performed by: PODIATRIST

## 2023-06-20 PROCEDURE — 99999 PR PBB SHADOW E&M-EST. PATIENT-LVL IV: CPT | Mod: PBBFAC,,, | Performed by: PODIATRIST

## 2023-06-20 PROCEDURE — 99203 PR OFFICE/OUTPT VISIT, NEW, LEVL III, 30-44 MIN: ICD-10-PCS | Mod: S$GLB,,, | Performed by: PODIATRIST

## 2023-06-20 PROCEDURE — 3051F PR MOST RECENT HEMOGLOBIN A1C LEVEL 7.0 - < 8.0%: ICD-10-PCS | Mod: CPTII,S$GLB,, | Performed by: PODIATRIST

## 2023-06-20 PROCEDURE — 4010F ACE/ARB THERAPY RXD/TAKEN: CPT | Mod: CPTII,S$GLB,, | Performed by: PODIATRIST

## 2023-06-20 PROCEDURE — 1159F MED LIST DOCD IN RCRD: CPT | Mod: CPTII,S$GLB,, | Performed by: PODIATRIST

## 2023-06-20 PROCEDURE — 3072F LOW RISK FOR RETINOPATHY: CPT | Mod: CPTII,S$GLB,, | Performed by: PODIATRIST

## 2023-06-20 PROCEDURE — 4010F PR ACE/ARB THEARPY RXD/TAKEN: ICD-10-PCS | Mod: CPTII,S$GLB,, | Performed by: PODIATRIST

## 2023-06-21 NOTE — PROGRESS NOTES
Subjective:     Patient ID: David Corona Jr. is a 58 y.o. male.    Chief Complaint: Diabetes Mellitus (6-05-23 Dr. Galindo ) and Nail Care    David is a 58 y.o. male who presents to the clinic upon referral from Dr. Galindo  for evaluation and treatment of diabetic feet. David has a past medical history of Arthritis, Cataract, CKD (chronic kidney disease) stage 3, GFR 30-59 ml/min, Colon polyp (11/2016), Dependent edema, Diverticulosis, Gout, arthritis, Hyperlipidemia LDL goal <100, Hypertension, Morbid obesity, TOSIN (obstructive sleep apnea), Peripheral autonomic neuropathy in disorders classified elsewhere(337.1), Proteinuria, Type II or unspecified type diabetes mellitus with neurological manifestations, uncontrolled(250.62), and Venous stasis of lower extremity. Presents for diabetic foot risk assessment.       PCP: Riddhi Galindo MD    Date Last Seen by PCP: per above    Current shoe gear: Tennis shoes    Hemoglobin A1C   Date Value Ref Range Status   06/03/2023 7.0 (H) 4.0 - 5.6 % Final     Comment:     ADA Screening Guidelines:  5.7-6.4%  Consistent with prediabetes  >or=6.5%  Consistent with diabetes    High levels of fetal hemoglobin interfere with the HbA1C  assay. Heterozygous hemoglobin variants (HbS, HgC, etc)do  not significantly interfere with this assay.   However, presence of multiple variants may affect accuracy.     02/06/2023 6.9 (H) 4.0 - 5.6 % Final     Comment:     ADA Screening Guidelines:  5.7-6.4%  Consistent with prediabetes  >or=6.5%  Consistent with diabetes    High levels of fetal hemoglobin interfere with the HbA1C  assay. Heterozygous hemoglobin variants (HbS, HgC, etc)do  not significantly interfere with this assay.   However, presence of multiple variants may affect accuracy.     10/29/2022 7.7 (H) 4.0 - 5.6 % Final     Comment:     ADA Screening Guidelines:  5.7-6.4%  Consistent with prediabetes  >or=6.5%  Consistent with diabetes    High levels of fetal hemoglobin  interfere with the HbA1C  assay. Heterozygous hemoglobin variants (HbS, HgC, etc)do  not significantly interfere with this assay.   However, presence of multiple variants may affect accuracy.     04/27/2021 7.4 (H) 4.8 - 5.6 % Final     Comment:     [facility]         Review of Systems   Constitutional: Negative for chills.   Cardiovascular:  Negative for chest pain and claudication.   Respiratory:  Negative for cough.    Skin:  Positive for color change, dry skin and nail changes.   Musculoskeletal:  Positive for joint pain.   Gastrointestinal:  Negative for nausea.   Neurological:  Positive for paresthesias. Negative for numbness.   Psychiatric/Behavioral:  The patient is not nervous/anxious.       Objective:     Physical Exam  Constitutional:       Appearance: He is well-developed.      Comments: Oriented to time, place, and person.   Cardiovascular:      Comments: DP and PT pulses are palpable bilaterally. 3 sec capillary refill time and toes and feet are warm to touch proximally .  There is  hair growth on the feet and toes b/l. There is no edema b/l. No spider veins or varicosities present b/l.     Musculoskeletal:      Comments: Equinus noted b/l ankles with < 10 deg DF noted. MMT 5/5 in DF/PF/Inv/Ev resistance with no reproduction of pain in any direction. Passive range of motion of ankle and pedal joints is painless b/l.     Feet:      Right foot:      Skin integrity: No callus or dry skin.      Left foot:      Skin integrity: No callus or dry skin.   Lymphadenopathy:      Comments: Negative lymphadenopathy bilateral popliteal fossa and tarsal tunnel.   Skin:     Comments: No open lesions, lacerations or wounds noted.Interdigital spaces clean, dry and intact b/l. No erythema noted to b/l foot.    Toenails 1-5 bilaterally are elongated by 2-3 mm, thickened by 2-3 mm, discolored/yellowed, dystrophic, brittle with subungual debris.       Neurological:      Mental Status: He is alert.      Comments: Light  touch, proprioception, and sharp/dull sensation are all intact bilaterally. Protective threshold with the Wiseman-Wienstein monofilament is intact bilaterally.    Psychiatric:         Behavior: Behavior is cooperative.         Assessment:      Encounter Diagnoses   Name Primary?    Controlled type 2 diabetes with neuropathy Yes    Hammer toe, unspecified laterality      Plan:     David was seen today for diabetes mellitus and nail care.    Diagnoses and all orders for this visit:    Controlled type 2 diabetes with neuropathy  -     Ambulatory referral/consult to Podiatry  -     DIABETIC SHOES FOR HOME USE    Hammer toe, unspecified laterality  -     DIABETIC SHOES FOR HOME USE      I counseled the patient on his conditions, their implications and medical management.        - Shoe inspection. Diabetic Foot Education. Patient reminded of the importance of good nutrition and blood sugar control to help prevent podiatric complications of diabetes. Patient instructed on proper foot hygeine. We discussed wearing proper shoe gear, daily foot inspections, never walking without protective shoe gear, caution putting sharp instruments to feet     - Discussed DM foot care:  Wear comfortable, proper fitting shoes. Wash feet daily. Dry well. After drying, apply moisturizer to feet (no lotion to webspaces). Inspect feet daily for skin breaks, blisters, swelling, or redness. Wear cotton socks (preferably white)  Change socks every day. Do NOT walk barefoot. Do NOT use heating pads or warm/hot water soaks      Nails 1-5 B/L feet trimmed. Patient is aware that routine trimming of nails or calluses will not be covered service per insurance and he will fall under Proc B if this service is desired. Patient verbalized understanding of this. RTC as needed for Proc B or any other pedal complaint.     Rx diabetic shoes with custom molded inserts to be worn at all times while ambulating. Prescription provided with list of local retailers.      F/u one year DM foot exam sooner PRN     Given underlying mod- severe AS, would be cautious with fluids. Pt is not a surgical candidate for severe AS given poor prognosis as well as family wishes to not pursue aggressive measures.  - See above management for diuresing. -patient initially stepped up from [facility] to [facility] for aspiration pneumonia and septic shock  -s/p Zosyn 5 day course   -now afebrile  PLAN:  Pt NPO, tube feeds.   - maintain NGT feeds  -follow up with speech and swallow and barium swallow

## 2023-07-03 ENCOUNTER — OFFICE VISIT (OUTPATIENT)
Dept: ENDOCRINOLOGY | Facility: CLINIC | Age: 58
End: 2023-07-03
Payer: COMMERCIAL

## 2023-07-03 VITALS
SYSTOLIC BLOOD PRESSURE: 167 MMHG | DIASTOLIC BLOOD PRESSURE: 89 MMHG | OXYGEN SATURATION: 96 % | HEIGHT: 74 IN | WEIGHT: 315 LBS | BODY MASS INDEX: 40.43 KG/M2 | HEART RATE: 84 BPM

## 2023-07-03 DIAGNOSIS — E66.01 MORBID OBESITY, UNSPECIFIED OBESITY TYPE: ICD-10-CM

## 2023-07-03 DIAGNOSIS — Z79.4 TYPE 2 DIABETES MELLITUS WITHOUT COMPLICATION, WITH LONG-TERM CURRENT USE OF INSULIN: Primary | ICD-10-CM

## 2023-07-03 DIAGNOSIS — E78.5 HYPERLIPIDEMIA, UNSPECIFIED HYPERLIPIDEMIA TYPE: ICD-10-CM

## 2023-07-03 DIAGNOSIS — E11.9 TYPE 2 DIABETES MELLITUS WITHOUT COMPLICATION, WITH LONG-TERM CURRENT USE OF INSULIN: Primary | ICD-10-CM

## 2023-07-03 DIAGNOSIS — I10 ESSENTIAL HYPERTENSION: ICD-10-CM

## 2023-07-03 PROCEDURE — 3079F PR MOST RECENT DIASTOLIC BLOOD PRESSURE 80-89 MM HG: ICD-10-PCS | Mod: CPTII,S$GLB,, | Performed by: NURSE PRACTITIONER

## 2023-07-03 PROCEDURE — 95251 PR GLUCOSE MONITOR, 72 HOUR, PHYS INTERP: ICD-10-PCS | Mod: S$GLB,,, | Performed by: NURSE PRACTITIONER

## 2023-07-03 PROCEDURE — 3051F HG A1C>EQUAL 7.0%<8.0%: CPT | Mod: CPTII,S$GLB,, | Performed by: NURSE PRACTITIONER

## 2023-07-03 PROCEDURE — 1160F RVW MEDS BY RX/DR IN RCRD: CPT | Mod: CPTII,S$GLB,, | Performed by: NURSE PRACTITIONER

## 2023-07-03 PROCEDURE — 3072F LOW RISK FOR RETINOPATHY: CPT | Mod: CPTII,S$GLB,, | Performed by: NURSE PRACTITIONER

## 2023-07-03 PROCEDURE — 1160F PR REVIEW ALL MEDS BY PRESCRIBER/CLIN PHARMACIST DOCUMENTED: ICD-10-PCS | Mod: CPTII,S$GLB,, | Performed by: NURSE PRACTITIONER

## 2023-07-03 PROCEDURE — 3079F DIAST BP 80-89 MM HG: CPT | Mod: CPTII,S$GLB,, | Performed by: NURSE PRACTITIONER

## 2023-07-03 PROCEDURE — 3077F SYST BP >= 140 MM HG: CPT | Mod: CPTII,S$GLB,, | Performed by: NURSE PRACTITIONER

## 2023-07-03 PROCEDURE — 4010F ACE/ARB THERAPY RXD/TAKEN: CPT | Mod: CPTII,S$GLB,, | Performed by: NURSE PRACTITIONER

## 2023-07-03 PROCEDURE — 3077F PR MOST RECENT SYSTOLIC BLOOD PRESSURE >= 140 MM HG: ICD-10-PCS | Mod: CPTII,S$GLB,, | Performed by: NURSE PRACTITIONER

## 2023-07-03 PROCEDURE — 99999 PR PBB SHADOW E&M-EST. PATIENT-LVL IV: CPT | Mod: PBBFAC,,, | Performed by: NURSE PRACTITIONER

## 2023-07-03 PROCEDURE — 3008F PR BODY MASS INDEX (BMI) DOCUMENTED: ICD-10-PCS | Mod: CPTII,S$GLB,, | Performed by: NURSE PRACTITIONER

## 2023-07-03 PROCEDURE — 1159F MED LIST DOCD IN RCRD: CPT | Mod: CPTII,S$GLB,, | Performed by: NURSE PRACTITIONER

## 2023-07-03 PROCEDURE — 3072F PR LOW RISK FOR RETINOPATHY: ICD-10-PCS | Mod: CPTII,S$GLB,, | Performed by: NURSE PRACTITIONER

## 2023-07-03 PROCEDURE — 1159F PR MEDICATION LIST DOCUMENTED IN MEDICAL RECORD: ICD-10-PCS | Mod: CPTII,S$GLB,, | Performed by: NURSE PRACTITIONER

## 2023-07-03 PROCEDURE — 3008F BODY MASS INDEX DOCD: CPT | Mod: CPTII,S$GLB,, | Performed by: NURSE PRACTITIONER

## 2023-07-03 PROCEDURE — 99214 OFFICE O/P EST MOD 30 MIN: CPT | Mod: S$GLB,,, | Performed by: NURSE PRACTITIONER

## 2023-07-03 PROCEDURE — 99999 PR PBB SHADOW E&M-EST. PATIENT-LVL IV: ICD-10-PCS | Mod: PBBFAC,,, | Performed by: NURSE PRACTITIONER

## 2023-07-03 PROCEDURE — 95251 CONT GLUC MNTR ANALYSIS I&R: CPT | Mod: S$GLB,,, | Performed by: NURSE PRACTITIONER

## 2023-07-03 PROCEDURE — 4010F PR ACE/ARB THEARPY RXD/TAKEN: ICD-10-PCS | Mod: CPTII,S$GLB,, | Performed by: NURSE PRACTITIONER

## 2023-07-03 PROCEDURE — 3051F PR MOST RECENT HEMOGLOBIN A1C LEVEL 7.0 - < 8.0%: ICD-10-PCS | Mod: CPTII,S$GLB,, | Performed by: NURSE PRACTITIONER

## 2023-07-03 PROCEDURE — 99214 PR OFFICE/OUTPT VISIT, EST, LEVL IV, 30-39 MIN: ICD-10-PCS | Mod: S$GLB,,, | Performed by: NURSE PRACTITIONER

## 2023-07-03 RX ORDER — INSULIN LISPRO 100 [IU]/ML
INJECTION, SOLUTION INTRAVENOUS; SUBCUTANEOUS
Qty: 15 ML | Refills: 5 | Status: SHIPPED | OUTPATIENT
Start: 2023-07-03 | End: 2023-07-03 | Stop reason: CLARIF

## 2023-07-03 RX ORDER — TIRZEPATIDE 15 MG/.5ML
15 INJECTION, SOLUTION SUBCUTANEOUS
Qty: 4 PEN | Refills: 3 | Status: SHIPPED | OUTPATIENT
Start: 2023-07-03 | End: 2023-10-03 | Stop reason: SDUPTHER

## 2023-07-03 RX ORDER — INSULIN ASPART INJECTION 100 [IU]/ML
INJECTION, SOLUTION SUBCUTANEOUS
Qty: 5 PEN | Refills: 5 | Status: SHIPPED | OUTPATIENT
Start: 2023-07-03

## 2023-07-03 RX ORDER — PIOGLITAZONEHYDROCHLORIDE 45 MG/1
TABLET ORAL
Qty: 90 TABLET | Refills: 2 | Status: SHIPPED | OUTPATIENT
Start: 2023-07-03

## 2023-07-03 RX ORDER — INSULIN GLARGINE 100 [IU]/ML
INJECTION, SOLUTION SUBCUTANEOUS
Qty: 15 ML | Refills: 5 | Status: SHIPPED | OUTPATIENT
Start: 2023-07-03 | End: 2023-10-03 | Stop reason: SDUPTHER

## 2023-07-03 NOTE — PATIENT INSTRUCTIONS
Increase Semglee to 30 units once daily.   Increase Mounjaro 15 mg weekly.   Continue Farxiga, pioglitazone, and lispro as needed with sliding scale.   Return to clinic in 3 months with labs prior. - virtual visit

## 2023-07-03 NOTE — PROGRESS NOTES
CC: This 58 y.o. Black or  male  is here for evaluation of  T2DM along with comorbidities indicated in the Visit Diagnosis section of this encounter.    HPI: David Corona Jr. was diagnosed with T2DM in 1991.  Medical history: TOSIN on cpap        Prior visit 3/2023  A1c is down from 7.7 to 6.9%, the lowest it's been in years.   He has gained 12 lb since lov however.   Admits he's been snacking. Mounjaro has not significantly cut his appetite. 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   Needs a PA for Libre2 and Farxiga.   He is taking Semglee 30 units and Humalog 10 units ac, not 20 and 8 units ac as advised at lov.   Pt unsure about pursuing bariatric sx.   Plan Increase Mounjaro from 7.5 mg to 10 mg once weekly for 1 month. After a month, if no severe nausea or diarrhea, then increase to 12.5 mg once weekly.   Continue Semglee at 30  units once daily but reduce to 24 units when Mounjaro is increased to 12.5 mg once weekly.   Stop Humalog fixed dosing before meals. Take Humalog based off correction scale: 150-200=+2, 201-250=+4; 251-300=+6; 301-350=+8, over 350=+10 units  Continue Farxiga and pioglitazone.   Return to clinic in 3 months with labs prior.       Interval hx  A1c is about the same, from 6.9 to 7%.   11 lb weight loss since lov.   Denies n/v, constipation, or diarrhea.     Pt was out of Mounjaro for 1 week and was eating more until he was able to resume it.       LAST DIABETES EDUCATION: 11/2020    HOSPITALIZED FOR DIABETES OR RELATED COMPLICATIONS -  Yes - upon diagnosis.    SIGNIFICANT DIABETES MED HISTORY:   Metformin - diarrhea   Glimepiride stopped and Trulicity started at initial visit 10/2020; switched from Trulicity to Mounjaro 8/2022  Declines VGo insulin delivery device   Farxiga started 10/2021  Mounjaro started 10/2022    PRESCRIBED DIABETES MEDICATIONS:  Farxiga 10 mg once daily  pioglitazone 45 mg once daily  Mounjaro 12.5 mg once  "weekly  Basaglar 30 units hs   Lispro ac ss : 150-200=+2, 201-250=+4; 251-300=+6; 301-350=+8, over 350=+10 units      Misses medication doses - No    DM COMPLICATIONS: nephropathy    SELF MONITORING BLOOD GLUCOSE: Checks blood glucose at home with Libre3   with phone sae (gets sensors for free)     CGM interpretation: BGs very stable with minimal postprandial excursions; no hypoglycemia.                   HYPOGLYCEMIC EPISODES: none. No BGs < 70.        CURRENT DIET: eats 2 meals/day - no lunch. No cold drinks.   Has been eating a lot of watermelon.    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       BP (!) 167/89   Pulse 84   Ht 6' 2" (1.88 m)   Wt (!) 213.2 kg (470 lb)   SpO2 96%   BMI 60.34 kg/m²       ROS:   CONSTITUTIONAL: Appetite good, denies fatigue  GI: Denies n/v, constipation, or diarrhea.           PHYSICAL EXAM:  GENERAL: Well developed, well nourished. No acute distress.   PSYCH: AAOx3, appropriate mood and affect, conversant, well-groomed. Judgement and insight good.   NEURO: Cranial nerves grossly intact. Speech clear, no tremor.   CHEST: Respirations even and unlabored.        Hemoglobin A1C   Date Value Ref Range Status   06/03/2023 7.0 (H) 4.0 - 5.6 % Final     Comment:     ADA Screening Guidelines:  5.7-6.4%  Consistent with prediabetes  >or=6.5%  Consistent with diabetes    High levels of fetal hemoglobin interfere with the HbA1C  assay. Heterozygous hemoglobin variants (HbS, HgC, etc)do  not significantly interfere with this assay.   However, presence of multiple variants may affect accuracy.     02/06/2023 6.9 (H) 4.0 - 5.6 % Final     Comment:     ADA Screening Guidelines:  5.7-6.4%  Consistent with prediabetes  >or=6.5%  Consistent with diabetes    High levels of fetal hemoglobin interfere with the HbA1C  assay. Heterozygous hemoglobin variants (HbS, HgC, etc)do  not significantly interfere with " this assay.   However, presence of multiple variants may affect accuracy.     10/29/2022 7.7 (H) 4.0 - 5.6 % Final     Comment:     ADA Screening Guidelines:  5.7-6.4%  Consistent with prediabetes  >or=6.5%  Consistent with diabetes    High levels of fetal hemoglobin interfere with the HbA1C  assay. Heterozygous hemoglobin variants (HbS, HgC, etc)do  not significantly interfere with this assay.   However, presence of multiple variants may affect accuracy.     04/27/2021 7.4 (H) 4.8 - 5.6 % Final     Comment:     [employer]           Chemistry        Component Value Date/Time     06/03/2023 0810    K 3.5 06/03/2023 0810     06/03/2023 0810    CO2 25 06/03/2023 0810    BUN 14 06/03/2023 0810    CREATININE 1.2 06/03/2023 0810    GLU 95 06/03/2023 0810        Component Value Date/Time    CALCIUM 9.5 06/03/2023 0810    ALKPHOS 146 (H) 10/29/2022 0813    AST 28 10/29/2022 0813    ALT 10 10/29/2022 0813    BILITOT 0.5 10/29/2022 0813    ESTGFRAFRICA >60 08/07/2021 0810    EGFRNONAA >60 08/07/2021 0810           Latest Reference Range & Units 06/03/23 08:10   BUN 6 - 20 mg/dL 14   Creatinine 0.5 - 1.4 mg/dL 1.2   eGFR >60 mL/min/1.73 m^2 >60.0     Lab Results   Component Value Date    LDLCALC 50.2 (L) 06/03/2023      Latest Reference Range & Units 06/03/23 08:10   Cholesterol 120 - 199 mg/dL 111 (L)   HDL 40 - 75 mg/dL 44   HDL/Cholesterol Ratio 20.0 - 50.0 % 39.6   LDL Cholesterol External 63.0 - 159.0 mg/dL 50.2 (L)   Non-HDL Cholesterol mg/dL 67   Total Cholesterol/HDL Ratio 2.0 - 5.0  2.5   Triglycerides 30 - 150 mg/dL 84   (L): Data is abnormally low    Lab Results   Component Value Date    MICALBCREAT 22.2 08/13/2022         ASSESSMENT and PLAN:    A1C GOAL: < 7 %       1. Type 2 diabetes mellitus without complication, with long-term current use of insulin  Increase Semglee to 30 units once daily.   Increase Mounjaro 15 mg weekly.   Continue Farxiga, pioglitazone, and lispro as needed with sliding  scale.   Return to clinic in 3 months with labs prior. - virtual visit     Hemoglobin A1C    Microalbumin/Creatinine Ratio, Urine    tirzepatide (MOUNJARO) 15 mg/0.5 mL PnIj    insulin (BASAGLAR KWIKPEN U-100 INSULIN) glargine 100 units/mL SubQ pen      2. Essential hypertension  Microalbumin/Creatinine Ratio, Urine    Followed by digital HTN team.       3. Morbid obesity, unspecified obesity type  tirzepatide (MOUNJARO) 15 mg/0.5 mL PnIj      4. Hyperlipidemia, unspecified hyperlipidemia type  Continue atorvastatin             Orders Placed This Encounter   Procedures    Hemoglobin A1C     Standing Status:   Future     Standing Expiration Date:   8/31/2024    Microalbumin/Creatinine Ratio, Urine     Standing Status:   Future     Standing Expiration Date:   8/31/2024     Order Specific Question:   Specimen Source     Answer:   Urine          Follow up in about 3 months (around 10/3/2023) for Virtual Visit.

## 2023-08-10 ENCOUNTER — TELEPHONE (OUTPATIENT)
Dept: PHARMACY | Facility: CLINIC | Age: 58
End: 2023-08-10
Payer: COMMERCIAL

## 2023-08-24 DIAGNOSIS — M79.642 PAIN OF LEFT HAND: ICD-10-CM

## 2023-08-24 RX ORDER — IBUPROFEN 600 MG/1
TABLET ORAL
Qty: 30 TABLET | Refills: 0 | Status: SHIPPED | OUTPATIENT
Start: 2023-08-24 | End: 2024-01-17 | Stop reason: SDUPTHER

## 2023-09-05 DIAGNOSIS — M10.9 GOUT, UNSPECIFIED CAUSE, UNSPECIFIED CHRONICITY, UNSPECIFIED SITE: ICD-10-CM

## 2023-09-05 RX ORDER — COLCHICINE 0.6 MG/1
TABLET ORAL
Qty: 90 TABLET | Refills: 0 | Status: SHIPPED | OUTPATIENT
Start: 2023-09-05 | End: 2023-10-30 | Stop reason: SDUPTHER

## 2023-09-05 NOTE — TELEPHONE ENCOUNTER
Refill Routing Note   Medication(s) are not appropriate for processing by [facility] for the following reason(s):      Required labs outdated    [facility] action(s):  Defer Care Due:  Labs due            Appointments  past 12m or future 3m with PCP    Date Provider   Last Visit   6/5/2023 Riddhi Galindo MD   Next Visit   Visit date not found Riddhi Galindo MD   ED visits in past 90 days: 0        Note composed:8:00 AM 09/05/2023

## 2023-09-05 NOTE — TELEPHONE ENCOUNTER
Care Due:                  Date            Visit Type   Department     Provider  --------------------------------------------------------------------------------                                EP -         [facility] FAM[facility]                              PRIMARY      MED/ INTERNAL  Christiane Gupta  Last Visit: 06-      CARE ([facility])   MED/ PEDS      Maryuri Galindo  Next Visit: None Scheduled  None         None Found                                                            Last  Test          Frequency    Reason                     Performed    Due Date  --------------------------------------------------------------------------------    Uric Acid...  12 months..  colchicine...............  Not Found    Overdue    [employer] Embedded Care Due Messages. Reference number: [id number].   9/05/2023 5:48:24 AM CDT

## 2023-09-07 ENCOUNTER — TELEPHONE (OUTPATIENT)
Dept: FAMILY MEDICINE | Facility: CLINIC | Age: 58
End: 2023-09-07
Payer: COMMERCIAL

## 2023-09-07 NOTE — TELEPHONE ENCOUNTER
----- Message from Riddhi Galindo MD sent at 9/5/2023 12:23 PM CDT -----  Patient needs to be seen in December of this year- please schedule. Please address health maintenance, if applicable. May need to see NP.

## 2023-09-25 ENCOUNTER — LAB VISIT (OUTPATIENT)
Dept: LAB | Facility: HOSPITAL | Age: 58
End: 2023-09-25
Attending: INTERNAL MEDICINE
Payer: COMMERCIAL

## 2023-09-25 DIAGNOSIS — Z79.4 TYPE 2 DIABETES MELLITUS WITHOUT COMPLICATION, WITH LONG-TERM CURRENT USE OF INSULIN: ICD-10-CM

## 2023-09-25 DIAGNOSIS — I10 ESSENTIAL HYPERTENSION: ICD-10-CM

## 2023-09-25 DIAGNOSIS — E11.9 TYPE 2 DIABETES MELLITUS WITHOUT COMPLICATION, WITH LONG-TERM CURRENT USE OF INSULIN: ICD-10-CM

## 2023-09-25 LAB
ESTIMATED AVG GLUCOSE: 151 MG/DL (ref 68–131)
HBA1C MFR BLD: 6.9 % (ref 4–5.6)

## 2023-09-25 PROCEDURE — 36415 COLL VENOUS BLD VENIPUNCTURE: CPT | Mod: PO | Performed by: NURSE PRACTITIONER

## 2023-09-25 PROCEDURE — 83036 HEMOGLOBIN GLYCOSYLATED A1C: CPT | Performed by: NURSE PRACTITIONER

## 2023-10-03 ENCOUNTER — OFFICE VISIT (OUTPATIENT)
Dept: ENDOCRINOLOGY | Facility: CLINIC | Age: 58
End: 2023-10-03
Payer: COMMERCIAL

## 2023-10-03 ENCOUNTER — PATIENT MESSAGE (OUTPATIENT)
Dept: FAMILY MEDICINE | Facility: CLINIC | Age: 58
End: 2023-10-03
Payer: COMMERCIAL

## 2023-10-03 VITALS
WEIGHT: 315 LBS | TEMPERATURE: 98 F | HEART RATE: 80 BPM | BODY MASS INDEX: 58.8 KG/M2 | DIASTOLIC BLOOD PRESSURE: 76 MMHG | SYSTOLIC BLOOD PRESSURE: 122 MMHG

## 2023-10-03 DIAGNOSIS — E66.01 MORBID OBESITY, UNSPECIFIED OBESITY TYPE: ICD-10-CM

## 2023-10-03 DIAGNOSIS — E11.9 TYPE 2 DIABETES MELLITUS WITHOUT COMPLICATION, WITH LONG-TERM CURRENT USE OF INSULIN: Primary | ICD-10-CM

## 2023-10-03 DIAGNOSIS — Z79.4 TYPE 2 DIABETES MELLITUS WITHOUT COMPLICATION, WITH LONG-TERM CURRENT USE OF INSULIN: Primary | ICD-10-CM

## 2023-10-03 DIAGNOSIS — I10 ESSENTIAL HYPERTENSION: ICD-10-CM

## 2023-10-03 PROCEDURE — 99214 PR OFFICE/OUTPT VISIT, EST, LEVL IV, 30-39 MIN: ICD-10-PCS | Mod: S$GLB,,, | Performed by: NURSE PRACTITIONER

## 2023-10-03 PROCEDURE — 95251 PR GLUCOSE MONITOR, 72 HOUR, PHYS INTERP: ICD-10-PCS | Mod: S$GLB,,, | Performed by: NURSE PRACTITIONER

## 2023-10-03 PROCEDURE — 3078F PR MOST RECENT DIASTOLIC BLOOD PRESSURE < 80 MM HG: ICD-10-PCS | Mod: CPTII,S$GLB,, | Performed by: NURSE PRACTITIONER

## 2023-10-03 PROCEDURE — 3044F PR MOST RECENT HEMOGLOBIN A1C LEVEL <7.0%: ICD-10-PCS | Mod: CPTII,S$GLB,, | Performed by: NURSE PRACTITIONER

## 2023-10-03 PROCEDURE — 95251 CONT GLUC MNTR ANALYSIS I&R: CPT | Mod: S$GLB,,, | Performed by: NURSE PRACTITIONER

## 2023-10-03 PROCEDURE — 3008F PR BODY MASS INDEX (BMI) DOCUMENTED: ICD-10-PCS | Mod: CPTII,S$GLB,, | Performed by: NURSE PRACTITIONER

## 2023-10-03 PROCEDURE — 3008F BODY MASS INDEX DOCD: CPT | Mod: CPTII,S$GLB,, | Performed by: NURSE PRACTITIONER

## 2023-10-03 PROCEDURE — 3072F LOW RISK FOR RETINOPATHY: CPT | Mod: CPTII,S$GLB,, | Performed by: NURSE PRACTITIONER

## 2023-10-03 PROCEDURE — 3078F DIAST BP <80 MM HG: CPT | Mod: CPTII,S$GLB,, | Performed by: NURSE PRACTITIONER

## 2023-10-03 PROCEDURE — 3074F SYST BP LT 130 MM HG: CPT | Mod: CPTII,S$GLB,, | Performed by: NURSE PRACTITIONER

## 2023-10-03 PROCEDURE — 4010F ACE/ARB THERAPY RXD/TAKEN: CPT | Mod: CPTII,S$GLB,, | Performed by: NURSE PRACTITIONER

## 2023-10-03 PROCEDURE — 99999 PR PBB SHADOW E&M-EST. PATIENT-LVL IV: ICD-10-PCS | Mod: PBBFAC,,, | Performed by: NURSE PRACTITIONER

## 2023-10-03 PROCEDURE — 3044F HG A1C LEVEL LT 7.0%: CPT | Mod: CPTII,S$GLB,, | Performed by: NURSE PRACTITIONER

## 2023-10-03 PROCEDURE — 1159F PR MEDICATION LIST DOCUMENTED IN MEDICAL RECORD: ICD-10-PCS | Mod: CPTII,S$GLB,, | Performed by: NURSE PRACTITIONER

## 2023-10-03 PROCEDURE — 1160F PR REVIEW ALL MEDS BY PRESCRIBER/CLIN PHARMACIST DOCUMENTED: ICD-10-PCS | Mod: CPTII,S$GLB,, | Performed by: NURSE PRACTITIONER

## 2023-10-03 PROCEDURE — 99999 PR PBB SHADOW E&M-EST. PATIENT-LVL IV: CPT | Mod: PBBFAC,,, | Performed by: NURSE PRACTITIONER

## 2023-10-03 PROCEDURE — 1159F MED LIST DOCD IN RCRD: CPT | Mod: CPTII,S$GLB,, | Performed by: NURSE PRACTITIONER

## 2023-10-03 PROCEDURE — 1160F RVW MEDS BY RX/DR IN RCRD: CPT | Mod: CPTII,S$GLB,, | Performed by: NURSE PRACTITIONER

## 2023-10-03 PROCEDURE — 99214 OFFICE O/P EST MOD 30 MIN: CPT | Mod: S$GLB,,, | Performed by: NURSE PRACTITIONER

## 2023-10-03 PROCEDURE — 4010F PR ACE/ARB THEARPY RXD/TAKEN: ICD-10-PCS | Mod: CPTII,S$GLB,, | Performed by: NURSE PRACTITIONER

## 2023-10-03 PROCEDURE — 3072F PR LOW RISK FOR RETINOPATHY: ICD-10-PCS | Mod: CPTII,S$GLB,, | Performed by: NURSE PRACTITIONER

## 2023-10-03 PROCEDURE — 3074F PR MOST RECENT SYSTOLIC BLOOD PRESSURE < 130 MM HG: ICD-10-PCS | Mod: CPTII,S$GLB,, | Performed by: NURSE PRACTITIONER

## 2023-10-03 RX ORDER — INSULIN GLARGINE 100 [IU]/ML
INJECTION, SOLUTION SUBCUTANEOUS
Qty: 15 ML | Refills: 5 | Status: SHIPPED | OUTPATIENT
Start: 2023-10-03

## 2023-10-03 RX ORDER — TIRZEPATIDE 15 MG/.5ML
15 INJECTION, SOLUTION SUBCUTANEOUS
Qty: 4 PEN | Refills: 5 | Status: SHIPPED | OUTPATIENT
Start: 2023-10-03 | End: 2023-10-03

## 2023-10-03 RX ORDER — DAPAGLIFLOZIN 10 MG/1
10 TABLET, FILM COATED ORAL DAILY
Qty: 90 TABLET | Refills: 2 | Status: SHIPPED | OUTPATIENT
Start: 2023-10-03

## 2023-10-03 RX ORDER — TIRZEPATIDE 15 MG/.5ML
15 INJECTION, SOLUTION SUBCUTANEOUS
Qty: 12 PEN | Refills: 2 | Status: SHIPPED | OUTPATIENT
Start: 2023-10-03

## 2023-10-03 NOTE — PATIENT INSTRUCTIONS
Obtain yearly eye exam.   Start exercise regimen.   Avoid beverages with sugar.   Continue current meds  A1c in 3 months  Return to clinic in 6 months with a1c prior.

## 2023-10-03 NOTE — PROGRESS NOTES
CC: This 58 y.o. Black or  male  is here for evaluation of  T2DM along with comorbidities indicated in the Visit Diagnosis section of this encounter.    HPI: David Corona Jr. was diagnosed with T2DM in 1991.  Medical history: TOSIN on cpap        Prior visit 3/2023  A1c is down from 7.7 to 6.9%, the lowest it's been in years.   He has gained 12 lb since lov however.   Admits he's been snacking. Mounjaro has not significantly cut his appetite. 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   Needs a PA for Libre2 and Farxiga.   He is taking Semglee 30 units and Humalog 10 units ac, not 20 and 8 units ac as advised at lov.   Pt unsure about pursuing bariatric sx.   Plan Increase Mounjaro from 7.5 mg to 10 mg once weekly for 1 month. After a month, if no severe nausea or diarrhea, then increase to 12.5 mg once weekly.   Continue Semglee at 30  units once daily but reduce to 24 units when Mounjaro is increased to 12.5 mg once weekly.   Stop Humalog fixed dosing before meals. Take Humalog based off correction scale: 150-200=+2, 201-250=+4; 251-300=+6; 301-350=+8, over 350=+10 units  Continue Farxiga and pioglitazone.   Return to clinic in 3 months with labs prior.       Prior visit 7/2023  A1c is about the same, from 6.9 to 7%.   11 lb weight loss since lov.   Denies n/v, constipation, or diarrhea.   Pt was out of Mounjaro for 1 week and was eating more until he was able to resume it.   Plan Increase Semglee to 30 units once daily.   Increase Mounjaro 15 mg weekly.   Continue Farxiga, pioglitazone, and lispro as needed with sliding scale.   Return to clinic in 3 months with labs prior. - virtual visit     Interval hx  A1c is about the same, from 7 to now 6.9%.   12 lb weight loss since lov. Has not noticed a big change in his appetite, but he does not eat as often and sometimes doesn't finish his plate.         LAST DIABETES EDUCATION: 11/2020    HOSPITALIZED FOR DIABETES OR  RELATED COMPLICATIONS -  Yes - upon diagnosis.    SIGNIFICANT DIABETES MED HISTORY:   Metformin - diarrhea   Glimepiride stopped and Trulicity started at initial visit 10/2020; switched from Trulicity to Mounjaro 8/2022  Declines VGo insulin delivery device   Farxiga started 10/2021  Mounjaro started 10/2022  Humalog fixed meal dosing stopped 3/2023 when Mounjaro dose was increased.       PRESCRIBED DIABETES MEDICATIONS:  Farxiga 10 mg once daily  pioglitazone 45 mg once daily  Mounjaro 15 mg once weekly  Semglee 30 units hs     Fiasp ac ss : 150-200=+2, 201-250=+4; 251-300=+6; 301-350=+8, over 350=+10 units      Misses medication doses - No    DM COMPLICATIONS: nephropathy    SELF MONITORING BLOOD GLUCOSE: Checks blood glucose at home with Libre3   with phone sae (gets sensors for free)     CGM interpretation: BGs stable with minimal postprandial excursions - especially after drinking juice/lemonade; no hypoglycemia.                     HYPOGLYCEMIC EPISODES: none. No BGs < 70.        CURRENT DIET: eats 2 meals/day - no lunch. No cold drinks.   Breakfast was oatmeal and sweet tea at [employer]. Dinner was baked chicken, lemonade.       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       /76   Pulse 80   Temp 98.2 °F (36.8 °C)   Wt (!) 207.7 kg (458 lb)   BMI 58.80 kg/m²       ROS:   CONSTITUTIONAL: Appetite good, denies fatigue  GI: Denies n/v, constipation, or diarrhea.           PHYSICAL EXAM:  GENERAL: Well developed, well nourished. No acute distress.   PSYCH: AAOx3, appropriate mood and affect, conversant, well-groomed. Judgement and insight good.   NEURO: Cranial nerves grossly intact. Speech clear, no tremor.   CHEST: Respirations even and unlabored.        Hemoglobin A1C   Date Value Ref Range Status   09/25/2023 6.9 (H) 4.0 - 5.6 % Final     Comment:     ADA Screening Guidelines:  5.7-6.4%  Consistent with  prediabetes  >or=6.5%  Consistent with diabetes    High levels of fetal hemoglobin interfere with the HbA1C  assay. Heterozygous hemoglobin variants (HbS, HgC, etc)do  not significantly interfere with this assay.   However, presence of multiple variants may affect accuracy.     06/03/2023 7.0 (H) 4.0 - 5.6 % Final     Comment:     ADA Screening Guidelines:  5.7-6.4%  Consistent with prediabetes  >or=6.5%  Consistent with diabetes    High levels of fetal hemoglobin interfere with the HbA1C  assay. Heterozygous hemoglobin variants (HbS, HgC, etc)do  not significantly interfere with this assay.   However, presence of multiple variants may affect accuracy.     02/06/2023 6.9 (H) 4.0 - 5.6 % Final     Comment:     ADA Screening Guidelines:  5.7-6.4%  Consistent with prediabetes  >or=6.5%  Consistent with diabetes    High levels of fetal hemoglobin interfere with the HbA1C  assay. Heterozygous hemoglobin variants (HbS, HgC, etc)do  not significantly interfere with this assay.   However, presence of multiple variants may affect accuracy.     04/27/2021 7.4 (H) 4.8 - 5.6 % Final     Comment:     Contents First           Component Value Date/Time     06/03/2023 0810    K 3.5 06/03/2023 0810     06/03/2023 0810    CO2 25 06/03/2023 0810    BUN 14 06/03/2023 0810    CREATININE 1.2 06/03/2023 0810    GLU 95 06/03/2023 0810    CALCIUM 9.5 06/03/2023 0810    ALKPHOS 146 (H) 10/29/2022 0813    AST 28 10/29/2022 0813    ALT 10 10/29/2022 0813    BILITOT 0.5 10/29/2022 0813    EGFRNORACEVR >60.0 06/03/2023 0810    ESTGFRAFRICA >60 08/07/2021 0810         Lab Results   Component Value Date    LDLCALC 50.2 (L) 06/03/2023      Latest Reference Range & Units 06/03/23 08:10   Cholesterol 120 - 199 mg/dL 111 (L)   HDL 40 - 75 mg/dL 44   HDL/Cholesterol Ratio 20.0 - 50.0 % 39.6   LDL Cholesterol External 63.0 - 159.0 mg/dL 50.2 (L)   Non-HDL Cholesterol mg/dL 67   Total Cholesterol/HDL Ratio 2.0 - 5.0  2.5   Triglycerides 30 -  150 mg/dL 84   (L): Data is abnormally low    Lab Results   Component Value Date    MICALBCREAT 22.2 08/13/2022         ASSESSMENT and PLAN:    A1C GOAL: < 7 %     1. Type 2 diabetes mellitus without complication, with long-term current use of insulin  Obtain yearly eye exam.   Start exercise regimen.   Avoid beverages with sugar.   Continue current meds  A1c in 3 months  Return to clinic in 6 months with a1c prior.     tirzepatide (MOUNJARO) 15 mg/0.5 mL PnIj    insulin (BASAGLAR KWIKPEN U-100 INSULIN) glargine 100 units/mL SubQ pen    dapagliflozin propanediol (FARXIGA) 10 mg tablet    Hemoglobin A1C    Microalbumin/Creatinine Ratio, Urine      2. Morbid obesity, unspecified obesity type  tirzepatide (MOUNJARO) 15 mg/0.5 mL PnIj      3. Essential hypertension  Microalbumin/Creatinine Ratio, Urine            Orders Placed This Encounter   Procedures    Hemoglobin A1C     Standing Status:   Standing     Number of Occurrences:   2     Standing Expiration Date:   12/1/2024    Microalbumin/Creatinine Ratio, Urine     Standing Status:   Future     Standing Expiration Date:   12/1/2024     Order Specific Question:   Specimen Source     Answer:   Urine          Follow up in about 6 months (around 4/3/2024).

## 2023-10-20 ENCOUNTER — TELEPHONE (OUTPATIENT)
Dept: FAMILY MEDICINE | Facility: CLINIC | Age: 58
End: 2023-10-20
Payer: COMMERCIAL

## 2023-10-30 DIAGNOSIS — M10.9 GOUT, UNSPECIFIED CAUSE, UNSPECIFIED CHRONICITY, UNSPECIFIED SITE: ICD-10-CM

## 2023-10-30 RX ORDER — COLCHICINE 0.6 MG/1
0.6 TABLET ORAL DAILY
Qty: 90 TABLET | Refills: 0 | Status: SHIPPED | OUTPATIENT
Start: 2023-10-30 | End: 2024-03-04

## 2023-10-30 NOTE — TELEPHONE ENCOUNTER
Care Due:                  Date            Visit Type   Department     Provider  --------------------------------------------------------------------------------                                EP -         [facility] FAMILY                              PRIMARY      MED/ INTERNAL  Christiane Gupta  Last Visit: 06-      CARE ([facility])   MED/ PEDS      Maryuri Galindo  Next Visit: None Scheduled  None         None Found                                                            Last  Test          Frequency    Reason                     Performed    Due Date  --------------------------------------------------------------------------------    CBC.........  12 months..  ibuprofen................  01-   01-    [employer] Embedded Care Due Messages. Reference number: [id number].   10/30/2023 10:42:33 AM CDT

## 2023-11-04 ENCOUNTER — LAB VISIT (OUTPATIENT)
Dept: LAB | Facility: HOSPITAL | Age: 58
End: 2023-11-04
Attending: INTERNAL MEDICINE
Payer: COMMERCIAL

## 2023-11-04 DIAGNOSIS — E78.2 MIXED HYPERLIPIDEMIA: ICD-10-CM

## 2023-11-04 LAB
ALBUMIN SERPL BCP-MCNC: 3.4 G/DL (ref 3.5–5.2)
ALP SERPL-CCNC: 159 U/L (ref 55–135)
ALT SERPL W/O P-5'-P-CCNC: 8 U/L (ref 10–44)
AST SERPL-CCNC: 26 U/L (ref 10–40)
BILIRUB DIRECT SERPL-MCNC: 0.3 MG/DL (ref 0.1–0.3)
BILIRUB SERPL-MCNC: 0.6 MG/DL (ref 0.1–1)
CHOLEST SERPL-MCNC: 107 MG/DL (ref 120–199)
CHOLEST/HDLC SERPL: 2.5 {RATIO} (ref 2–5)
HDLC SERPL-MCNC: 43 MG/DL (ref 40–75)
HDLC SERPL: 40.2 % (ref 20–50)
LDLC SERPL CALC-MCNC: 52.8 MG/DL (ref 63–159)
NONHDLC SERPL-MCNC: 64 MG/DL
PROT SERPL-MCNC: 7.2 G/DL (ref 6–8.4)
TRIGL SERPL-MCNC: 56 MG/DL (ref 30–150)

## 2023-11-04 PROCEDURE — 80061 LIPID PANEL: CPT | Performed by: INTERNAL MEDICINE

## 2023-11-04 PROCEDURE — 36415 COLL VENOUS BLD VENIPUNCTURE: CPT | Mod: PO | Performed by: INTERNAL MEDICINE

## 2023-11-04 PROCEDURE — 80076 HEPATIC FUNCTION PANEL: CPT | Performed by: INTERNAL MEDICINE

## 2023-11-08 ENCOUNTER — OFFICE VISIT (OUTPATIENT)
Dept: CARDIOLOGY | Facility: CLINIC | Age: 58
End: 2023-11-08
Payer: COMMERCIAL

## 2023-11-08 VITALS
BODY MASS INDEX: 40.43 KG/M2 | HEIGHT: 74 IN | RESPIRATION RATE: 17 BRPM | HEART RATE: 72 BPM | DIASTOLIC BLOOD PRESSURE: 76 MMHG | SYSTOLIC BLOOD PRESSURE: 160 MMHG | WEIGHT: 315 LBS

## 2023-11-08 DIAGNOSIS — E11.40 POORLY CONTROLLED TYPE 2 DIABETES MELLITUS WITH NEUROPATHY: ICD-10-CM

## 2023-11-08 DIAGNOSIS — E66.01 MORBID OBESITY WITH BMI OF 50.0-59.9, ADULT: ICD-10-CM

## 2023-11-08 DIAGNOSIS — E11.65 POORLY CONTROLLED TYPE 2 DIABETES MELLITUS WITH NEUROPATHY: ICD-10-CM

## 2023-11-08 DIAGNOSIS — E78.2 MIXED HYPERLIPIDEMIA: ICD-10-CM

## 2023-11-08 DIAGNOSIS — I10 ESSENTIAL HYPERTENSION: Primary | ICD-10-CM

## 2023-11-08 DIAGNOSIS — G47.33 OSA (OBSTRUCTIVE SLEEP APNEA): ICD-10-CM

## 2023-11-08 DIAGNOSIS — E66.01 MORBID OBESITY, UNSPECIFIED OBESITY TYPE: ICD-10-CM

## 2023-11-08 PROCEDURE — 3077F SYST BP >= 140 MM HG: CPT | Mod: CPTII,S$GLB,, | Performed by: INTERNAL MEDICINE

## 2023-11-08 PROCEDURE — 3078F PR MOST RECENT DIASTOLIC BLOOD PRESSURE < 80 MM HG: ICD-10-PCS | Mod: CPTII,S$GLB,, | Performed by: INTERNAL MEDICINE

## 2023-11-08 PROCEDURE — 4010F ACE/ARB THERAPY RXD/TAKEN: CPT | Mod: CPTII,S$GLB,, | Performed by: INTERNAL MEDICINE

## 2023-11-08 PROCEDURE — 1159F MED LIST DOCD IN RCRD: CPT | Mod: CPTII,S$GLB,, | Performed by: INTERNAL MEDICINE

## 2023-11-08 PROCEDURE — 3044F HG A1C LEVEL LT 7.0%: CPT | Mod: CPTII,S$GLB,, | Performed by: INTERNAL MEDICINE

## 2023-11-08 PROCEDURE — 99214 PR OFFICE/OUTPT VISIT, EST, LEVL IV, 30-39 MIN: ICD-10-PCS | Mod: S$GLB,,, | Performed by: INTERNAL MEDICINE

## 2023-11-08 PROCEDURE — 3072F PR LOW RISK FOR RETINOPATHY: ICD-10-PCS | Mod: CPTII,S$GLB,, | Performed by: INTERNAL MEDICINE

## 2023-11-08 PROCEDURE — 99214 OFFICE O/P EST MOD 30 MIN: CPT | Mod: S$GLB,,, | Performed by: INTERNAL MEDICINE

## 2023-11-08 PROCEDURE — 3044F PR MOST RECENT HEMOGLOBIN A1C LEVEL <7.0%: ICD-10-PCS | Mod: CPTII,S$GLB,, | Performed by: INTERNAL MEDICINE

## 2023-11-08 PROCEDURE — 3008F PR BODY MASS INDEX (BMI) DOCUMENTED: ICD-10-PCS | Mod: CPTII,S$GLB,, | Performed by: INTERNAL MEDICINE

## 2023-11-08 PROCEDURE — 99999 PR PBB SHADOW E&M-EST. PATIENT-LVL V: ICD-10-PCS | Mod: PBBFAC,,, | Performed by: INTERNAL MEDICINE

## 2023-11-08 PROCEDURE — 3072F LOW RISK FOR RETINOPATHY: CPT | Mod: CPTII,S$GLB,, | Performed by: INTERNAL MEDICINE

## 2023-11-08 PROCEDURE — 3078F DIAST BP <80 MM HG: CPT | Mod: CPTII,S$GLB,, | Performed by: INTERNAL MEDICINE

## 2023-11-08 PROCEDURE — 93000 EKG 12-LEAD: ICD-10-PCS | Mod: S$GLB,,, | Performed by: INTERNAL MEDICINE

## 2023-11-08 PROCEDURE — 99999 PR PBB SHADOW E&M-EST. PATIENT-LVL V: CPT | Mod: PBBFAC,,, | Performed by: INTERNAL MEDICINE

## 2023-11-08 PROCEDURE — 3077F PR MOST RECENT SYSTOLIC BLOOD PRESSURE >= 140 MM HG: ICD-10-PCS | Mod: CPTII,S$GLB,, | Performed by: INTERNAL MEDICINE

## 2023-11-08 PROCEDURE — 93000 ELECTROCARDIOGRAM COMPLETE: CPT | Mod: S$GLB,,, | Performed by: INTERNAL MEDICINE

## 2023-11-08 PROCEDURE — 1160F PR REVIEW ALL MEDS BY PRESCRIBER/CLIN PHARMACIST DOCUMENTED: ICD-10-PCS | Mod: CPTII,S$GLB,, | Performed by: INTERNAL MEDICINE

## 2023-11-08 PROCEDURE — 1159F PR MEDICATION LIST DOCUMENTED IN MEDICAL RECORD: ICD-10-PCS | Mod: CPTII,S$GLB,, | Performed by: INTERNAL MEDICINE

## 2023-11-08 PROCEDURE — 3008F BODY MASS INDEX DOCD: CPT | Mod: CPTII,S$GLB,, | Performed by: INTERNAL MEDICINE

## 2023-11-08 PROCEDURE — 1160F RVW MEDS BY RX/DR IN RCRD: CPT | Mod: CPTII,S$GLB,, | Performed by: INTERNAL MEDICINE

## 2023-11-08 PROCEDURE — 4010F PR ACE/ARB THEARPY RXD/TAKEN: ICD-10-PCS | Mod: CPTII,S$GLB,, | Performed by: INTERNAL MEDICINE

## 2023-11-08 RX ORDER — HYDRALAZINE HYDROCHLORIDE 50 MG/1
50 TABLET, FILM COATED ORAL 2 TIMES DAILY
Qty: 180 TABLET | Refills: 3 | Status: SHIPPED | OUTPATIENT
Start: 2023-11-08 | End: 2024-11-07

## 2023-11-08 NOTE — PROGRESS NOTES
CARDIOVASCULAR PROGRESS NOTE    REASON FOR CONSULT:   David Corona Jr. is a 58 y.o. male who presents for f/u of HTN.    PCP: Josie  HISTORY OF PRESENT ILLNESS:   The patient returns for follow up.  He denies intercurrent angina, dyspnea, palpitations, or syncope.  He is had no PND, orthopnea, melena, hematuria, or claudication symptoms.    CARDIOVASCULAR HISTORY:   TOSIN on CPAP    PAST MEDICAL HISTORY:     Past Medical History:   Diagnosis Date    Arthritis     Cataract     left eye     CKD (chronic kidney disease) stage 3, GFR 30-59 ml/min     Colon polyp 11/2016    Dependent edema     Diverticulosis     Gout, arthritis     Hyperlipidemia LDL goal <100     Hypertension     Morbid obesity     TOSIN (obstructive sleep apnea)     Peripheral autonomic neuropathy in disorders classified elsewhere(337.1)     Proteinuria     Type II or unspecified type diabetes mellitus with neurological manifestations, uncontrolled(250.62)     Venous stasis of lower extremity        PAST SURGICAL HISTORY:     Past Surgical History:   Procedure Laterality Date    CATARACT EXTRACTION W/  INTRAOCULAR LENS IMPLANT Right 2007    COLONOSCOPY N/A 11/22/2016    Procedure: COLONOSCOPY;  Surgeon: Abdi La MD;  Location: [facility] ([address] [place] [address]);  Service: Endoscopy;  Laterality: N/A;  2nd floor case/BMI 59.13/wants week of Thanksgiving    COLONOSCOPY N/A 02/28/2022    Procedure: COLONOSCOPY;  Surgeon: Jessi Martinez MD;  Location: [facility] ([address] [place] [address]);  Service: Endoscopy;  Laterality: N/A;  BMI 61.71  covid test [place] 2/25 2/24 pt confirmed appt and new arrival time-Kpvt       ALLERGIES AND MEDICATION:   Review of patient's allergies indicates:  No Known Allergies     Medication List            Accurate as of November 8, 2023  3:27 PM. If you have any questions, ask your nurse or doctor.                CONTINUE taking these medications      aspirin 81 MG EC tablet  Commonly known as: ECOTRIN  Take 1 tablet (81 mg total) by mouth once  "daily.     atorvastatin 40 MG tablet  Commonly known as: LIPITOR  Take 1 tablet (40 mg total) by mouth every evening.     BASAGLAR KWIKPEN U-100 INSULIN glargine 100 units/mL SubQ pen  Generic drug: insulin  Inject 30 units once daily     colchicine (gout) 0.6 mg tablet  Commonly known as: COLCRYS  Take 1 tablet (0.6 mg total) by mouth once daily.     diltiaZEM 240 MG 24 hr capsule  Commonly known as: CARDIZEM CD  TAKE 2 CAPSULES BY MOUTH EVERY DAY     FARXIGA 10 mg tablet  Generic drug: dapagliflozin propanediol  Take 1 tablet (10 mg total) by mouth once daily.     FIASP FLEXTOUCH U-100 INSULIN 100 unit/mL (3 mL) Inpn  Generic drug: insulin aspart (niacinamide)  Take as needed before meals: 150-200=+2, 201-250=+4; 251-300=+6; 301-350=+8, over 350=+10 units     FREESTYLE JAYLEN 3 SENSOR Clare  Generic drug: blood-glucose sensor  Change every 14 days     furosemide 40 MG tablet  Commonly known as: LASIX  TAKE 1 TABLET(40 MG) BY MOUTH TWICE DAILY     ibuprofen 600 MG tablet  Commonly known as: ADVIL,MOTRIN  TAKE 1 TABLET(600 MG) BY MOUTH EVERY NIGHT AS NEEDED FOR PAIN     losartan 100 MG tablet  Commonly known as: COZAAR  Take 1 tablet (100 mg total) by mouth once daily.     metoprolol succinate 50 MG 24 hr tablet  Commonly known as: TOPROL-XL  TAKE 1 TABLET(50 MG) BY MOUTH EVERY DAY     MOUNJARO 15 mg/0.5 mL Pnij  Generic drug: tirzepatide  Inject 15 mg into the skin every 7 days.     omega-3 fatty acids 1,000 mg Cap     pen needle, diabetic 32 gauge x 1/4" Ndle  Commonly known as: NOVOFINE 32  USE FOUR TIMES DAILY AS NEEDED     pioglitazone 45 MG tablet  Commonly known as: ACTOS  TAKE 1 TABLET(45 MG) BY MOUTH EVERY DAY            STOP taking these medications      blood sugar diagnostic Strp  Stopped by: Kavon Messer MD     blood-glucose meter kit  Stopped by: Kavon Messer MD     lancets [facility]  Commonly known as: MICROLET LANCET  Stopped by: Kavon Messer MD              SOCIAL HISTORY:     Social " History     Socioeconomic History    Marital status:     Number of children: 3   Occupational History    Occupation:      Employer: [employer]    Occupation:    Tobacco Use    Smoking status: Never    Smokeless tobacco: Never   Substance and Sexual Activity    Alcohol use: Yes     Alcohol/week: 0.0 standard drinks of alcohol     Comment: rarely    Drug use: No     Social Determinants of Health     Financial Resource Strain: Low Risk  (10/30/2023)    Overall Financial Resource Strain (CARDIA)     Difficulty of Paying Living Expenses: Not hard at all   Food Insecurity: No Food Insecurity (10/30/2023)    Hunger Vital Sign     Worried About Running Out of Food in the Last Year: Never true     Ran Out of Food in the Last Year: Never true   Transportation Needs: No Transportation Needs (10/30/2023)    PRAPARE - Transportation     Lack of Transportation (Medical): No     Lack of Transportation (Non-Medical): No   Physical Activity: Unknown (10/30/2023)    Exercise Vital Sign     Days of Exercise per Week: 4 days   Stress: No Stress Concern Present (10/30/2023)    [employer] - Occupational Stress Questionnaire     Feeling of Stress : Not at all   Social Connections: Unknown (10/30/2023)    Social Connection and Isolation Panel [NHANES]     Frequency of Communication with Friends and Family: More than three times a week     Frequency of Social Gatherings with Friends and Family: More than three times a week     Active Member of Clubs or Organizations: Yes     Attends Club or Organization Meetings: More than 4 times per year     Marital Status:    Housing Stability: Unknown (10/30/2023)    Housing Stability Vital Sign     Unable to Pay for Housing in the Last Year: No     Unstable Housing in the Last Year: No       FAMILY HISTORY:     Family History   Problem Relation Age of Onset    Breast cancer Mother     Diabetes Mother      "Hypertension Mother     Stroke Mother     Prostate cancer Father     Diabetes Father     Hypertension Father     No Known Problems Sister     No Known Problems Brother     Breast cancer Maternal Grandmother     Amblyopia Neg Hx     Blindness Neg Hx     Cataracts Neg Hx     Glaucoma Neg Hx     Macular degeneration Neg Hx     Retinal detachment Neg Hx     Strabismus Neg Hx        REVIEW OF SYSTEMS:   Review of Systems   Constitutional:  Negative for chills, diaphoresis and fever.   HENT:  Negative for nosebleeds.    Eyes:  Negative for blurred vision, double vision and photophobia.   Respiratory:  Negative for hemoptysis, shortness of breath and wheezing.    Cardiovascular:  Negative for chest pain, palpitations, orthopnea, claudication, leg swelling and PND.   Gastrointestinal:  Negative for abdominal pain, blood in stool, heartburn, melena, nausea and vomiting.   Genitourinary:  Negative for flank pain and hematuria.   Musculoskeletal:  Negative for falls, myalgias and neck pain.   Skin:  Negative for rash.   Neurological:  Negative for dizziness, seizures, loss of consciousness, weakness and headaches.   Endo/Heme/Allergies:  Negative for polydipsia. Does not bruise/bleed easily.   Psychiatric/Behavioral:  Negative for depression and memory loss. The patient is not nervous/anxious.        PHYSICAL EXAM:     Vitals:    11/08/23 1522   BP: (!) 160/76   Pulse: 72   Resp: 17      Body mass index is 58.79 kg/m².  Weight: (!) 207.7 kg (457 lb 14.3 oz)   Height: 6' 2" (188 cm)     Physical Exam  Vitals reviewed.   Constitutional:       General: He is not in acute distress.     Appearance: He is well-developed. He is obese. He is not ill-appearing, toxic-appearing or diaphoretic.   HENT:      Head: Normocephalic and atraumatic.   Eyes:      General: No scleral icterus.     Extraocular Movements: Extraocular movements intact.      Conjunctiva/sclera: Conjunctivae normal.      Pupils: Pupils are equal, round, and reactive to " light.   Neck:      Thyroid: No thyromegaly.      Vascular: Normal carotid pulses. No carotid bruit or JVD.      Trachea: Trachea normal. No tracheal deviation.   Cardiovascular:      Rate and Rhythm: Normal rate and regular rhythm.      Pulses:           Carotid pulses are 2+ on the right side and 2+ on the left side.     Heart sounds: S1 normal and S2 normal. Heart sounds are distant. No murmur heard.     No friction rub. No gallop.   Pulmonary:      Effort: Pulmonary effort is normal. No respiratory distress.      Breath sounds: Normal breath sounds. No stridor. No wheezing, rhonchi or rales.   Chest:      Chest wall: No tenderness.   Abdominal:      General: There is no distension.      Palpations: Abdomen is soft.   Musculoskeletal:         General: No swelling or tenderness. Normal range of motion.      Cervical back: Normal range of motion and neck supple. No edema or rigidity.      Right lower leg: No edema.      Left lower leg: No edema.   Feet:      Right foot:      Skin integrity: No ulcer.      Left foot:      Skin integrity: No ulcer.   Skin:     General: Skin is warm and dry.      Coloration: Skin is not jaundiced.   Neurological:      General: No focal deficit present.      Mental Status: He is alert and oriented to person, place, and time.      Cranial Nerves: No cranial nerve deficit.   Psychiatric:         Mood and Affect: Mood normal.         Speech: Speech normal.         Behavior: Behavior normal. Behavior is cooperative.         DATA:   EKG: (personally reviewed tracing(s))  11/8/23 SR 67, PRWP    Laboratory:  CBC:  Recent Labs   Lab 01/15/22  0828   WBC 6.89   Hemoglobin 13.2 L   Hematocrit 42.1   Platelets 219       CHEMISTRIES:  Recent Labs   Lab 08/07/21  0810 10/29/22  0813 06/03/23  0810   Glucose 132 H 127 H 95   Sodium 139 140 139   Potassium 3.8 4.0 3.5   BUN 12 14 14   Creatinine 1.2 1.4 1.2   eGFR if  >60  --   --    eGFR if non  >60  --   --     Calcium 9.0 9.1 9.5       CARDIAC BIOMARKERS:        COAGS:        LIPIDS/LFTS:  Recent Labs   Lab 08/07/21  0810 04/16/22  0823 10/29/22  0813 06/03/23  0810 11/04/23  0945   Cholesterol  --  137  --  111 L 107 L   Triglycerides  --  89  --  84 56   HDL  --  50  --  44 43   LDL Cholesterol  --  69.2  --  50.2 L 52.8 L   Non-HDL Cholesterol  --  87  --  67 64   AST 21  --  28  --  26   ALT 8 L  --  10  --  8 L       Cardiovascular Testing:  Echo 5/26/23  The estimated ejection fraction is 65%.  The left ventricle is normal in size with mild concentric hypertrophy and normal systolic function.  Normal right ventricular size with normal right ventricular systolic function.  Mild left atrial enlargement.  The estimated PA systolic pressure is 11 mmHg.  Normal central venous pressure (3 mmHg).    ASSESSMENT:   # HTN, uncontrolled  # BMI 59, down 1 unit vs last OV  # TOSIN on CPAP  # DM  # HLP on atorva 40mg    PLAN:   Cont med rx  Add hydrala 50mg qd, titrate prn +/- add imdur next  [facility] surgery referral (again)  Diet/exercise/weight loss  RTC prn    Kavon Messer MD, FACC

## 2023-11-10 DIAGNOSIS — I10 ESSENTIAL HYPERTENSION: ICD-10-CM

## 2023-11-10 NOTE — TELEPHONE ENCOUNTER
Care Due:                  Date            Visit Type   Department     Provider  --------------------------------------------------------------------------------                                [facility]                              PRIMARY      MED/ INTERNAL  Christiane Gupta  Last Visit: 06-      CARE ([facility])   MED/ PEDS      Maryuri Galindo  Next Visit: None Scheduled  None         None Found                                                            Last  Test          Frequency    Reason                     Performed    Due Date  --------------------------------------------------------------------------------    Uric Acid...  12 months..  colchicine,..............  Not Found    Overdue    [employer] Embedded Care Due Messages. Reference number: [id number].   11/10/2023 4:54:34 PM CST

## 2023-11-15 DIAGNOSIS — I10 ESSENTIAL HYPERTENSION: ICD-10-CM

## 2023-11-15 NOTE — TELEPHONE ENCOUNTER
No care due was identified.  [facility] Embedded Care Due Messages. Reference number: [id number].   11/15/2023 12:32:05 PM CST

## 2023-11-17 RX ORDER — FUROSEMIDE 40 MG/1
40 TABLET ORAL 2 TIMES DAILY
Qty: 180 TABLET | Refills: 0 | Status: SHIPPED | OUTPATIENT
Start: 2023-11-17 | End: 2024-02-16 | Stop reason: SDUPTHER

## 2023-11-17 RX ORDER — FUROSEMIDE 40 MG/1
40 TABLET ORAL 2 TIMES DAILY
Qty: 180 TABLET | Refills: 3 | OUTPATIENT
Start: 2023-11-17

## 2023-11-22 ENCOUNTER — CLINICAL SUPPORT (OUTPATIENT)
Dept: FAMILY MEDICINE | Facility: CLINIC | Age: 58
End: 2023-11-22
Payer: COMMERCIAL

## 2023-11-22 VITALS — HEART RATE: 84 BPM | DIASTOLIC BLOOD PRESSURE: 78 MMHG | OXYGEN SATURATION: 97 % | SYSTOLIC BLOOD PRESSURE: 138 MMHG

## 2023-11-22 DIAGNOSIS — I10 ESSENTIAL HYPERTENSION: Primary | ICD-10-CM

## 2023-11-22 PROCEDURE — 99999 PR PBB SHADOW E&M-EST. PATIENT-LVL II: CPT | Mod: PBBFAC,,,

## 2023-11-22 PROCEDURE — 99999 PR PBB SHADOW E&M-EST. PATIENT-LVL II: ICD-10-PCS | Mod: PBBFAC,,,

## 2023-11-22 NOTE — PROGRESS NOTES
David MARVIN Doshirley Savage. 58 y.o. male is here today for Blood Pressure check.   History of HTN yes.    Review of patient's allergies indicates:  No Known Allergies  Creatinine   Date Value Ref Range Status   06/03/2023 1.2 0.5 - 1.4 mg/dL Final     Sodium   Date Value Ref Range Status   06/03/2023 139 136 - 145 mmol/L Final     Potassium   Date Value Ref Range Status   06/03/2023 3.5 3.5 - 5.1 mmol/L Final   ]  Patient verifies taking blood pressure medications on a regular basis at the same time of the day.     Current Outpatient Medications:     aspirin (ECOTRIN) 81 MG EC tablet, Take 1 tablet (81 mg total) by mouth once daily., Disp: 90 tablet, Rfl: 3    atorvastatin (LIPITOR) 40 MG tablet, Take 1 tablet (40 mg total) by mouth every evening., Disp: 90 tablet, Rfl: 3    blood-glucose sensor (FREESTYLE JAYLEN 3 SENSOR) Clare, Change every 14 days, Disp: 3 each, Rfl: 11    colchicine, gout, (COLCRYS) 0.6 mg tablet, Take 1 tablet (0.6 mg total) by mouth once daily., Disp: 90 tablet, Rfl: 0    dapagliflozin propanediol (FARXIGA) 10 mg tablet, Take 1 tablet (10 mg total) by mouth once daily., Disp: 90 tablet, Rfl: 2    diltiaZEM (CARDIZEM CD) 240 MG 24 hr capsule, TAKE 2 CAPSULES BY MOUTH EVERY DAY, Disp: 180 capsule, Rfl: 3    furosemide (LASIX) 40 MG tablet, Take 1 tablet (40 mg total) by mouth 2 (two) times daily., Disp: 180 tablet, Rfl: 0    hydrALAZINE (APRESOLINE) 50 MG tablet, Take 1 tablet (50 mg total) by mouth 2 (two) times a day., Disp: 180 tablet, Rfl: 3    ibuprofen (ADVIL,MOTRIN) 600 MG tablet, TAKE 1 TABLET(600 MG) BY MOUTH EVERY NIGHT AS NEEDED FOR PAIN, Disp: 30 tablet, Rfl: 0    insulin (BASAGLAR KWIKPEN U-100 INSULIN) glargine 100 units/mL SubQ pen, Inject 30 units once daily, Disp: 15 mL, Rfl: 5    insulin aspart, niacinamide, (FIASP FLEXTOUCH U-100 INSULIN) 100 unit/mL (3 mL) InPn, Take as needed before meals: 150-200=+2, 201-250=+4; 251-300=+6; 301-350=+8, over 350=+10 units, Disp: 5 pen, Rfl: 5     "losartan (COZAAR) 100 MG tablet, Take 1 tablet (100 mg total) by mouth once daily., Disp: 90 tablet, Rfl: 3    metoprolol succinate (TOPROL-XL) 50 MG 24 hr tablet, TAKE 1 TABLET(50 MG) BY MOUTH EVERY DAY, Disp: 90 tablet, Rfl: 3    omega-3 fatty acids 1,000 mg Cap, Take 2 capsules by mouth once daily., Disp: , Rfl:     pen needle, diabetic (NOVOFINE 32) 32 gauge x 1/4" Ndle, USE FOUR TIMES DAILY AS NEEDED, Disp: 400 each, Rfl: 11    pioglitazone (ACTOS) 45 MG tablet, TAKE 1 TABLET(45 MG) BY MOUTH EVERY DAY, Disp: 90 tablet, Rfl: 2    tirzepatide (MOUNJARO) 15 mg/0.5 mL PnIj, Inject 15 mg into the skin every 7 days., Disp: 12 pen , Rfl: 2  Does patient have record of home blood pressure readings yes. Patient on digital medicine.   Last dose of blood pressure medication was taken at 7:30 AM.  Patient is asymptomatic.       Vitals:    11/22/23 1107   BP: 138/78   BP Location: Left arm   Patient Position: Sitting   BP Method: Large (Manual)   Pulse: 84   SpO2: 97%         Dr. Messer informed of nurse visit.     "

## 2023-12-01 ENCOUNTER — TELEPHONE (OUTPATIENT)
Dept: BARIATRICS | Facility: CLINIC | Age: 58
End: 2023-12-01
Payer: COMMERCIAL

## 2023-12-03 ENCOUNTER — PATIENT MESSAGE (OUTPATIENT)
Dept: ADMINISTRATIVE | Facility: OTHER | Age: 58
End: 2023-12-03
Payer: COMMERCIAL

## 2023-12-26 ENCOUNTER — TELEPHONE (OUTPATIENT)
Dept: FAMILY MEDICINE | Facility: CLINIC | Age: 58
End: 2023-12-26
Payer: COMMERCIAL

## 2023-12-26 NOTE — TELEPHONE ENCOUNTER
----- Message from Riddhi Galindo MD sent at 12/26/2023 10:53 AM CST -----  Please call patient to complete urine for microalbumin this week.

## 2023-12-27 ENCOUNTER — TELEPHONE (OUTPATIENT)
Dept: FAMILY MEDICINE | Facility: CLINIC | Age: 58
End: 2023-12-27
Payer: COMMERCIAL

## 2023-12-27 NOTE — TELEPHONE ENCOUNTER
----- Message from Binta Melgar sent at 12/27/2023 10:55 AM CST -----  Regarding: self  Who called: self        What is the request in detail: pt calling to do urine specimens but no orders are available, pt would like call back from nurse        Can the clinic reply by [facility]? No        Would the patient rather a call back or a response via [facility]? Call back        Best call back number:[phone]

## 2023-12-29 ENCOUNTER — OFFICE VISIT (OUTPATIENT)
Dept: OPHTHALMOLOGY | Facility: CLINIC | Age: 58
End: 2023-12-29
Payer: COMMERCIAL

## 2023-12-29 ENCOUNTER — LAB VISIT (OUTPATIENT)
Dept: LAB | Facility: HOSPITAL | Age: 58
End: 2023-12-29
Payer: COMMERCIAL

## 2023-12-29 DIAGNOSIS — E11.9 TYPE 2 DIABETES MELLITUS WITHOUT COMPLICATION, WITH LONG-TERM CURRENT USE OF INSULIN: ICD-10-CM

## 2023-12-29 DIAGNOSIS — Z79.4 TYPE 2 DIABETES MELLITUS WITHOUT COMPLICATION, WITH LONG-TERM CURRENT USE OF INSULIN: ICD-10-CM

## 2023-12-29 DIAGNOSIS — I10 ESSENTIAL HYPERTENSION: ICD-10-CM

## 2023-12-29 DIAGNOSIS — E11.9 DM TYPE 2 WITHOUT RETINOPATHY: ICD-10-CM

## 2023-12-29 DIAGNOSIS — Z96.1 PSEUDOPHAKIA: ICD-10-CM

## 2023-12-29 DIAGNOSIS — H26.491 PCO (POSTERIOR CAPSULAR OPACIFICATION), RIGHT: ICD-10-CM

## 2023-12-29 DIAGNOSIS — H25.12 NUCLEAR SCLEROSIS, LEFT: Primary | ICD-10-CM

## 2023-12-29 DIAGNOSIS — H52.7 REFRACTIVE ERROR: ICD-10-CM

## 2023-12-29 LAB
ALBUMIN/CREAT UR: NORMAL UG/MG (ref 0–30)
CREAT UR-MCNC: 56 MG/DL (ref 23–375)
MICROALBUMIN UR DL<=1MG/L-MCNC: <5 UG/ML

## 2023-12-29 PROCEDURE — 92014 COMPRE OPH EXAM EST PT 1/>: CPT | Mod: S$GLB,,, | Performed by: OPHTHALMOLOGY

## 2023-12-29 PROCEDURE — 1160F RVW MEDS BY RX/DR IN RCRD: CPT | Mod: CPTII,S$GLB,, | Performed by: OPHTHALMOLOGY

## 2023-12-29 PROCEDURE — 82043 UR ALBUMIN QUANTITATIVE: CPT | Performed by: NURSE PRACTITIONER

## 2023-12-29 PROCEDURE — 4010F PR ACE/ARB THEARPY RXD/TAKEN: ICD-10-PCS | Mod: CPTII,S$GLB,, | Performed by: OPHTHALMOLOGY

## 2023-12-29 PROCEDURE — 3044F PR MOST RECENT HEMOGLOBIN A1C LEVEL <7.0%: ICD-10-PCS | Mod: CPTII,S$GLB,, | Performed by: OPHTHALMOLOGY

## 2023-12-29 PROCEDURE — 99999 PR PBB SHADOW E&M-EST. PATIENT-LVL III: ICD-10-PCS | Mod: PBBFAC,,, | Performed by: OPHTHALMOLOGY

## 2023-12-29 PROCEDURE — 1160F PR REVIEW ALL MEDS BY PRESCRIBER/CLIN PHARMACIST DOCUMENTED: ICD-10-PCS | Mod: CPTII,S$GLB,, | Performed by: OPHTHALMOLOGY

## 2023-12-29 PROCEDURE — 4010F ACE/ARB THERAPY RXD/TAKEN: CPT | Mod: CPTII,S$GLB,, | Performed by: OPHTHALMOLOGY

## 2023-12-29 PROCEDURE — 1159F MED LIST DOCD IN RCRD: CPT | Mod: CPTII,S$GLB,, | Performed by: OPHTHALMOLOGY

## 2023-12-29 PROCEDURE — 1159F PR MEDICATION LIST DOCUMENTED IN MEDICAL RECORD: ICD-10-PCS | Mod: CPTII,S$GLB,, | Performed by: OPHTHALMOLOGY

## 2023-12-29 PROCEDURE — 92014 PR EYE EXAM, EST PATIENT,COMPREHESV: ICD-10-PCS | Mod: S$GLB,,, | Performed by: OPHTHALMOLOGY

## 2023-12-29 PROCEDURE — 2023F DILAT RTA XM W/O RTNOPTHY: CPT | Mod: CPTII,S$GLB,, | Performed by: OPHTHALMOLOGY

## 2023-12-29 PROCEDURE — 3044F HG A1C LEVEL LT 7.0%: CPT | Mod: CPTII,S$GLB,, | Performed by: OPHTHALMOLOGY

## 2023-12-29 PROCEDURE — 2023F PR DILATED RETINAL EXAM W/O EVID OF RETINOPATHY: ICD-10-PCS | Mod: CPTII,S$GLB,, | Performed by: OPHTHALMOLOGY

## 2023-12-29 PROCEDURE — 99999 PR PBB SHADOW E&M-EST. PATIENT-LVL III: CPT | Mod: PBBFAC,,, | Performed by: OPHTHALMOLOGY

## 2023-12-29 NOTE — PROGRESS NOTES
Subjective:       Patient ID: David Corona Jr. is a 58 y.o. male.    Chief Complaint: Annual Exam    HPI    Patient present today  for annual eye exam   Pt state no complaints at this time         1. NS OS   2. PCO OD   3. Type 2 DM no DR   4. HTN Retinopathy OU   5. PCIOL OD   6. Refractive Error     Last edited by Jennie Marquez on 12/29/2023  1:24 PM.             Assessment:       1. Nuclear sclerosis, left    2. PCO (posterior capsular opacification), right    3. DM type 2 without retinopathy    4. Essential hypertension    5. Refractive error - Both Eyes    6. Pseudophakia - Right Eye        Plan:       Cataract OS- Not visually significant.  PCO OD- Not Visually Significant per Pt.  DM-No NPDR OU.  HTN-No retinopathy OU.  RE-Pt does not want MRx.      Control DM & HTN.  RTC 1 yr.

## 2024-01-07 ENCOUNTER — PATIENT MESSAGE (OUTPATIENT)
Dept: ADMINISTRATIVE | Facility: OTHER | Age: 59
End: 2024-01-07
Payer: COMMERCIAL

## 2024-01-17 ENCOUNTER — PATIENT MESSAGE (OUTPATIENT)
Dept: ADMINISTRATIVE | Facility: OTHER | Age: 59
End: 2024-01-17
Payer: COMMERCIAL

## 2024-01-17 DIAGNOSIS — M79.642 PAIN OF LEFT HAND: ICD-10-CM

## 2024-01-18 RX ORDER — IBUPROFEN 600 MG/1
TABLET ORAL
Qty: 30 TABLET | Refills: 0 | Status: SHIPPED | OUTPATIENT
Start: 2024-01-18

## 2024-01-18 NOTE — TELEPHONE ENCOUNTER
Care Due:                  Date            Visit Type   Department     Provider  --------------------------------------------------------------------------------                                [facility]                              PRIMARY      MED/ INTERNAL  Christiane Gupta  Last Visit: 06-      CARE ([facility])   MED/ PEDS      Maryuri Galindo  Next Visit: None Scheduled  None         None Found                                                            Last  Test          Frequency    Reason                     Performed    Due Date  --------------------------------------------------------------------------------    CBC.........  12 months..  ibuprofen................  Not Found    Overdue    Uric Acid...  12 months..  colchicine,..............  Not Found    Overdue    [employer] Embedded Care Due Messages. Reference number: [id number].   1/17/2024 10:14:39 PM CST

## 2024-02-01 ENCOUNTER — OFFICE VISIT (OUTPATIENT)
Dept: FAMILY MEDICINE | Facility: CLINIC | Age: 59
End: 2024-02-01
Payer: COMMERCIAL

## 2024-02-01 VITALS
BODY MASS INDEX: 40.43 KG/M2 | OXYGEN SATURATION: 97 % | HEIGHT: 74 IN | SYSTOLIC BLOOD PRESSURE: 114 MMHG | DIASTOLIC BLOOD PRESSURE: 76 MMHG | HEART RATE: 74 BPM | TEMPERATURE: 99 F | WEIGHT: 315 LBS

## 2024-02-01 DIAGNOSIS — N18.31 STAGE 3A CHRONIC KIDNEY DISEASE: ICD-10-CM

## 2024-02-01 DIAGNOSIS — E66.01 MORBID OBESITY WITH BMI OF 50.0-59.9, ADULT: ICD-10-CM

## 2024-02-01 DIAGNOSIS — E11.40 CONTROLLED TYPE 2 DIABETES WITH NEUROPATHY: Primary | ICD-10-CM

## 2024-02-01 DIAGNOSIS — I10 ESSENTIAL HYPERTENSION: ICD-10-CM

## 2024-02-01 PROCEDURE — 99999 PR PBB SHADOW E&M-EST. PATIENT-LVL IV: CPT | Mod: PBBFAC,,,

## 2024-02-01 PROCEDURE — 3074F SYST BP LT 130 MM HG: CPT | Mod: CPTII,S$GLB,,

## 2024-02-01 PROCEDURE — 3008F BODY MASS INDEX DOCD: CPT | Mod: CPTII,S$GLB,,

## 2024-02-01 PROCEDURE — 3078F DIAST BP <80 MM HG: CPT | Mod: CPTII,S$GLB,,

## 2024-02-01 PROCEDURE — 1159F MED LIST DOCD IN RCRD: CPT | Mod: CPTII,S$GLB,,

## 2024-02-01 PROCEDURE — 3072F LOW RISK FOR RETINOPATHY: CPT | Mod: CPTII,S$GLB,,

## 2024-02-01 PROCEDURE — 99213 OFFICE O/P EST LOW 20 MIN: CPT | Mod: S$GLB,,,

## 2024-02-01 NOTE — PROGRESS NOTES
HPI     Chief Complaint:  Chief Complaint   Patient presents with    Diabetes     3 months f/u       David Corona Jr. is a 58 y.o. male with multiple medical diagnoses as listed in the medical history and problem list that presents for DM follow up.  Pt is not known to me with his last appointment 11/22/2023.      HPI  Pt presents for DM follow up.  Blood sugars at home per freestyle glucometer .       Assessment & Plan     Problem List Items Addressed This Visit          Cardiac/Vascular    Essential hypertension  BP in clinic today 114/76.  Closely monitor his blood pressure at home. The current medical regimen is effective;  continue present plan and medications.           Endocrine    Morbid obesity with BMI of 50.0-59.9, adult  Working on healthy balanced diet and exercise.  Considering bariatric surgery in beginning the process for that.     Other Visit Diagnoses       Controlled type 2 diabetes with neuropathy    -  Primary  Blood sugars at home per freestyle glucometer . Most recent A1C 6.9. Followed by endocrine. Focuses on healthy balanced diet and physical activity for normal blood sugar.     Stage 3a chronic kidney disease  Stable. The current medical regimen is effective;  continue present plan and medications.                --------------------------------------------      Health Maintenance:  Health Maintenance         Date Due Completion Date    PROSTATE-SPECIFIC ANTIGEN 04/16/2023 4/16/2022    COVID-19 Vaccine (5 - 2023-24 season) 09/01/2023 7/7/2022    Hemoglobin A1c 03/25/2024 9/25/2023    Foot Exam 06/20/2024 6/20/2023 (Done)    Override on 6/20/2023: Done (seen by podiatry)    Lipid Panel 11/04/2024 11/4/2023    Diabetes Urine Screening 12/29/2024 12/29/2023    Eye Exam 12/29/2024 12/29/2023    Override on 1/30/2012: Done    Low Dose Statin 02/01/2025 2/1/2024    Colorectal Cancer Screening 02/28/2027 2/28/2022    TETANUS VACCINE 09/04/2027 9/4/2017    Pneumococcal Vaccines  "(Age 0-64) (3 of 3 - PPSV23 or PCV20) 05/07/2031 2/1/2019            Health maintenance reviewed    Follow Up:  No follow-ups on file.    Exam     Review of Systems:  (as noted above)  Review of Systems   Constitutional:  Negative for fever.   HENT:  Negative for trouble swallowing.    Eyes:  Negative for visual disturbance.   Respiratory:  Negative for chest tightness and shortness of breath.    Cardiovascular:  Negative for chest pain.   Gastrointestinal:  Negative for blood in stool.       Physical Exam:   Physical Exam  Constitutional:       General: He is not in acute distress.     Appearance: He is obese. He is not ill-appearing or diaphoretic.   HENT:      Head: Normocephalic and atraumatic.   Eyes:      General: No scleral icterus.  Cardiovascular:      Rate and Rhythm: Normal rate and regular rhythm.      Pulses: Normal pulses.      Heart sounds: No murmur heard.     No friction rub. No gallop.   Pulmonary:      Effort: No respiratory distress.   Chest:      Chest wall: No tenderness.   Musculoskeletal:      Cervical back: No rigidity.   Neurological:      Mental Status: He is alert and oriented to person, place, and time.       Vitals:    02/01/24 1520   BP: 114/76   BP Location: Right arm   Patient Position: Sitting   BP Method: Large (Automatic)   Pulse: 74   Temp: 98.5 °F (36.9 °C)   TempSrc: Oral   SpO2: 97%   Weight: (!) 211.2 kg (465 lb 9.8 oz)   Height: 6' 2" (1.88 m)      Body mass index is 59.78 kg/m².        History     Past Medical History:  Past Medical History:   Diagnosis Date    Arthritis     Cataract     left eye     CKD (chronic kidney disease) stage 3, GFR 30-59 ml/min     Colon polyp 11/2016    Dependent edema     Diverticulosis     Gout, arthritis     Hyperlipidemia LDL goal <100     Hypertension     Morbid obesity     TOSIN (obstructive sleep apnea)     Peripheral autonomic neuropathy in disorders classified elsewhere(337.1)     Proteinuria     Type II or unspecified type diabetes " mellitus with neurological manifestations, uncontrolled(250.62)     Venous stasis of lower extremity        Past Surgical History:  Past Surgical History:   Procedure Laterality Date    CATARACT EXTRACTION W/  INTRAOCULAR LENS IMPLANT Right 2007    COLONOSCOPY N/A 11/22/2016    Procedure: COLONOSCOPY;  Surgeon: Abdi La MD;  Location: [facility] ENDO (2ND FLR);  Service: Endoscopy;  Laterality: N/A;  2nd floor case/BMI 59.13/wants week of Thanksgiving    COLONOSCOPY N/A 02/28/2022    Procedure: COLONOSCOPY;  Surgeon: Jessi Martinez MD;  [facility]  Service: Endoscopy;  Laterality: N/A;  BMI 61.71  covid test [place] 2/25 2/24 pt confirmed appt and new arrival time-Kpvt       Social History:  Social History     Socioeconomic History    Marital status:     Number of children: 3   Occupational History    Occupation:      Employer: [employer]    Occupation:    Tobacco Use    Smoking status: Never    Smokeless tobacco: Never   Substance and Sexual Activity    Alcohol use: Yes     Alcohol/week: 0.0 standard drinks of alcohol     Comment: rarely    Drug use: No    Sexual activity: Yes     Social Determinants of Health     Financial Resource Strain: Low Risk  (12/26/2023)    Overall Financial Resource Strain (CARDIA)     Difficulty of Paying Living Expenses: Not hard at all   Food Insecurity: No Food Insecurity (12/26/2023)    Hunger Vital Sign     Worried About Running Out of Food in the Last Year: Never true     Ran Out of Food in the Last Year: Never true   Transportation Needs: No Transportation Needs (12/26/2023)    PRAPARE - Transportation     Lack of Transportation (Medical): No     Lack of Transportation (Non-Medical): No   Physical Activity: Insufficiently Active (12/26/2023)    Exercise Vital Sign     Days of Exercise per Week: 4 days     Minutes of Exercise per Session: 20 min   Stress: No Stress Concern Present (12/26/2023)     [employer] - Occupational Stress Questionnaire     Feeling of Stress : Not at all   Social Connections: Unknown (12/26/2023)    Social Connection and Isolation Panel [NHANES]     Frequency of Communication with Friends and Family: More than three times a week     Frequency of Social Gatherings with Friends and Family: More than three times a week     Active Member of Clubs or Organizations: Yes     Attends Club or Organization Meetings: More than 4 times per year     Marital Status:    Housing Stability: Low Risk  (12/26/2023)    Housing Stability Vital Sign     Unable to Pay for Housing in the Last Year: No     Number of Places Lived in the Last Year: 1     Unstable Housing in the Last Year: No       Family History:  Family History   Problem Relation Age of Onset    Breast cancer Mother     Diabetes Mother     Hypertension Mother     Stroke Mother     Prostate cancer Father     Diabetes Father     Hypertension Father     No Known Problems Sister     No Known Problems Brother     Breast cancer Maternal Grandmother     Amblyopia Neg Hx     Blindness Neg Hx     Cataracts Neg Hx     Glaucoma Neg Hx     Macular degeneration Neg Hx     Retinal detachment Neg Hx     Strabismus Neg Hx        Allergies and Medications: (updated and reviewed)  Review of patient's allergies indicates:  No Known Allergies  Current Outpatient Medications   Medication Sig Dispense Refill    aspirin (ECOTRIN) 81 MG EC tablet Take 1 tablet (81 mg total) by mouth once daily. 90 tablet 3    atorvastatin (LIPITOR) 40 MG tablet Take 1 tablet (40 mg total) by mouth every evening. 90 tablet 3    blood-glucose sensor (FREESTYLE JAYLEN 3 SENSOR) Clare Change every 14 days 3 each 11    colchicine, gout, (COLCRYS) 0.6 mg tablet Take 1 tablet (0.6 mg total) by mouth once daily. 90 tablet 0    dapagliflozin propanediol (FARXIGA) 10 mg tablet Take 1 tablet (10 mg total) by mouth once daily. 90 tablet 2    diltiaZEM (CARDIZEM  "CD) 240 MG 24 hr capsule TAKE 2 CAPSULES BY MOUTH EVERY  capsule 3    furosemide (LASIX) 40 MG tablet Take 1 tablet (40 mg total) by mouth 2 (two) times daily. 180 tablet 0    hydrALAZINE (APRESOLINE) 50 MG tablet Take 1 tablet (50 mg total) by mouth 2 (two) times a day. 180 tablet 3    ibuprofen (ADVIL,MOTRIN) 600 MG tablet TAKE 1 TABLET(600 MG) BY MOUTH EVERY NIGHT AS NEEDED FOR PAIN 30 tablet 0    insulin (BASAGLAR KWIKPEN U-100 INSULIN) glargine 100 units/mL SubQ pen Inject 30 units once daily 15 mL 5    insulin aspart, niacinamide, (FIASP FLEXTOUCH U-100 INSULIN) 100 unit/mL (3 mL) InPn Take as needed before meals: 150-200=+2, 201-250=+4; 251-300=+6; 301-350=+8, over 350=+10 units 5 pen 5    losartan (COZAAR) 100 MG tablet Take 1 tablet (100 mg total) by mouth once daily. 90 tablet 3    metoprolol succinate (TOPROL-XL) 50 MG 24 hr tablet TAKE 1 TABLET(50 MG) BY MOUTH EVERY DAY 90 tablet 3    omega-3 fatty acids 1,000 mg Cap Take 2 capsules by mouth once daily.      pen needle, diabetic (NOVOFINE 32) 32 gauge x 1/4" Ndle USE FOUR TIMES DAILY AS NEEDED 400 each 11    pioglitazone (ACTOS) 45 MG tablet TAKE 1 TABLET(45 MG) BY MOUTH EVERY DAY 90 tablet 2    tirzepatide (MOUNJARO) 15 mg/0.5 mL PnIj Inject 15 mg into the skin every 7 days. 12 pen 2     No current facility-administered medications for this visit.       Patient Care Team:  Riddhi Galindo MD as PCP - General (Internal Medicine)  Vick Cornelius MD as Consulting Physician (Nephrology)  Munira Garnett LPN as Care Coordinator  Angela Geiger as Digital Medicine Health   Sloane Vasquez, Kierra as Hypertension Digital Medicine Clinician (Pharmacist)  Riddhi Galindo MD as Hypertension Digital Medicine Responsible Provider (Internal Medicine)         - The patient is given an After Visit Summary that lists all medications with directions, allergies, education, orders placed during this encounter and follow-up instructions.      - " I have reviewed the patient's medical information including past medical, family, and social history sections including the medications and allergies.      - We discussed the patient's current medications.     This note was created by combination of typed  and MModal dictation.  Transcription errors may be present.  If there are any questions, please contact me.       Kaleigh Dick NP

## 2024-02-16 DIAGNOSIS — I10 ESSENTIAL HYPERTENSION: ICD-10-CM

## 2024-02-16 RX ORDER — FUROSEMIDE 40 MG/1
40 TABLET ORAL 2 TIMES DAILY
Qty: 180 TABLET | Refills: 1 | Status: SHIPPED | OUTPATIENT
Start: 2024-02-16

## 2024-02-16 NOTE — TELEPHONE ENCOUNTER
No care due was identified.  [facility] Embedded Care Due Messages. Reference number: [id number].   2/16/2024 3:37:43 AM CST

## 2024-02-16 NOTE — TELEPHONE ENCOUNTER
Refill Decision Note   David Corona  is requesting a refill authorization.  Brief Assessment and Rationale for Refill:  Approve     Medication Therapy Plan:         Comments:     Note composed:4:21 PM 02/16/2024

## 2024-03-03 DIAGNOSIS — M10.9 GOUT, UNSPECIFIED CAUSE, UNSPECIFIED CHRONICITY, UNSPECIFIED SITE: ICD-10-CM

## 2024-03-03 NOTE — TELEPHONE ENCOUNTER
Refill Routing Note   Medication(s) are not appropriate for processing by [facility] for the following reason(s):      Required labs outdated    [facility] action(s):  Defer Care Due:  Appointment due  Labs due            Appointments  past 12m or future 3m with PCP    Date Provider   Last Visit   6/5/2023 Riddhi Galindo MD   Next Visit   Visit date not found Riddhi Galindo MD   ED visits in past 90 days: 0        Note composed:11:03 AM 03/03/2024

## 2024-03-03 NOTE — TELEPHONE ENCOUNTER
Care Due:                  Date            Visit Type   Department     Provider  --------------------------------------------------------------------------------                                [facility]                              PRIMARY      MED/ INTERNAL  Christiane Gupta  Last Visit: 06-      CARE ([facility])   MED/ PEDS      Maryuri Galindo  Next Visit: None Scheduled  None         None Found                                                            Last  Test          Frequency    Reason                     Performed    Due Date  --------------------------------------------------------------------------------    Office Visit  12 months..  ibuprofen................  06- 05-    CMP.........  12 months..  colchicine,, furosemide,   10-   05-                             ibuprofen................    [employer] Embedded Care Due Messages. Reference number: [id number].   3/03/2024 6:08:07 AM CST

## 2024-03-04 RX ORDER — COLCHICINE 0.6 MG/1
TABLET ORAL
Qty: 90 TABLET | Refills: 0 | Status: SHIPPED | OUTPATIENT
Start: 2024-03-04 | End: 2024-05-30 | Stop reason: SDUPTHER

## 2024-03-18 ENCOUNTER — PATIENT MESSAGE (OUTPATIENT)
Dept: ADMINISTRATIVE | Facility: HOSPITAL | Age: 59
End: 2024-03-18
Payer: COMMERCIAL

## 2024-03-19 ENCOUNTER — PATIENT OUTREACH (OUTPATIENT)
Dept: ADMINISTRATIVE | Facility: HOSPITAL | Age: 59
End: 2024-03-19
Payer: COMMERCIAL

## 2024-03-19 RX ORDER — BLOOD-GLUCOSE SENSOR
EACH MISCELLANEOUS
Qty: 3 EACH | Refills: 11 | Status: SHIPPED | OUTPATIENT
Start: 2024-03-19 | End: 2024-05-22 | Stop reason: CLARIF

## 2024-03-19 RX ORDER — ASPIRIN 81 MG/1
81 TABLET ORAL DAILY
Qty: 90 TABLET | Refills: 3 | COMMUNITY
Start: 2024-03-19 | End: 2024-04-01 | Stop reason: SDUPTHER

## 2024-03-28 ENCOUNTER — LAB VISIT (OUTPATIENT)
Dept: LAB | Facility: HOSPITAL | Age: 59
End: 2024-03-28
Attending: INTERNAL MEDICINE
Payer: COMMERCIAL

## 2024-03-28 DIAGNOSIS — Z12.5 PROSTATE CANCER SCREENING: ICD-10-CM

## 2024-03-28 DIAGNOSIS — Z79.4 TYPE 2 DIABETES MELLITUS WITHOUT COMPLICATION, WITH LONG-TERM CURRENT USE OF INSULIN: ICD-10-CM

## 2024-03-28 DIAGNOSIS — E11.9 TYPE 2 DIABETES MELLITUS WITHOUT COMPLICATION, WITH LONG-TERM CURRENT USE OF INSULIN: ICD-10-CM

## 2024-03-28 LAB
COMPLEXED PSA SERPL-MCNC: 0.79 NG/ML (ref 0–4)
ESTIMATED AVG GLUCOSE: 137 MG/DL (ref 68–131)
HBA1C MFR BLD: 6.4 % (ref 4–5.6)

## 2024-03-28 PROCEDURE — 84153 ASSAY OF PSA TOTAL: CPT | Performed by: INTERNAL MEDICINE

## 2024-03-28 PROCEDURE — 36415 COLL VENOUS BLD VENIPUNCTURE: CPT | Mod: PO | Performed by: INTERNAL MEDICINE

## 2024-03-28 PROCEDURE — 83036 HEMOGLOBIN GLYCOSYLATED A1C: CPT | Performed by: NURSE PRACTITIONER

## 2024-04-02 RX ORDER — ASPIRIN 81 MG/1
81 TABLET ORAL DAILY
Qty: 90 TABLET | Refills: 3 | COMMUNITY
Start: 2024-04-02 | End: 2024-04-19 | Stop reason: SDUPTHER

## 2024-04-19 RX ORDER — ASPIRIN 81 MG/1
81 TABLET ORAL DAILY
Qty: 90 TABLET | Refills: 3 | COMMUNITY
Start: 2024-04-19

## 2024-05-03 DIAGNOSIS — I10 ESSENTIAL HYPERTENSION: ICD-10-CM

## 2024-05-06 RX ORDER — LOSARTAN POTASSIUM 100 MG/1
100 TABLET ORAL DAILY
Qty: 90 TABLET | Refills: 0 | Status: SHIPPED | OUTPATIENT
Start: 2024-05-06

## 2024-05-06 RX ORDER — ATORVASTATIN CALCIUM 40 MG/1
40 TABLET, FILM COATED ORAL NIGHTLY
Qty: 90 TABLET | Refills: 0 | Status: SHIPPED | OUTPATIENT
Start: 2024-05-06

## 2024-05-20 DIAGNOSIS — I10 ESSENTIAL HYPERTENSION: ICD-10-CM

## 2024-05-20 RX ORDER — PIOGLITAZONEHYDROCHLORIDE 45 MG/1
TABLET ORAL
Qty: 90 TABLET | Refills: 2 | Status: SHIPPED | OUTPATIENT
Start: 2024-05-20

## 2024-05-21 RX ORDER — LOSARTAN POTASSIUM 100 MG/1
100 TABLET ORAL DAILY
Qty: 90 TABLET | Refills: 0 | OUTPATIENT
Start: 2024-05-21

## 2024-05-22 ENCOUNTER — LAB VISIT (OUTPATIENT)
Dept: LAB | Facility: HOSPITAL | Age: 59
End: 2024-05-22
Payer: COMMERCIAL

## 2024-05-22 DIAGNOSIS — E11.9 TYPE 2 DIABETES MELLITUS WITHOUT COMPLICATION, WITH LONG-TERM CURRENT USE OF INSULIN: ICD-10-CM

## 2024-05-22 DIAGNOSIS — Z79.4 TYPE 2 DIABETES MELLITUS WITHOUT COMPLICATION, WITH LONG-TERM CURRENT USE OF INSULIN: ICD-10-CM

## 2024-05-22 LAB
ESTIMATED AVG GLUCOSE: 146 MG/DL (ref 68–131)
HBA1C MFR BLD: 6.7 % (ref 4–5.6)

## 2024-05-22 PROCEDURE — 83036 HEMOGLOBIN GLYCOSYLATED A1C: CPT | Performed by: NURSE PRACTITIONER

## 2024-05-22 PROCEDURE — 36415 COLL VENOUS BLD VENIPUNCTURE: CPT | Mod: PO | Performed by: NURSE PRACTITIONER

## 2024-05-30 ENCOUNTER — OFFICE VISIT (OUTPATIENT)
Dept: ENDOCRINOLOGY | Facility: CLINIC | Age: 59
End: 2024-05-30
Payer: COMMERCIAL

## 2024-05-30 VITALS
DIASTOLIC BLOOD PRESSURE: 81 MMHG | SYSTOLIC BLOOD PRESSURE: 134 MMHG | WEIGHT: 315 LBS | BODY MASS INDEX: 59.06 KG/M2 | TEMPERATURE: 98 F | HEART RATE: 68 BPM

## 2024-05-30 DIAGNOSIS — M10.9 GOUT, UNSPECIFIED CAUSE, UNSPECIFIED CHRONICITY, UNSPECIFIED SITE: ICD-10-CM

## 2024-05-30 DIAGNOSIS — I10 ESSENTIAL HYPERTENSION: ICD-10-CM

## 2024-05-30 DIAGNOSIS — E11.9 TYPE 2 DIABETES MELLITUS WITHOUT COMPLICATION, WITH LONG-TERM CURRENT USE OF INSULIN: Primary | ICD-10-CM

## 2024-05-30 DIAGNOSIS — E66.01 MORBID OBESITY, UNSPECIFIED OBESITY TYPE: ICD-10-CM

## 2024-05-30 DIAGNOSIS — Z79.4 TYPE 2 DIABETES MELLITUS WITHOUT COMPLICATION, WITH LONG-TERM CURRENT USE OF INSULIN: Primary | ICD-10-CM

## 2024-05-30 PROCEDURE — 3044F HG A1C LEVEL LT 7.0%: CPT | Mod: CPTII,S$GLB,, | Performed by: NURSE PRACTITIONER

## 2024-05-30 PROCEDURE — 4010F ACE/ARB THERAPY RXD/TAKEN: CPT | Mod: CPTII,S$GLB,, | Performed by: NURSE PRACTITIONER

## 2024-05-30 PROCEDURE — 3079F DIAST BP 80-89 MM HG: CPT | Mod: CPTII,S$GLB,, | Performed by: NURSE PRACTITIONER

## 2024-05-30 PROCEDURE — 3008F BODY MASS INDEX DOCD: CPT | Mod: CPTII,S$GLB,, | Performed by: NURSE PRACTITIONER

## 2024-05-30 PROCEDURE — 95251 CONT GLUC MNTR ANALYSIS I&R: CPT | Mod: S$GLB,,, | Performed by: NURSE PRACTITIONER

## 2024-05-30 PROCEDURE — 1160F RVW MEDS BY RX/DR IN RCRD: CPT | Mod: CPTII,S$GLB,, | Performed by: NURSE PRACTITIONER

## 2024-05-30 PROCEDURE — 1159F MED LIST DOCD IN RCRD: CPT | Mod: CPTII,S$GLB,, | Performed by: NURSE PRACTITIONER

## 2024-05-30 PROCEDURE — 3075F SYST BP GE 130 - 139MM HG: CPT | Mod: CPTII,S$GLB,, | Performed by: NURSE PRACTITIONER

## 2024-05-30 PROCEDURE — 3072F LOW RISK FOR RETINOPATHY: CPT | Mod: CPTII,S$GLB,, | Performed by: NURSE PRACTITIONER

## 2024-05-30 PROCEDURE — 99999 PR PBB SHADOW E&M-EST. PATIENT-LVL IV: CPT | Mod: PBBFAC,,, | Performed by: NURSE PRACTITIONER

## 2024-05-30 PROCEDURE — 99214 OFFICE O/P EST MOD 30 MIN: CPT | Mod: S$GLB,,, | Performed by: NURSE PRACTITIONER

## 2024-05-30 RX ORDER — INSULIN GLARGINE 100 [IU]/ML
INJECTION, SOLUTION SUBCUTANEOUS
Qty: 15 ML | Refills: 5 | Status: SHIPPED | OUTPATIENT
Start: 2024-05-30

## 2024-05-30 RX ORDER — COLCHICINE 0.6 MG/1
0.6 TABLET ORAL DAILY
Qty: 90 TABLET | Refills: 0 | Status: SHIPPED | OUTPATIENT
Start: 2024-05-30

## 2024-05-30 RX ORDER — TIRZEPATIDE 15 MG/.5ML
15 INJECTION, SOLUTION SUBCUTANEOUS
Qty: 12 PEN | Refills: 2 | Status: SHIPPED | OUTPATIENT
Start: 2024-05-30

## 2024-05-30 RX ORDER — DAPAGLIFLOZIN 10 MG/1
10 TABLET, FILM COATED ORAL DAILY
Qty: 90 TABLET | Refills: 2 | Status: SHIPPED | OUTPATIENT
Start: 2024-05-30

## 2024-05-30 NOTE — TELEPHONE ENCOUNTER
Refill Routing Note   Medication(s) are not appropriate for processing by [facility] for the following reason(s):        Required labs outdated    [facility] action(s):  Defer   Requires appointment : Yes     Requires labs : Yes             Appointments  past 12m or future 3m with PCP    Date Provider   Last Visit   6/5/2023 Riddhi Galindo MD   Next Visit   Visit date not found Riddhi Galindo MD   ED visits in past 90 days: 0        Note composed:3:12 PM 05/30/2024

## 2024-05-30 NOTE — TELEPHONE ENCOUNTER
Care Due:                  Date            Visit Type   Department     Provider  --------------------------------------------------------------------------------                                [facility]                              PRIMARY      MED/ INTERNAL  Christiane Gupta  Last Visit: 06-      CARE ([facility])   MED/ PEDS      Maryuri Galindo  Next Visit: None Scheduled  None         None Found                                                            Last  Test          Frequency    Reason                     Performed    Due Date  --------------------------------------------------------------------------------    Office Visit  12 months..  atorvastatin, colchicine,   06- 05-                             furosemide, ibuprofen,                             losartan.................    CBC.........  12 months..  ibuprofen................  Not Found    Overdue    CMP.........  12 months..  colchicine, furosemide,    10-   05-                             ibuprofen, losartan......    Uric Acid...  12 months..  colchicine...............  Not Found    Overdue    [employer] Embedded Care Due Messages. Reference number: [id number].   5/30/2024 1:06:46 PM CDT

## 2024-05-30 NOTE — PROGRESS NOTES
CC: This 59 y.o. Black or  male  is here for evaluation of  T2DM along with comorbidities indicated in the Visit Diagnosis section of this encounter.    HPI: David Corona Jr. was diagnosed with T2DM in 1991.  Medical history: TOSIN on cpap      Prior visit 10/2023  A1c is about the same, from 7 to now 6.9%.   12 lb weight loss since lov. Has not noticed a big change in his appetite, but he does not eat as often and sometimes doesn't finish his plate.   Plan Obtain yearly eye exam.   Start exercise regimen.   Avoid beverages with sugar.   Continue current meds  A1c in 3 months  Return to clinic in 6 months with a1c prior.     Interval hx  A1c down from 6.9% in Sept to 6.4% in March, now at 6.7%.   Pt switched from Malka to Dexcom this week d/t insurance formulary.     Pt was out of Mounjaro x 3-4 weeks d/t shortage. Reports his appetite had remained the same but sugars were higher then.      LAST DIABETES EDUCATION: 11/2020    HOSPITALIZED FOR DIABETES OR RELATED COMPLICATIONS -  Yes - upon diagnosis.    SIGNIFICANT DIABETES MED HISTORY:   Metformin - diarrhea   Glimepiride stopped and Trulicity started at initial visit 10/2020; switched from Trulicity to Mounjaro 8/2022  Declines VGo insulin delivery device   Farxiga started 10/2021  Mounjaro started 10/2022  Humalog fixed meal dosing stopped 3/2023 when Mounjaro dose was increased.       PRESCRIBED DIABETES MEDICATIONS:  Farxiga 10 mg once daily  pioglitazone 45 mg once daily  Mounjaro 15 mg once weekly Sundays   Basaglar 30 units qhs     Fiasp ac ss : 150-200=+2, 201-250=+4; 251-300=+6; 301-350=+8, over 350=+10 units      Misses medication doses - No    DM COMPLICATIONS: nephropathy    SELF MONITORING BLOOD GLUCOSE: Checks blood glucose at home with [email or web address]   with phone sae (gets sensors for free)     CGM interpretation: BGs stable with minimal postprandial excursions; no hypoglycemia.                         HYPOGLYCEMIC EPISODES: none. No BGs  < 70.        CURRENT DIET: eats 2 meals/day - no lunch. Drinks water, diluted lemonade.   Oatmeal for breakfast. Dinner is 5 pm.   No snacks midday.   Bedtime snacks - 4 peanut butter crackers.     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       /81   Pulse 68   Temp 98.4 °F (36.9 °C)   Wt (!) 208.7 kg (460 lb)   BMI 59.06 kg/m²       ROS:   CONSTITUTIONAL: Appetite good, denies fatigue  GI: Denies n/v, constipation, or diarrhea.           PHYSICAL EXAM:  GENERAL: Well developed, well nourished. No acute distress.   PSYCH: AAOx3, appropriate mood and affect, conversant, well-groomed. Judgement and insight good.   NEURO: Cranial nerves grossly intact. Speech clear, no tremor.   CHEST: Respirations even and unlabored.        Hemoglobin A1C   Date Value Ref Range Status   05/22/2024 6.7 (H) 4.0 - 5.6 % Final     Comment:     ADA Screening Guidelines:  5.7-6.4%  Consistent with prediabetes  >or=6.5%  Consistent with diabetes    High levels of fetal hemoglobin interfere with the HbA1C  assay. Heterozygous hemoglobin variants (HbS, HgC, etc)do  not significantly interfere with this assay.   However, presence of multiple variants may affect accuracy.     03/28/2024 6.4 (H) 4.0 - 5.6 % Final     Comment:     ADA Screening Guidelines:  5.7-6.4%  Consistent with prediabetes  >or=6.5%  Consistent with diabetes    High levels of fetal hemoglobin interfere with the HbA1C  assay. Heterozygous hemoglobin variants (HbS, HgC, etc)do  not significantly interfere with this assay.   However, presence of multiple variants may affect accuracy.     09/25/2023 6.9 (H) 4.0 - 5.6 % Final     Comment:     ADA Screening Guidelines:  5.7-6.4%  Consistent with prediabetes  >or=6.5%  Consistent with diabetes    High levels of fetal hemoglobin interfere with the HbA1C  assay. Heterozygous hemoglobin variants (HbS, HgC, etc)do  not significantly  interfere with this assay.   However, presence of multiple variants may affect accuracy.     04/27/2021 7.4 (H) 4.8 - 5.6 % Final     Comment:     [employer]           Component Value Date/Time     06/03/2023 0810    K 3.5 06/03/2023 0810     06/03/2023 0810    CO2 25 06/03/2023 0810    BUN 14 06/03/2023 0810    CREATININE 1.2 06/03/2023 0810    GLU 95 06/03/2023 0810    CALCIUM 9.5 06/03/2023 0810    ALKPHOS 159 (H) 11/04/2023 0945    AST 26 11/04/2023 0945    ALT 8 (L) 11/04/2023 0945    BILITOT 0.6 11/04/2023 0945    EGFRNORACEVR >60.0 06/03/2023 0810    ESTGFRAFRICA >60 08/07/2021 0810           Component Value Date/Time     06/03/2023 0810    K 3.5 06/03/2023 0810     06/03/2023 0810    CO2 25 06/03/2023 0810    BUN 14 06/03/2023 0810    CREATININE 1.2 06/03/2023 0810    GLU 95 06/03/2023 0810    CALCIUM 9.5 06/03/2023 0810    ALKPHOS 159 (H) 11/04/2023 0945    AST 26 11/04/2023 0945    ALT 8 (L) 11/04/2023 0945    BILITOT 0.6 11/04/2023 0945    EGFRNORACEVR >60.0 06/03/2023 0810    ESTGFRAFRICA >60 08/07/2021 0810         Lab Results   Component Value Date    MICALBCREAT Unable to calculate 12/29/2023         ASSESSMENT and PLAN:    A1C GOAL: < 7 %       1. Type 2 diabetes mellitus without complication, with long-term current use of insulin  Diabetes remains controlled based off CGM data and A1c. Continue current medications.   A1c in 3 months.   Return to clinic in 6 months with labs prior.     tirzepatide (MOUNJARO) 15 mg/0.5 mL PnIj    Hemoglobin A1C    Lipid Panel    Microalbumin/Creatinine Ratio, Urine    insulin glargine U-100, Lantus, (BASAGLAR KWIKPEN U-100 INSULIN) 100 unit/mL (3 mL) InPn pen    dapagliflozin propanediol (FARXIGA) 10 mg tablet      2. Morbid obesity, unspecified obesity type  tirzepatide (MOUNJARO) 15 mg/0.5 mL PnIj      3. Essential hypertension  controlled             Orders Placed This Encounter   Procedures    Hemoglobin A1C     Standing Status:    Standing     Number of Occurrences:   2     Standing Expiration Date:   8/28/2025    Lipid Panel     Standing Status:   Future     Standing Expiration Date:   5/30/2025    Microalbumin/Creatinine Ratio, Urine     Standing Status:   Future     Standing Expiration Date:   7/29/2025     Order Specific Question:   Specimen Source     Answer:   Urine          Follow up in about 6 months (around 11/30/2024).

## 2024-05-30 NOTE — PATIENT INSTRUCTIONS
Diabetes remains controlled based off CGM data and A1c. Continue current medications.   A1c in 3 months.   Return to clinic in 6 months with labs prior.

## 2024-06-01 DIAGNOSIS — M10.9 GOUT, UNSPECIFIED CAUSE, UNSPECIFIED CHRONICITY, UNSPECIFIED SITE: ICD-10-CM

## 2024-06-01 DIAGNOSIS — I10 ESSENTIAL HYPERTENSION: ICD-10-CM

## 2024-06-01 RX ORDER — DILTIAZEM HYDROCHLORIDE 240 MG/1
CAPSULE, COATED, EXTENDED RELEASE ORAL
Qty: 180 CAPSULE | Refills: 0 | OUTPATIENT
Start: 2024-06-01

## 2024-06-01 RX ORDER — COLCHICINE 0.6 MG/1
TABLET ORAL
Qty: 90 TABLET | Refills: 0 | OUTPATIENT
Start: 2024-06-01

## 2024-06-01 RX ORDER — METOPROLOL SUCCINATE 50 MG/1
TABLET, EXTENDED RELEASE ORAL
Qty: 90 TABLET | Refills: 0 | OUTPATIENT
Start: 2024-06-01

## 2024-06-01 NOTE — TELEPHONE ENCOUNTER
No care due was identified.  [facility] Embedded Care Due Messages. Reference number: [id number].   6/01/2024 5:48:48 AM CDT

## 2024-06-02 NOTE — TELEPHONE ENCOUNTER
Quick DC. Request already responded to by other means (e.g. phone or fax)   Refill Authorization Note   David Corona  is requesting a refill authorization.  Brief Assessment and Rationale for Refill:  Quick Discontinue  Medication Therapy Plan:  Receipt confirmed by pharmacy (5/30/2024  3:19 PM CDT)    Medication Reconciliation Completed:  No      Comments:     Note composed:10:20 PM 06/01/2024

## 2024-06-21 ENCOUNTER — TELEPHONE (OUTPATIENT)
Dept: FAMILY MEDICINE | Facility: CLINIC | Age: 59
End: 2024-06-21
Payer: COMMERCIAL

## 2024-06-21 DIAGNOSIS — I10 ESSENTIAL HYPERTENSION: ICD-10-CM

## 2024-06-21 RX ORDER — DILTIAZEM HYDROCHLORIDE 240 MG/1
CAPSULE, COATED, EXTENDED RELEASE ORAL
Qty: 180 CAPSULE | Refills: 0 | Status: SHIPPED | OUTPATIENT
Start: 2024-06-21

## 2024-06-21 NOTE — TELEPHONE ENCOUNTER
----- Message from Riddhi Galindo MD sent at 6/21/2024 11:10 AM CDT -----  Due for 6 mos office visit with myself or Kaleigh Gonzales NP in August - please schedule.

## 2024-06-21 NOTE — TELEPHONE ENCOUNTER
Refill Decision Note   David Corona  is requesting a refill authorization.  Brief Assessment and Rationale for Refill:  Approve     Medication Therapy Plan:         Comments:     Note composed:10:21 AM 06/21/2024

## 2024-06-21 NOTE — TELEPHONE ENCOUNTER
No care due was identified.  [facility] Embedded Care Due Messages. Reference number: [id number].   6/21/2024 8:19:10 AM CDT

## 2024-07-04 DIAGNOSIS — I10 ESSENTIAL HYPERTENSION: ICD-10-CM

## 2024-07-05 NOTE — TELEPHONE ENCOUNTER
No care due was identified.  [facility] Embedded Care Due Messages. Reference number: [id number].   7/04/2024 8:31:04 PM CDT

## 2024-07-09 RX ORDER — METOPROLOL SUCCINATE 50 MG/1
50 TABLET, EXTENDED RELEASE ORAL DAILY
Qty: 90 TABLET | Refills: 0 | Status: SHIPPED | OUTPATIENT
Start: 2024-07-09

## 2024-07-09 NOTE — TELEPHONE ENCOUNTER
Refill Decision Note   David Corona  is requesting a refill authorization.  Brief Assessment and Rationale for Refill:  Approve     Medication Therapy Plan:        Comments:     Note composed:4:28 PM 07/09/2024

## 2024-07-31 RX ORDER — ATORVASTATIN CALCIUM 40 MG/1
40 TABLET, FILM COATED ORAL NIGHTLY
Qty: 90 TABLET | Refills: 0 | Status: SHIPPED | OUTPATIENT
Start: 2024-07-31

## 2024-07-31 NOTE — TELEPHONE ENCOUNTER
Provider Staff:  Action required for this patient    Requires appointment   Requires labs      Please see care gap opportunities below in Care Due Message.    Thanks!  [facility]     Appointments      Date Provider   Last Visit   6/5/2023 Riddhi Galindo MD   Next Visit   Visit date not found Riddhi Galindo MD     Refill Decision Note   David Corona  is requesting a refill authorization.  Brief Assessment and Rationale for Refill:  Approve     Medication Therapy Plan:         Comments:     Note composed:8:47 AM 07/31/2024

## 2024-07-31 NOTE — TELEPHONE ENCOUNTER
Care Due:                  Date            Visit Type   Department     Provider  --------------------------------------------------------------------------------                                [facility]                              PRIMARY      MED/ INTERNAL  Christiane Gupta  Last Visit: 06-      CARE ([facility])   MED/ PEDS      Maryuri Galindo  Next Visit: None Scheduled  None         None Found                                                            Last  Test          Frequency    Reason                     Performed    Due Date  --------------------------------------------------------------------------------    Office Visit  12 months..  atorvastatin, colchicine,   06- 05-                             furosemide, ibuprofen,                             metoprolol, valsartan....    CBC.........  12 months..  ibuprofen................  Not Found    Overdue    CMP.........  12 months..  colchicine, furosemide,    10-   05-                             ibuprofen, valsartan.....    Uric Acid...  12 months..  colchicine...............  Not Found    Overdue    [employer] Embedded Care Due Messages. Reference number: [id number].   7/31/2024 8:46:16 AM CDT

## 2024-08-06 RX ORDER — BLOOD SUGAR DIAGNOSTIC
STRIP MISCELLANEOUS
Qty: 400 EACH | Refills: 3 | Status: SHIPPED | OUTPATIENT
Start: 2024-08-06

## 2024-08-16 ENCOUNTER — OFFICE VISIT (OUTPATIENT)
Dept: FAMILY MEDICINE | Facility: CLINIC | Age: 59
End: 2024-08-16
Payer: COMMERCIAL

## 2024-08-16 VITALS
OXYGEN SATURATION: 96 % | DIASTOLIC BLOOD PRESSURE: 80 MMHG | HEIGHT: 74 IN | WEIGHT: 315 LBS | HEART RATE: 72 BPM | BODY MASS INDEX: 40.43 KG/M2 | TEMPERATURE: 99 F | SYSTOLIC BLOOD PRESSURE: 136 MMHG

## 2024-08-16 DIAGNOSIS — E11.40 CONTROLLED TYPE 2 DIABETES WITH NEUROPATHY: ICD-10-CM

## 2024-08-16 DIAGNOSIS — E11.3219: ICD-10-CM

## 2024-08-16 DIAGNOSIS — N25.81 SECONDARY HYPERPARATHYROIDISM OF RENAL ORIGIN: ICD-10-CM

## 2024-08-16 DIAGNOSIS — E11.65: ICD-10-CM

## 2024-08-16 DIAGNOSIS — I10 ESSENTIAL HYPERTENSION: Primary | ICD-10-CM

## 2024-08-16 PROCEDURE — 3008F BODY MASS INDEX DOCD: CPT | Mod: CPTII,S$GLB,,

## 2024-08-16 PROCEDURE — 3044F HG A1C LEVEL LT 7.0%: CPT | Mod: CPTII,S$GLB,,

## 2024-08-16 PROCEDURE — 4010F ACE/ARB THERAPY RXD/TAKEN: CPT | Mod: CPTII,S$GLB,,

## 2024-08-16 PROCEDURE — 99213 OFFICE O/P EST LOW 20 MIN: CPT | Mod: S$GLB,,,

## 2024-08-16 PROCEDURE — 3075F SYST BP GE 130 - 139MM HG: CPT | Mod: CPTII,S$GLB,,

## 2024-08-16 PROCEDURE — 3079F DIAST BP 80-89 MM HG: CPT | Mod: CPTII,S$GLB,,

## 2024-08-16 PROCEDURE — 99999 PR PBB SHADOW E&M-EST. PATIENT-LVL V: CPT | Mod: PBBFAC,,,

## 2024-08-16 PROCEDURE — 3072F LOW RISK FOR RETINOPATHY: CPT | Mod: CPTII,S$GLB,,

## 2024-08-16 PROCEDURE — 1159F MED LIST DOCD IN RCRD: CPT | Mod: CPTII,S$GLB,,

## 2024-08-16 NOTE — PROGRESS NOTES
HPI     Chief Complaint:  Chief Complaint   Patient presents with    Follow-up       David Corona Jr. is a 59 y.o. male with multiple medical diagnoses as listed in the medical history and problem list that presents for   Chief Complaint   Patient presents with    Follow-up    .     Patient is know to me with his last appointment with me on 2/1/2024.     HPI  Pt presents for BP follow up.   BP readings at home 120s-140s/80s.      Assessment & Plan     Problem List Items Addressed This Visit          Cardiac/Vascular    Essential hypertension - Primary  BP in clinic today 136/80. BP readings at home 120s-140s/80s. The current medical regimen is effective;  continue present plan and medications.           Endocrine    Secondary hyperparathyroidism of renal origin  Stable. The current medical regimen is effective;  continue present plan and medications.      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 Will order A1c today.  Hemoglobin A1C   Date Value Ref Range Status   08/19/2024 6.1 (H) 4.0 - 5.6 % Final     Comment:     ADA Screening Guidelines:  5.7-6.4%  Consistent with prediabetes  >or=6.5%  Consistent with diabetes    High levels of fetal hemoglobin interfere with the HbA1C  assay. Heterozygous hemoglobin variants (HbS, HgC, etc)do  not significantly interfere with this assay.   However, presence of multiple variants may affect accuracy.     05/22/2024 6.7 (H) 4.0 - 5.6 % Final     Comment:     ADA Screening Guidelines:  5.7-6.4%  Consistent with prediabetes  >or=6.5%  Consistent with diabetes    High levels of fetal hemoglobin interfere with the HbA1C  assay. Heterozygous hemoglobin variants (HbS, HgC, etc)do  not significantly interfere with this assay.   However, presence of multiple variants may affect accuracy.     03/28/2024 6.4 (H) 4.0 - 5.6 % Final     Comment:     ADA Screening Guidelines:  5.7-6.4%  Consistent with  prediabetes  >or=6.5%  Consistent with diabetes    High levels of fetal hemoglobin interfere with the HbA1C  assay. Heterozygous hemoglobin variants (HbS, HgC, etc)do  not significantly interfere with this assay.   However, presence of multiple variants may affect accuracy.     04/27/2021 7.4 (H) 4.8 - 5.6 % Final     Comment:     3D FUTURE VISION II         Overview     - mild - right eye only          Other Visit Diagnoses       Controlled type 2 diabetes with neuropathy      Stable. The current medical regimen is effective;  continue present plan and medications.      Relevant Orders    HEMOGLOBIN A1C (Completed)              --------------------------------------------      Health Maintenance:  Health Maintenance         Date Due Completion Date    COVID-19 Vaccine (5 - 2023-24 season) 09/01/2023 7/7/2022    Influenza Vaccine (1) 09/01/2024 11/10/2023    Lipid Panel 11/04/2024 11/4/2023    Diabetes Urine Screening 12/29/2024 12/29/2023    Eye Exam 12/29/2024 12/29/2023    Override on 1/30/2012: Done    Hemoglobin A1c 02/19/2025 8/19/2024    PROSTATE-SPECIFIC ANTIGEN 03/28/2025 3/28/2024    Low Dose Statin 08/16/2025 8/16/2024    Foot Exam 08/16/2025 8/16/2024    Override on 6/20/2023: Done (seen by podiatry)    Colorectal Cancer Screening 02/28/2027 2/28/2022    TETANUS VACCINE 09/04/2027 9/4/2017    Pneumococcal Vaccines (Age 0-64) (3 of 3 - PPSV23 or PCV20) 05/07/2031 2/1/2019            Health maintenance reviewed and A1c ordered    Follow Up:  No follow-ups on file.    Exam     Review of Systems:  (as noted above)  Review of Systems    Physical Exam:   Physical Exam  Constitutional:       Appearance: Normal appearance. He is obese.   Cardiovascular:      Rate and Rhythm: Normal rate and regular rhythm.   Pulmonary:      Effort: Pulmonary effort is normal.      Breath sounds: Normal breath sounds.   Neurological:      Mental Status: He is alert.       Protective Sensation (w/ 10 gram monofilament):  Right:  "Intact  Left: Intact    Visual Inspection:  Onychomycosis -  Bilateral    Pedal Pulses:   Right: Present  Left: Present    Posterior Tibialis Pulses:   Right:Present  Left: Present    Vitals:    08/16/24 1447 08/16/24 1513   BP: 136/80    BP Location: Right arm    Patient Position: Sitting    BP Method: Large (Manual)    Pulse: 72    Temp: 98.6 °F (37 °C)    TempSrc: Oral    SpO2: (!) 94% 96%   Weight: (!) 206.7 kg (455 lb 11.1 oz)    Height: 6' 2" (1.88 m)       Body mass index is 58.51 kg/m².        History     Past Medical History:  Past Medical History:   Diagnosis Date    Arthritis     Cataract     left eye     CKD (chronic kidney disease) stage 3, GFR 30-59 ml/min     Colon polyp 11/2016    Dependent edema     Diverticulosis     Gout, arthritis     Hyperlipidemia LDL goal <100     Hypertension     Morbid obesity     TOSIN (obstructive sleep apnea)     Peripheral autonomic neuropathy in disorders classified elsewhere(337.1)     Proteinuria     Type II or unspecified type diabetes mellitus with neurological manifestations, uncontrolled(250.62)     Venous stasis of lower extremity        Past Surgical History:  Past Surgical History:   Procedure Laterality Date    CATARACT EXTRACTION W/  INTRAOCULAR LENS IMPLANT Right 2007    COLONOSCOPY N/A 11/22/2016    Procedure: COLONOSCOPY;  Surgeon: Abdi La MD;  Location: [facility] ([address] [place] [address]);  Service: Endoscopy;  Laterality: N/A;  2nd floor case/BMI 59.13/wants week of Thanksgiving    COLONOSCOPY N/A 02/28/2022    Procedure: COLONOSCOPY;  Surgeon: Jessi Martinez MD;  [facility] ([facility]  Service: Endoscopy;  Laterality: N/A;  BMI 61.71  covid test [place] 2/25 2/24 pt confirmed appt and new arrival time-Kpvt       Social History:  Social History     Socioeconomic History    Marital status:     Number of children: 3   Occupational History    Occupation:      Employer: [employer]    Occupation: playground " supervisor   Tobacco Use    Smoking status: Never    Smokeless tobacco: Never   Substance and Sexual Activity    Alcohol use: Yes     Alcohol/week: 0.0 standard drinks of alcohol     Comment: rarely    Drug use: No    Sexual activity: Yes     Social Determinants of Health     Financial Resource Strain: Low Risk  (5/6/2024)    Received from [facility]    Overall Financial Resource Strain (CARDIA)     Difficulty of Paying Living Expenses: Not hard at all   Food Insecurity: No Food Insecurity (5/6/2024)    Received from [facility]    Hunger Vital Sign     Worried About Running Out of Food in the Last Year: Never true     Ran Out of Food in the Last Year: Never true   Transportation Needs: No Transportation Needs (5/6/2024)    Received from [facility]    PRAPARE - Transportation     Lack of Transportation (Medical): No     Lack of Transportation (Non-Medical): No   Physical Activity: Insufficiently Active (5/6/2024)    Received from [facility]    Exercise Vital Sign     Days of Exercise per Week: 5 days     Minutes of Exercise per Session: 20 min   Stress: No Stress Concern Present (5/6/2024)    Received from [facility]    [employer] [place] [employer] - Occupational Stress Questionnaire     Feeling of Stress : Not at all   Housing Stability: Low Risk  (4/8/2024)    Housing Stability Vital Sign     Unable to Pay for Housing in the Last Year: No     Number of Places Lived in the Last Year: 1     Unstable Housing in the Last Year: No       Family History:  Family History   Problem Relation Name Age of Onset    Breast cancer Mother      Diabetes Mother      Hypertension Mother      Stroke Mother      Prostate cancer Father      Diabetes Father      Hypertension Father      No Known Problems Sister Cass     No Known Problems Brother Bebeto     Breast cancer Maternal Grandmother       Amblyopia Neg Hx      Blindness Neg Hx      Cataracts Neg Hx      Glaucoma Neg Hx      Macular degeneration Neg Hx      Retinal detachment Neg Hx      Strabismus Neg Hx         Allergies and Medications: (updated and reviewed)  Review of patient's allergies indicates:  No Known Allergies  Current Outpatient Medications   Medication Sig Dispense Refill    aspirin (ECOTRIN) 81 MG EC tablet Take 1 tablet (81 mg total) by mouth once daily. 90 tablet 3    atorvastatin (LIPITOR) 40 MG tablet Take 1 tablet (40 mg total) by mouth every evening. 90 tablet 0    blood-glucose sensor (DEXCOM G7 SENSOR) Clare Change every 10 days 12 each 3    colchicine (COLCRYS) 0.6 mg tablet Take 1 tablet (0.6 mg total) by mouth once daily. 90 tablet 0    dapagliflozin propanediol (FARXIGA) 10 mg tablet Take 1 tablet (10 mg total) by mouth once daily. 90 tablet 2    diltiaZEM (CARDIZEM CD) 240 MG 24 hr capsule TAKE 2 CAPSULES BY MOUTH EVERY  capsule 0    furosemide (LASIX) 40 MG tablet Take 1 tablet (40 mg total) by mouth 2 (two) times daily. 180 tablet 1    hydrALAZINE (APRESOLINE) 50 MG tablet Take 1 tablet (50 mg total) by mouth 2 (two) times a day. 180 tablet 3    ibuprofen (ADVIL,MOTRIN) 600 MG tablet TAKE 1 TABLET(600 MG) BY MOUTH EVERY NIGHT AS NEEDED FOR PAIN 30 tablet 0    insulin aspart U-100 (NOVOLOG) 100 unit/mL (3 mL) InPn pen Take as needed before meals: 150-200=+2, 201-250=+4; 251-300=+6; 301-350=+8, over 350=+10 units. Max daily dose 30 units 15 mL 0    insulin aspart, niacinamide, (FIASP FLEXTOUCH U-100 INSULIN) 100 unit/mL (3 mL) InPn Take as needed before meals: 150-200=+2, 201-250=+4; 251-300=+6; 301-350=+8, over 350=+10 units. Max daily dose 30 units 5 pen 5    insulin glargine U-100, Lantus, (BASAGLAR KWIKPEN U-100 INSULIN) 100 unit/mL (3 mL) InPn pen Inject 30 units into the skin once daily 15 mL 5    metoprolol succinate (TOPROL-XL) 50 MG 24 hr tablet Take 1 tablet (50 mg total) by mouth once daily. 90 tablet 0     "omega-3 fatty acids 1,000 mg Cap Take 2 capsules by mouth once daily.      pen needle, diabetic (NOVOFINE 32) 32 gauge x 1/4" Ndle USE FOUR TIMES DAILY AS NEEDED 400 each 3    pioglitazone (ACTOS) 45 MG tablet TAKE 1 TABLET(45 MG) BY MOUTH EVERY DAY 90 tablet 2    tirzepatide (MOUNJARO) 15 mg/0.5 mL PnIj Inject 15 mg (one pen) into the skin every 7 days. 12 Pen 2    valsartan (DIOVAN) 320 MG tablet Take 1 tablet (320 mg total) by mouth once daily. 90 tablet 1     No current facility-administered medications for this visit.       Patient Care Team:  Riddhi Galindo MD as PCP - General (Internal Medicine)  Vick Cornelius MD as Consulting Physician (Nephrology)  Munira Garnett LPN as Care Coordinator  Angela Geiger as Digital Medicine Health   Sloane Vasquez PharmD as Hypertension Digital Medicine Clinician (Pharmacist)  Riddhi Galindo MD as Hypertension Digital Medicine Responsible Provider (Internal Medicine)         - The patient is given an After Visit Summary that lists all medications with directions, allergies, education, orders placed during this encounter and follow-up instructions.      - I have reviewed the patient's medical information including past medical, family, and social history sections including the medications and allergies.      - We discussed the patient's current medications.     This note was created by combination of typed  and MModal dictation.  Transcription errors may be present.  If there are any questions, please contact me.       Kaleigh Dick NP               "

## 2024-08-19 ENCOUNTER — LAB VISIT (OUTPATIENT)
Dept: LAB | Facility: HOSPITAL | Age: 59
End: 2024-08-19
Attending: INTERNAL MEDICINE
Payer: COMMERCIAL

## 2024-08-19 DIAGNOSIS — E11.40 CONTROLLED TYPE 2 DIABETES WITH NEUROPATHY: ICD-10-CM

## 2024-08-19 DIAGNOSIS — I10 ESSENTIAL HYPERTENSION: ICD-10-CM

## 2024-08-19 LAB
ALBUMIN SERPL BCP-MCNC: 3.3 G/DL (ref 3.5–5.2)
ALP SERPL-CCNC: 140 U/L (ref 55–135)
ALT SERPL W/O P-5'-P-CCNC: 8 U/L (ref 10–44)
ANION GAP SERPL CALC-SCNC: 5 MMOL/L (ref 8–16)
AST SERPL-CCNC: 25 U/L (ref 10–40)
BILIRUB SERPL-MCNC: 0.6 MG/DL (ref 0.1–1)
BUN SERPL-MCNC: 14 MG/DL (ref 6–20)
CALCIUM SERPL-MCNC: 9.3 MG/DL (ref 8.7–10.5)
CHLORIDE SERPL-SCNC: 107 MMOL/L (ref 95–110)
CO2 SERPL-SCNC: 28 MMOL/L (ref 23–29)
CREAT SERPL-MCNC: 1.4 MG/DL (ref 0.5–1.4)
EST. GFR  (NO RACE VARIABLE): 58.3 ML/MIN/1.73 M^2
ESTIMATED AVG GLUCOSE: 128 MG/DL (ref 68–131)
GLUCOSE SERPL-MCNC: 127 MG/DL (ref 70–110)
HBA1C MFR BLD: 6.1 % (ref 4–5.6)
POTASSIUM SERPL-SCNC: 4.7 MMOL/L (ref 3.5–5.1)
PROT SERPL-MCNC: 6.7 G/DL (ref 6–8.4)
SODIUM SERPL-SCNC: 140 MMOL/L (ref 136–145)

## 2024-08-19 PROCEDURE — 83036 HEMOGLOBIN GLYCOSYLATED A1C: CPT

## 2024-08-19 PROCEDURE — 36415 COLL VENOUS BLD VENIPUNCTURE: CPT | Mod: PO | Performed by: INTERNAL MEDICINE

## 2024-08-19 PROCEDURE — 80053 COMPREHEN METABOLIC PANEL: CPT | Performed by: INTERNAL MEDICINE

## 2024-08-23 ENCOUNTER — TELEPHONE (OUTPATIENT)
Dept: FAMILY MEDICINE | Facility: CLINIC | Age: 59
End: 2024-08-23
Payer: COMMERCIAL

## 2024-08-30 DIAGNOSIS — I10 ESSENTIAL HYPERTENSION: ICD-10-CM

## 2024-08-30 NOTE — TELEPHONE ENCOUNTER
No care due was identified.  [facility] Embedded Care Due Messages. Reference number: [id number].   8/30/2024 12:36:38 PM CDT

## 2024-08-30 NOTE — TELEPHONE ENCOUNTER
Please see the attached refill request.   See if communication from patient through office in the mail.  He stated the following:    \"Sorry to bother.  I have that script for Percocet.  Can I take something also for the headaches I am getting when I take it?  It seems to help take the edge off, but I get a headache every time it wears off.  Thanks for your help.\"    Patient was sent a reply through the portal.  See message

## 2024-08-31 NOTE — TELEPHONE ENCOUNTER
Refill Routing Note   Medication(s) are not appropriate for processing by [facility] for the following reason(s):        Patient not seen by provider within 15 months    [facility] action(s):  Route             Appointments  past 12m or future 3m with PCP    Date Provider   Last Visit   6/5/2023 Riddhi Galindo MD   Next Visit   Visit date not found Riddhi Galindo MD   ED visits in past 90 days: 0        Note composed:6:52 AM 08/31/2024

## 2024-09-01 DIAGNOSIS — I10 ESSENTIAL HYPERTENSION: ICD-10-CM

## 2024-09-01 NOTE — TELEPHONE ENCOUNTER
No care due was identified.  [facility] Embedded Care Due Messages. Reference number: [id number].   9/01/2024 12:08:53 PM CDT

## 2024-09-03 RX ORDER — FUROSEMIDE 40 MG/1
40 TABLET ORAL 2 TIMES DAILY
Qty: 180 TABLET | Refills: 1 | Status: SHIPPED | OUTPATIENT
Start: 2024-09-03

## 2024-09-03 RX ORDER — FUROSEMIDE 40 MG/1
40 TABLET ORAL 2 TIMES DAILY
Qty: 180 TABLET | Refills: 1 | OUTPATIENT
Start: 2024-09-03

## 2024-09-03 NOTE — TELEPHONE ENCOUNTER
Refill Decision Note   David Corona  is requesting a refill authorization.  Brief Assessment and Rationale for Refill:  Quick Discontinue     Medication Therapy Plan:         Comments:     Note composed:11:53 AM 09/03/2024

## 2024-09-14 DIAGNOSIS — M10.9 GOUT, UNSPECIFIED CAUSE, UNSPECIFIED CHRONICITY, UNSPECIFIED SITE: ICD-10-CM

## 2024-09-15 NOTE — TELEPHONE ENCOUNTER
No care due was identified.  Health [facility] Embedded Care Due Messages. Reference number: [id number].   9/14/2024 7:04:20 PM CDT

## 2024-09-16 RX ORDER — COLCHICINE 0.6 MG/1
TABLET ORAL
Qty: 90 TABLET | Refills: 1 | Status: SHIPPED | OUTPATIENT
Start: 2024-09-16

## 2024-09-27 DIAGNOSIS — I10 ESSENTIAL HYPERTENSION: ICD-10-CM

## 2024-09-27 RX ORDER — METOPROLOL SUCCINATE 50 MG/1
TABLET, EXTENDED RELEASE ORAL
Qty: 90 TABLET | Refills: 1 | Status: SHIPPED | OUTPATIENT
Start: 2024-09-27

## 2024-09-27 NOTE — TELEPHONE ENCOUNTER
No care due was identified.  [facility] Embedded Care Due Messages. Reference number: [id number].   9/27/2024 6:45:01 AM CDT

## 2024-09-27 NOTE — TELEPHONE ENCOUNTER
Refill Routing Note   Medication(s) are not appropriate for processing by [facility] for the following reason(s):        Patient not seen by provider within 15 months    [facility] action(s):  Defer               Appointments  past 12m or future 3m with PCP    Date Provider   Last Visit   Visit date not found Riddhi Galindo MD   Next Visit   Visit date not found Riddhi Galindo MD   ED visits in past 90 days: 0        Note composed:3:40 PM 09/27/2024

## 2024-10-03 ENCOUNTER — TELEPHONE (OUTPATIENT)
Dept: BARIATRICS | Facility: CLINIC | Age: 59
End: 2024-10-03
Payer: COMMERCIAL

## 2024-10-03 DIAGNOSIS — I10 ESSENTIAL HYPERTENSION: ICD-10-CM

## 2024-10-04 RX ORDER — DILTIAZEM HYDROCHLORIDE 240 MG/1
CAPSULE, COATED, EXTENDED RELEASE ORAL
Qty: 180 CAPSULE | Refills: 0 | Status: SHIPPED | OUTPATIENT
Start: 2024-10-04

## 2024-10-14 ENCOUNTER — TELEPHONE (OUTPATIENT)
Dept: BARIATRICS | Facility: CLINIC | Age: 59
End: 2024-10-14
Payer: COMMERCIAL

## 2024-10-14 NOTE — TELEPHONE ENCOUNTER
Spoke with pt, confirmed BMI of 56.5, [facility] APPROVED 10/3/24 for surgery in 2025 - Dr. Leone pt completed FS appointment 10/9/2024, medical criteria reviewed including 4 mo pre-op period copied below, dashboard updated, discussed assigned nurse coordinator and program, scheduled Initial consult visits with ZEESHAN and Dietician. Explained I would be sending a copy of our Bariatric Patient Handbook, Surgery and Nutrition booklets for review, and the instructions to complete the online seminar before scheduled appointments. Pt verbalized understanding.     Copied from 10/7/2024 media:  i. To complete a four (4) month pre-operative period which begins on the date the Plan Participant is approved for enrollment in the Bariatric Surgery Benefit. During the four (4) month pre-operative period, the Plan Participant must:  (1) undergo a medically supervised weight loss program that is directed by the approved bariatric surgeon or bariatric surgery center. There is no minimum required length of time for the weight loss program;  (2) undergo nutritional counseling-including pre-operative nutritional assessment-and counseling about pre-operative nutrition, eating, and exercise;

## 2024-10-17 ENCOUNTER — OFFICE VISIT (OUTPATIENT)
Dept: BARIATRICS | Facility: CLINIC | Age: 59
End: 2024-10-17
Payer: COMMERCIAL

## 2024-10-17 VITALS
SYSTOLIC BLOOD PRESSURE: 141 MMHG | HEIGHT: 73 IN | DIASTOLIC BLOOD PRESSURE: 73 MMHG | BODY MASS INDEX: 41.75 KG/M2 | HEART RATE: 74 BPM | OXYGEN SATURATION: 94 % | WEIGHT: 315 LBS

## 2024-10-17 DIAGNOSIS — E11.65 POORLY CONTROLLED TYPE 2 DIABETES MELLITUS WITH NEUROPATHY: ICD-10-CM

## 2024-10-17 DIAGNOSIS — E78.5 HYPERLIPIDEMIA LDL GOAL <100: Primary | ICD-10-CM

## 2024-10-17 DIAGNOSIS — E66.01 MORBID OBESITY WITH BMI OF 50.0-59.9, ADULT: ICD-10-CM

## 2024-10-17 DIAGNOSIS — I10 ESSENTIAL HYPERTENSION: ICD-10-CM

## 2024-10-17 DIAGNOSIS — N18.31 STAGE 3A CHRONIC KIDNEY DISEASE: ICD-10-CM

## 2024-10-17 DIAGNOSIS — E11.40 POORLY CONTROLLED TYPE 2 DIABETES MELLITUS WITH NEUROPATHY: ICD-10-CM

## 2024-10-17 DIAGNOSIS — G47.33 OSA (OBSTRUCTIVE SLEEP APNEA): ICD-10-CM

## 2024-10-17 PROCEDURE — 3008F BODY MASS INDEX DOCD: CPT | Mod: CPTII,S$GLB,, | Performed by: PHYSICIAN ASSISTANT

## 2024-10-17 PROCEDURE — 3044F HG A1C LEVEL LT 7.0%: CPT | Mod: CPTII,S$GLB,, | Performed by: PHYSICIAN ASSISTANT

## 2024-10-17 PROCEDURE — 99205 OFFICE O/P NEW HI 60 MIN: CPT | Mod: S$GLB,,, | Performed by: PHYSICIAN ASSISTANT

## 2024-10-17 PROCEDURE — 99999 PR PBB SHADOW E&M-EST. PATIENT-LVL V: CPT | Mod: PBBFAC,,, | Performed by: PHYSICIAN ASSISTANT

## 2024-10-17 PROCEDURE — 3077F SYST BP >= 140 MM HG: CPT | Mod: CPTII,S$GLB,, | Performed by: PHYSICIAN ASSISTANT

## 2024-10-17 PROCEDURE — 4010F ACE/ARB THERAPY RXD/TAKEN: CPT | Mod: CPTII,S$GLB,, | Performed by: PHYSICIAN ASSISTANT

## 2024-10-17 PROCEDURE — 1160F RVW MEDS BY RX/DR IN RCRD: CPT | Mod: CPTII,S$GLB,, | Performed by: PHYSICIAN ASSISTANT

## 2024-10-17 PROCEDURE — 3078F DIAST BP <80 MM HG: CPT | Mod: CPTII,S$GLB,, | Performed by: PHYSICIAN ASSISTANT

## 2024-10-17 PROCEDURE — 3072F LOW RISK FOR RETINOPATHY: CPT | Mod: CPTII,S$GLB,, | Performed by: PHYSICIAN ASSISTANT

## 2024-10-17 PROCEDURE — 1159F MED LIST DOCD IN RCRD: CPT | Mod: CPTII,S$GLB,, | Performed by: PHYSICIAN ASSISTANT

## 2024-10-17 NOTE — PROGRESS NOTES
BARIATRIC NEW PATIENT EVALUATION    CHIEF COMPLAINT:   Morbid obesity, body mass index is 58.49 kg/m². and inability to lose weight.    HPI:  David Corona Jr. is a 59 y.o. morbidly obese male. His current body mass index is 58.49 kg/m². 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  He has struggled with excess weight since 2013/2014.  His highest adult weight was 460 lbs at age 51, and his lowest adult weight was 220 lbs at age [age].  The patient has tried calorie counting, low-carb diet, naltrexone-bupropion (Contrave), Intermittent Fasting, Exercise, and portion control . The patient was most successful with walking and dietary changes with a weight loss of 50-60 lbs.  His current exercise includes walking 1 times a week. He denies any history of eating disorder such as anorexia, bulimia, or taking laxatives for weight loss, and denies any addiction including illicit substances, alcohol, or gambling.  Patient states he has a good  support system.  He lives with his family.  He is currently employed as a  .  He  denies a history of GERD.  The patient's goal is to loose weight and get off of medications.    ESS: Score of 10, reviewed 10/17/2024.  Does not need Sleep Study. ( Pt has active TOSIN)     PAST MEDICAL HISTORY:  Past Medical History:   Diagnosis Date    Arthritis     Cataract     left eye     CKD (chronic kidney disease) stage 3, GFR 30-59 ml/min     Colon polyp 11/2016    Dependent edema     Diabetes mellitus     Diverticulosis     Gout, arthritis     Hyperlipidemia LDL goal <100     Hypertension     Morbid obesity     TOSIN (obstructive sleep apnea)     Peripheral autonomic neuropathy in disorders classified elsewhere(337.1)     Proteinuria     Type II or unspecified type diabetes mellitus with neurological manifestations, uncontrolled(250.62)     Venous stasis of lower  extremity        PAST SURGICAL HISTORY:  Past Surgical History:   Procedure Laterality Date    CATARACT EXTRACTION W/  INTRAOCULAR LENS IMPLANT Right 2007    COLONOSCOPY N/A 11/22/2016    Procedure: COLONOSCOPY;  Surgeon: Abdi La MD;  Location: [facility] ENDO (2ND FLR);  Service: Endoscopy;  Laterality: N/A;  2nd floor case/BMI 59.13/wants week of Thanksgiving    COLONOSCOPY N/A 02/28/2022    Procedure: COLONOSCOPY;  Surgeon: Jessi Martinez MD;  [facility]  Service: Endoscopy;  Laterality: N/A;  BMI 61.71  covid test [place] 2/25 2/24 pt confirmed appt and new arrival time-Kpvt    EYE SURGERY         FAMILY HISTORY:  Family History   Problem Relation Name Age of Onset    Breast cancer Mother inge blair dobblobo     Diabetes Mother inge blair dobblobo     Hypertension Mother inge blair dobblobo     Stroke Mother inge blair dobblobo     Cancer Mother inge blair dobbins     Prostate cancer Father edmi cota dobbins sr     Diabetes Father demi cota dobbins sr     Hypertension Father demi cota doshirley sr     Cancer Father demi trujillo sr     No Known Problems Sister Cass     No Known Problems Brother Bebeto     Breast cancer Maternal Grandmother      Amblyopia Neg Hx      Blindness Neg Hx      Cataracts Neg Hx      Glaucoma Neg Hx      Macular degeneration Neg Hx      Retinal detachment Neg Hx      Strabismus Neg Hx          SOCIAL HISTORY:  Social History     Socioeconomic History    Marital status:     Number of children: 3   Occupational History    Occupation:      Employer: [employer]    Occupation:    Tobacco Use    Smoking status: Never    Smokeless tobacco: Never   Substance and Sexual Activity    Alcohol use: Not Currently     Comment: rarely    Drug use: No    Sexual activity: Yes     Partners: Female     Birth control/protection: None     Social Drivers of Health     Financial Resource Strain: Low Risk  (5/6/2024)    Received from [facility]    Overall Financial Resource Strain (CARDIA)     Difficulty of Paying Living Expenses: Not hard at all   Food Insecurity: No Food Insecurity (5/6/2024)    Received from [facility]    Hunger Vital Sign     Worried About Running Out of Food in the Last Year: Never true     Ran Out of Food in the Last Year: Never true   Transportation Needs: No Transportation Needs (5/6/2024)    Received from [facility]    PRAPARE - Transportation     Lack of Transportation (Medical): No     Lack of Transportation (Non-Medical): No   Physical Activity: Insufficiently Active (5/6/2024)    Received from [facility]    Exercise Vital Sign     Days of Exercise per Week: 5 days     Minutes of Exercise per Session: 20 min   Stress: No Stress Concern Present (5/6/2024)    Received from [facility]    [employer] [place] [place] [employer] - Occupational Stress Questionnaire     Feeling of Stress : Not at all   Housing Stability: Low Risk  (4/8/2024)    Housing Stability Vital Sign     Unable to Pay for Housing in the Last Year: No     Number of Places Lived in the Last Year: 1     Unstable Housing in the Last Year: No       MEDICATIONS:    Current Outpatient Medications:     aspirin (ECOTRIN) 81 MG EC tablet, Take 1 tablet (81 mg total) by mouth once daily., Disp: 90 tablet, Rfl: 3    atorvastatin (LIPITOR) 40 MG tablet, Take 1 tablet (40 mg total) by mouth every evening., Disp: 90 tablet, Rfl: 0    blood-glucose sensor (DEXCOM G7 SENSOR) [place], Change every 10 days, Disp: 12 each, Rfl: 3    colchicine (COLCRYS) 0.6 mg tablet, TAKE 1 TABLET(0.6 MG) BY MOUTH DAILY, Disp: 90 tablet, Rfl: 1    dapagliflozin propanediol (FARXIGA) 10 mg tablet, Take 1 tablet (10 mg total) by mouth once daily., Disp: 90 tablet, Rfl: 2    diltiaZEM (CARDIZEM CD) 240 MG 24 hr capsule, TAKE 2 CAPSULES BY MOUTH EVERY DAY, Disp: 180 capsule,  "Rfl: 0    furosemide (LASIX) 40 MG tablet, Take 1 tablet (40 mg total) by mouth 2 (two) times daily., Disp: 180 tablet, Rfl: 1    hydrALAZINE (APRESOLINE) 50 MG tablet, Take 1 tablet (50 mg total) by mouth 2 (two) times a day., Disp: 180 tablet, Rfl: 3    ibuprofen (ADVIL,MOTRIN) 600 MG tablet, TAKE 1 TABLET(600 MG) BY MOUTH EVERY NIGHT AS NEEDED FOR PAIN, Disp: 30 tablet, Rfl: 0    insulin aspart U-100 (NOVOLOG) 100 unit/mL (3 mL) InPn pen, Take as needed before meals: 150-200=+2, 201-250=+4; 251-300=+6; 301-350=+8, over 350=+10 units. Max daily dose 30 units, Disp: 15 mL, Rfl: 0    insulin aspart, niacinamide, (FIASP FLEXTOUCH U-100 INSULIN) 100 unit/mL (3 mL) InPn, Take as needed before meals: 150-200=+2, 201-250=+4; 251-300=+6; 301-350=+8, over 350=+10 units. Max daily dose 30 units, Disp: 5 pen, Rfl: 5    insulin glargine U-100, Lantus, (BASAGLAR KWIKPEN U-100 INSULIN) 100 unit/mL (3 mL) InPn pen, Inject 30 units into the skin once daily, Disp: 15 mL, Rfl: 5    metoprolol succinate (TOPROL-XL) 50 MG 24 hr tablet, TAKE 1 TABLET(50 MG) BY MOUTH DAILY, Disp: 90 tablet, Rfl: 1    omega-3 fatty acids 1,000 mg Cap, Take 2 capsules by mouth once daily., Disp: , Rfl:     pen needle, diabetic (NOVOFINE 32) 32 gauge x 1/4" Ndle, USE FOUR TIMES DAILY AS NEEDED, Disp: 400 each, Rfl: 3    pioglitazone (ACTOS) 45 MG tablet, TAKE 1 TABLET(45 MG) BY MOUTH EVERY DAY, Disp: 90 tablet, Rfl: 2    tirzepatide (MOUNJARO) 15 mg/0.5 mL PnIj, Inject 15 mg (one pen) into the skin every 7 days., Disp: 12 Pen, Rfl: 2    valsartan (DIOVAN) 320 MG tablet, Take 1 tablet (320 mg total) by mouth once daily., Disp: 90 tablet, Rfl: 1    ALLERGIES:  Review of patient's allergies indicates:  No Known Allergies    Review of Systems   Constitutional:  Negative for chills and fever.   HENT:  Negative for sore throat and tinnitus.    Eyes:  Negative for blurred vision and double vision.   Respiratory:  Negative for cough and shortness of breath.  " "  Cardiovascular:  Negative for chest pain, palpitations and leg swelling.   Gastrointestinal:  Negative for abdominal pain, constipation, diarrhea, heartburn, nausea and vomiting.   Genitourinary:  Negative for dysuria and hematuria.   Musculoskeletal:  Negative for back pain, falls, joint pain, myalgias and neck pain.   Skin:  Negative for rash.   Neurological:  Negative for dizziness, tingling and headaches.   Endo/Heme/Allergies:  Does not bruise/bleed easily.   Psychiatric/Behavioral:  Negative for depression and suicidal ideas. The patient is not nervous/anxious.        Vitals:    10/17/24 1328   BP: (!) 141/73   Pulse: 74   SpO2: (!) 94%   Weight: (!) 201.1 kg (443 lb 5.5 oz)   Height: 6' 1" (1.854 m)   PainSc: 0-No pain       Physical Exam  Constitutional:       Appearance: He is obese.   HENT:      Head: Normocephalic and atraumatic.   Eyes:      Extraocular Movements: Extraocular movements intact.      Conjunctiva/sclera: Conjunctivae normal.      Pupils: Pupils are equal, round, and reactive to light.   Cardiovascular:      Rate and Rhythm: Normal rate and regular rhythm.      Pulses: Normal pulses.      Heart sounds: Normal heart sounds. No murmur heard.  Pulmonary:      Effort: Pulmonary effort is normal. No respiratory distress.      Breath sounds: Normal breath sounds.   Abdominal:      General: Bowel sounds are normal. There is no distension.      Palpations: Abdomen is soft.   Musculoskeletal:         General: No swelling. Normal range of motion.      Cervical back: Normal range of motion and neck supple.   Skin:     General: Skin is warm and dry.      Capillary Refill: Capillary refill takes less than 2 seconds.      Coloration: Skin is not jaundiced.   Neurological:      General: No focal deficit present.      Mental Status: He is alert.   Psychiatric:         Mood and Affect: Mood normal.         Behavior: Behavior normal.         Thought Content: Thought content normal.         Judgment: " Judgment normal.          DIAGNOSIS:  1. Morbid obesity, body mass index is 58.49 kg/m². and inability to lose weight.  2. 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 The patient is interested in Unknown with Dr Leone. The surgery and post-op care was discussed in detail with the patient. All questions were answered.    The patient understands that bariatric surgery is a tool to aid in weight loss and that they need to be committed to the diet and exercise post-operatively for successful weight loss. Discussed with patient that bariatric surgery is not the easy way out and that it will take plenty of dedication on the patient's part to be successful. Also discussed the possibility of weight regain if the patient strays from the diet guidelines or exercise requirements. Patient verbalized understanding and wishes to proceed with the work-up.    Estimated Goal weight is 50-60% EW    ORDERS:  1. Stress Test, Chest X-Ray, and EKG  2. Psychological Consult, Bariatric Dietician Consult, and Cardiac Clearance  3. Bariatric Labs: Per orders.  4. No NSAIDS after surgery due to increased risk of stomach ulcers. Talk to your prescribing provider about alternative options for your pain.    PCP: Riddhi Galindo MD  RTC: As scheduled.

## 2024-10-17 NOTE — PATIENT INSTRUCTIONS
Prior to surgery you will need to complete:  - Dietitian consult and follow up appointments as needed  - Cardiac clearance  - Labs  - Chest X-ray  - EKG  - Psychological evaluation, Please call psychiatry [phone] to schedule  - Stress echo     In preparation for bariatric surgery, please complete the following:   Discuss your current medications with your primary care provider, remember your medications will need to be crushed, chewable, or in liquid form for the first 3-6 months after a gastric bypass or sleeve.  For a gastric band, your medications will need to be crushed indefinitely.    If you take a blood thinner such as: Coumadin (warfarin), Pradaxa (dabigatran), or Plavix (clopidogrel), you will need to speak with your prescribing provider on how or if this medication can be stopped before surgery.   If you take a medication for depression or anxiety, you will need to begin crushing or opening the capsule 1-3 months prior to surgery.  Remember to discuss this with the psychologist or psychiatrist that you see.   If you take medication for arthritis on a daily basis that is considered a non-steroidal anti-inflammatory (NSAID), please discuss with your prescribing physician an alternative medication.  After having gastric bypass or gastric sleeve, this group of medications is not appropriate to take due to increased risk of bleeding stomach ulcers.      DEFINITIONS  Appointments: Pre-scheduled meetings or consultations with any physician, advanced practice provider, dietitian, or psychologist, and labs, imaging studies, sleep studies, etc.   Late cancellation: Cancelling an appointment 24-48 hours prior to scheduled time.  No-Show appointment:  is when   You do NOT arrive to your appointment at the time its scheduled.  You call to cancel or cancel via [facility] less than 24 hours in advance of your scheduled appointment  You show up 15 minutes AFTER your scheduled appointment time without any  notification of being late.     POLICY  You are allowed up to 3 cancellations for appointments.   After 3 cancellations your case will be placed on hold for 2 months and after that time you can resume the program.   You are allowed only 1 no-show for an appointment.   You will be re-scheduled one time and if there is a 2nd no-show at any point, your case will be placed on hold for 3 months.  After 3 months you can resume the program.     Upon resuming the program after being placed on hold for either above mentioned reasons, if you have a late cancel or no show for any appointment, the bariatric team will review if youre an appropriate candidate for surgery at the monthly meeting.

## 2024-10-21 ENCOUNTER — PATIENT MESSAGE (OUTPATIENT)
Dept: ADMINISTRATIVE | Facility: HOSPITAL | Age: 59
End: 2024-10-21
Payer: COMMERCIAL

## 2024-10-21 ENCOUNTER — PATIENT MESSAGE (OUTPATIENT)
Dept: BARIATRICS | Facility: CLINIC | Age: 59
End: 2024-10-21
Payer: COMMERCIAL

## 2024-10-22 ENCOUNTER — CLINICAL SUPPORT (OUTPATIENT)
Dept: BARIATRICS | Facility: CLINIC | Age: 59
End: 2024-10-22
Payer: COMMERCIAL

## 2024-10-22 DIAGNOSIS — I10 ESSENTIAL HYPERTENSION: ICD-10-CM

## 2024-10-22 DIAGNOSIS — E78.5 HYPERLIPIDEMIA LDL GOAL <100: ICD-10-CM

## 2024-10-22 DIAGNOSIS — E66.01 MORBID OBESITY WITH BMI OF 50.0-59.9, ADULT: ICD-10-CM

## 2024-10-22 DIAGNOSIS — Z71.3 DIETARY COUNSELING: Primary | ICD-10-CM

## 2024-10-22 PROCEDURE — 97802 MEDICAL NUTRITION INDIV IN: CPT | Mod: 95,,, | Performed by: DIETITIAN, REGISTERED

## 2024-10-22 NOTE — PATIENT INSTRUCTIONS
NUTRITION    Before & After  BARIATRIC SURGERY         [facility]  Surgical Weight Loss Program            BARIATRIC CLINIC  [address] [facility], [address] [place] [address]  Telephone: [phone]; Fax: [phone]    Bariatric Dietitians    Riddhi Carrera    Support Group Meetings  2nd Tuesday of the month 5:30 pm - 6:30 pm via Zoom. Scan QR code below to join.                I. Preparing for Bariatric Surgery    Bariatric surgery is a great tool in helping you lose weight. Your surgeon, advance practice providers, dietitians, nurses, and medical assistants will assist you in preparing for surgery. You are the most important person in making the weight loss successful - by following the nutrition plans before and after surgery, following up with post-operative visits, and establishing lifelong healthy habits.      The Bariatric Diet    What is a Bariatric Diet and Why Should I Follow It?  The bariatric diet is high protein, low-fat, low carbohydrate diet, and reduced calories.  In other words, it prioritizes lean protein, followed by non-starchy vegetables, fruit, and whole grains. Since the stomach is smaller after bariatric surgery - about the size of an egg - it is important to eat nutrient-dense foods. Adequate nutrition helps in the healing of your incisions, preventing gastric discomfort, and in maintaining your nutritional health. Otherwise, you could develop nutrient deficiencies, which may consequently affect your health AND you may not reach the maximal amount of weight loss, or the rate of weight loss may slow down.    Why Protein?  Protein is a very important macronutrient. (The other two macronutrients are carbohydrates and fats). It is in our muscle, organs, blood, bone, skin, hair, and many other parts of the body. Sources of protein include meat, poultry, fish, seafood, beans, nuts, seeds, and dairy. Protein is especially important after bariatric  surgery. Why? Studies have shown that higher protein diets along with calorie restriction can lead to greater weight loss, fat loss, and preservation of lean mass than other diets. Protein can help you feel full and more satisfied than carbohydrates, so you eat fewer calories.     What About Carbohydrates?  Carbohydrates provide fuel for our bodies. There are three types of carbohydrates: sugars, starches (long chains of sugar molecules), and fiber. Your body breaks down sugars and starches into glucose (blood sugar) to provide energy for your cells, tissues, and organs. Sources of carbohydrates include fruits, vegetables, and grains. While preparing for bariatric surgery, we emphasize limiting starchy carbohydrates such as bread, cereals, crackers, pasta, rice, corn, peas, and potatoes and eating non-starchy vegetables, fruit, and low-fat dairy.     What About Fat?  Our bodies need a small amount of fat for absorbing certain vitamins, insulating our bodies, and cushioning our organs. Before and after bariatric surgery, you will want to avoid adding extra calories from fat. That means eating lean proteins, baking, grilling, steaming, boiling, and air frying foods, and using cooking sprays instead of oil. It also means avoiding fatty meats, butter, margarine, lard, shortening, animal fat (e.g. garcia grease), and foods made with hydrogenated oil. These foods include packaged/highly processed foods, baked goods, snack foods, creamers, and fried foods.    To prepare for bariatric surgery, you will need to start following a high protein, low-fat, and low carbohydrate diet NOW. Below are 10 recommendations to help you adapt to the bariatric lifestyle, be ready for life after surgery, and be successful in your health improvement journey. Aim to consume 80 g - 120 g protein and 1200 - 1500 calories per day. That breaks down to 20 g - 30 g protein per meal and 10 g - 15 g protein per snack. Note: This is not a keto  diet.    Include protein/lean meats and non-starchy vegetables in all meals. Fruit can be used as a snack or dessert. Canned fruit in its own juice and water are ok. Avoid fruit in syrup.  Cut out high fat foods like beef/pork garcia and sausages, gravies, fried foods, butter, margarine, whole milk, cream, half & half, and ice cream.   Begin cutting back on starchy vegetables (all potatoes, corn and peas) and grains (all breads pasta, rice, cereal, grits, oatmeal, crackers). Cut out sugary drinks (sodas, energy drinks, sports drinks, sweet tea, lemonade, and fruit juice).   Switch to smaller/salad plates (less than 8 inches across) to help with portion control. Begin limiting carbonated beverages (sodas, seltzer) and caffeine.   Begin replacing one meal with a high protein shake with at least 20 grams of protein and under 4 grams of sugar. Do some type of physical activity at least 3 times a week for 30 minutes. It does not have to be 30 minutes all at once!   Cut out junk foods (baked goods, candies, chips) and eat high protein snacks instead like nuts, cheese, Greek yogurt, and protein bars. Practice taking small bites of food and sips; Practice not drinking while eating - drink liquids 30 mins before and/or after eating.     High Protein Sources  Poultry: skinless chicken or turkey (light/dark), ground (90% lean)  Fish/Shellfish: catfish, clams, crab, crawfish, lobster, salmon, shrimp, squid, tilapia, trout, tuna  Beef: tenderloin, roast (rib, álvaro, rump), steak (sirloin, round, cubed, T-bone, flank), ground (90% lean)  Pork: lean ham, [place] garcia, tenderloin, center loin chop, ground (90% lean)  Game: venison, rabbit, duck (skin removed)  Deli meats: turkey, roast beef, ham, chicken  Dairy: low fat, part skim, and fat free cheese (sliced or shredded) mozzarella string cheese, cottage cheese, Greek or low-fat yogurt (less than 10g sugar per serving)  Eggs: Any way you like them!  Beans and Lentils: red,  white, black, lima, black-eyed-peas, chickpeas, hummus  Soy: tofu, edamame  Nuts and Seeds: unsalted, ¼ cup or 2 tbsp nut/seed butter per day  Protein supplements: Protein shakes, drinks, powders, bars, protein chips, and protein soup    How to Prepare Proteins  Remove all visible fat before and after cooking  Bake, broil, boil, roast, grill, air warner, slow cook, pressure cook (Instant pot)  Sauté in Uma, I Can't Believe It's Not Butter, or other cooking spray  NO BREADING or DEEP FRYING      Non-Starchy Vegetables  This is a short list of the variety of vegetables you can eat on a bariatric diet.    Asparagus  Beets  Beet greens  Broccoli  [place] Sprouts  Cabbage  Carrots  Cauliflower (florets, mashed, riced)  Celery Cucumber  Eggplant  Green Beans  Kale  Lettuce/lettuce mixes  Mushrooms  Mustard greens  Okra  Onions  Peppers Snow peas  Spaghetti Squash  Spinach  Squash  Swiss chard  Tomatoes  Zucchini  Zucchini noodles     How to Prepare Non-Starchy Vegetables  Bake, boil, broil, grill, air-warner, microwave, raw, roast, steam, stew  Sauté in Uma, I Can't Believe It's Not Butter, or other cooking spray  NO BREADING or DEEP FRYING  Do not add cream or cheese sauce or butter  Use lemon, vinegar, herbs, and spices for flavor without added calories    Choosing Fluids  All fluids before and after surgery should be sugar-free, non-carbonated, and low calorie - no more than 15 calories per serving. Check the nutrition facts on the back of the bottle/package. Not all diet or low calorie drinks will meet the 15 calorie limit.    Plain water - infuse with lemon/lime/orange, berries, mint leaves, cucumber slices  Flavored highotwer - Propel Zero, Nestle Pure Life Splash, Aquafina Flavor Splash, Hint Water        Coffee or tea - limit to one 12 oz caffeinated drink per day  Diet iced teas - [place] Diet Green Tea, Diet Snapple Tea, Gurdeep diet green tea  Sugar-free (SF) powders and drops - Crystal Light, Que Acosta's  Light, SF Ezio-Aid, SF Hawaiian Punch, Dasani Drops, Great Value, Market Pantry, etc.   Sugar-free sports drinks - Powerade Zero, Gatorade Zero, Gatorade G-Fit, Gatorade Zero with 10g protein  Diet juices - diet cranberry juices, Diet V-8 Splash, diet lemonade  Sugar-free Jell-O and sugar-free popsicles, under 15 calories per serving  Low sodium broth, low sodium bone broth    *Try to drink at least 64 oz of fluid per day. That's about 4 bottles*            Low Calorie/Sugar-Free Drinks      Plain water   Infuse with fruit as desired   Non-carbonated flavored water   Unsweetened tea  (no green tea)     Sugar-free drink powders   Sugar-free drink drops   Diet juices     Sugar-free Jell-o   Sugar-free popsicles   Low sodium broths*   * Try bone broth, which has 4 g - 9 g of protein per cup. Also, you can add dry seasonings to broths if you find them bland        High Protein Drinks, Powders, Snacks, and Soups    On the next few pages are examples of acceptable, commonly used protein drinks, powders, snacks, and soups to start including in your diet. There are many more available in stores and online. If you are not sure if a product is okay to use, ask your dietitian.    Ready-to-Drink (RTD) Protein Shakes  When choosing a protein shake, please read the label. Make sure the protein shake has at least 20 grams of protein, no more than 4 grams of sugar per serving, and 100% whey protein (not collagen) or pea or soy protein if plant-based.  Lactose free protein shakes are marked with an *      Boost Glucose Control Max  30 g protein   Ensure Max Protein  30 g protein    Equate High Performance  30 g protein     Endorse Core Power*  26 g protein   Endorse Nutrition Plan*  30 g protein   GNC Total Lean*  20 g protein     Muscle Milk*  30 g protein   Premier Protein  30 g protein   Quest  30 g protein       Plant-Based Protein Shakes (Lactose-free)      Evolve*  20 g protein   Owyn*  20 g protein   Purely Inspired*  20 g  protein     Smoothies      PJ's Coffee  Protein Velvet Ice  21 g protein    Smoothie King Xochitldibarry (without fruit)  20 oz, 45 g protein      Ready-to-Drink Protein Saenz      Isopure Zero Carb  32 g protein    Premier Clear  20 g protein   Protein 2.0  20 g protein       Protein Powders  Protein powders can be mixed with water, fat-free/skim or 1% milk, or plain, unsweetened plant-based milks like almond and soy milk. They can also be mixed with sugar-free flavored drinks. Lactose-free protein powders are marked with an *. Remember:    Choose powders that have fewer than 4 grams of sugar per serving  If you are tracking, count the calories and protein from the liquids that you add to your protein powders        Body Fortress  30 g protein       25 g protein   ISOPURE+  20-25 g protein     Amado Dwight*+  24 g protein   Muscle Milk*  32 g protein Orgain*  21 g protein     Premier Protein+  30 g protein   Pure Protein  25 g protein   Unjury+  21 g protein   + Available flavored and unflavored    Protein Bars and Protein Chips  Protein bars and protein chips can be a great way to add a tasty snack to your day and meet your protein needs. Try to purchase protein bars with at least 15 g of protein and no more than 4g of sugar. Below are some protein bars that meet these guidelines. Always check the nutrition facts when buying bars. Calorie, protein, and sugar content can change with different flavors and sizes.    Atkins Protein Meal Bar    1.76 oz  190-250 calories  13-17 g protein  1-3 g sugar Young Nu Fit Snack Protein Bar    170-190 calories  16 g protein   4 g sugar Barebells Protein Bar    1.9 oz  200 calories  20 g protein  1 g sugar Fit Crunch    1.62 oz  190-200 calories  16 g protein   3 g sugar         Fulfill  1.41 oz  160 calories  15 g protein  1 g sugar No Cow   2.12 oz  190-200 calories  20-22g protein  1 g sugar  One Bar    2.14 oz  220 calories  20 g protein  1 g sugar  Pure Protein Bar    1.76  oz  190-200 calories  20-21 g protein  2-3 g sugar          Quest Hero/Crispy    1.94 oz  15 g protein  1 g sugar Quest Protein Bar    2.12 oz  180-200 calories  15-21 g protein  1 g sugar Think!    2.1 oz  240 calories  20g protein  0 g sugar          Atkins Chips    1.1 oz  140 calories  13 g protein One Puffs    1.05 oz  150 calories  14 g protein Quest Chips    1.1 oz  150 calories  18 g protein        High Protein Soups  High protein soups are available online (see Resources section). If you have hypertension, look for soups with sodium under 20% per serving.      Bariatric Pal  15 g protein   Unjury  21 g protein   Celebrate  15 g protein       Other Tips in Preparing for Bariatric Surgery (see Appendix for detailed information)    Stock your pantry with bariatric-friendly foods   Purge your pantry of high fat, high sugar, and junk foods and drinks   Learn to measure your portion sizes   Read nutrition facts on food packaging   Track your food     Goals in Preparing for Bariatric Surgery     Eating between 80 g - 120 g protein and 4545-9350 calories every day. That breaks down to 20 g - 30 g of protein per meal and 10 g-15 g of protein per snack  Drinking 64 fluid ounces from water and/or sugar-free, non-carbonated drinks every day  Including protein at every meal and snack  Finding a high protein shake/drink/powder to use during the pre- and post-operative liquid diet  Getting into the habit of reading nutrition facts  Getting physical activity at least 90 minutes per week  Shifting how your plate looks with most of it as protein, followed by non-starchy vegetables and fruit     * YOU DO NOT NEED TO LOSE WEIGHT PRIOR TO SURGERY *  However, you must show progress towards these nutrition goals to be approved for surgery.        II. Bariatric Nutrition Core Points and Contract Agreement    Eat a protein at all meals and snacks   Limit starchy carbohydrates (bread, rice, pasta, potatoes, corn, grits, oatmeal,  etc.)  Eat 4-6 small meals per day. Protein drinks should be used for 1-2 of the small meals.  Measure portion sizes. Small plates, bowls and cups make smaller portions look bigger.  Limit eating out; make better choices when eating out (low fat/low carb)  Include fruits and vegetables in the diet DAILY  Avoid/Limit desserts/candy   Low-fat diet (Baked, broiled, grilled, and boiled instead of fried, sautéed, creamed)  Increase activity (walking, swimming, exercise videos)  Limit sugary, caffeinated, and carbonated beverages  Aim for 64 oz. water per day  Limit alcohol  Practice chewing foods thoroughly.  Practice sipping beverages--no chugging or gulping.  No Straws.  NO LIQUIDS 30 MIN. BEFORE, DURING, AND 30 MIN. AFTER MEALS.  Keep food logs and bring to each visit for review AVOID/LIMIT THESE FOODS   High in Fat/Sugar:   High fat milk (whole, 2%)  Butter, margarine, oil (instead use Uma sprays or I Can't Believe It's Not Butter Spray)   Mayonnaise, sour cream, cream cheese, salad dressing (may use low fat versions of these items)  Ice Cream  Cakes, cookies, pies, desserts  Candy  Luncheon meats (bologna, salami, chopped ham)  Sausage, Aly  Gravy  Breaded and Fried Foods  Sugary drinks  Alcohol   Starchy Carbohydrates.    White and wheat Bread, muffins, bagels, English muffins, biscuits, buns, rolls, cornbread,  Rice, Pasta   Cereals (including grits, oatmeal)   Crackers, Pretzels, Chips, Granola  Corn, Popcorn, Peas, Quinoa  White Potatoes, Sweet potatoes  Flour and corn tortillas   Practice NOT DRINKING   Carbonated drinks  Using a straw          I have been educated on the above lifestyle and nutrition changes regarding weight loss surgery.  I understand and agree that following these guidelines will help me to lose weight and maintain my health long-term.    Patient Signature ______________________________________   Date ____________________    Dietitian Signature  _____________________________________    CONTACT INFORMATION  Riddhi Godinez, RD, LDN  Alix Jones, MS, RD, LDN, [facility]  Cindy Deandre, MS, RDN, LDN    [facility] [address]nd [address] [place] [address] PHONE: [phone] FAX: [phone]    *Send us a message anytime using your [facility] account*        III. Pre-Operative Liquid Protein Diet    Your surgeon and dietitian will have you start a high protein, low calorie, full liquid diet.   For BMI of 40 or more: you will do 2 weeks of the pre-op liquid diet  For BMI of below 40: you will do 1 week of the pre-op liquid diet unless instructed otherwise by the bariatric team.    On the liquid diet, your caloric intake will be about 600 - 800 calories per day, with a protein intake of  grams per day. This will help you lose weight, decrease the size of your abdomen, and shrink your liver. These changes help to decrease your risk of complications during surgery.      While on the liquid diet, remember:     Clear Liquids            Besides protein drinks, all liquids should be sugar-free, decaffeinated **, non-carbonated fluids, and under 15 calories per 8 oz/1 cup serving  Unlimited sugar free beverages are allowed on pre-op liquid diet  You should drink at least 64 ounces of clear, sugar-free fluids during your pre-operative liquid diet (unless instructed otherwise by your medical providers and/or bariatric team)  Fruit, fruit purees, fruit juices, yogurt, and puddings ARE NOT part of the liquid diet, either alone or mixed into liquids or protein drinks.  Approved low calorie clear liquids include:  Water  Flavored water  Sugar-free, non-carbonated, non-caffeinated drinks  Sugar-free powders and drops  Unsweetened tea  Diet juices  Sugar-free Jell-o  Sugar-free popsicles  Broths/bone broths  Gatorade Zero  Powerade Zero     Soups and Broths        Unlimited amounts of broths are allowed during the pre-op liquid  diet.  If your provider has you on a low sodium diet, please choose low sodium broths.  The only high protein soups allowed on the liquid diet are powdered soups from companies like WeWork, Seamless Receipts, Bariatric Pal, Unjury. Include protein from high protein soups into your daily protein totals.  Bone broth can be a good source of protein - it can have up to 15 g of protein per cup.  Canned and fresh soups from the grocery stores and restaurants (egg drop, broccoli & cheddar, debbi broth), gumbo or red bean juice ARE NOT Allowed on the bariatric liquid diet.       Tea/Coffee     **Limit caffeinated tea or coffee to one 12 oz serving per day.    Do not drink green tea of any kind.  To belinda, use a sugar substitute like Splenda, stevia, etc. To lighten, use fat free or 1% milk, unsweetened almond or soy milk, or a sugar-free creamer. Watch the serving size of the milks or creamer! Remember, no more than 15 calories per serving. Or, try using a little protein shake!     Protein     Aim to get  grams of protein per day.  Choose protein shakes with at least 20g of protein, with no more than 4 grams of sugar per 8 oz serving and is 100% whey protein (not collagen protein).  If you prefer or require a plant-based option, please choose soy or pea protein shakes.   If you use protein powder instead of premade protein shakes, mix protein powder with water, skim or 1% milk, unsweetened soy, cashew, or almond milk according to directions on protein product label.   Do not add protein powder to other high protein drinks. Your body can only absorb about 35-40 g protein at a time.  Add flavor to protein drinks using sugar-free drink powders and drops, sugar-free syrups, and extracts. Do not add yogurt, honey, peanut butter, fruit, or vegetables to protein drinks.     Also remember:  Stop taking any vitamins for 1 week prior to surgery.  Stop herbal supplements including green tea and fish oils for 2 weeks prior to  surgery.        Tips to Thriving on the Liquid Diet  Make popsicles out of protein hightower and protein shakes by pouring the protein drink/shake into a popsicle mold  If you don't have popsicle molds, use a small paper or plastic cup or an empty yogurt container  Pour drink/shake into cup/container  Insert a popsicle stick, spoon, or fork in the cup  Keep the stick in place by punching a hole through plastic wrap or tin foil and covering the cup  Make slushies or frosties out of protein drinks/shakes  Semi freeze protein drinks  Pour into , add ice, blend to desired consistency  Vary your flavor of protein drinks  Include broths and bone broths in your diet; Season broths with herbs and spices if you find them bland  Mix high protein soup with low sodium bone broth for added flavor and protein.          IV. Bariatric Vitamin and Mineral Needs    Bariatric surgery can change the way your body absorbs certain vitamins and minerals. With a smaller stomach, it is also harder for you to get all the nutrients you need through food. After bariatric surgery, it is essential for you to take the following vitamins and minerals for the rest of your life to avoid nutrient deficiency. These can be purchased in stores or online (see below).     The dietitian will call you one week after your surgery and discuss starting your recommended vitamins and minerals.      DO NOT START VITAMINS AND MINERALS UNTIL INSTRUCTED    You can purchase your vitamins and minerals locally ([employer], pharmacy, grocery, etc.) or on the websites below:    https://[email or web address] (use verification code [facility] for discount)  https://store.bariatricpal.com/collections/[email or web address]-vitamins (use discount code PHMRJ406 for 20% off your first order)  [email or web address]  [email or web address].[email or web address]  https://[email or web address]/    B-1* (also called Thiamine) or Super B Complex with Thiamine   The following dosing applies to sleeve,  bypass, or CJ-S surgeries:    How Much Do I Need?  50 mg of B-1 (thiamine) per day  The larger the dose, the less often you take it:  Take 25 mg 2 x day; or take 50 mg 1 x day; or take 100 mg every 2 days; or take 250 mg 1 x week; or take 500 mg every 2 weeks    Options:   Dissolve on Tongue   Pill      * We recommend purchasing 250 mg of B-1 and 2500 mcg of B-12 and taking each once per week.      Multivitamin with Iron   DO NOT GET A MEN'S, 50+ OR SILVER MULTIVITAMIN. They do not iron.    How Many Multivitamins Do I Take?   The following dosing applies to sleeve, bypass, or CJ-S surgeries:  If taking a non-bariatric vitamin, take 2 times per day (one in the am and one in the pm)  If you are taking a bariatric vitamin (Barimelt, Bariatric Advantage, Bariatric Pal, etc.), you may only need to take 1 tablet per day.    How Much Iron do I need? Ensure you get at least the iron requirements as listed below:  Men and post-menopausal women:  take at least 18 mg of iron per day  Pre-menopausal women and patients with anemia: take at least 36 mg per day    Options:   Chewable   Pills   Dissolve on Tongue       Calcium Citrate with Vitamin D   *Take Calcium supplement 2 hours apart from multivitamin with iron*    How Much Do I need?  Your body can only absorb 500 - 600 mg of calcium at a time; Space out your daily requirements listed below:  For sleeve and bypass patients: take 6771-4298 mg per day  For CJ-S patients: take 8539-9710 mg per day    Options:   Soft Chew   Liquid   Dissolve on Tongue   Pill       B-12*    Look for sublingual or dissolve on the pill bottles    How Much Do I Need?  500 mcg per day; The larger the dose, the less often you take it.  500 mcg = 1 x day; 1000 mcg = every 2 days; 2500 mcg = 1 x week; 5000 mcg = every 2 weeks    Options:   Dissolve Under Tongue   Drops Under Tongue     * We recommend purchasing 250 mg of B-1 and 2500 mcg of B-12 and taking each once per week.    FOR CJ-S  PATIENTS ONLY- INCLUDE VITAMIN A SUPPLEMENT  Vitamin A    FOR CJ-S PATIENTS ONLY    How Much Do I Need?  10,000 IU/3,000 mcg per day    Options:   Dissolve on Tongue   Drops   Softgels             Post-Op Medication Instructions    Taking Vitamins, Minerals and Prescribed Medications  Continue all prescribed medications upon arrival home from hospital discharge as instructed per your prescribing providers or per discharge instructions from the hospital.  Specific, written instructions about your medicines will be given to you by your nurse upon hospital discharge.    Speak with your primary care doctor or the physician who prescribed your medications if you have any concerns regarding changes in your medications that are not prescribed by the bariatric team.      You may swallow pills smaller than 5 mm or the tip of the pencil eraser right after surgery.     DO NOT swallow pills larger than 5 mm (tip of pencil eraser) for 2 weeks after surgery.  If your medications are larger than 5 mm you will either need crushable, chewable, liquid, or capsule form for 2 weeks after surgery. Review your current medications with the prescribing provider and/or pharmacy to determine if medications are appropriate to crush, open or alter    Extended-release medications should never be altered and are meant to be taken whole.     If you are having a hard time with swallowing pills, split or crush them and try mixing them with unsweetened applesauce    If a change in these medications is needed, please consult with the provider that prescribes these medications so that they can prescribe an alternative form, dose, or medication    DO NOT take gummy multivitamins due to poor quality and sugar content    DO NOT take calcium citrate and iron within 2 hours of each other due to poor absorption    The dietitian will call you one week after your surgery and discuss starting your recommended vitamins and minerals at that time    Take one  vitamin/mineral/medication at a time, spaced 10-15 mins apart        V. Diet Progression after Bariatric Surgery    DIETARY  PHASE TIME FRAME  POST-SURGERY FOODS AND BEVERAGES   1 - LIQUID       Weeks 1 & 2    Day 1-7: 48 oz fluids. Add protein shake when able to drink 48 oz fluids    Day 8-14: 64 oz fluids, 60 g - 80 g protein Water, sugar-free, decaffeinated, non-carbonated beverages  Sugar free Jell-o and sugar-free popsicles, under 15 calories per serving  Skim, 1% milk, unsweetened almond or soy milk   Protein drinks and powders < 4 grams of sugar per serving, high protein soups   2 - PUREE       Weeks 3 & 4    Daily goals:   g protein  64 oz fluids   All Phase 1 liquids plus  Pureed lean meats, seafood, beans, and fruits and vegetables (without peel)  Soft scrambled egg  Nonfat Greek yogurt, low fat/fat free cheese   98% fat free cream soups  Sugar-free pudding   3 - SOFT     1 month after surgery    Daily goals:   g protein  64 oz fluids   All Phase 2 foods and liquids plus  Cooked, fork tender lean meats, fish and seafood  Lean deli meats  Eggs - scrambled, boiled and poached  Raw fruits and cooked vegetables - no peel   4 - SOLID Without Starchy Carbohydrates     2 months after surgery    Daily goals:   g protein  64 oz fluids  800-1000 calories All Phase 3 foods and liquids plus  Raw vegetables  Fruit with peel  Nuts and seeds  Dried meats/jerky and sticks  Protein bars < 4 grams of sugar   NO STARCHY CARBOHYDRATES (breads, pastas, rice, corn, peas, or any potatoes)   5 - REGULAR BARIATRIC   At goal weight    Daily goals   g protein  64 oz fluids   All Phase foods 4 plus up to ½ a serving of starchy carbohydrate (¼ cup of pasta, rice, corn, peas, potatoes or ½ slice of bread, bun,  tortilla per meal)       Phase 1: Bariatric High Protein Liquid Diet  Weeks 1 & 2 Post-Op    Dehydration is the #1 reason patients return to the hospital after being discharged. After surgery, your job is  to drink fluids and walk frequently throughout the day. Use the medicine cups and the fluid tracker you were given at your pre-op visit.    After bariatric surgery, patients need to take very small sips of fluid throughout the day to stay hydrated.  Remember to sip slowly.    Bariatric patients should NOT have alcohol, sugar or sugary drinks, carbonated beverages, caffeine or use a straw for drinking.     In the hospital and upon discharge, measure and document protein and fluid intake on the log sheet (Post Surgery Fluid and Protein Tracker) provided.  Set a reminder or timer to alert you to drink fluids.  The [employer] Bina is a valuable tool and can assist with alerts.    After you leave the hospital, follow the nutrition discharge instructions below and reference the Bariatric Nutrition Guidebook for additional information.    Start sipping water and clear protein drinks (Premier clear, [id number], Isopure Clear, or protein powder with water).  Drink 1-2 ounces or 1-2 medicine cups (30 ml/medicine cup) of clear liquids every 15 minutes while awake and increase as tolerated.   Fluid/clear liquid intake should be a minimum of 48 ounces with a goal of 64 ounces. Increase the amount of fluids as tolerated.      Once you reach 48 ounces of water/clear protein drinks, you will advance to a full protein liquid diet. For the full protein liquid diet, either mix protein powder with skim (1%) milk, unsweetened soy, cashew, or almond milk, according to directions on protein product label, or you can drink premade protein shakes (you may dilute or add water to thin pre-made shakes if needed).     Continue full protein liquid diet for 2 weeks after surgery.  Further diet progression will be discussed at your 2-week post-op appointment- Do not progress unless instructed to do so by the Bariatric team.    A dietitian will call you 1 week post op to see how you are doing. At your 1-week dietitian phone call appointment, your  dietitian will discuss your vitamin regimen and start date. When you are cleared and instructed, follow the instructions on pages 21 regarding pill size, guidelines swallowing medications, and vitamin regimen.       Post-op Liquid Diet Goals       4-8 oz fluids every hour, minimum of 48 oz per day with goal of 64 oz  Add clear protein drinks and advance to the pre-made protein shakes as instructed once you reach 48 oz fluids daily.  Goal of 30 g - 40 g protein per day  Walk as tolerated        8 oz fluids every hour, goal of 64 oz fluids per day  Goal of 60 g - 80 g protein per day  Walk as tolerated       Physical Activity    If your doctor has cleared you for walking or bike riding before your surgery, you can continue this after surgery.  Do not to lift, push, or pull anything heavier than 10 lbs for the first 6 weeks after surgery.      A dietitian will call you at the end of Week 1 to see how you are doing and discuss vitamin regimen and start date. When you come to the clinic for your 2-week follow-up, a dietitian will discuss advancing your diet. Please bring your fluid tracker and your vitamins for review. If you have any questions about your diet or vitamins call the Bariatric Clinic [phone]          Taste and Tolerance Changes After Surgery    You may find that after having surgery, things don't taste the same to you. Drinks may taste too sweet, or water may taste metallic. You may find that flavors you liked before, you no longer enjoy. Here are some common post-op complaints and tips for handling them.    Water has a metallic taste  Add flavoring to water, such as a squeeze of lemon or lime, some fresh fruit (to infuse, not eat!) with crushed mint, or sugar-free powders and drops.     Muscle spasm in stomach after drinking fluids  Your stomach may be sensitive to the temperature of the fluid. If cold fluids cause muscle spasms, try drinking fluids at room temperature or warm. You may need to try  different temperatures until you find what works best. Remember to take small sips!    Drinks taste too sweet   Dilute drinks with water. Or, use less flavoring when mixing sugar-free drinks.    Protein shake causes diarrhea  Some patients may develop lactose sensitivity or intolerance after surgery. Try lactose-free protein shakes and powders such as Fairlife, Muscle Milk, or plant-based protein shakes and powders like Owyn and Evolve.    Protein shake tastes chalky, has an after-taste  Try mixing protein powder with your fluid of choice instead of drinking pre-made shakes. Or, mix unflavored protein powder or fruit-flavored protein powder into sugar-free drinks.     Hunger and Fullness Cues    After surgery, you may not experience feelings of hunger or fullness. Or, your body may use different signs to tell you it is hungry or full.    Hunger  You may not feel hunger or feel like eating. That does not mean you should skip meals. Your body still needs nutrients. Eat a high protein meal, snack, or drink a protein shake every 2-3 hours. You may experience other signs of hunger, like tiredness, or getting hangry. Learn what your new hunger cues are and pay attention to them.    Fullness  You may not feel fullness. That does not mean you should keep eating. Learn to visualize how much food is an appropriate amount, i.e. a palm-sized portion of protein. You may have other signs of fullness, such as nausea, burping, a runny nose (this has happened!), or pressure in your stomach, shoulders, or chest. Learn your new fullness cues and pay attention to them.      Phase 2: Bariatric High Protein Pureed Diet  Weeks 3 & 4    Two weeks after surgery, you may be ready to add pureed foods to your diet.  All food should be the consistency of baby food, or thinner.      Protein First   It is very important to pay attention to protein intake during this time. Meeting protein needs daily will help increase healing, decrease muscle  loss, and increase weight loss. Inadequate protein intake can cause delayed wound healing, hair loss, and muscle break down.    Always eat the foods with the highest protein first. Foods high in protein include lean meat, poultry, fish, seafood, milk, yogurt, cheese, eggs, beans  Eat 3-4 small meals per day (2-4 tbsp each), with protein supplements in between to meet protein needs  Lean meats and seafood can be pureed with a small amount of liquid, such as broth, in a  or  to baby food consistency. No lumps, bumps or stringy bits.    Follow the pureed diet for the next two weeks.      Daily Goals   grams of protein a day through protein supplements and food  64 oz of water or other sugar-free, non-carbonated drink    Tip:  Use a food journal or the Covercake sae to track how much you are eating and drinking     Foods and Drinks Allowed Portion size Protein (g)   Sugar-free, non-carbonated, non-caffeinated clear liquids As desired 0   Skim or 1% milk 8 oz 8   Greek yogurt (under 10 g total sugar) 5 oz 12-15   Lean meats, poultry, fish, seafood, pureed 1 oz 6-9   Beans (red, white, black, lima, yi, fat-free refried, hummus) and lentils, soft-cooked ¼ cup 4   Low-fat/fat-free cheese (cottage cheese, mozzarella string cheese, ricotta cheese, Laughing Cow, Baby Bell, cheddar, etc.) ¼ cup 7-8   Scrambled eggs or Egg Beaters 1 or ¼ cup 6   Edamame or tofu, mashed ¼ cup 5   98% fat-free cream soups (add unflavored protein powder if desired) ½ cup 1-2   Sugar-free pudding (add protein or pb powder if desired) ½ cup 0-1   Peanut butter powder* 2 Tbsp 5   * Peanut butter powder contains about 60 calories per serving. Regular peanut butter is 190 calories per serving. Peanut butter powder is sold in the same section as regular peanut butter.       Protein drinks will make up most of your protein for the day  Protein drinks should contain 20-30 g protein and no more than 4 grams of sugar per  serving  Always have protein when you eat  Sip fluids in between meals  If food feels stuck, do not try to push it down with fluids; walk around  *NO MASHED POTATOES, GRITS OR OATMEAL*    Bariatric Pureed Sample Menu  Below is a sample schedule and menu. Note how every hour there is an eating or drinking occasion with a break in between. Adjust this schedule according to your day.     Timing Item Protein (g) Calories   8-8:30am 1 egg or ¼ cup Egg Beaters 6 78   9-9:30am 1 cup water, or decaf coffee or tea 0 0   10-10:30am Protein drink 30 160   11-11:30am 2 tbsp low-fat cottage cheese, and 1 tbsp pureed peaches 3.5 20   12-12:30pm 1 cup water, or sugar-free lemonade  0 0   1-1:30pm 2 tbsp pureed chicken, and 1 tbsp pureed carrots  9 46   2-2:30pm 1 cup water, or sugar-free lemonade 0 0   3-3:30pm Protein drink 30 160   5-5:30pm 1 cup water  0 0   6-6:30pm 1 cup hi-protein creamy chicken soup (see Recipe) 14 120   7-7:30pm 1 cup water, or sugar-free fruit punch  0 0   8-8:30pm 1 cup water 0 0     RECIPE IDEAS for Bariatric Pureed Diet    Hi-Protein Creamy Chicken Soup  (10g protein per 1 cup serving)  Empty 1 can of 98% fat free cream of chicken soup into saucepan. In a bowl, blend 1 scoop of unflavored protein powder with 1 can of skim milk until smooth.  Add protein milk to saucepan and heat to warm. (Note: Do NOT boil. Protein powder may clump if heated too hot).     Hi-Protein Pudding:  (14g protein per ½ cup serving)  Add 2 scoops protein powder to 2 cups cold skim milk and mix well.  Stir in dry Jell-O Sugar-Free Instant Pudding mix.  Chill and Enjoy!    Tuna Mousse   (12g protein per ¼ cup serving) From Eating Well After Weight Loss Surgery.  In a  or , combine all ingredients and pulse until smooth.  2 6-ounce cans tuna packed in water, drained  2 tbsp low-fat mayonnaise  2 tbsp fat-free sour cream  2 tbsp fat-free cream cheese, softened  ½ cup shallots, finely chopped  1 tbsp lemon  juice  ¼ tsp ground pepper  ½ tsp celery seed    Chocolate Peanut Butter Mousse  (28g protein)  Mix 6 oz plain Greek yogurt with 4 tbsp chocolate PB2      Phase 3: Bariatric High Protein Soft Diet   One Month After Surgery    One month after surgery, your stomach may be healed enough to add soft foods to your diet.  Soft foods are those which can be easily mashed with a fork. This diet usually lasts for one month.      Daily Goals   g of protein per day through food and protein supplements   64 ounces of fluids per day from water, sugar-free, non-carbonated drinks, and sugar-free popsicles, and sugar-free Jell-O      No liquids with meals. Do no drink 30 minutes before meals and wait 30 minutes to 1 hour after meals to start drinking  Sip on fluids throughout the day  Chew foods slowly, at least 30 times. A meal should take 20-30 mins to eat  Eat 3-5 small meals per day, without any additional snacking (no grazing)  Stop eating as soon as you feel full  Avoid using sugar of any kind and foods made with sugar, which can cause dumping syndrome and slow down weight loss  Marinating meats with a low sugar marinade, adding low-fat salad dressing, or adding low calorie gravy (made from powder and water) can help meats to digest easier    Adding Vegetables and Fruits  As long as you are consuming at least 80 g total protein daily from combination of foods and protein drinks, you may start adding small bites of fruits and non-starchy vegetables to your meals.     Cooked, tender vegetables and ripe fruits without the peel are tolerated best. Non-starchy vegetables include:    Asparagus  Beets  Beet greens  Broccoli  [place]ry Sprouts  Cabbage  Carrots  Cauliflower (florets, mashed, riced)  Celery Cucumber  Eggplant  Green Beans  Kale  Lettuce/lettuce mixes  Mushrooms  Mustard greens  Okra  Onions  Peppers Snow peas  Spaghetti Squash  Spinach  Squash  Swiss chard  Tomatoes  Zucchini  Zucchini noodles           Foods to  Eat and Avoid One Month After Surgery    EAT THESE FOODS AVOID THESE FOODS   High in Protein: High in Fat/Sugar:   Canned tuna or chicken packed in water  Ground beef/turkey/pork/chicken (at least 90% lean)  Lean deli meats (turkey, chicken, ham, roast beef)  Skinless turkey or chicken, cooked tender and cut in small pieces  Lean pork or beef, cooked tender and cut in small pieces  Baked, broiled, grilled or boiled fish and seafood (not fried!)  Eggs - scrambled, poached, or boiled  Beans, hummus and lentils  Low-fat or fat-free cottage cheese, soft cheese, mozzarella string cheese, ricotta cheese, Laughing Cow, and Baby Bel cheese  Non-fat/low sugar yogurt (no more than 10 g of sugar per serving)  Sugar-free pudding  Silken tofu, edamame (soybeans) High fat milk (whole, 2%)  Butter, margarine, oil, mayonnaise  Sour cream, cream cheese, salad dressing  Ice Cream and frozen desserts  Baked goods (muffins, doughnuts, sweet rolls, cakes, cookies, pies, desserts)  Candy and chocolates  Luncheon meats and smoked meats (bologna, salami, garcia, sausage)  Breaded and fried foods  Gravy made with a jeanne    Starchy Carbohydrates*:    All breads (white, wheat, rolls, flat, wraps)  Flour and corn tortillas   All rice, quinoa, and other grains  All pasta and noodles  All cereals (including grits, oatmeal, and granola)   All crackers, pretzels and pretzel crisps  All potatoes, sweet potatoes, and chips  Corn, popcorn, and corn chips, corn puffs  Green/sweet peas   As long as you are getting >80g PRO: Tough/Crunchy   Cooked tender vegetables without peel  Ripe fresh fruits without peel  Frozen fruits with no added sugar  Fruit canned in its own juice or in water Tough or dry meats  Nuts and seeds  Dried fruit and dried coconut  Raw vegetables  Protein bars   Fluids: Always Avoid:   Skim/1% milk, Lactaid, soy milk,   Water and sugar-free beverages (decaf and non-carbonated)  Decaf coffee & decaf tea  Sugary drinks  Carbonated  drinks  Alcohol  Drinking through straws   * At goal weight, you may include whole grains in small amounts.    Sample Menu for Bariatric High Protein Soft Diet    This sample menu provides about 80 g protein, including about 40 g protein from foods and at least 40 g protein from protein drinks.  Drinking protein drinks daily helps decrease muscle loss, increase weight loss, and prevent hair loss.    Time of Day Day 1 Day 2   7:00am-7:30pm    1 egg (or ¼ cup Egg Beaters) ¼ cup low-fat cottage cheese, 1 tbsp berries   8:00am-9:00am Water/SF beverage Water/SF beverage   9:30am-10:30am Protein drink  Protein drink   11:00am-12:00pm Water/SF beverage Water/SF beverage   12:30pm-1:00pm   1-2 oz grilled shrimp, ¼ cup green beans 1-2 oz canned chicken, shredded cheese, 1 tbsp salsa   1:30pm-2:30pm Water/SF beverage Water/SF beverage   3:00pm-4:00pm  Protein drink   Protein drink   4:30-6:30pm Water/SF beverage Water/SF beverage   7:00pm-7:30pm  ½ cup low fat chili, 1oz low-fat cheese, ¼ cup broccoli 2 oz grilled fish,  ¼ cup lima beans   8:00pm-9:00pm Water/SF beverage Water/SF beverage       Sip fluids continuously between meals  No drinking from 30 minutes before meals to 30 minutes after meals  For fluids: 1 cup = 8 oz; For food: ¼ cup = 4 tablespoons   3 oz meat is about the size of a deck of cards  A food scale will help you determine portion size    Tips for Fluids  Avoid sugary drinks  Limit caffeinated beverages to 12 oz per day for the first 2-3 months  Avoid carbonated beverages  Avoid drinking through straws to reduce gas and bloating for at least 3 months      Phase 4: Bariatric High Protein Solid Diet  Two Months After Surgery    Two months after surgery, you may add the following slowly, one by one, back into your diet:    Raw, crunchy non-starchy vegetables  Plain/unsalted nuts and seeds  Dried meats/jerky and meat sticks  Protein bars with no more than 4 g of sugar per serving (see section II for  examples)         g of protein per day, using foods and high protein shakes   800-1000 calories per day  64 ounces of fluids per day from water, sugar-free, non-carbonated drinks, and sugar-free popsicles, and sugar-free Jell-O      Chew foods well. If you have a hard time with certain foods at first, wait a few days and try again  Limit fruit to 2 servings per day. One serving of fruit is 1 small piece of fruit, ½ cup of canned fruit (in juice or water) or ½ cup cubed fruit  Limit nuts and seeds to one serving per day - ¼ cup nuts/seeds or 2 tbsp nut/seed butter    SAMPLE MENU STARTING TWO MONTHS AFTER SURGERY    Time of Day Day 1 Day 2   7am-7:30am:    Egg omelet made with 1 egg and 1 slice low-fat cheese Protein bar   8am-9am:  8 oz water/SF beverage water/SF beverage   9:30am:  1 oz turkey with 1 string cheese ¼ cup unsalted nuts + ½ banana   10:30-11:30am:  8 oz water/SF beverage water/SF beverage   12pm:    Grilled chicken (2 oz) salad with 1 tbsp of low-fat Italian dressing and 1 apple  Taco Lettuce wraps: 1-2 oz of lean ground meat, low fat cheese, salsa wrapped in lettuce leaves   12:30pm-2:30pm:  16 oz water/SF beverage water/SF beverage   3pm:  Protein drink   Light yogurt   3:30-5:30pm:  16 oz water/SF beverage water/SF beverage   6pm:  Grilled shrimp kebobs (2 oz shrimp, pineapple chunks, bell pepper and onions) with ¼ cup sautéed spinach  ½ cup Red Beans (no rice) served over ¼ cup cauliflower rice   6:30pm-8:30pm:  16 oz water/SF beverage water/SF beverage   9pm:  1 tbsp slivered almonds sprinkled over 6 oz Greek yogurt Protein shake     Food to Eat and Avoid Two Months After Surgery    EAT THESE FOODS AVOID THESE FOODS   High in Protein: High in Fat/Sugar:   Poultry: skinless chicken or turkey (light/dark), ground (90% lean)  Fish/Shellfish: catfish, salmon, tilapia, trout, tuna, crab, crawfish, shrimp, etc.  Beef: tenderloin, roast (rib, álvaro, rump), steak (sirloin, round, cubed, T-bone,  flank), ground (90% lean)  Pork: lean ham, Campbell garcia, tenderloin, center loin chop, ground (90% lean)  Game: venison, rabbit, duck (skin removed)  Deli meats: turkey, roast beef, ham, chicken  Dairy: low fat, part skim, and fat free cheese, Greek or low-fat yogurt (less than 10g sugar per serving)  Eggs: Boiled, omelet, poached, scrambled  Beans and Lentils: any kind, plus hummus and fat free refried beans  Soy: tofu, edamame  Nuts and Seeds: unsalted, ¼ cup or 2 tbsp nut/seed butter per day  Fruits - fresh, frozen, canned in its own juices or water  Vegetables - fresh, frozen, canned without added salt  Protein bars (no more than 4 g sugar)  Sugar-free pudding, popsicles, and Jell-o High fat milk (whole, 2%)  Butter, margarine, oil, mayonnaise  Sour cream, cream cheese, salad dressing  Ice Cream and frozen desserts  Baked goods (muffins, doughnuts, sweet rolls, cakes, cookies, pies, desserts)  Candy and chocolates  Luncheon meats and smoked meats (bologna, salami, garcia, sausage)  Breaded and fried foods  Gravy made with a jeanne    Starchy Carbohydrates*:    All breads (white, wheat, rolls, flat, wraps)  Flour and corn tortillas   All rice, quinoa, and other grains  All pasta and noodles  All cereals (including grits, oatmeal, and granola)   All crackers, pretzels and pretzel crisps  All potatoes, sweet potatoes, and chips  Corn, popcorn, and corn chips, corn puffs  Green/sweet peas   Fluids: Always Avoid:   Skim/1% cow milk, unsweetened almond and soy milk  Water, sugar-free, and non-carbonated drinks, coffee, tea  Sugary drinks and juice  Carbonated drinks  Alcohol  Drinking through straws   * At goal weight, you may include starchy carbohydrates in small amounts      Phase 5. Bariatric Diet at Goal Weight and Beyond  Follow a high protein, low carb, low fat diet FOR LIFE    Congratulations on reaching your goal weight!  Reaching your goal weight is a great accomplishment. You worked hard to get the weight  off. Keep it off by staying on track with the bariatric lifestyle. Remember the following:    Daily Goals   g of protein per day, through food and high protein shakes.  64 ounces of fluids per day from water, sugar-free, non-carbonated drinks, and sugar-free popsicles and Jell-o.  Calories per day to maintain your weight are based on your age, height, weight, gender, and activity level. Speak with your dietitian for assistance in determining your daily caloric intake.    Adding Grains and Starchy Vegetables  At your goal weight, ask your dietitian about adding grains and starchy vegetables to your diet.  Choose whole grains such as brown rice, whole wheat breads, pastas, tortillas, crackers, old fashioned oatmeal (not instant), and whole grain cereals (Cheerios, Bran Flakes). They provide more fiber, protein, and vitamins than refined (white) grains.  When preparing potatoes (roasted, boiled, mashed, air-fried, etc.), keep the skin on for added fiber.  Keep serving size to ¼ cup.        Protein is the priority. Eat protein foods first before eating vegetables, fruit, or grains.  Aim to eat 3 - 4 oz of protein foods per meal and eat 3 meals per day.  Drink fluids 30 minutes before or 30 minutes after eating.  Snack on high protein foods like Greek yogurt, ¼ cup of unsalted nuts, protein bars, and low-fat cheese  Use protein shakes as a meal replacement or snack.  Limit fruit to 2 per day - small apple, orange, or banana, a handful of strawberries, blueberries, or grapes, or ½ cup of cut fruit.  Limit nuts/seeds to 1 serving per day - ¼ cup of nuts/seeds or 2 tbsp nut/seed butter    Keep Doing the Following  Taking your vitamins - multivitamin with iron, calcium citrate with Vit D, B-1 or Super B-Complex, and B-12  Exercising - cardio and weight resistance or lifting  Checking nutrition labels for protein, fat, sugar, and calories (see Appendix for more info on reading nutrition labels)  Tracking your food,  drink, and exercise with the whistleBox sae or on paper  Visiting the bariatric clinic - at least once a year       Sample Menus    Time of Day Food/Drink Protein (g) Calories   7am-7:30am  2 eggs scrambled  12 156   8:00am-9:00am  Water     9:30am-10:00am 3 oz plain nonfat Greek yogurt,   ¼ c blueberries 14  0 83  21   10:30-11:30am Water     12:00pm-12:30pm   3 oz tuna salad with 1 tbsp lite morton and 6 whole wheat crackers 24  3 146  120   1:00pm-2:00pm Water     2:30pm-3:00pm Protein bar 21 190   3:30-5:30pm Water     6:00pm-6:30pm 3 oz grilled chicken  ¼ cup brown rice  ½ cup steamed broccoli 27  1  0 138  54  10   7:00pm-8:00pm Water     8:30pm-9:00pm Banana nice cream Frozen ½ banana pureed with 1 tbsp PB powder 0  3 55  30   Total  105 1003       Time of Day Food/Drink Protein (g) Calories   7:00am-8:00am  Protein shake 30 160   8:30am-9:30am Water     10:00am-10:30am 1 oz sliced turkey  1 oz mozzarella string cheese 7  7 35  80   11:00am-12:00pm  Water     12:30pm-1:30pm   1 oz whole wheat spaghetti   3 oz ground turkey (90% lean)  ¼ cup marinara sauce no added sugar 4  20  2 90  143  60   2:00pm-3:00pm Water     3:30pm-4:00pm ¼ c unsalted peanuts  ½ banana 7  0 160  55   4:30-5:30pm:  Water     6:00pm-6:30pm  3 oz grilled shrimp skewers  ¼ cup grilled pineapple  ½ cup cauliflower rice 18  0  0 84  20  10   7:00pm-8:00pm  Water     8:30pm-9:00pm 3 oz plain nonfat Greek yogurt,   ¼ c blueberries 14  0 83  21   Total  109 1001         VI. Physical Activity        Physical activity and exercise are essential to achieve and maintain weight loss and to build and maintain lean muscle. Activity and exercise beginning right after your surgery will help you feel better, recover faster and reduce the risk of complications. Staying active promotes mental well-being, relieves stress and reduces feelings of depression and anxiety. You feel good about your body when you exercise regularly, and therefore have a healthier  body image.     Choose a form of activity or exercise that you enjoy   Try mall walking, aerobics, swimming, aqua aerobics, or dancing    Ask a friend or family member to join in an activity or exercise with you. Use the [employer] system!   Lift some weights or use resistance machines and bands to tone muscle and help retain muscles    Join an exercise club or fitness class   When shopping, park farther from the entrance and walk    Stay hydrated by drinking water before and during exercise   Prioritize activity and exercise time into your schedule    Listen to your favorite music or podcase as you exercise   Keep a record or journal of your activity    Use stairs instead of the elevator or escalator   Aim for 30 minutes of physical activity, 3 times per week. It does not have to be 30 mins all at once         VII. Common Nutritional Problems After Surgery and Prevention Tips     Contact the Bariatric Clinic if you need help with any of these problems after surgery.      Gas and Bloating  Constipation    Cause: Digestive tract changes after surgery  Prevention tips: Eat slowly, avoid overeating, avoid carbonated beverages and drinking through straws.  Try switching to lactose-free protein drinks. Try using Gas-X chewables  Causes: Food and fiber intake are reduced following surgery  Prevention tips: Drink at least 48 ounces water daily in addition to protein drinks; Exercise daily; Try a laxative such as MiraLAX or a stool softener like Dulcolax. Do not use a fiber supplement like Metamucil          Nausea and Vomiting  Temporary Hair Loss    Causes: Overeating or eating too quickly  Prevention tips: Eat slowly, chew your food very well, and stop eating as soon as you feel full  Causes: Rapid weight loss and/or lack of protein in the diet  Prevention tips: Eat the amount of protein recommended by your dietitian          Dehydration  Dumping Syndrome    Causes: Not drinking enough fluids; or persistent vomiting and/or  diarrhea  Symptoms: Dark and strong-smelling urine, dry mouth, headache and fatigue  Prevention tips: Take frequent sips of liquid throughout the day, eat sugar free Jell-o and popsicles  Causes: Food emptying too quickly from the stomach, due to excess sugar or fat  Symptoms: Diarrhea, nausea, cold sweats and light-headedness  Prevention tips: Avoid consuming sugary foods or beverages, eating high fat foods, drinking fluids too soon after a meal          Lactose Intolerance  Chronic Malnutrition    Causes: Change in ability to digest lactose  Symptoms: Gas, bloating, cramping and diarrhea after drinking milk or whey-based protein shakes  Prevention tips: Switch to lactose-free milk, unsweetened almond or soy milk. Refer to lactose-free protein supplement suggestions in this booklet  Causes: Changes in nutrient absorption after surgery, insufficient supplementation   Symptoms: Fatigue, aching muscles, tingling feet, calves or hands  Prevention tips: Eat a healthy diet; always take your supplements, check with a Dietitian that you are taking the correct supplements   Page created by 2024 [facility] Dietetic Intern Shannan Wood VIII. Frequently Asked Questions    Q: When can I have caffeinated coffee or tea after surgery?  A: When you can comfortably drink 48 oz of clear fluids, you can have one cup of caffeinated coffee or tea    Q: When can I have alcohol after surgery?  A: You may try alcohol one year after surgery. Be aware that alcohol will affect you very quickly after bariatric surgery. If you do, we suggest sticking to single ingredient drinks like beer, wine, and not mixing spirits with juices and carbonated drinks.    Q: Why can't I use a straw after surgery?  A: Three months after surgery, you may attempt to use a straw. You may find that it fills your stomach with air and cause nausea. In that case, stop using the straw.    Q: When can I start drinking carbonated beverages after surgery?  A: 3 months  after surgery, you may attempt to drink a carbonated beverage. You may find that it fills your stomach with air and cause nausea, bloating, or other discomfort. In that case, stop.    Q: Why do I have to take vitamins for the rest of my life?  A: Bariatric surgery can change the way your body absorbs vitamins and minerals. Plus, with a smaller stomach, you will not be able to get enough nutrients through food.     Q: When can I go back to work?  A: We recommend not returning to work for at least 1 week after surgery, preferably 2 weeks. In the first few days after surgery, you may experience symptoms like nausea, vomiting, diarrhea, or constipation. You also want to give your body time to adjust to your new stomach.    Q: Can I have mashed potatoes, grits, or oatmeal during the pureed phase?  A: No. These are starchy carbohydrates. Starchy carbs expand in your stomach and may cause pain. They do not help with weight loss and they are easy to overeat.    Q: How many carbs can I have?  A: There is not a set amount of carbs in the bariatric lifestyle. Rather, it is more important to focus on the type of carbs than the number of grams of carbs per meal or per day.    Q: When can I have surgery to remove the extra skin?  A: 2 years after bariatric surgery. Note that insurance does not pay for skin removal for cosmetic reasons. If excess skin removal is medically necessary (for example, you develop a rash under the extra skin around your belly), insurance may cover it.            Log food and drink and feelings of hunger and fullness before and after consuming    Base meals around lean protein and vegetables    Eat two servings of fruits and 3 servings of vegetables every day    Eat consistently throughout the day, every 3-4 hours    Go out when you want a treat instead of having it in the house    Include protein in all meals and snacks    Consume low fat/fat free dairy products    Drink water and low calorie drinks     Turn off electronics while eating    Plan meals and snacks   Page created by 2024 [facility] Dietetic Intern Julee Smith IX. Lifelong Nutrition Guidelines After Bariatric Surgery     Safe and successful weight loss after bariatric surgery requires you to make a commitment to living the bariatric lifestyle. Remember to do the following:    Prioritize Protein   Eat Protein Foods First.  Protein is necessary to help you maintain lean body mass as you lose weight.    80 g - 120 g of protein per day, through food and protein supplements  Make half your plate protein, followed by non-starchy vegetables, then fruit or a small serving of starchy carbohydrate when you reach your goal weight     Avoid high fat, high sugar foods and drinks  These can cause diarrhea and stomach discomfort, or the dreaded Dumping Syndrome!  Keep protein shakes, powders, drinks, and bars to no more than 4 g of sugar per serving  Diets high in fat and sugar can lead to weight regain    Be Mindful About Eating    Eat slowly, chew foods thoroughly,   eat and drink small amounts at a time    Listen to Your Body for hunger and fullness cues. You may not feel hunger, but you may feel hangry or tiredness.  Stop eating when you feel 80% full. If you are unable to recognize fullness, learn how much you can eat before you start to feel nauseated or vomit.    Consume only the quantity of food recommended. Eating or drinking too much may eventually stretch your pouch and prevent you from achieving optimal weight loss.      Be Active  Engage in some type of physical activity at least 30 minutes, three times per week. The 30 minutes do not all need to be at the same time   Include weightlifting/resistance to help build and preserve lean muscle    Stay Hydrated  Drink 64 oz or more of fluids daily of sugar-free, non-carbonated beverages under 15 calories per 8 oz serving  Drink fluids 30 minutes before or after eating, not at the same time    Take Your  Vitamins and Minerals  Multivitamin with iron, calcium citrate with vitamin D, B-1, and B-12 (include Vit A if you had CJ-S surgery)  Take multivitamin with iron and calcium at least two hours apart from each other        Go to Your Bariatric Follow-up Visits  2 weeks, 8 weeks, 6 months, and every year after surgery  See a dietitian at every time!   Attend monthly bariatric support group meetings. First Tuesday of every month, 5:30 pm - 6:30 pm. Scan this QR code to join!            Contact Your Dietitian Anytime   Questions about the bariatric lifestyle  When weight loss stalls  Questions about foods, drinks, vitamins & minerals, recipes  Things you see on social media that you have questions about  Message your dietitian through the patient portal or call the Bariatric Clinic at [phone] x. Resources for Bariatric Patients        Smart Phone Apps  Rolith. Available for iPhone and Android. After signing in, enter [facility] Code 50616  Seek & AdorePal  Diabetes logbook by Cony (track blood sugar, upload meal pics)      Shopping Websites  https://[email or web address]/ (use verification code TENNILLENER for discount)  https://store.TapInfluence.BioAnalytix/collections/[email or web address]-vitamins (use discount code IQELI708 for 20% off your first order)  https://celebratevitamins.[email or web address]  https://www.bariatricfusion.com/  https://unjury.[email or web address]    Websites   www.oa.org Overeater's Anonymous is a community of people who support each other in order to recover from compulsive eating and food behaviors. Online, in-person, and telephone meetings available  www.calorieking.com Online food database to find out the calories, protein, sugar, fat, fiber, etc. of any certain food. Helpful when tracking protein and calories on paper.  www.bariatriceating.com A website started by a bariatric surgery patient. Includes recipes, advice, and an online store      Books  Before & After: Living & Eating Well After Weight  Loss Surgery by Indira Souza, 2012  Eating Well After Weight Loss Surgery: Over 150 Delicious Low-Fat, High-Protein Recipes to Enjoy in the Weeks, Months, and Years after Surgery by Ewa Maldonado & Dave Meyer, 2018  Weight Loss Surgery for Dummies by Adilia Ku MD, FACS, 2012  Exodus from Obesity: The Guide to Long-Term Success After Weight Loss Surgery by Priya Wyatt, 2003.  Weight Loss Surgery: Finding the Thin Person Hiding Inside You by Vy Edge, 2008.  The Don't Diet Live It Workbook by Andrea Wachter and Tarik Davis, 1999  The Overeater's Journal: Exercised for the Heart, Mind, and Soul by Rosalind Kaiser, 2004  Moving Away from Diets: Healing Eating Problems and Exercise Resistance, 2nd Edition by Addie Hui and Phuong Ferrara, 2003  50 Ways to Soothe Yourself Without Food by Indira Salinas PSY.D., 2009        XI. Appendix    What to Stock    An empty kitchen can be an obstacle to your weight loss journey. When it's mealtime and you don't have healthy options on hand, it's tempting to skip meals or head to the drive-through. Keeping your kitchen stocked with easy-to-prepare meals and snacks will help you make good choices and stay on track with your weight loss goals.     Pantry Sada   Canned/Anthony Goods Cooking Sada Snacks/Dry Goods   Non-starchy vegetables (with no salt added, if possible)  Beans and fat free refried beans  Chicken/tuna/salmon packed in water  Soups (98% fat free)  Fruits (in 100% juice or no sugar added)  Olives and pickles Herbs and spices  Seasoning mixes (low sodium taco, Italian, Ranch)  Cinnamon/Pumpkin pie spice  Olive oil and sesame oil  Vinegars and cooking rosangela   Low sodium broth and stock  Light soy sauce  Sugar substitutes and sugar free syrups Nuts, nut butters, PB powder  Pumpkin/sunflower seeds  Protein powders and bars with 0-4 g sugar  Sugar free pudding and Jell-o mixes  Dry pasta made from chickpeas or lentils  Dry beans and  lentils  Low sugar tomato sauce   Protein chips (such as Quest)     Freezer Staples   Proteins Vegetables Fruits   Fish fillets (not breaded or battered)  Shrimp  Chicken  Turkey or veggie patties  Turkey sausage or chicken sausage  Edamame (soybeans) Broccoli, cauliflower (including cauli-rice and cauli-mash), green beans, squash/zucchini (including zoodles)  Onion/bell pepper/celery seasoning  Spinach/greens  Stir warner blends Berries  [place]  Banana chunks  Pineapple  Cherries       Refrigerator Sada   Proteins Fruit, Vegetables, Drinks Condiments   Protein shakes/hightower  Chicken/turkey/lean meats  Fish/seafood  90% lean ground beef/turkey   Lean deli meats  Eggs/Egg Beaters  Cottage cheese (low fat/fat free)  Greek yogurt (under 10 g sugar)  Low fat cheeses Fresh fruits  Fresh vegetables  Raw veggies, lettuces - washed and chopped  Avocado  Hummus   Milk (skim or 1%)  Unsweetened almond/soy milk  Pitcher of sugar-free flavored beverage  Low fat morton  Mustard  Salsa  Sugar free flavored coffee creamer   Sugar free BBQ sauce, ketchup, and [place] sauce   Lite/fat-free salad dressing         Pantry Purge    It is important to rid your surroundings of foods that can derail your weight loss journey. They can keep you from losing weight and worsen any medical conditions you may have. A pantry purge includes the cupboards, refrigerator, freezer, garage, deep freezer, shed, and car - anywhere you keep food. Remember: When in doubt, throw it out.    What to Purge    High Fat Foods  Anything that has more than 20% of the daily value for fat should be tossed. These may include:   Meats in a can like meat spreads, SPAM, Shahana sausages, and little smokies  Luncheon/cured/smoked meats like bologna, salami, hot dogs, and sausages  Frozen meals, including pizza  Ground meat less than 90% lean, ribs, garcia, and fatty meats  Bakery items like cakes, brownies, muffins, sweet rolls and buns, doughnuts, pies, and cookies  Whole fat  dairy like milk, cheese, yogurt, pudding, and ice cream.    High Sugar Foods  Anything that has more than 10% of the daily value for added sugar should be tossed. These may include.  Breakfast pastries like muffins, doughnuts, and Danishes  Bakery and desserts items like cakes, brownies, sweet rolls and buns, pies, and cookies, and ice cream  Jennifer and chocolates  Fruit juices, sweetened tea, lemonade, cold drinks, flavored coffee creamers, flavored yogurt, jams, jellies, preserves, and dried fruits    High sodium foods  Anything that has more than 20% of the daily value for sodium should be limited, especially if you have hypertension. These may include:   Meals in a box such as jambalaya mix, Rice-A-Radames, macaroni & cheese  Meals in a can like Chef De La Cruz, canned soups (even the ones that claim to be healthy), canned stews, meat spreads, SPAM, [place] sausages, little smokies, and canned chili  Ramen noodles and Cup O' Soup  Luncheon/cured/smoked meats like bologna, salami, hot dogs, sausages, and pickled pork  Frozen meals, including pizza      Your Handy Portion Size Guide    You can use measure cups, spoons, and a kitchen scale to determine the portion size of your foods. Or, you can use this handy guide to estimate your portion sizes.     Palm of hand ? 1 serving of protein        A closed fist ? 1 serving of soup, salad greens          A cupped hand ? 1 serving of fruit, vegetables, grains          Tip of Thumb ? tsp of oil or sweetener     A thumb ?1 tbsp of cheese, dressings, spreads             Reading Nutrition Facts    When choosing foods, it's always a good idea to look at the nutrition facts label on the back of the food and drink packages. The label will help you choose foods that contain more of the nutrients you want to get more of and less of the nutrients you may want to limit.      Nutrients to get less of: Saturated Fat, Sodium, and Added Sugars.  Eating too much saturated fat and sodium is  associated with an increased risk of developing and worsening some health conditions, like cardiovascular disease and high blood pressure. Consuming too much added sugars can make it hard to meet important nutrient needs while staying within calorie limits.    Total Sugars includes sugars naturally present in many nutritious foods and beverages, such as sugar in milk, fruit, and vegetables as well as any sugars that were added in making in the product.     Added Sugars include any caloric sweeteners that are added during the processing of foods. Added sugars include: sugar, honey, molasses, agave nectar, syrups, malts, and concentrated fruit and vegetable juices (see next page for a longer list).      Nutrients to get more of: Dietary Fiber, Vitamin D, Calcium, Iron, and Potassium.  Eating a diet high in dietary fiber can increase the frequency of bowel movements, lower blood glucose and cholesterol levels, and reduce calorie intake. Diets higher in vitamin D, calcium, iron, and potassium can reduce the risk of developing osteoporosis, anemia, and high blood pressure.      A Word About Sugar and Sweeteners    We know to look at the nutrition facts for sugar. We can also look at the ingredients list to see if there is sugar in a food or drink. Did you know there are dozens of names for sugar?         Anything that is a juice, syrup, or malt is sugar. Anything that ends in -ose is sugar. Anything that ends in -madelin is sugar alcohol. Sugar alcohols are fewer in calories than sugar itself. But can also cause diarrhea if too much is eaten at one time.      Added Sugar  In addition to looking at the nutrition facts for added sugar, you can look at the ingredients list to find added sugars. Compare regular Yoplait (red container) and Yoplait Light (blue container).          Sample Nutrition Facts of Protein Bars  Which protein bar is a good choice for a bariatric diet?     Be a food ! Not all protein bars are a  good choice on the bariatric diet. The [place] bar does have protein (10 g). It also has more fat and sugar than the Quest protein bar.         Eat This, Not That    Protein-rich, lower fat, and lower sugar substitutes for commonly eaten foods.            Pre-Operative Sample Meal Plan     Below is a sample 7-day meal plan to give you ideas on the kinds of meals and snacks to include in your diet before surgery. Each day's menu provides  g protein per day and 6297-2453 calories. Remember to drink sugar-free fluids throughout the day.    MENU # 1   Breakfast: 2 eggs, 1 turkey sausage jase, 1 apple   Snack: P3 protein pack (Avoid P3 with dried fruits and chocolate)   Lunch: 2 meat & cheese roll-ups (sliced turkey, low-fat sliced cheese, mustard), 12 baby carrots dipped in 1 Tbsp natural peanut butter   Mid-Day Snack: ¼ cup unsalted almonds, ½ cup fruit   Dinner: 1 chicken thigh simmered in tomato sauce + 2 Tbsp mozzarella cheese, ½ cup black beans, ½ cup steamed carrots   Evening Snack: ½ cup grapes with 4 cubes low-fat cheddar cheese           MENU # 2   Breakfast: 200 calorie protein drink (20-30 g protein, no more than 4 g sugar)  Mid-morning snack: ¼ cup unsalted nuts, medium banana   Lunch: 3 oz tuna or chicken salad made with 2 tbsp light morton, over salad with tomatoes and cucumbers.   Snack: Low-fat cheese stick   Dinner: 3 oz hamburger jase, slice of low-fat cheese, 1 cup yellow squash and zucchini sauteed in nonstick cook spray  Snack: 6 oz light yogurt           Menu #3   Breakfast: 5-6 oz plain Greek yogurt + fruit (½ banana OR ½ cup fruit - pineapple, blueberries, strawberries, peach), may add Splenda to belinda   Lunch: Grilled chicken breast w/slice low-fat pepper hermelinda cheese, ½ cup grilled/baked asparagus and small salad with Salad Spritzer.   Mid-Day snack: 200 calorie protein drink (20-30 g protein, no more than 4 g sugar)  Dinner: 4 oz grilled fish, ½ cup white beans, ½ cup cooked  spinach   Evening Snack: Fudgsicle no sugar added           Menu # 4   Breakfast: 1 scoop vanilla protein powder + 4 oz skim milk + 4 oz coffee   Snack: Protein bar under 4 g sugar and under 200 calories   Lunch: 2 lettuce tacos: 3 oz seasoned ground turkey wrapped in jorge lettuce leaves with 1 tbsp shredded cheese and dollop low-fat sour cream   Snack: ½ cup cottage cheese, ½ cup pineapple chunks   Dinner: Shrimp omelet: 2 eggs, ½ cup shrimp, green onions, and shredded cheese           Menu #5   Breakfast: 1 cup low-fat cottage cheese, ½ cup peaches (no sugar added)   Snack: 1 apple, 4 cubes cheese   Lunch: 3 oz baked pork chop, 1 cup okra and tomato stew   Snack: ½ cup black beans + salsa + dollop light sour cream   Dinner: Caprese chicken salad: 3 oz chicken breast, 1 oz fresh mozzarella, sliced tomato, 1 tbsp olive oil, torn fresh basil leaves or dried basil   Snack: Sugar-free popsicle           Menu #6   Breakfast: ½ cup part-skim ricotta cheese, 2 tbsp sugar-free strawberry preserves, sprinkle of slivered almonds   Snack: 1 orange + string cheese  Lunch: 3 oz canned chicken, 1 oz shredded cheddar cheese, ½ cup black beans, 2 Tbsp salsa   Snack: 200 calorie protein drink   Dinner: 4 oz grilled salmon steak, over mixed green salad with 2 tbsp light dressing           Menu #7  Breakfast: Crust-less quiche (bake 1 egg mixed w/ low fat cheese, broccoli and low sodium ham in a muffin cup until cooked inside)  Snack: 1 light yogurt  Lunch: Protein shake   Snack: Small apple w/2 tbsp PB2 (powdered peanut butter)  Dinner: 2 ground turkey meatballs w/low sugar marinara sauce, ½ cup spiralized zucchini (sauteed on stove top w/nonstick cook spray)              Tracking Your Nutrition Intake    Studies show that people who track their food intake and physical activity are more successful in their weight loss than those who do not. Tracking takes just a few minutes a day and is very helpful in keeping you on  course.    There's an sae for that  We use Roambi, a free sae available for both Apple and Android. After downloading the ase and signing up, use program code [id number] to access the sae tailored for [facility] bariatric patients. In addition to the tracking features, on the sae you can set reminders, view the bariatric video, and access all of the nutrition information in this booklet.     Paper and Pen  You can also choose to track with paper and pen. You can buy a food journal online, print food log sheets off the internet, or make your own in a notebook. On the last page of this booklet is a sample food log. In filling in the food log, refer to the nutrition label on the food you eat for the calorie and protein info. You may need to look up food items online if a nutrition label is not available.          Protein Foods List  Use these lists as a guide to your protein and calorie intake. You can also look up foods online at websites such as  https://www.Exelis/us/en/    Abbreviations: SF=sugar free, RF = Reduced fat, LF=low fat, FF= fat free, g=grams    Meat and Fish*   Food Name Portion Calories Protein (g)   Beef, ground 90% lean 1 oz 50 6   Beef, roast 1 oz 46 8   Beef, steak, sirloin, fat trimmed 1 oz 55 9   Catfish, broiled or baked 1 oz 30 5   Chicken, white breast w/o skin 1 oz 46 9   Chicken, leg w/o skin 1 oz 54 7   Crab, steamed ¼ cup 40 9   Crawfish tails, boiled ¼ cup 35 8   Egg 1 each 78 6   Ham, lean 95% 1 oz 44 7   Pork, tenderloin 1 oz 46 7   Ord, baked 1 oz 52 7   Shrimp, steamed 1 oz 28 6   Tilapia, white fish, cooked 1 oz 36 8   Trout 1 oz 48 7   Tuna, canned in water 1 oz 37 8   Turkey, white meat 1 oz 35 7   Turkey, ground 93% lean 1 oz 38 6   Turkey, breakfast sausage link 1 oz 55 5   Turkey, smoked rope 1 oz 50 4   Veal Loin 1 oz 50 7   *A serving of cooked meat/fish is 3 oz = size of deck of cards    Beans   Food Name Portion Calories Protein (g)   Black beans ¼ cup 60 4   Great  northern (white) beans  ¼ cup 50 3   Red beans ¼ cup 56 4   Refried beans, fat free ¼ cup 65 4   Tofu ¼ cup 47 5     Dairy   Food Name Portion Calories Protein (g)   Baby Bel cheese 1 piece 70 5   Cheese, American FF 1 oz 40 6   Cheese, cottage 1% fat ¼ cup 41 7   Cheese, Fiesta Blend/Mexican, RF ¼ cup 80 7   Cheese, mozzarella, string 1 stick 80 7   Cheese, parmesan, grated 2 tsp 20 2   Cheese, ricotta, part skim ¼ cup 90 8   Cheese, Sargento Ultra Thin Cheddar 1 slice 45 3   Laughing Cow cheese 1 wedge 30 2   Yogurt, light 5 oz 80 5   Yogurt, Greek nonfat/light 5 oz  12-15     Snacks   Food Name Portion Calories Protein (g)   Almonds, unsalted ¼ cup 160 6   Cashews, unsalted ¼ cup 160 5   Chickpeas, roasted ½ cup 70 4   Beef jerky, original 1 oz 80 10   Edamame, shelled ¼ cup 50 4   Hummus ¼ cup 100 5   Peanuts, unsalted ¼ cup 160 7   Peanut butter, unsalted, creamy 2 tbsp 180 8   Pudding, sugar free 1 serving 60-70 1-2   Quest Chips 32 g 140 19-20   Sunflower seeds, unsalted ¼ cup 170 6   Turkey jerky, original 1 oz 70 12     Drinks   Food Name Portion Calories Protein (g)   Butler milk, unsweetened 1 cup 30 1   Cow's milk, skim or 1% 1 cup 90 8   Cow's milk, Fairlife non-fat 1 cup 80 13   Soymilk, unsweetened 1 cup 80 7         Plant Based Meats   Food Name Portion Calories Protein (g)   Beyond Burger ½ jase 115 10   Beyond Beef (ground) 2 oz 115 10   Beyond Sausage 1 link 190 16   Beyond Meatballs 3 each 114 11   Beyond Breakfast Sausage 2 each 180 11   Beyond Beef Crumble ½ cup 90 14   Beyond Beef Jerky ½ cup 90 10   Impossible Beef (ground, burger) 2 oz 115 9.5   Impossible Sausage 2 oz 130 7   Gardein Be'f Burger 1 jase 130 14   Gardein Chick'n Strips 3 each 65 7.5   Gardein Meatballs 3 each 160 15   Gardein Saus'ge Links ½ link 95 8   Gardein Be'f Tips 6 each 105 10.5   Gardein Ground Be'f 1/2 cup 80 12   Morning Star Grillers Original Burger 1 jase 130 16   Morning Star Breakfast Sausage Link 2  each 70 9   Morning Star Breakfast Coretta 1 coretta 80 9   Morning Star Griller Crumbles ½ cup 70 10   Morning Star Chick'n Stips 8 each 100 15       Sample Food Log    DAY/DATE FOOD ITEM QTY/SIZE/WT PROTEIN CALORIES   Mon, 11/27 Scrambled eggs 2 12 g 160    Bel Vino Core Power 1 - 14 oz 26 g 170    Mozzarella string cheese 1 - 1 oz 8 g 80    Tuna  4 oz 32 g 160    Hummus ¼ cup 5 g 100    Sliced carrots ½ cup .5 g 25    Baked salmon 4 oz 28 g 200    TOTAL  111.5 g 895

## 2024-10-22 NOTE — PROGRESS NOTES
The patient location is: [place] ([place])  The chief complaint leading to consultation is: morbid obesity    Visit type: audiovisual    Face to Face time with patient: 30 min  30 minutes of total time spent on the encounter, which includes face to face time and non-face to face time preparing to see the patient (eg, review of tests), Obtaining and/or reviewing separately obtained history, Documenting clinical information in the electronic or other health record, Independently interpreting results (not separately reported) and communicating results to the patient/family/caregiver, or Care coordination (not separately reported).         Each patient to whom he or she provides medical services by telemedicine is:  (1) informed of the relationship between the physician and patient and the respective role of any other health care provider with respect to management of the patient; and (2) notified that he or she may decline to receive medical services by telemedicine and may withdraw from such care at any time.      NUTRITIONAL CONSULT    Referring Physician: Musa Leone M.D.   Reason for MNT Referral: Initial assessment for sleeve gastrectomy work-up    PAST MEDICAL HISTORY:   59 y.o. male    Weight history includes He has struggled with excess weight since 2013/2014.  His highest adult weight was 460 lbs at age 51, and his lowest adult weight was 220 lbs at age 21/22.  The patient has tried calorie counting, low-carb diet, naltrexone-bupropion (Contrave), Intermittent Fasting, Exercise, and portion control. The patient was most successful with walking and dietary changes with a weight loss of 50-60 lbs.     Past Medical History:   Diagnosis Date    Arthritis     Cataract     left eye     CKD (chronic kidney disease) stage 3, GFR 30-59 ml/min     Colon polyp 11/2016    Dependent edema     Diabetes mellitus     Diverticulosis     Gout, arthritis     Hyperlipidemia LDL goal <100     Hypertension     Morbid obesity      TOSIN (obstructive sleep apnea)     Peripheral autonomic neuropathy in disorders classified elsewhere(337.1)     Proteinuria     Type II or unspecified type diabetes mellitus with neurological manifestations, uncontrolled(250.62)     Venous stasis of lower extremity        CLINICAL DATA:  59 y.o.-year-old Black or  male.  Height: 6 ft. 1 in.  Weight: 443 lbs  IBW: 174 lbs  BMI: 58.4  The patient's goal weight (50% EBW): 309 lbs    Goal for Bariatric Surgery: to improve health, to improve quality of life, to lose weight, and to prevent future medical conditions    DAILY NUTRITIONAL NEEDS: pre-op nutritional bariatric guidelines to promote weight loss  5514-6338 Calories    Grams Protein    NUTRITION & HEALTH HISTORY:  Greatest challenge: eating out/delivery frequency, sweets, starchy CHO, portion control, snacking at night, irregular meal patterns, high-fat diet, and sugar-sweetened beverages    Current diet recall:     B: Angelina's oatmeal with apples/raisins, coffee with powder creamer and S&L  - cookies/pb crackers  - water/sweet tea  D: take out (2 fried chicken wings, red beans and rice, cornbread). Lemonade  Sn: none or sandwich or a few cookies    Current Diet:  Meal pattern: 2 meals + snacking  Protein supplements: none  Snacking: crackers, cookies  Vegetables: Likes a variety. Eats rarely.  Fruits: Likes a variety. Eats rarely.  Beverages: water, sugary beverages, sweet tea, and coffee without sugar. No milk.  Dining out/delivery: Daily. Breakfast and dinner. Mostly restaurants and take-out.  Cooking at home: Less than Weekly.    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 Also works at [employer] 5-9pm, [employer].  Lives with his 32 yr old son.  Grocery shopping and food prep not done regularly.  Patient believes the household will be supportive after surgery.  Alcohol: Socially. Wine cooler. Family get together.   Smoking: None.    ASSESSMENT:  Patient reports attempts at weight loss, only to regain lost weight.  Patient demonstrated knowledge of healthy eating behaviors and exercise patterns; admits to not eating healthy and not exercising at this point.  Patient states willingness to change lifestyle and make behavior modifications.    Barriers to Education: none    Stage of change: determination    NUTRITION DIAGNOSIS:    Morbid Obesity related to Excessive carbohydrate intake, Excessive calorie intake, and Physical inactivity as evidence by BMI.    BARIATRIC DIET DISCUSSION/PLAN:  Discussed diet after surgery and related to patient's food record.  Reviewed nutrition guidelines for before and after surgery.  Answered all questions.  Continue to review Bariatric Nutrition Guidebook at home and call with any questions.  Work on Bariatric Nutrition Checklist.  Work on expanding variety of vegetables.  Work on gradually cutting back on starchy CHO in the diet.  Begin trying various protein supplements to determine preference  Start including protein supplements in the diet plan daily.  1200-calorie diet.  1500-calorie diet.  5-6 meals per day  Reduce frequency of eating out/delivery.  More grocery shopping and meal preparation at home.  Increase exercise  increase protein  Work on cutting out sugar-sweetened beverages  Work on cutting out "junk foods"    RECOMMENDATIONS:  Pt is a potential candidate for bariatric surgery - Sleeve    Follow up in one month. Needs additional visits with RD to work on dietary and lifestyle changes in preparation for bariatric surgery.    Patient verbalized understanding.    Communicated nutrition plan with bariatric " team.    SESSION TIME:  60 minutes

## 2024-10-23 ENCOUNTER — LAB VISIT (OUTPATIENT)
Dept: LAB | Facility: HOSPITAL | Age: 59
End: 2024-10-23
Attending: PHYSICIAN ASSISTANT
Payer: COMMERCIAL

## 2024-10-23 DIAGNOSIS — G47.33 OSA (OBSTRUCTIVE SLEEP APNEA): ICD-10-CM

## 2024-10-23 DIAGNOSIS — N18.31 STAGE 3A CHRONIC KIDNEY DISEASE: ICD-10-CM

## 2024-10-23 DIAGNOSIS — E11.65 POORLY CONTROLLED TYPE 2 DIABETES MELLITUS WITH NEUROPATHY: ICD-10-CM

## 2024-10-23 DIAGNOSIS — E78.5 HYPERLIPIDEMIA LDL GOAL <100: ICD-10-CM

## 2024-10-23 DIAGNOSIS — E66.01 MORBID OBESITY WITH BMI OF 50.0-59.9, ADULT: ICD-10-CM

## 2024-10-23 DIAGNOSIS — E11.40 POORLY CONTROLLED TYPE 2 DIABETES MELLITUS WITH NEUROPATHY: ICD-10-CM

## 2024-10-23 DIAGNOSIS — I10 ESSENTIAL HYPERTENSION: ICD-10-CM

## 2024-10-23 LAB — 25(OH)D3+25(OH)D2 SERPL-MCNC: 30 NG/ML (ref 30–96)

## 2024-10-23 PROCEDURE — 36415 COLL VENOUS BLD VENIPUNCTURE: CPT | Mod: PO | Performed by: PHYSICIAN ASSISTANT

## 2024-10-23 PROCEDURE — 82306 VITAMIN D 25 HYDROXY: CPT | Performed by: PHYSICIAN ASSISTANT

## 2024-10-25 DIAGNOSIS — I10 ESSENTIAL HYPERTENSION: ICD-10-CM

## 2024-10-28 DIAGNOSIS — I10 ESSENTIAL HYPERTENSION: Primary | ICD-10-CM

## 2024-10-29 VITALS — DIASTOLIC BLOOD PRESSURE: 80 MMHG | SYSTOLIC BLOOD PRESSURE: 136 MMHG

## 2024-10-29 RX ORDER — ATORVASTATIN CALCIUM 40 MG/1
40 TABLET, FILM COATED ORAL NIGHTLY
Qty: 90 TABLET | Refills: 0 | Status: SHIPPED | OUTPATIENT
Start: 2024-10-29

## 2024-10-29 RX ORDER — FUROSEMIDE 40 MG/1
40 TABLET ORAL 2 TIMES DAILY
Qty: 180 TABLET | Refills: 0 | Status: SHIPPED | OUTPATIENT
Start: 2024-10-29

## 2024-10-29 RX ORDER — HYDRALAZINE HYDROCHLORIDE 50 MG/1
50 TABLET, FILM COATED ORAL 2 TIMES DAILY
Qty: 180 TABLET | Refills: 1 | Status: SHIPPED | OUTPATIENT
Start: 2024-10-29

## 2024-11-01 ENCOUNTER — TELEPHONE (OUTPATIENT)
Dept: BARIATRICS | Facility: CLINIC | Age: 59
End: 2024-11-01
Payer: COMMERCIAL

## 2024-11-02 ENCOUNTER — PATIENT MESSAGE (OUTPATIENT)
Dept: BARIATRICS | Facility: CLINIC | Age: 59
End: 2024-11-02
Payer: COMMERCIAL

## 2024-11-07 DIAGNOSIS — M79.642 PAIN OF LEFT HAND: ICD-10-CM

## 2024-11-08 RX ORDER — IBUPROFEN 600 MG/1
TABLET ORAL
Qty: 30 TABLET | Refills: 0 | Status: SHIPPED | OUTPATIENT
Start: 2024-11-08

## 2024-11-08 NOTE — TELEPHONE ENCOUNTER
No care due was identified.  Health [facility] Embedded Care Due Messages. Reference number: [id number].   11/07/2024 6:47:15 PM CST

## 2024-11-09 DIAGNOSIS — I10 ESSENTIAL HYPERTENSION: ICD-10-CM

## 2024-11-09 NOTE — TELEPHONE ENCOUNTER
No care due was identified.  Health [facility] Embedded Care Due Messages. Reference number: [id number].   11/09/2024 9:01:06 AM CST

## 2024-11-09 NOTE — TELEPHONE ENCOUNTER
No care due was identified.  Health [facility] Embedded Care Due Messages. Reference number: [id number].   11/09/2024 5:51:07 AM CST

## 2024-11-11 RX ORDER — DILTIAZEM HYDROCHLORIDE 240 MG/1
CAPSULE, COATED, EXTENDED RELEASE ORAL
Qty: 180 CAPSULE | Refills: 1 | Status: SHIPPED | OUTPATIENT
Start: 2024-11-11

## 2024-11-11 RX ORDER — ATORVASTATIN CALCIUM 40 MG/1
40 TABLET, FILM COATED ORAL NIGHTLY
Qty: 90 TABLET | Refills: 0 | OUTPATIENT
Start: 2024-11-11

## 2024-11-11 NOTE — TELEPHONE ENCOUNTER
Refill Routing Note   Medication(s) are not appropriate for processing by [facility] for the following reason(s):        Patient not seen by provider within 15 months    [facility] action(s):  Defer               Appointments  past 12m or future 3m with PCP    Date Provider   Last Visit   6/5/2023 Riddhi Galindo MD   Next Visit   11/9/2024 Riddhi Galindo MD   ED visits in past 90 days: 0        Note composed:8:27 AM 11/11/2024

## 2024-11-12 ENCOUNTER — PATIENT MESSAGE (OUTPATIENT)
Dept: BARIATRICS | Facility: CLINIC | Age: 59
End: 2024-11-12
Payer: COMMERCIAL

## 2024-11-16 ENCOUNTER — LAB VISIT (OUTPATIENT)
Dept: LAB | Facility: HOSPITAL | Age: 59
End: 2024-11-16
Attending: PHYSICIAN ASSISTANT
Payer: COMMERCIAL

## 2024-11-16 DIAGNOSIS — E11.40 POORLY CONTROLLED TYPE 2 DIABETES MELLITUS WITH NEUROPATHY: ICD-10-CM

## 2024-11-16 DIAGNOSIS — E11.9 TYPE 2 DIABETES MELLITUS WITHOUT COMPLICATION, WITH LONG-TERM CURRENT USE OF INSULIN: ICD-10-CM

## 2024-11-16 DIAGNOSIS — E78.5 HYPERLIPIDEMIA LDL GOAL <100: ICD-10-CM

## 2024-11-16 DIAGNOSIS — E66.01 MORBID OBESITY WITH BMI OF 50.0-59.9, ADULT: ICD-10-CM

## 2024-11-16 DIAGNOSIS — I10 ESSENTIAL HYPERTENSION: ICD-10-CM

## 2024-11-16 DIAGNOSIS — G47.33 OSA (OBSTRUCTIVE SLEEP APNEA): ICD-10-CM

## 2024-11-16 DIAGNOSIS — E11.65 POORLY CONTROLLED TYPE 2 DIABETES MELLITUS WITH NEUROPATHY: ICD-10-CM

## 2024-11-16 DIAGNOSIS — N18.31 STAGE 3A CHRONIC KIDNEY DISEASE: ICD-10-CM

## 2024-11-16 DIAGNOSIS — Z79.4 TYPE 2 DIABETES MELLITUS WITHOUT COMPLICATION, WITH LONG-TERM CURRENT USE OF INSULIN: ICD-10-CM

## 2024-11-16 LAB
ALBUMIN SERPL BCP-MCNC: 3.2 G/DL (ref 3.5–5.2)
ALP SERPL-CCNC: 117 U/L (ref 40–150)
ALT SERPL W/O P-5'-P-CCNC: 8 U/L (ref 10–44)
ANION GAP SERPL CALC-SCNC: 10 MMOL/L (ref 8–16)
AST SERPL-CCNC: 30 U/L (ref 10–40)
BASOPHILS # BLD AUTO: 0.04 K/UL (ref 0–0.2)
BASOPHILS NFR BLD: 0.5 % (ref 0–1.9)
BILIRUB DIRECT SERPL-MCNC: 0.3 MG/DL (ref 0.1–0.3)
BILIRUB SERPL-MCNC: 0.6 MG/DL (ref 0.1–1)
BUN SERPL-MCNC: 26 MG/DL (ref 6–20)
CALCIUM SERPL-MCNC: 9.3 MG/DL (ref 8.7–10.5)
CHLORIDE SERPL-SCNC: 102 MMOL/L (ref 95–110)
CHOLEST SERPL-MCNC: 116 MG/DL (ref 120–199)
CHOLEST SERPL-MCNC: 116 MG/DL (ref 120–199)
CHOLEST/HDLC SERPL: 3.1 {RATIO} (ref 2–5)
CHOLEST/HDLC SERPL: 3.1 {RATIO} (ref 2–5)
CO2 SERPL-SCNC: 25 MMOL/L (ref 23–29)
CREAT SERPL-MCNC: 1.5 MG/DL (ref 0.5–1.4)
DIFFERENTIAL METHOD BLD: ABNORMAL
EOSINOPHIL # BLD AUTO: 0.2 K/UL (ref 0–0.5)
EOSINOPHIL NFR BLD: 3.3 % (ref 0–8)
ERYTHROCYTE [DISTWIDTH] IN BLOOD BY AUTOMATED COUNT: 15.2 % (ref 11.5–14.5)
EST. GFR  (NO RACE VARIABLE): 53.6 ML/MIN/1.73 M^2
ESTIMATED AVG GLUCOSE: 128 MG/DL (ref 68–131)
FOLATE SERPL-MCNC: 11 NG/ML (ref 4–24)
GLUCOSE SERPL-MCNC: 69 MG/DL (ref 70–110)
HBA1C MFR BLD: 6.1 % (ref 4–5.6)
HCT VFR BLD AUTO: 42.9 % (ref 40–54)
HDLC SERPL-MCNC: 37 MG/DL (ref 40–75)
HDLC SERPL-MCNC: 37 MG/DL (ref 40–75)
HDLC SERPL: 31.9 % (ref 20–50)
HDLC SERPL: 31.9 % (ref 20–50)
HGB BLD-MCNC: 13.6 G/DL (ref 14–18)
IMM GRANULOCYTES # BLD AUTO: 0.02 K/UL (ref 0–0.04)
IMM GRANULOCYTES NFR BLD AUTO: 0.3 % (ref 0–0.5)
IRON SERPL-MCNC: 71 UG/DL (ref 45–160)
LDLC SERPL CALC-MCNC: 69.4 MG/DL (ref 63–159)
LDLC SERPL CALC-MCNC: 69.4 MG/DL (ref 63–159)
LYMPHOCYTES # BLD AUTO: 1.6 K/UL (ref 1–4.8)
LYMPHOCYTES NFR BLD: 21.7 % (ref 18–48)
MAGNESIUM SERPL-MCNC: 2 MG/DL (ref 1.6–2.6)
MCH RBC QN AUTO: 31.2 PG (ref 27–31)
MCHC RBC AUTO-ENTMCNC: 31.7 G/DL (ref 32–36)
MCV RBC AUTO: 98 FL (ref 82–98)
MONOCYTES # BLD AUTO: 0.6 K/UL (ref 0.3–1)
MONOCYTES NFR BLD: 8.6 % (ref 4–15)
NEUTROPHILS # BLD AUTO: 4.8 K/UL (ref 1.8–7.7)
NEUTROPHILS NFR BLD: 65.6 % (ref 38–73)
NONHDLC SERPL-MCNC: 79 MG/DL
NONHDLC SERPL-MCNC: 79 MG/DL
NRBC BLD-RTO: 0 /100 WBC
PHOSPHATE SERPL-MCNC: 3.4 MG/DL (ref 2.7–4.5)
PLATELET # BLD AUTO: 290 K/UL (ref 150–450)
PMV BLD AUTO: 11.4 FL (ref 9.2–12.9)
POTASSIUM SERPL-SCNC: 4.2 MMOL/L (ref 3.5–5.1)
PROT SERPL-MCNC: 7.2 G/DL (ref 6–8.4)
RBC # BLD AUTO: 4.36 M/UL (ref 4.6–6.2)
SATURATED IRON: 27 % (ref 20–50)
SODIUM SERPL-SCNC: 137 MMOL/L (ref 136–145)
T3 SERPL-MCNC: 105 NG/DL (ref 60–180)
T4 SERPL-MCNC: 10.7 UG/DL (ref 4.5–11.5)
TOTAL IRON BINDING CAPACITY: 265 UG/DL (ref 250–450)
TRANSFERRIN SERPL-MCNC: 179 MG/DL (ref 200–375)
TRIGL SERPL-MCNC: 48 MG/DL (ref 30–150)
TRIGL SERPL-MCNC: 48 MG/DL (ref 30–150)
TSH SERPL DL<=0.005 MIU/L-ACNC: 1.15 UIU/ML (ref 0.4–4)
WBC # BLD AUTO: 7.36 K/UL (ref 3.9–12.7)

## 2024-11-16 PROCEDURE — 85025 COMPLETE CBC W/AUTO DIFF WBC: CPT | Performed by: PHYSICIAN ASSISTANT

## 2024-11-16 PROCEDURE — 82607 VITAMIN B-12: CPT | Performed by: PHYSICIAN ASSISTANT

## 2024-11-16 PROCEDURE — 82746 ASSAY OF FOLIC ACID SERUM: CPT | Performed by: PHYSICIAN ASSISTANT

## 2024-11-16 PROCEDURE — 84436 ASSAY OF TOTAL THYROXINE: CPT | Performed by: PHYSICIAN ASSISTANT

## 2024-11-16 PROCEDURE — 83036 HEMOGLOBIN GLYCOSYLATED A1C: CPT | Performed by: PHYSICIAN ASSISTANT

## 2024-11-16 PROCEDURE — 83540 ASSAY OF IRON: CPT | Performed by: PHYSICIAN ASSISTANT

## 2024-11-16 PROCEDURE — 84100 ASSAY OF PHOSPHORUS: CPT | Performed by: PHYSICIAN ASSISTANT

## 2024-11-16 PROCEDURE — 83735 ASSAY OF MAGNESIUM: CPT | Performed by: PHYSICIAN ASSISTANT

## 2024-11-16 PROCEDURE — 80048 BASIC METABOLIC PNL TOTAL CA: CPT | Performed by: PHYSICIAN ASSISTANT

## 2024-11-16 PROCEDURE — 80061 LIPID PANEL: CPT | Performed by: NURSE PRACTITIONER

## 2024-11-16 PROCEDURE — 84439 ASSAY OF FREE THYROXINE: CPT | Performed by: PHYSICIAN ASSISTANT

## 2024-11-16 PROCEDURE — 80076 HEPATIC FUNCTION PANEL: CPT | Performed by: PHYSICIAN ASSISTANT

## 2024-11-16 PROCEDURE — 84480 ASSAY TRIIODOTHYRONINE (T3): CPT | Performed by: PHYSICIAN ASSISTANT

## 2024-11-16 PROCEDURE — 36415 COLL VENOUS BLD VENIPUNCTURE: CPT | Mod: PO | Performed by: NURSE PRACTITIONER

## 2024-11-16 PROCEDURE — 84425 ASSAY OF VITAMIN B-1: CPT | Performed by: PHYSICIAN ASSISTANT

## 2024-11-16 PROCEDURE — 84443 ASSAY THYROID STIM HORMONE: CPT | Performed by: PHYSICIAN ASSISTANT

## 2024-11-16 PROCEDURE — 86677 HELICOBACTER PYLORI ANTIBODY: CPT | Performed by: PHYSICIAN ASSISTANT

## 2024-11-18 DIAGNOSIS — R76.8 HELICOBACTER PYLORI AB+: Primary | ICD-10-CM

## 2024-11-18 DIAGNOSIS — R79.9 ELEVATED BUN: Primary | ICD-10-CM

## 2024-11-18 LAB
H PYLORI IGG SERPL QL IA: POSITIVE
T4 FREE SERPL-MCNC: 1.25 NG/DL (ref 0.71–1.51)
VIT B12 SERPL-MCNC: 486 PG/ML (ref 210–950)

## 2024-11-18 RX ORDER — OMEPRAZOLE 20 MG/1
20 CAPSULE, DELAYED RELEASE ORAL 2 TIMES DAILY
Qty: 28 CAPSULE | Refills: 0 | Status: SHIPPED | OUTPATIENT
Start: 2024-11-18 | End: 2024-12-02

## 2024-11-18 RX ORDER — AMOXICILLIN 500 MG/1
1000 TABLET, FILM COATED ORAL EVERY 12 HOURS
Qty: 56 TABLET | Refills: 0 | Status: SHIPPED | OUTPATIENT
Start: 2024-11-18 | End: 2024-12-02

## 2024-11-18 RX ORDER — CLARITHROMYCIN 500 MG/1
500 TABLET, FILM COATED ORAL EVERY 12 HOURS
Qty: 28 TABLET | Refills: 0 | Status: SHIPPED | OUTPATIENT
Start: 2024-11-18 | End: 2024-12-02

## 2024-11-19 ENCOUNTER — HOSPITAL ENCOUNTER (OUTPATIENT)
Dept: CARDIOLOGY | Facility: CLINIC | Age: 59
Discharge: HOME | End: 2024-11-19
Payer: COMMERCIAL

## 2024-11-19 ENCOUNTER — HOSPITAL ENCOUNTER (OUTPATIENT)
Dept: RADIOLOGY | Facility: HOSPITAL | Age: 59
Discharge: HOME | End: 2024-11-19
Attending: PHYSICIAN ASSISTANT
Payer: COMMERCIAL

## 2024-11-19 ENCOUNTER — HOSPITAL ENCOUNTER (OUTPATIENT)
Dept: CARDIOLOGY | Facility: HOSPITAL | Age: 59
Discharge: HOME | End: 2024-11-19
Attending: PHYSICIAN ASSISTANT
Payer: COMMERCIAL

## 2024-11-19 VITALS
RESPIRATION RATE: 16 BRPM | HEART RATE: 78 BPM | HEIGHT: 73 IN | SYSTOLIC BLOOD PRESSURE: 120 MMHG | DIASTOLIC BLOOD PRESSURE: 60 MMHG | WEIGHT: 315 LBS | BODY MASS INDEX: 41.75 KG/M2

## 2024-11-19 DIAGNOSIS — E11.65 POORLY CONTROLLED TYPE 2 DIABETES MELLITUS WITH NEUROPATHY: ICD-10-CM

## 2024-11-19 DIAGNOSIS — E78.5 HYPERLIPIDEMIA LDL GOAL <100: ICD-10-CM

## 2024-11-19 DIAGNOSIS — E66.01 MORBID OBESITY WITH BMI OF 50.0-59.9, ADULT: ICD-10-CM

## 2024-11-19 DIAGNOSIS — E11.40 POORLY CONTROLLED TYPE 2 DIABETES MELLITUS WITH NEUROPATHY: ICD-10-CM

## 2024-11-19 DIAGNOSIS — I10 ESSENTIAL HYPERTENSION: ICD-10-CM

## 2024-11-19 DIAGNOSIS — G47.33 OSA (OBSTRUCTIVE SLEEP APNEA): ICD-10-CM

## 2024-11-19 DIAGNOSIS — N18.31 STAGE 3A CHRONIC KIDNEY DISEASE: ICD-10-CM

## 2024-11-19 LAB
ASCENDING AORTA: 3.16 CM
AV AREA BY CONTINUOUS VTI: 1.6 CM2
AV INDEX (PROSTH): 0.47
AV LVOT MEAN GRADIENT: 1 MMHG
AV LVOT PEAK GRADIENT: 2 MMHG
AV MEAN GRADIENT: 6.7 MMHG
AV PEAK GRADIENT: 13 MMHG
AV VALVE AREA BY VELOCITY RATIO: 1.2 CM²
AV VALVE AREA: 1.6 CM2
AV VELOCITY RATIO: 0.33
BSA FOR ECHO PROCEDURE: 3.22 M2
CV ECHO LV RWT: 0.42 CM
CV STRESS BASE HR: 78 BPM
DIASTOLIC BLOOD PRESSURE: 84 MMHG
DOP CALC AO PEAK VEL: 1.8 M/S
DOP CALC AO VTI: 29.1 CM
DOP CALC LVOT AREA: 3.5 CM2
DOP CALC LVOT DIAMETER: 2.1 CM
DOP CALC LVOT PEAK VEL: 0.6 M/S
DOP CALC LVOT STROKE VOLUME: 47.4 CM3
DOP CALCLVOT PEAK VEL VTI: 13.7 CM
E WAVE DECELERATION TIME: 173.13 MS
E/A RATIO: 0.71
E/E' RATIO: 6.9 M/S
ECHO EF ESTIMATED: 66 %
ECHO LV POSTERIOR WALL: 1.1 CM (ref 0.6–1.1)
FRACTIONAL SHORTENING: 36.5 % (ref 28–44)
INTERVENTRICULAR SEPTUM: 1.1 CM (ref 0.6–1.1)
IVC DIAMETER: 1.43 CM
IVRT: 88.49 MS
LA MAJOR: 6.33 CM
LA MINOR: 6.02 CM
LA WIDTH: 4.54 CM
LEFT ATRIUM SIZE: 4.33 CM
LEFT ATRIUM VOLUME INDEX MOD: 20.9 ML/M2
LEFT ATRIUM VOLUME INDEX: 34.1 ML/M2
LEFT ATRIUM VOLUME MOD: 63 ML
LEFT ATRIUM VOLUME: 103.12 CM3
LEFT INTERNAL DIMENSION IN SYSTOLE: 3.3 CM (ref 2.1–4)
LEFT VENTRICLE DIASTOLIC VOLUME INDEX: 43.75 ML/M2
LEFT VENTRICLE DIASTOLIC VOLUME: 132.11 ML
LEFT VENTRICLE MASS INDEX: 73.1 G/M2
LEFT VENTRICLE SYSTOLIC VOLUME INDEX: 15 ML/M2
LEFT VENTRICLE SYSTOLIC VOLUME: 45.16 ML
LEFT VENTRICULAR INTERNAL DIMENSION IN DIASTOLE: 5.2 CM (ref 3.5–6)
LEFT VENTRICULAR MASS: 220.8 G
LV LATERAL E/E' RATIO: 6.27
LV SEPTAL E/E' RATIO: 7.67
MV PEAK A VEL: 0.97 M/S
MV PEAK E VEL: 0.69 M/S
OHS CV CPX 1 MINUTE RECOVERY HEART RATE: 133 BPM
OHS CV CPX 85 PERCENT MAX PREDICTED HEART RATE MALE: 138
OHS CV CPX MAX PREDICTED HEART RATE: 162
OHS CV CPX PATIENT IS FEMALE: 0
OHS CV CPX PATIENT IS MALE: 1
OHS CV CPX PEAK DIASTOLIC BLOOD PRESSURE: 56 MMHG
OHS CV CPX PEAK HEAR RATE: 142 BPM
OHS CV CPX PEAK RATE PRESSURE PRODUCT: NORMAL
OHS CV CPX PEAK SYSTOLIC BLOOD PRESSURE: 125 MMHG
OHS CV CPX PERCENT MAX PREDICTED HEART RATE ACHIEVED: 88
OHS CV CPX RATE PRESSURE PRODUCT PRESENTING: NORMAL
OHS CV INITIAL DOSE: 10 MCG/KG/MIN
OHS CV PEAK DOSE: 30 MCG/KG/MIN
OHS CV RV/LV RATIO: 0.73 CM
OHS QRS DURATION: 96 MS
OHS QTC CALCULATION: 451 MS
RA MAJOR: 5.79 CM
RA PRESSURE ESTIMATED: 3 MMHG
RA WIDTH: 3.8 CM
RIGHT VENTRICLE DIASTOLIC BASEL DIMENSION: 3.8 CM
RV TISSUE DOPPLER FREE WALL SYSTOLIC VELOCITY 1 (APICAL 4 CHAMBER VIEW): 19.68 CM/S
SINUS: 3.49 CM
STJ: 2.98 CM
SYSTOLIC BLOOD PRESSURE: 154 MMHG
TDI LATERAL: 0.11 M/S
TDI SEPTAL: 0.09 M/S
TDI: 0.1 M/S
TRICUSPID ANNULAR PLANE SYSTOLIC EXCURSION: 3.41 CM
Z-SCORE OF LEFT VENTRICULAR DIMENSION IN END DIASTOLE: -29.68
Z-SCORE OF LEFT VENTRICULAR DIMENSION IN END SYSTOLE: -22.37

## 2024-11-19 PROCEDURE — 93351 STRESS TTE COMPLETE: CPT

## 2024-11-19 PROCEDURE — 63600175 PHARM REV CODE 636 W HCPCS: Performed by: PHYSICIAN ASSISTANT

## 2024-11-19 PROCEDURE — 93352 ADMIN ECG CONTRAST AGENT: CPT | Mod: ,,, | Performed by: INTERNAL MEDICINE

## 2024-11-19 PROCEDURE — 71046 X-RAY EXAM CHEST 2 VIEWS: CPT | Mod: 26,,, | Performed by: RADIOLOGY

## 2024-11-19 PROCEDURE — 93351 STRESS TTE COMPLETE: CPT | Mod: 26,,, | Performed by: INTERNAL MEDICINE

## 2024-11-19 PROCEDURE — 25500020 PHARM REV CODE 255: Performed by: PHYSICIAN ASSISTANT

## 2024-11-19 PROCEDURE — 93010 ELECTROCARDIOGRAM REPORT: CPT | Mod: S$GLB,,, | Performed by: INTERNAL MEDICINE

## 2024-11-19 PROCEDURE — 93005 ELECTROCARDIOGRAM TRACING: CPT | Mod: S$GLB,,, | Performed by: PHYSICIAN ASSISTANT

## 2024-11-19 PROCEDURE — 71046 X-RAY EXAM CHEST 2 VIEWS: CPT | Mod: TC,FY

## 2024-11-19 RX ORDER — DOBUTAMINE HYDROCHLORIDE 400 MG/100ML
10 INJECTION INTRAVENOUS
Status: COMPLETED | OUTPATIENT
Start: 2024-11-19 | End: 2024-11-19

## 2024-11-19 RX ORDER — ATROPINE SULFATE 0.1 MG/ML
1 INJECTION INTRAVENOUS
Status: COMPLETED | OUTPATIENT
Start: 2024-11-19 | End: 2024-11-19

## 2024-11-19 RX ADMIN — DOBUTAMINE HYDROCHLORIDE 10 MCG/KG/MIN: 400 INJECTION INTRAVENOUS at 10:11

## 2024-11-19 RX ADMIN — ATROPINE SULFATE 1 MG: 0.1 INJECTION INTRAVENOUS at 10:11

## 2024-11-19 RX ADMIN — PERFLUTREN 1.5 ML: 6.52 INJECTION, SUSPENSION INTRAVENOUS at 10:11

## 2024-11-20 ENCOUNTER — OFFICE VISIT (OUTPATIENT)
Dept: ENDOCRINOLOGY | Facility: CLINIC | Age: 59
End: 2024-11-20
Payer: COMMERCIAL

## 2024-11-20 ENCOUNTER — CLINICAL SUPPORT (OUTPATIENT)
Dept: BARIATRICS | Facility: CLINIC | Age: 59
End: 2024-11-20
Payer: COMMERCIAL

## 2024-11-20 VITALS
SYSTOLIC BLOOD PRESSURE: 126 MMHG | HEART RATE: 86 BPM | TEMPERATURE: 98 F | BODY MASS INDEX: 56.86 KG/M2 | WEIGHT: 315 LBS | DIASTOLIC BLOOD PRESSURE: 78 MMHG

## 2024-11-20 DIAGNOSIS — E66.01 MORBID OBESITY, UNSPECIFIED OBESITY TYPE: ICD-10-CM

## 2024-11-20 DIAGNOSIS — I10 ESSENTIAL HYPERTENSION: ICD-10-CM

## 2024-11-20 DIAGNOSIS — Z79.4 TYPE 2 DIABETES MELLITUS WITHOUT COMPLICATION, WITH LONG-TERM CURRENT USE OF INSULIN: Primary | ICD-10-CM

## 2024-11-20 DIAGNOSIS — E11.9 TYPE 2 DIABETES MELLITUS WITHOUT COMPLICATION, WITH LONG-TERM CURRENT USE OF INSULIN: Primary | ICD-10-CM

## 2024-11-20 DIAGNOSIS — E78.5 HYPERLIPIDEMIA LDL GOAL <100: ICD-10-CM

## 2024-11-20 DIAGNOSIS — E11.65 POORLY CONTROLLED TYPE 2 DIABETES MELLITUS WITH NEUROPATHY: ICD-10-CM

## 2024-11-20 DIAGNOSIS — Z71.3 DIETARY COUNSELING: Primary | ICD-10-CM

## 2024-11-20 DIAGNOSIS — E11.40 POORLY CONTROLLED TYPE 2 DIABETES MELLITUS WITH NEUROPATHY: ICD-10-CM

## 2024-11-20 DIAGNOSIS — E66.01 MORBID OBESITY WITH BMI OF 50.0-59.9, ADULT: ICD-10-CM

## 2024-11-20 PROCEDURE — 3044F HG A1C LEVEL LT 7.0%: CPT | Mod: CPTII,S$GLB,, | Performed by: NURSE PRACTITIONER

## 2024-11-20 PROCEDURE — 97803 MED NUTRITION INDIV SUBSEQ: CPT | Mod: 95,,, | Performed by: DIETITIAN, REGISTERED

## 2024-11-20 PROCEDURE — 99999 PR PBB SHADOW E&M-EST. PATIENT-LVL IV: CPT | Mod: PBBFAC,,, | Performed by: NURSE PRACTITIONER

## 2024-11-20 PROCEDURE — 3066F NEPHROPATHY DOC TX: CPT | Mod: CPTII,S$GLB,, | Performed by: NURSE PRACTITIONER

## 2024-11-20 PROCEDURE — 3078F DIAST BP <80 MM HG: CPT | Mod: CPTII,S$GLB,, | Performed by: NURSE PRACTITIONER

## 2024-11-20 PROCEDURE — 3072F LOW RISK FOR RETINOPATHY: CPT | Mod: CPTII,S$GLB,, | Performed by: NURSE PRACTITIONER

## 2024-11-20 PROCEDURE — 1160F RVW MEDS BY RX/DR IN RCRD: CPT | Mod: CPTII,S$GLB,, | Performed by: NURSE PRACTITIONER

## 2024-11-20 PROCEDURE — 1159F MED LIST DOCD IN RCRD: CPT | Mod: CPTII,S$GLB,, | Performed by: NURSE PRACTITIONER

## 2024-11-20 PROCEDURE — 4010F ACE/ARB THERAPY RXD/TAKEN: CPT | Mod: CPTII,S$GLB,, | Performed by: NURSE PRACTITIONER

## 2024-11-20 PROCEDURE — 3061F NEG MICROALBUMINURIA REV: CPT | Mod: CPTII,S$GLB,, | Performed by: NURSE PRACTITIONER

## 2024-11-20 PROCEDURE — 3008F BODY MASS INDEX DOCD: CPT | Mod: CPTII,S$GLB,, | Performed by: NURSE PRACTITIONER

## 2024-11-20 PROCEDURE — 99214 OFFICE O/P EST MOD 30 MIN: CPT | Mod: S$GLB,,, | Performed by: NURSE PRACTITIONER

## 2024-11-20 PROCEDURE — 3074F SYST BP LT 130 MM HG: CPT | Mod: CPTII,S$GLB,, | Performed by: NURSE PRACTITIONER

## 2024-11-20 PROCEDURE — G2211 COMPLEX E/M VISIT ADD ON: HCPCS | Mod: S$GLB,,, | Performed by: NURSE PRACTITIONER

## 2024-11-20 RX ORDER — INSULIN GLARGINE 100 [IU]/ML
INJECTION, SOLUTION SUBCUTANEOUS
Qty: 15 ML | Refills: 5 | Status: SHIPPED | OUTPATIENT
Start: 2024-11-20

## 2024-11-20 RX ORDER — DAPAGLIFLOZIN 10 MG/1
10 TABLET, FILM COATED ORAL DAILY
Qty: 90 TABLET | Refills: 2 | Status: SHIPPED | OUTPATIENT
Start: 2024-11-20

## 2024-11-20 RX ORDER — PIOGLITAZONEHYDROCHLORIDE 45 MG/1
TABLET ORAL
Qty: 90 TABLET | Refills: 2 | Status: SHIPPED | OUTPATIENT
Start: 2024-11-20

## 2024-11-20 RX ORDER — INSULIN ASPART 100 [IU]/ML
INJECTION, SOLUTION INTRAVENOUS; SUBCUTANEOUS
Qty: 15 ML | Refills: 0 | Status: SHIPPED | OUTPATIENT
Start: 2024-11-20

## 2024-11-20 RX ORDER — TIRZEPATIDE 15 MG/.5ML
15 INJECTION, SOLUTION SUBCUTANEOUS
Qty: 12 PEN | Refills: 2 | Status: SHIPPED | OUTPATIENT
Start: 2024-11-20

## 2024-11-20 NOTE — PROGRESS NOTES
CC: This 59 y.o. Black or  male  is here for evaluation of  T2DM along with comorbidities indicated in the Visit Diagnosis section of this encounter.    HPI: David Corona Jr. was diagnosed with T2DM in 1991.  Medical history: TOSIN on cpap      Prior visit 5/30/24  A1c down from 6.9% in Sept to 6.4% in March, now at 6.7%.   Pt switched from Malka to Dexcom this week d/t insurance formulary.   Pt was out of Mounjaro x 3-4 weeks d/t shortage. Reports his appetite had remained the same but sugars were higher then.    Plan Diabetes remains controlled based off CGM data and A1c. Continue current medications.   A1c in 3 months.   Return to clinic in 6 months with labs prior.       Interval hx  A1c is down from 6.7 to 6.1%.   Unable to view Dexcom data as he was not logged into [facility].   He has lost 29 lb since lov.   He has been doing w/u for gastric sleeve and plans to undergo sx early 2025.         LAST DIABETES EDUCATION: 11/2020    HOSPITALIZED FOR DIABETES OR RELATED COMPLICATIONS -  Yes - upon diagnosis.    SIGNIFICANT DIABETES MED HISTORY:   Metformin - diarrhea   Glimepiride stopped and Trulicity started at initial visit 10/2020; switched from Trulicity to Mounjaro 8/2022  Declines VGo insulin delivery device   Farxiga started 10/2021  Mounjaro started 10/2022  Humalog fixed meal dosing stopped 3/2023 when Mounjaro dose was increased.       PRESCRIBED DIABETES MEDICATIONS:  Farxiga 10 mg once daily  pioglitazone 45 mg once daily  Mounjaro 15 mg once weekly Sundays   Basaglar 30 units qhs     Fiasp ac ss : 150-200=+2, 201-250=+4; 251-300=+6; 301-350=+8, over 350=+10 units      Misses medication doses - No    DM COMPLICATIONS: nephropathy    SELF MONITORING BLOOD GLUCOSE: Dexcom G7 sae         HYPOGLYCEMIC EPISODES: BG falls less than 70 about 2-3x/week.       CURRENT DIET: eats 2 meals/day - no lunch. Drinks water, coffee in the morning.      Breakfast - 2 boiled eggs, orange. Lunch is protein  shake. 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       /78   Pulse 86   Temp 98.4 °F (36.9 °C)   Wt (!) 195.5 kg (431 lb)   BMI 56.86 kg/m²       ROS:   CONSTITUTIONAL: Appetite good, denies fatigue  GI: Denies n/v, constipation, or diarrhea.           PHYSICAL EXAM:  GENERAL: Well developed, well nourished. No acute distress.   PSYCH: AAOx3, appropriate mood and affect, conversant, well-groomed. Judgement and insight good.   NEURO: Cranial nerves grossly intact. Speech clear, no tremor.   CHEST: Respirations even and unlabored.        Hemoglobin A1C   Date Value Ref Range Status   11/16/2024 6.1 (H) 4.0 - 5.6 % Final     Comment:     ADA Screening Guidelines:  5.7-6.4%  Consistent with prediabetes  >or=6.5%  Consistent with diabetes    High levels of fetal hemoglobin interfere with the HbA1C  assay. Heterozygous hemoglobin variants (HbS, HgC, etc)do  not significantly interfere with this assay.   However, presence of multiple variants may affect accuracy.     08/19/2024 6.1 (H) 4.0 - 5.6 % Final     Comment:     ADA Screening Guidelines:  5.7-6.4%  Consistent with prediabetes  >or=6.5%  Consistent with diabetes    High levels of fetal hemoglobin interfere with the HbA1C  assay. Heterozygous hemoglobin variants (HbS, HgC, etc)do  not significantly interfere with this assay.   However, presence of multiple variants may affect accuracy.     05/22/2024 6.7 (H) 4.0 - 5.6 % Final     Comment:     ADA Screening Guidelines:  5.7-6.4%  Consistent with prediabetes  >or=6.5%  Consistent with diabetes    High levels of fetal hemoglobin interfere with the HbA1C  assay. Heterozygous hemoglobin variants (HbS, HgC, etc)do  not significantly interfere with this assay.   However, presence of multiple variants may affect accuracy.     04/27/2021 7.4 (H) 4.8 - 5.6 % Final     Comment:      [employer]           Component Value Date/Time     11/16/2024 0820    K 4.2 11/16/2024 0820     11/16/2024 0820    CO2 25 11/16/2024 0820    BUN 26 (H) 11/16/2024 0820    CREATININE 1.5 (H) 11/16/2024 0820    GLU 69 (L) 11/16/2024 0820    CALCIUM 9.3 11/16/2024 0820    ALKPHOS 117 11/16/2024 0820    AST 30 11/16/2024 0820    ALT 8 (L) 11/16/2024 0820    BILITOT 0.6 11/16/2024 0820    EGFRNORACEVR 53.6 (A) 11/16/2024 0820    ESTGFRAFRICA >60 08/07/2021 0810     Lab Results   Component Value Date    CHOL 116 (L) 11/16/2024    CHOL 116 (L) 11/16/2024    CHOL 107 (L) 11/04/2023     Lab Results   Component Value Date    HDL 37 (L) 11/16/2024    HDL 37 (L) 11/16/2024    HDL 43 11/04/2023     Lab Results   Component Value Date    LDLCALC 69.4 11/16/2024    LDLCALC 69.4 11/16/2024    LDLCALC 52.8 (L) 11/04/2023     Lab Results   Component Value Date    TRIG 48 11/16/2024    TRIG 48 11/16/2024    TRIG 56 11/04/2023       Lab Results   Component Value Date    CHOLHDL 31.9 11/16/2024    CHOLHDL 31.9 11/16/2024    CHOLHDL 40.2 11/04/2023         Lab Results   Component Value Date    MICALBCREAT Unable to calculate 11/16/2024         ASSESSMENT and PLAN:    A1C GOAL: < 7 %            1. Type 2 diabetes mellitus without complication, with long-term current use of insulin  Reduce Basaglar from 30 to 20 units once daily.   If blood sugars still dropping less than 80, reduce to 16 units.     Return to clinic in 6 months with A1c prior, or sooner as needed.     Hemoglobin A1C      2. Morbid obesity, unspecified obesity type  Gastric sleeve pending  and will need adjustments to DM meds       3. Essential hypertension  controlled            Orders Placed This Encounter   Procedures    Hemoglobin A1C     Standing Status:   Future     Standing Expiration Date:   1/19/2026     Order Specific Question:   Send normal result to authorizing provider's In Basket if patient is active on MyChart:     Answer:   Yes           Follow up in about 6 months (around 5/20/2025).

## 2024-11-20 NOTE — PATIENT INSTRUCTIONS
Reduce Basaglar from 30 to 20 units once daily.   If blood sugars still dropping less than 80, reduce to 16 units.     Return to clinic in 6 months with A1c prior, or sooner as needed.

## 2024-11-20 NOTE — PROGRESS NOTES
The patient location is: [place] ([place])  The chief complaint leading to consultation is: morbid obesity    Visit type: audiovisual    Face to Face time with patient: 15 min  15 minutes of total time spent on the encounter, which includes face to face time and non-face to face time preparing to see the patient (eg, review of tests), Obtaining and/or reviewing separately obtained history, Documenting clinical information in the electronic or other health record, Independently interpreting results (not separately reported) and communicating results to the patient/family/caregiver, or Care coordination (not separately reported).         Each patient to whom he or she provides medical services by telemedicine is:  (1) informed of the relationship between the physician and patient and the respective role of any other health care provider with respect to management of the patient; and (2) notified that he or she may decline to receive medical services by telemedicine and may withdraw from such care at any time.      NUTRITIONAL Note     Referring Physician: Musa Leone M.D.   Reason for MNT Referral: Follow up assessment for sleeve gastrectomy work-up     59 y.o. male presents for follow up with 12 lbs weight loss over the past month by making numerous dietary and lifestyle changes in preparation for bariatric surgery.          Past Medical History:   Diagnosis Date    Arthritis      Cataract       left eye     CKD (chronic kidney disease) stage 3, GFR 30-59 ml/min      Colon polyp 11/2016    Dependent edema      Diabetes mellitus      Diverticulosis      Gout, arthritis      Hyperlipidemia LDL goal <100      Hypertension      Morbid obesity      TOSIN (obstructive sleep apnea)      Peripheral autonomic neuropathy in disorders classified elsewhere(337.1)      Proteinuria      Type II or unspecified type diabetes mellitus with neurological manifestations, uncontrolled(250.62)      Venous stasis of lower extremity         CLINICAL DATA:  59 y.o.-year-old Black or  male.  Height: 6 ft. 1 in.  Weight: 431 lbs  IBW: 174 lbs  BMI: 56.8 (from 58.4)     DAILY NUTRITIONAL NEEDS: pre-op nutritional bariatric guidelines to promote weight loss  4169-5841 Calories    Grams Protein     NUTRITION & HEALTH HISTORY:  Greatest challenge: eating out/delivery frequency, sweets, starchy CHO, portion control, snacking at night, irregular meal patterns, high-fat diet, and sugar-sweetened beverages     Current diet recall:      B: 2 boiled eggs, fruit like orange or melon. Coffee with powder creamer and S&L  L: Fairlife shake  Sn: fruit and/or nuts or cucumber slices in vinegar  D: baked chicken and steamed broccoli OR dining out - salad with grilled chicken and light dressing  Sn: Fairlife shake     Current Diet:  Meal pattern: 2 meals + snacking + 2 protein supplements  Protein supplements: Fairlife shakes  Snacking: fruit, nuts  Vegetables: Likes a variety. Eats daily.  Fruits: Likes a variety. Eats rarely.  Beverages: water, and coffee without sugar. No milk.  Dining out/delivery: Reduced. Mostly restaurants and take-out.  Cooking at home: Daily.     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  Lives with his 32 yr old son.  Grocery shopping and food prep not done regularly.  Patient believes the household will be supportive after surgery.  Alcohol: None.   Smoking: None.     ASSESSMENT:  Patient demonstrated knowledge of healthy eating behaviors and exercise patterns.  Patient demonstrates willingness to change lifestyle and make behavior modifications AEB weight loss, dietary changes, protein supplements, exercises.      Barriers to Education: none     Stage of change: action     NUTRITION DIAGNOSIS:    Morbid Obesity related to Excessive carbohydrate intake, Excessive calorie intake, and Physical inactivity as evidence by BMI.  Status: Improved     BARIATRIC DIET DISCUSSION/PLAN:  Discussed diet after surgery and related to patient's food record.  Reviewed nutrition guidelines for before and after surgery.  Answered all questions.  Continue to review Bariatric Nutrition Guidebook at home and call with any questions.  Work on Bariatric Nutrition Checklist.  1200-calorie diet.  1500-calorie diet.  5-6 meals per day     RECOMMENDATIONS:  Pt is a good candidate for bariatric surgery - Sleeve     Patient verbalized understanding.    Will f/u before/after surgery as needed.     Communicated nutrition plan with bariatric team.     SESSION TIME:  15 minutes

## 2024-11-21 ENCOUNTER — TELEPHONE (OUTPATIENT)
Dept: BARIATRICS | Facility: CLINIC | Age: 59
End: 2024-11-21
Payer: COMMERCIAL

## 2024-11-21 LAB — VIT B1 BLD-MCNC: 61 UG/L (ref 38–122)

## 2024-11-21 NOTE — TELEPHONE ENCOUNTER
----- Message from Antwon Stern PA-C sent at 11/18/2024 11:45 AM CST -----  Regarding: bmp recheck  Please contact pt to schedule recheck bmp in one week     BM

## 2024-11-21 NOTE — TELEPHONE ENCOUNTER
Needs BMP recheck on 11/25/2024.    Called and spoke with the pt.  Scheduled his BMP recheck at his preferred location.

## 2024-11-23 ENCOUNTER — LAB VISIT (OUTPATIENT)
Dept: LAB | Facility: HOSPITAL | Age: 59
End: 2024-11-23
Attending: PHYSICIAN ASSISTANT
Payer: COMMERCIAL

## 2024-11-23 DIAGNOSIS — R79.9 ELEVATED BUN: ICD-10-CM

## 2024-11-23 LAB
ANION GAP SERPL CALC-SCNC: 9 MMOL/L (ref 8–16)
BUN SERPL-MCNC: 25 MG/DL (ref 6–20)
CALCIUM SERPL-MCNC: 9.4 MG/DL (ref 8.7–10.5)
CHLORIDE SERPL-SCNC: 102 MMOL/L (ref 95–110)
CO2 SERPL-SCNC: 27 MMOL/L (ref 23–29)
CREAT SERPL-MCNC: 1.5 MG/DL (ref 0.5–1.4)
EST. GFR  (NO RACE VARIABLE): 53.6 ML/MIN/1.73 M^2
GLUCOSE SERPL-MCNC: 107 MG/DL (ref 70–110)
POTASSIUM SERPL-SCNC: 4.2 MMOL/L (ref 3.5–5.1)
SODIUM SERPL-SCNC: 138 MMOL/L (ref 136–145)

## 2024-11-23 PROCEDURE — 36415 COLL VENOUS BLD VENIPUNCTURE: CPT | Mod: PO | Performed by: PHYSICIAN ASSISTANT

## 2024-11-23 PROCEDURE — 80048 BASIC METABOLIC PNL TOTAL CA: CPT | Performed by: PHYSICIAN ASSISTANT

## 2024-11-26 ENCOUNTER — TELEPHONE (OUTPATIENT)
Dept: FAMILY MEDICINE | Facility: CLINIC | Age: 59
End: 2024-11-26
Payer: COMMERCIAL

## 2024-11-26 ENCOUNTER — PATIENT MESSAGE (OUTPATIENT)
Dept: ADMINISTRATIVE | Facility: OTHER | Age: 59
End: 2024-11-26
Payer: COMMERCIAL

## 2024-11-26 NOTE — TELEPHONE ENCOUNTER
----- Message from Dragonfly sent at 11/26/2024  9:02 AM CST -----  Who Called:self    Who Left Message for Patient: Robert    Does the patient know what this is regarding?:no    Would the patient rather a call back or a response via [facility]?call    Best Call Back Number:.[phone]    Additional Information:

## 2024-11-26 NOTE — TELEPHONE ENCOUNTER
----- Message from Riddhi Galindo MD sent at 11/25/2024 11:56 AM CST -----  Please call patient for office visit with Kaleigh Dick NP for change in kidney function evaluation.

## 2024-12-04 ENCOUNTER — PATIENT MESSAGE (OUTPATIENT)
Dept: FAMILY MEDICINE | Facility: CLINIC | Age: 59
End: 2024-12-04

## 2024-12-04 ENCOUNTER — OFFICE VISIT (OUTPATIENT)
Dept: FAMILY MEDICINE | Facility: CLINIC | Age: 59
End: 2024-12-04
Payer: COMMERCIAL

## 2024-12-04 VITALS
BODY MASS INDEX: 41.75 KG/M2 | HEART RATE: 84 BPM | HEIGHT: 73 IN | WEIGHT: 315 LBS | DIASTOLIC BLOOD PRESSURE: 80 MMHG | OXYGEN SATURATION: 98 % | TEMPERATURE: 98 F | SYSTOLIC BLOOD PRESSURE: 130 MMHG

## 2024-12-04 DIAGNOSIS — N17.9 AKI (ACUTE KIDNEY INJURY): Primary | ICD-10-CM

## 2024-12-04 DIAGNOSIS — E11.40 CONTROLLED TYPE 2 DIABETES WITH NEUROPATHY: ICD-10-CM

## 2024-12-04 DIAGNOSIS — E66.01 MORBID OBESITY WITH BMI OF 50.0-59.9, ADULT: ICD-10-CM

## 2024-12-04 DIAGNOSIS — I10 ESSENTIAL HYPERTENSION: ICD-10-CM

## 2024-12-04 PROCEDURE — 1159F MED LIST DOCD IN RCRD: CPT | Mod: CPTII,S$GLB,,

## 2024-12-04 PROCEDURE — G2211 COMPLEX E/M VISIT ADD ON: HCPCS | Mod: S$GLB,,,

## 2024-12-04 PROCEDURE — 3066F NEPHROPATHY DOC TX: CPT | Mod: CPTII,S$GLB,,

## 2024-12-04 PROCEDURE — 3008F BODY MASS INDEX DOCD: CPT | Mod: CPTII,S$GLB,,

## 2024-12-04 PROCEDURE — 4010F ACE/ARB THERAPY RXD/TAKEN: CPT | Mod: CPTII,S$GLB,,

## 2024-12-04 PROCEDURE — 3075F SYST BP GE 130 - 139MM HG: CPT | Mod: CPTII,S$GLB,,

## 2024-12-04 PROCEDURE — 3079F DIAST BP 80-89 MM HG: CPT | Mod: CPTII,S$GLB,,

## 2024-12-04 PROCEDURE — 3072F LOW RISK FOR RETINOPATHY: CPT | Mod: CPTII,S$GLB,,

## 2024-12-04 PROCEDURE — 99999 PR PBB SHADOW E&M-EST. PATIENT-LVL V: CPT | Mod: PBBFAC,,,

## 2024-12-04 PROCEDURE — 3044F HG A1C LEVEL LT 7.0%: CPT | Mod: CPTII,S$GLB,,

## 2024-12-04 PROCEDURE — 99213 OFFICE O/P EST LOW 20 MIN: CPT | Mod: S$GLB,,,

## 2024-12-04 PROCEDURE — 3061F NEG MICROALBUMINURIA REV: CPT | Mod: CPTII,S$GLB,,

## 2024-12-04 NOTE — PROGRESS NOTES
HPI     Chief Complaint:  Chief Complaint   Patient presents with    Results       David Corona Jr. is a 59 y.o. male with multiple medical diagnoses as listed in the medical history and problem list that presents for   Chief Complaint   Patient presents with    Results    .     Patient is know to me with his last appointment with me on 8/16/2024.     HPI  Pt presents for lab results.  Approved for bariatric surgery recently.  Has been working on healthy diet and exercise.  Mother passed away in September for dementia.  Recently started on 2 medications but unsure of the names.  Denies regularly taking ibuprofen.  Blood sugars well controlled at home with average of 133.      Assessment & Plan     Problem List Items Addressed This Visit       Morbid obesity with BMI of 50.0-59.9, adult  Approved for bariatric surgery recently.  Has been working on healthy diet and exercise.  Followed by bariatrics.    Essential hypertension  BP in clinic today 130/80.  The current medical regimen is effective;  continue present plan and medications.     Other Visit Diagnoses       CAL (acute kidney injury)    -  Primary  Decrease in kidney function seen on recent labs.  Discussed avoiding NSAIDs and staying well hydrated.  Will refer to nephrology.    Relevant Orders    Ambulatory referral/consult to Nephrology    Controlled type 2 diabetes with neuropathy      Stable. The current medical regimen is effective;  continue present plan and medications.    Hemoglobin A1C   Date Value Ref Range Status   11/16/2024 6.1 (H) 4.0 - 5.6 % Final     Comment:     ADA Screening Guidelines:  5.7-6.4%  Consistent with prediabetes  >or=6.5%  Consistent with diabetes    High levels of fetal hemoglobin interfere with the HbA1C  assay. Heterozygous hemoglobin variants (HbS, HgC, etc)do  not significantly interfere with this assay.   However, presence of multiple variants may affect accuracy.     08/19/2024 6.1 (H) 4.0 - 5.6 % Final     Comment:      ADA Screening Guidelines:  5.7-6.4%  Consistent with prediabetes  >or=6.5%  Consistent with diabetes    High levels of fetal hemoglobin interfere with the HbA1C  assay. Heterozygous hemoglobin variants (HbS, HgC, etc)do  not significantly interfere with this assay.   However, presence of multiple variants may affect accuracy.     05/22/2024 6.7 (H) 4.0 - 5.6 % Final     Comment:     ADA Screening Guidelines:  5.7-6.4%  Consistent with prediabetes  >or=6.5%  Consistent with diabetes    High levels of fetal hemoglobin interfere with the HbA1C  assay. Heterozygous hemoglobin variants (HbS, HgC, etc)do  not significantly interfere with this assay.   However, presence of multiple variants may affect accuracy.     04/27/2021 7.4 (H) 4.8 - 5.6 % Final     Comment:     [employer]                   --------------------------------------------      Health Maintenance:  Health Maintenance         Date Due Completion Date    COVID-19 Vaccine (5 - 2024-25 season) 09/01/2024 7/7/2022    Eye Exam 12/29/2024 12/29/2023    Override on 1/30/2012: Done    PROSTATE-SPECIFIC ANTIGEN 03/28/2025 3/28/2024    Hemoglobin A1c 05/16/2025 11/16/2024    Foot Exam 08/16/2025 8/16/2024    Override on 6/20/2023: Done (seen by podiatry)    Diabetes Urine Screening 11/16/2025 11/16/2024    Lipid Panel 11/16/2025 11/16/2024    Low Dose Statin 12/04/2025 12/4/2024    Colorectal Cancer Screening 02/28/2027 2/28/2022    TETANUS VACCINE 09/04/2027 9/4/2017    RSV Vaccine (Age 60+ and Pregnant patients) (1 - 1-dose 75+ series) 05/07/2041 ---            Health maintenance reviewed    Follow Up:  No follow-ups on file.    Exam     Review of Systems:  (as noted above)  Review of Systems    Physical Exam:   Physical Exam  Constitutional:       General: He is not in acute distress.     Appearance: Normal appearance. He is obese. He is not ill-appearing or toxic-appearing.   Cardiovascular:      Rate and Rhythm: Normal rate.   Pulmonary:      Effort:  "Pulmonary effort is normal.   Neurological:      Mental Status: He is alert.       Vitals:    12/04/24 1452   BP: 130/80   BP Location: Right arm   Patient Position: Sitting   Pulse: 84   Temp: 97.7 °F (36.5 °C)   TempSrc: Oral   SpO2: 98%   Weight: (!) 194.6 kg (429 lb 0.2 oz)   Height: 6' 1" (1.854 m)      Body mass index is 56.6 kg/m².        History     Past Medical History:  Past Medical History:   Diagnosis Date    Arthritis     Cataract     left eye     CKD (chronic kidney disease) stage 3, GFR 30-59 ml/min     Colon polyp 11/2016    Dependent edema     Diabetes mellitus     Diverticulosis     Gout, arthritis     Hyperlipidemia LDL goal <100     Hypertension     Morbid obesity     TOSIN (obstructive sleep apnea)     Peripheral autonomic neuropathy in disorders classified elsewhere(337.1)     Proteinuria     Type II or unspecified type diabetes mellitus with neurological manifestations, uncontrolled(250.62)     Venous stasis of lower extremity        Past Surgical History:  Past Surgical History:   Procedure Laterality Date    CATARACT EXTRACTION W/  INTRAOCULAR LENS IMPLANT Right 2007    COLONOSCOPY N/A 11/22/2016    Procedure: COLONOSCOPY;  Surgeon: Abdi La MD;  Location: [facility] ([address] [place] [address]);  Service: Endoscopy;  Laterality: N/A;  2nd floor case/BMI 59.13/wants week of Thanksgiving    COLONOSCOPY N/A 02/28/2022    Procedure: COLONOSCOPY;  Surgeon: Jessi Martinez MD;  Location: [facility] ([address] [place] [address]);  Service: Endoscopy;  Laterality: N/A;  BMI 61.71  covid test [place] 2/25 2/24 pt confirmed appt and new arrival time-Kpvt    EYE SURGERY         Social History:  Social History     Socioeconomic History    Marital status:     Number of children: 3   Occupational History    Occupation:      Employer: [employer]    Occupation:    Tobacco Use    Smoking status: Never    Smokeless tobacco: Never   Substance and Sexual Activity    Alcohol use: Not " Currently     Comment: rarely    Drug use: No    Sexual activity: Yes     Partners: Female     Birth control/protection: None     Social Drivers of Health     Financial Resource Strain: Low Risk  (5/6/2024)    Received from [facility]    Overall Financial Resource Strain (CARDIA)     Difficulty of Paying Living Expenses: Not hard at all   Food Insecurity: No Food Insecurity (5/6/2024)    Received from [facility]    Hunger Vital Sign     Worried About Running Out of Food in the Last Year: Never true     Ran Out of Food in the Last Year: Never true   Transportation Needs: No Transportation Needs (5/6/2024)    Received from [facility]    PRAPARE - Transportation     Lack of Transportation (Medical): No     Lack of Transportation (Non-Medical): No   Physical Activity: Insufficiently Active (5/6/2024)    Received from [facility]    Exercise Vital Sign     Days of Exercise per Week: 5 days     Minutes of Exercise per Session: 20 min   Stress: No Stress Concern Present (5/6/2024)    Received from [facility]    [employer] [place] [employer] - Occupational Stress Questionnaire     Feeling of Stress : Not at all   Housing Stability: Low Risk  (4/8/2024)    Housing Stability Vital Sign     Unable to Pay for Housing in the Last Year: No     Number of Places Lived in the Last Year: 1     Unstable Housing in the Last Year: No       Family History:  Family History   Problem Relation Name Age of Onset    Breast cancer Mother inge blair doshirley     Diabetes Mother inge trujillo     Hypertension Mother inge trujillo     Stroke Mother inge blair doshirley     Cancer Mother inge blair doshirley     Prostate cancer Father demi cota dodiallolobo sr     Diabetes Father demi cota dodiallolobo sr     Hypertension Father demi cota dodiallolobo sr     Cancer Father demi cota dodiallolobo sr     No Known Problems Sister Cass     No  Known Problems Brother Bebeto     Breast cancer Maternal Grandmother      Amblyopia Neg Hx      Blindness Neg Hx      Cataracts Neg Hx      Glaucoma Neg Hx      Macular degeneration Neg Hx      Retinal detachment Neg Hx      Strabismus Neg Hx         Allergies and Medications: (updated and reviewed)  Review of patient's allergies indicates:  No Known Allergies  Current Outpatient Medications   Medication Sig Dispense Refill    aspirin (ECOTRIN) 81 MG EC tablet Take 1 tablet (81 mg total) by mouth once daily. 90 tablet 3    atorvastatin (LIPITOR) 40 MG tablet Take 1 tablet (40 mg total) by mouth every evening. 90 tablet 0    blood-glucose sensor (DEXCOM G7 SENSOR) Clare Change every 10 days 12 each 3    colchicine (COLCRYS) 0.6 mg tablet TAKE 1 TABLET(0.6 MG) BY MOUTH DAILY 90 tablet 1    dapagliflozin propanediol (FARXIGA) 10 mg tablet Take 1 tablet (10 mg total) by mouth once daily. 90 tablet 2    diltiaZEM (CARDIZEM CD) 240 MG 24 hr capsule TAKE 2 CAPSULES BY MOUTH EVERY  capsule 1    furosemide (LASIX) 40 MG tablet Take 1 tablet (40 mg total) by mouth 2 (two) times daily. 180 tablet 0    hydrALAZINE (APRESOLINE) 50 MG tablet Take 1 tablet (50 mg total) by mouth 2 (two) times a day. 180 tablet 1    ibuprofen (ADVIL,MOTRIN) 600 MG tablet TAKE 1 TABLET(600 MG) BY MOUTH EVERY NIGHT AS NEEDED FOR PAIN 30 tablet 0    insulin aspart U-100 (NOVOLOG FLEXPEN U-100 INSULIN) 100 unit/mL (3 mL) InPn pen Take as needed before meals: 150-200=+2, 201-250=+4; 251-300=+6; 301-350=+8, over 350=+10 units. Max daily dose 30 units 15 mL 0    insulin aspart, niacinamide, (FIASP FLEXTOUCH U-100 INSULIN) 100 unit/mL (3 mL) InPn Take as needed before meals: 150-200=+2, 201-250=+4; 251-300=+6; 301-350=+8, over 350=+10 units. Max daily dose 30 units 5 pen 5    insulin glargine U-100, Lantus, (BASAGLAR KWIKPEN U-100 INSULIN) 100 unit/mL (3 mL) InPn pen Inject 20 units into the skin once daily 15 mL 5    metoprolol succinate  "(TOPROL-XL) 50 MG 24 hr tablet TAKE 1 TABLET(50 MG) BY MOUTH DAILY 90 tablet 1    omega-3 fatty acids 1,000 mg Cap Take 2 capsules by mouth once daily.      pen needle, diabetic (NOVOFINE 32) 32 gauge x 1/4" Ndle USE FOUR TIMES DAILY AS NEEDED 400 each 3    pioglitazone (ACTOS) 45 MG tablet TAKE 1 TABLET (45 MG) BY MOUTH EVERY DAY 90 tablet 2    tirzepatide (MOUNJARO) 15 mg/0.5 mL PnIj Inject 15 mg (one pen) into the skin every 7 days. 12 Pen 2    valsartan (DIOVAN) 320 MG tablet Take 1 tablet (320 mg total) by mouth once daily. 90 tablet 1    omeprazole (PRILOSEC) 20 MG capsule Take 1 capsule (20 mg total) by mouth 2 (two) times daily. for 14 days 28 capsule 0     No current facility-administered medications for this visit.       Patient Care Team:  Riddhi Galindo MD as PCP - General (Internal Medicine)  Vick Cornelius MD as Consulting Physician (Nephrology)  Munira Garnett LPN as Care Coordinator  Sloane Vasquez PharmD as Hypertension Digital Medicine Clinician (Pharmacist)  Riddhi Galindo MD as Hypertension Digital Medicine Responsible Provider (Internal Medicine)         - The patient is given an After Visit Summary that lists all medications with directions, allergies, education, orders placed during this encounter and follow-up instructions.      - I have reviewed the patient's medical information including past medical, family, and social history sections including the medications and allergies.      - We discussed the patient's current medications.     This note was created by combination of typed  and MModal dictation.  Transcription errors may be present.  If there are any questions, please contact me.       Kaleigh Dick NP               "

## 2024-12-10 ENCOUNTER — PATIENT MESSAGE (OUTPATIENT)
Dept: BARIATRICS | Facility: CLINIC | Age: 59
End: 2024-12-10
Payer: COMMERCIAL

## 2024-12-11 ENCOUNTER — CLINICAL SUPPORT (OUTPATIENT)
Dept: PSYCHIATRY | Facility: CLINIC | Age: 59
End: 2024-12-11
Payer: COMMERCIAL

## 2024-12-11 DIAGNOSIS — Z00.8 ENCOUNTER FOR PRE-SURGICAL PSYCHOLOGICAL ASSESSMENT: Primary | ICD-10-CM

## 2024-12-17 ENCOUNTER — OFFICE VISIT (OUTPATIENT)
Dept: PSYCHIATRY | Facility: CLINIC | Age: 59
End: 2024-12-17
Payer: COMMERCIAL

## 2024-12-17 DIAGNOSIS — Z01.818 PREOPERATIVE EVALUATION TO RULE OUT SURGICAL CONTRAINDICATION: Primary | ICD-10-CM

## 2024-12-17 DIAGNOSIS — E66.01 MORBID OBESITY WITH BMI OF 50.0-59.9, ADULT: ICD-10-CM

## 2024-12-17 PROCEDURE — 4010F ACE/ARB THERAPY RXD/TAKEN: CPT | Mod: CPTII,95,,

## 2024-12-17 PROCEDURE — 3044F HG A1C LEVEL LT 7.0%: CPT | Mod: CPTII,95,,

## 2024-12-17 PROCEDURE — 96130 PSYCL TST EVAL PHYS/QHP 1ST: CPT | Mod: 95,,,

## 2024-12-17 PROCEDURE — 96146 PSYCL/NRPSYC TST AUTO RESULT: CPT | Mod: 95,59,,

## 2024-12-17 PROCEDURE — 3066F NEPHROPATHY DOC TX: CPT | Mod: CPTII,95,,

## 2024-12-17 PROCEDURE — 3072F LOW RISK FOR RETINOPATHY: CPT | Mod: CPTII,95,,

## 2024-12-17 PROCEDURE — 3061F NEG MICROALBUMINURIA REV: CPT | Mod: CPTII,95,,

## 2024-12-17 PROCEDURE — 90791 PSYCH DIAGNOSTIC EVALUATION: CPT | Mod: 95,,,

## 2024-12-17 NOTE — LETTER
December 17, 2024        Danya Guerrero LCSW             [facility] 4thfl  [address]  [facility] [address]  Phone: [phone]   Patient: David Corona Jr.   MR Number: [id number]   YOB: 1965   Date of Visit: 12/17/2024     Thank you for referring David Corona to me for evaluation. Below are the relevant portions of my assessment and plan of care.        SUMMARY AND RECOMMENDATIONS:  Mr. Corona is a 59-year-old male referred for presurgical psychological evaluation prior to bariatric surgery. Results of personality testing should be considered valid, and they indicate that he is experiencing no acute psychiatric symptoms or declines in functioning at this time. Test results do not reveal any evidence that psychological difficulties would play a role in his recovery from surgery. Mr. Corona' testing profile was largely consistent with his reports in the clinical interview. In the clinical interview, Mr. Corona denied past psychiatric history and treatment.     There are no overt psychological contraindications for proceeding with bariatric surgery. Overall, Mr. Corona is at LOW  risk for adverse postsurgical outcomes based on the following considerations:  There are no indications of disabling psychopathology, substance use/abuse, cognitive problems, or disabilities that would prevent understanding and competence with medical treatment.  There are no reports or major psychosocial stressors that would interfere with his adherence to treatment recommendations.  There is no evidence of suicidality.  He exhibits high social stability and good social support.  She has adequate coping strategies to deal with stress and the demands of surgery and recovery.  He has fair knowledge about the surgical procedure, fair knowledge about the required dietary and lifestyle changes, and adequate understanding of possible risks of this treatment option. He reports adequate  compliance with prior medical regimens.    There are no recommendations for psychological intervention at this time.      If you have questions, please do not hesitate to call me.    Sincerely,  Kavon Liang PsyD

## 2024-12-26 ENCOUNTER — TELEPHONE (OUTPATIENT)
Dept: BARIATRICS | Facility: CLINIC | Age: 59
End: 2024-12-26
Payer: COMMERCIAL

## 2024-12-26 NOTE — TELEPHONE ENCOUNTER
Called pt to discuss gloria sx date. Pt stated he will call me back tomorrow (12/27/24) to confirm date. Pt stated he would like to schedule sx when he is off from wk.

## 2024-12-30 ENCOUNTER — TELEPHONE (OUTPATIENT)
Dept: BARIATRICS | Facility: CLINIC | Age: 59
End: 2024-12-30
Payer: COMMERCIAL

## 2024-12-30 DIAGNOSIS — E66.01 MORBID OBESITY WITH BMI OF 50.0-59.9, ADULT: Primary | ICD-10-CM

## 2024-12-30 DIAGNOSIS — G47.33 OSA (OBSTRUCTIVE SLEEP APNEA): ICD-10-CM

## 2024-12-30 DIAGNOSIS — I10 ESSENTIAL HYPERTENSION: ICD-10-CM

## 2024-12-30 DIAGNOSIS — E78.5 HYPERLIPIDEMIA LDL GOAL <100: ICD-10-CM

## 2024-12-30 DIAGNOSIS — E11.65 POORLY CONTROLLED TYPE 2 DIABETES MELLITUS WITH NEUROPATHY: ICD-10-CM

## 2024-12-30 DIAGNOSIS — E11.40 POORLY CONTROLLED TYPE 2 DIABETES MELLITUS WITH NEUROPATHY: ICD-10-CM

## 2024-12-30 NOTE — TELEPHONE ENCOUNTER
"Spoke with patient and confirmed the surgical procedure of robotic assisted laparoscopic sleeve gastrectomy with Dr Leone on 3-19-25.  Scheduled preop appts/surgery date/2 week and 8 week post op appts. All dates and times agreed upon. Pt aware that if they are required to have PCP clearance, it must be within 6 months of surgery, unless their medical history has changed, it should be dated, signed and in chart for preop appointment. All medications have been reviewed regarding the necessity to be crushed or broken into pieces smaller that the tip of a pencil eraser for 2 weeks following gastric sleeve surgery and 4 weeks following gastric bypass surgery. Pt instructed to stop taking all NSAIDS 1 week before surgery and for life after surgery. Pt aware that protein liquid diet start date is 3-5-25. Patient is doing well with their diet. Patient was instructed about the progression of the diet phases. The patient's current weight is 429 lbs, height is 6'1", and BMI is 56.60. Refer to medical letter of necessity from Surgeon. Discussed the importance of increased physical activity and dieting lifestyle changes to improve weight loss and meet goals. Screened patient for history of UTI per protocol. Discussed with patient to avoid antibiotics and elective procedures involving sedation/anesthesia within 30 days of surgery unless cleared by the bariatric department. Patient instructed to call the bariatric clinic post op for any s/s of UTI. Patient's mailing address confirmed and informed to expect a manilla envelop containing bariatric surgery pre op booklet, appointment reminders, protein and fluid log sheets, and liquid diet and vitamin information sheets. Pt aware that all appts can be seen in my [facility] patient portal at this time. Confirmed email address and informed patient that they will be enrolled in the Patient Reported Outcomes program to track their progress and successes. The first email will be sent " 2-3 weeks before surgery and then every year on your surgery anniversary date. Office phone and fax number given to patient for any future questions/concerns. Discussed the pre-surgery complex carbohydrate beverage to purchase in [facility] pharmacy to drink 30 minutes before the surgery arrival time. Reviewed the policy of scheduling a covid test 72 hours prior to surgery if necessary.

## 2024-12-30 NOTE — LETTER
"  [facility] 2nd Fl  [address]  [facility] [address]  Phone: [phone]  Fax: [phone] 2024       Attn: Pre-determination Dept.    RE:  David Corona Jr.          OC #: [id number]          : 1965    To Whom It May Concern:  I am sending this letter on behalf of David Corona Jr. (a 59 y.o. male) for pre-approval for Bariatric Surgery; specifically the robotic assisted laparoscopic sleeve gastrectomy. David  has a past medical history of morbid obesity, BMI 50-59.9, CKD (chronic kidney disease) stage 3, GFR 30-59 ml/min, Dependent edema, Diabetes mellitus,  Gout,  Hyperlipidemia LDL goal <100, Hypertension, TOSIN (obstructive sleep apnea), Peripheral autonomic neuropathy in disorders classified elsewhere(337.1), Proteinuria, Type II or unspecified type diabetes mellitus with neurological manifestations, uncontrolled(250.62), and Venous stasis of lower extremity. David Corona Jr.  has made numerous attempts at dieting and exercise programs, and has failed to maintain any sustained weight loss.  Weight/Height/BMI  Estimated body mass index is 56.6 kg/m² as calculated from the following:  Height as of 24: 6' 1" (1.854 m).  Weight as of 24: 194.6 kg (429 lb 0.2 oz).  Daivd Corona Jr. has been evaluated in our bariatric program by myself, the , and the program dietician and is felt to be an excellent candidate for surgery.  In addition, he has undergone a psychological evaluation, from which a letter is enclosed.  David Corona Jr. has undergone extensive pre-operative education and understands all the risks, benefits, and possible complications of surgery.  He has also undergone dietary education and thorough nutritional evaluation via a registered dietician.  Our program provides long term nutritional counseling with unlimited consults with the dietician.    Our team is sending this letter to receive pre-approval for " the indicated procedure.  Please let us know if you have any questions or require any further information.  Sincerely,    Musa Leone MD  Section Head - General, Laparoscopic, Bariatric,  Acute Care and Oncologic Surgery  Bariatric   [facility] [place], [place]

## 2025-01-02 DIAGNOSIS — I10 ESSENTIAL HYPERTENSION: ICD-10-CM

## 2025-01-02 NOTE — TELEPHONE ENCOUNTER
Care Due:                  Date            Visit Type   Department     Provider  --------------------------------------------------------------------------------                                EP [facility]                              PRIMARY      MED/ INTERNAL  Christiane Gupta  Last Visit: 06-      CARE ([facility])   MED/ PEDS      Maryuri Galindo  Next Visit: None Scheduled  None         None Found                                                            Last  Test          Frequency    Reason                     Performed    Due Date  --------------------------------------------------------------------------------    Office Visit  12 months..  atorvastatin, colchicine,   06- 05-                             furosemide, hydrALAZINE,                             ibuprofen, valsartan.....    Uric Acid...  12 months..  colchicine...............  Not Found    Overdue    [employer] Embedded Care Due Messages. Reference number: [id number].   1/02/2025 7:47:36 AM CST

## 2025-01-03 RX ORDER — DILTIAZEM HYDROCHLORIDE 240 MG/1
CAPSULE, COATED, EXTENDED RELEASE ORAL
Qty: 180 CAPSULE | Refills: 0 | Status: SHIPPED | OUTPATIENT
Start: 2025-01-03

## 2025-01-03 NOTE — TELEPHONE ENCOUNTER
Refill Routing Note   Medication(s) are not appropriate for processing by [facility] for the following reason(s):        Patient not seen by provider within 15 months    [facility] action(s):  Defer   Requires appointment : Yes     Requires labs : Yes             Appointments  past 12m or future 3m with PCP    Date Provider   Last Visit   6/5/2023 Riddhi Galindo MD   Next Visit   Visit date not found Riddhi Galindo MD   ED visits in past 90 days: 0        Note composed:10:35 PM 01/02/2025

## 2025-01-06 ENCOUNTER — PATIENT MESSAGE (OUTPATIENT)
Dept: BARIATRICS | Facility: CLINIC | Age: 60
End: 2025-01-06
Payer: COMMERCIAL

## 2025-01-14 ENCOUNTER — PATIENT MESSAGE (OUTPATIENT)
Dept: BARIATRICS | Facility: CLINIC | Age: 60
End: 2025-01-14
Payer: COMMERCIAL

## 2025-01-24 DIAGNOSIS — Z79.4 CONTROLLED TYPE 2 DIABETES MELLITUS WITH STAGE 2 CHRONIC KIDNEY DISEASE, WITH LONG-TERM CURRENT USE OF INSULIN: Primary | ICD-10-CM

## 2025-01-24 DIAGNOSIS — E11.22 CONTROLLED TYPE 2 DIABETES MELLITUS WITH STAGE 2 CHRONIC KIDNEY DISEASE, WITH LONG-TERM CURRENT USE OF INSULIN: Primary | ICD-10-CM

## 2025-01-24 DIAGNOSIS — N18.2 CONTROLLED TYPE 2 DIABETES MELLITUS WITH STAGE 2 CHRONIC KIDNEY DISEASE, WITH LONG-TERM CURRENT USE OF INSULIN: Primary | ICD-10-CM

## 2025-01-25 ENCOUNTER — LAB VISIT (OUTPATIENT)
Dept: LAB | Facility: HOSPITAL | Age: 60
End: 2025-01-25
Attending: STUDENT IN AN ORGANIZED HEALTH CARE EDUCATION/TRAINING PROGRAM
Payer: COMMERCIAL

## 2025-01-25 DIAGNOSIS — N18.2 CONTROLLED TYPE 2 DIABETES MELLITUS WITH STAGE 2 CHRONIC KIDNEY DISEASE, WITH LONG-TERM CURRENT USE OF INSULIN: ICD-10-CM

## 2025-01-25 DIAGNOSIS — E11.22 CONTROLLED TYPE 2 DIABETES MELLITUS WITH STAGE 2 CHRONIC KIDNEY DISEASE, WITH LONG-TERM CURRENT USE OF INSULIN: ICD-10-CM

## 2025-01-25 DIAGNOSIS — Z79.4 CONTROLLED TYPE 2 DIABETES MELLITUS WITH STAGE 2 CHRONIC KIDNEY DISEASE, WITH LONG-TERM CURRENT USE OF INSULIN: ICD-10-CM

## 2025-01-25 LAB
25(OH)D3+25(OH)D2 SERPL-MCNC: 34 NG/ML (ref 30–96)
ALBUMIN SERPL BCP-MCNC: 3.2 G/DL (ref 3.5–5.2)
ANION GAP SERPL CALC-SCNC: 8 MMOL/L (ref 8–16)
BASOPHILS # BLD AUTO: 0.04 K/UL (ref 0–0.2)
BASOPHILS NFR BLD: 0.5 % (ref 0–1.9)
BUN SERPL-MCNC: 21 MG/DL (ref 6–20)
CALCIUM SERPL-MCNC: 9.1 MG/DL (ref 8.7–10.5)
CHLORIDE SERPL-SCNC: 103 MMOL/L (ref 95–110)
CO2 SERPL-SCNC: 26 MMOL/L (ref 23–29)
CREAT SERPL-MCNC: 1.5 MG/DL (ref 0.5–1.4)
DIFFERENTIAL METHOD BLD: ABNORMAL
EOSINOPHIL # BLD AUTO: 0.3 K/UL (ref 0–0.5)
EOSINOPHIL NFR BLD: 3.5 % (ref 0–8)
ERYTHROCYTE [DISTWIDTH] IN BLOOD BY AUTOMATED COUNT: 15.9 % (ref 11.5–14.5)
EST. GFR  (NO RACE VARIABLE): 54 ML/MIN/1.73 M^2
FERRITIN SERPL-MCNC: 116 NG/ML (ref 20–300)
GLUCOSE SERPL-MCNC: 116 MG/DL (ref 70–110)
HCT VFR BLD AUTO: 41.9 % (ref 40–54)
HGB BLD-MCNC: 13.6 G/DL (ref 14–18)
IMM GRANULOCYTES # BLD AUTO: 0.04 K/UL (ref 0–0.04)
IMM GRANULOCYTES NFR BLD AUTO: 0.5 % (ref 0–0.5)
IRON SERPL-MCNC: 48 UG/DL (ref 45–160)
LYMPHOCYTES # BLD AUTO: 2.3 K/UL (ref 1–4.8)
LYMPHOCYTES NFR BLD: 27.2 % (ref 18–48)
MCH RBC QN AUTO: 31.4 PG (ref 27–31)
MCHC RBC AUTO-ENTMCNC: 32.5 G/DL (ref 32–36)
MCV RBC AUTO: 97 FL (ref 82–98)
MONOCYTES # BLD AUTO: 0.8 K/UL (ref 0.3–1)
MONOCYTES NFR BLD: 9.5 % (ref 4–15)
NEUTROPHILS # BLD AUTO: 4.9 K/UL (ref 1.8–7.7)
NEUTROPHILS NFR BLD: 58.8 % (ref 38–73)
NRBC BLD-RTO: 0 /100 WBC
PHOSPHATE SERPL-MCNC: 3.4 MG/DL (ref 2.7–4.5)
PLATELET # BLD AUTO: 263 K/UL (ref 150–450)
PMV BLD AUTO: 10.7 FL (ref 9.2–12.9)
POTASSIUM SERPL-SCNC: 3.8 MMOL/L (ref 3.5–5.1)
PTH-INTACT SERPL-MCNC: 166.2 PG/ML (ref 9–77)
RBC # BLD AUTO: 4.33 M/UL (ref 4.6–6.2)
SATURATED IRON: 16 % (ref 20–50)
SODIUM SERPL-SCNC: 137 MMOL/L (ref 136–145)
TOTAL IRON BINDING CAPACITY: 297 UG/DL (ref 250–450)
TRANSFERRIN SERPL-MCNC: 201 MG/DL (ref 200–375)
URATE SERPL-MCNC: 7.8 MG/DL (ref 3.4–7)
WBC # BLD AUTO: 8.38 K/UL (ref 3.9–12.7)

## 2025-01-25 PROCEDURE — 80069 RENAL FUNCTION PANEL: CPT | Performed by: STUDENT IN AN ORGANIZED HEALTH CARE EDUCATION/TRAINING PROGRAM

## 2025-01-25 PROCEDURE — 82306 VITAMIN D 25 HYDROXY: CPT | Performed by: STUDENT IN AN ORGANIZED HEALTH CARE EDUCATION/TRAINING PROGRAM

## 2025-01-25 PROCEDURE — 84550 ASSAY OF BLOOD/URIC ACID: CPT | Performed by: STUDENT IN AN ORGANIZED HEALTH CARE EDUCATION/TRAINING PROGRAM

## 2025-01-25 PROCEDURE — 82728 ASSAY OF FERRITIN: CPT | Performed by: STUDENT IN AN ORGANIZED HEALTH CARE EDUCATION/TRAINING PROGRAM

## 2025-01-25 PROCEDURE — 84466 ASSAY OF TRANSFERRIN: CPT | Performed by: STUDENT IN AN ORGANIZED HEALTH CARE EDUCATION/TRAINING PROGRAM

## 2025-01-25 PROCEDURE — 85025 COMPLETE CBC W/AUTO DIFF WBC: CPT | Performed by: STUDENT IN AN ORGANIZED HEALTH CARE EDUCATION/TRAINING PROGRAM

## 2025-01-25 PROCEDURE — 83970 ASSAY OF PARATHORMONE: CPT | Performed by: STUDENT IN AN ORGANIZED HEALTH CARE EDUCATION/TRAINING PROGRAM

## 2025-01-25 PROCEDURE — 36415 COLL VENOUS BLD VENIPUNCTURE: CPT | Performed by: STUDENT IN AN ORGANIZED HEALTH CARE EDUCATION/TRAINING PROGRAM

## 2025-01-27 ENCOUNTER — OFFICE VISIT (OUTPATIENT)
Dept: NEPHROLOGY | Facility: CLINIC | Age: 60
End: 2025-01-27
Payer: COMMERCIAL

## 2025-01-27 VITALS
OXYGEN SATURATION: 97 % | RESPIRATION RATE: 18 BRPM | HEIGHT: 73 IN | BODY MASS INDEX: 41.75 KG/M2 | SYSTOLIC BLOOD PRESSURE: 140 MMHG | DIASTOLIC BLOOD PRESSURE: 72 MMHG | WEIGHT: 315 LBS | HEART RATE: 81 BPM

## 2025-01-27 DIAGNOSIS — N17.9 AKI (ACUTE KIDNEY INJURY): ICD-10-CM

## 2025-01-27 DIAGNOSIS — N18.31 STAGE 3A CHRONIC KIDNEY DISEASE: Primary | ICD-10-CM

## 2025-01-27 PROCEDURE — 99999 PR PBB SHADOW E&M-EST. PATIENT-LVL V: CPT | Mod: PBBFAC,,, | Performed by: STUDENT IN AN ORGANIZED HEALTH CARE EDUCATION/TRAINING PROGRAM

## 2025-01-27 PROCEDURE — 3066F NEPHROPATHY DOC TX: CPT | Mod: CPTII,S$GLB,, | Performed by: STUDENT IN AN ORGANIZED HEALTH CARE EDUCATION/TRAINING PROGRAM

## 2025-01-27 PROCEDURE — 3077F SYST BP >= 140 MM HG: CPT | Mod: CPTII,S$GLB,, | Performed by: STUDENT IN AN ORGANIZED HEALTH CARE EDUCATION/TRAINING PROGRAM

## 2025-01-27 PROCEDURE — 99213 OFFICE O/P EST LOW 20 MIN: CPT | Mod: S$GLB,,, | Performed by: STUDENT IN AN ORGANIZED HEALTH CARE EDUCATION/TRAINING PROGRAM

## 2025-01-27 PROCEDURE — 3078F DIAST BP <80 MM HG: CPT | Mod: CPTII,S$GLB,, | Performed by: STUDENT IN AN ORGANIZED HEALTH CARE EDUCATION/TRAINING PROGRAM

## 2025-01-27 PROCEDURE — 4010F ACE/ARB THERAPY RXD/TAKEN: CPT | Mod: CPTII,S$GLB,, | Performed by: STUDENT IN AN ORGANIZED HEALTH CARE EDUCATION/TRAINING PROGRAM

## 2025-01-27 PROCEDURE — 3008F BODY MASS INDEX DOCD: CPT | Mod: CPTII,S$GLB,, | Performed by: STUDENT IN AN ORGANIZED HEALTH CARE EDUCATION/TRAINING PROGRAM

## 2025-01-27 PROCEDURE — 1159F MED LIST DOCD IN RCRD: CPT | Mod: CPTII,S$GLB,, | Performed by: STUDENT IN AN ORGANIZED HEALTH CARE EDUCATION/TRAINING PROGRAM

## 2025-01-27 RX ORDER — ALLOPURINOL 100 MG/1
100 TABLET ORAL DAILY
Qty: 90 TABLET | Refills: 3 | Status: SHIPPED | OUTPATIENT
Start: 2025-01-27

## 2025-01-27 NOTE — TELEPHONE ENCOUNTER
No care due was identified.  [facility] Embedded Care Due Messages. Reference number: [id number].   1/27/2025 2:37:29 PM CST

## 2025-01-27 NOTE — PROGRESS NOTES
Subjective     Chief Complaint: CAL    History of Present Illness:  Mr. David Corona Jr. is a 59 y.o. male with active medical diagnosis of HTN, DM, gout    Last seen by Dr. Cornelius in 2020 for CKD 2/3a - HTN, DM    Having gastric sleeve in March and is tracking his calories already. BMI 57.36    #CKD3a  Baseline creatinine 1.5  UA 4+ glucose  UPCR 0    Creatinine   Date Value Ref Range Status   01/25/2025 1.5 (H) 0.5 - 1.4 mg/dL Final   11/23/2024 1.5 (H) 0.5 - 1.4 mg/dL Final   11/16/2024 1.5 (H) 0.5 - 1.4 mg/dL Final     eGFR   Date Value Ref Range Status   01/25/2025 54 (A) >60 mL/min/1.73 m^2 Final   11/23/2024 53.6 (A) >60 mL/min/1.73 m^2 Final   11/16/2024 53.6 (A) >60 mL/min/1.73 m^2 Final     Prot/Creat Ratio, Urine   Date Value Ref Range Status   01/25/2025 Unable to calculate 0.00 - 0.20 Final   09/26/2020 0.04 0.00 - 0.20 Final   11/30/2019 0.11 0.00 - 0.20 Final     Imaging: None    #HTN  Home medications: hydralazine 50 BID, lasix 40 BID, diltiazem 240 qd, metoprolol 50 qd, valsartan 320 qd  Has the digital hypertension - 130s for the last 3.     #DM  Hemoglobin A1c:6.1  On farxiga 10 QD    #Gout:  Uric Acid 7.8 - on colchicine 0.6 daily for prophylaxis, takes as needed.   Rx allopurinol 100 mg 1/27/2025          Review of Systems   Constitutional:  Negative for chills and fever.   HENT:  Negative for ear discharge and ear pain.    Eyes:  Negative for blurred vision and double vision.   Respiratory:  Negative for cough and shortness of breath.    Cardiovascular:  Negative for chest pain and palpitations.   Gastrointestinal:  Negative for abdominal pain, constipation, diarrhea, nausea and vomiting.   Genitourinary:  Negative for dysuria and frequency.   Musculoskeletal:  Negative for myalgias and neck pain.   Skin:  Negative for itching and rash.   Neurological:  Negative for tingling and headaches.             PAST HISTORY:     Past Medical History:   Diagnosis Date    Arthritis     Cataract      left eye     CKD (chronic kidney disease) stage 3, GFR 30-59 ml/min     Colon polyp 11/2016    Dependent edema     Diabetes mellitus     Diverticulosis     Gout, arthritis     Hyperlipidemia LDL goal <100     Hypertension     Morbid obesity     TOSIN (obstructive sleep apnea)     Peripheral autonomic neuropathy in disorders classified elsewhere(337.1)     Proteinuria     Type II or unspecified type diabetes mellitus with neurological manifestations, uncontrolled(250.62)     Venous stasis of lower extremity        Past Surgical History:   Procedure Laterality Date    CATARACT EXTRACTION W/  INTRAOCULAR LENS IMPLANT Right 2007    COLONOSCOPY N/A 11/22/2016    Procedure: COLONOSCOPY;  Surgeon: Abdi La MD;  Location: [facility] ([address] [place] [address]);  Service: Endoscopy;  Laterality: N/A;  2nd floor case/BMI 59.13/wants week of Thanksgiving    COLONOSCOPY N/A 02/28/2022    Procedure: COLONOSCOPY;  Surgeon: Jessi Martinez MD;  [facility]  Service: Endoscopy;  Laterality: N/A;  BMI 61.71  covid test [place] 2/25 2/24 pt confirmed appt and new arrival time-Kpvt    EYE SURGERY         Family History   Problem Relation Name Age of Onset    Breast cancer Mother inge blair doshirley     Diabetes Mother inge trujillo     Hypertension Mother inge trujillo     Stroke Mother inge blair dobblobo     Cancer Mother inge blair doshirley     Prostate cancer Father demi trujillo sr     Diabetes Father demi trujillo sr     Hypertension Father demi trujillo sr     Cancer Father demi trujillo sr     No Known Problems Sister Cass     No Known Problems Brother Bebeto     Breast cancer Maternal Grandmother      Amblyopia Neg Hx      Blindness Neg Hx      Cataracts Neg Hx      Glaucoma Neg Hx      Macular degeneration Neg Hx      Retinal detachment Neg Hx      Strabismus Neg Hx         Social History     Socioeconomic History    Marital status:     Number of children: 3   Occupational History    Occupation:       Employer: [employer]    Occupation:    Tobacco Use    Smoking status: Never    Smokeless tobacco: Never   Substance and Sexual Activity    Alcohol use: Not Currently     Comment: rarely    Drug use: No    Sexual activity: Yes     Partners: Female     Birth control/protection: None     Social Drivers of Health     Financial Resource Strain: Low Risk  (5/6/2024)    Received from [facility]    Overall Financial Resource Strain (CARDIA)     Difficulty of Paying Living Expenses: Not hard at all   Food Insecurity: No Food Insecurity (5/6/2024)    Received from [facility]    Hunger Vital Sign     Worried About Running Out of Food in the Last Year: Never true     Ran Out of Food in the Last Year: Never true   Transportation Needs: No Transportation Needs (5/6/2024)    Received from [facility]    PRAPARE - Transportation     Lack of Transportation (Medical): No     Lack of Transportation (Non-Medical): No   Physical Activity: Insufficiently Active (5/6/2024)    Received from [facility]    Exercise Vital Sign     Days of Exercise per Week: 5 days     Minutes of Exercise per Session: 20 min   Stress: No Stress Concern Present (5/6/2024)    Received from [facility]    [employer] - Occupational Stress Questionnaire     Feeling of Stress : Not at all   Housing Stability: Low Risk  (4/8/2024)    Housing Stability Vital Sign     Unable to Pay for Housing in the Last Year: No     Number of Places Lived in the Last Year: 1     Unstable Housing in the Last Year: No       MEDICATIONS & ALLERGIES:     Current Outpatient Medications on File Prior to Visit   Medication Sig    aspirin (ECOTRIN) 81 MG EC tablet Take 1 tablet (81 mg total) by mouth once daily.    atorvastatin (LIPITOR) 40 MG tablet Take 1 tablet (40 mg total) by  "mouth every evening.    blood-glucose sensor (DEXCOM G7 SENSOR) Clare Change every 10 days    colchicine (COLCRYS) 0.6 mg tablet TAKE 1 TABLET(0.6 MG) BY MOUTH DAILY    dapagliflozin propanediol (FARXIGA) 10 mg tablet Take 1 tablet (10 mg total) by mouth once daily.    diltiaZEM (CARDIZEM CD) 240 MG 24 hr capsule TAKE 2 CAPSULES BY MOUTH EVERY DAY    furosemide (LASIX) 40 MG tablet Take 1 tablet (40 mg total) by mouth 2 (two) times daily.    hydrALAZINE (APRESOLINE) 50 MG tablet Take 1 tablet (50 mg total) by mouth 2 (two) times a day.    ibuprofen (ADVIL,MOTRIN) 600 MG tablet TAKE 1 TABLET(600 MG) BY MOUTH EVERY NIGHT AS NEEDED FOR PAIN    insulin aspart U-100 (NOVOLOG FLEXPEN U-100 INSULIN) 100 unit/mL (3 mL) InPn pen Take as needed before meals: 150-200=+2, 201-250=+4; 251-300=+6; 301-350=+8, over 350=+10 units. Max daily dose 30 units    insulin aspart, niacinamide, (FIASP FLEXTOUCH U-100 INSULIN) 100 unit/mL (3 mL) InPn Take as needed before meals: 150-200=+2, 201-250=+4; 251-300=+6; 301-350=+8, over 350=+10 units. Max daily dose 30 units    insulin glargine U-100, Lantus, (BASAGLAR KWIKPEN U-100 INSULIN) 100 unit/mL (3 mL) InPn pen Inject 20 units into the skin once daily    metoprolol succinate (TOPROL-XL) 50 MG 24 hr tablet TAKE 1 TABLET(50 MG) BY MOUTH DAILY    omega-3 fatty acids 1,000 mg Cap Take 2 capsules by mouth once daily.    omeprazole (PRILOSEC) 20 MG capsule Take 1 capsule (20 mg total) by mouth 2 (two) times daily. for 14 days    pen needle, diabetic (NOVOFINE 32) 32 gauge x 1/4" Ndle USE FOUR TIMES DAILY AS NEEDED    pioglitazone (ACTOS) 45 MG tablet TAKE 1 TABLET (45 MG) BY MOUTH EVERY DAY    tirzepatide (MOUNJARO) 15 mg/0.5 mL PnIj Inject 15 mg (one pen) into the skin every 7 days.    valsartan (DIOVAN) 320 MG tablet Take 1 tablet (320 mg total) by mouth once daily.     No current facility-administered medications on file prior to visit.       Review of patient's allergies indicates:  No Known " Allergies    OBJECTIVE:     Vital Signs:  There were no vitals filed for this visit.    There is no height or weight on file to calculate BMI.     Physical Exam  Constitutional:       General: He is not in acute distress.     Appearance: Normal appearance. He is obese. He is not ill-appearing or diaphoretic.   HENT:      Head: Normocephalic and atraumatic.      Mouth/Throat:      Pharynx: No oropharyngeal exudate or posterior oropharyngeal erythema.   Eyes:      General: No scleral icterus.        Right eye: No discharge.         Left eye: No discharge.      Extraocular Movements: Extraocular movements intact.      Conjunctiva/sclera: Conjunctivae normal.      Pupils: Pupils are equal, round, and reactive to light.   Neck:      Vascular: No JVD.      Trachea: No tracheal deviation.   Cardiovascular:      Rate and Rhythm: Normal rate and regular rhythm.      Heart sounds: No murmur heard.  Pulmonary:      Effort: Pulmonary effort is normal. No respiratory distress.      Breath sounds: Normal breath sounds. No wheezing or rales.   Abdominal:      General: Abdomen is flat. Bowel sounds are normal. There is no distension.      Palpations: Abdomen is soft. There is no mass.      Tenderness: There is no abdominal tenderness.   Musculoskeletal:         General: No swelling, tenderness or deformity. Normal range of motion.      Cervical back: Normal range of motion and neck supple.   Lymphadenopathy:      Cervical: No cervical adenopathy.   Skin:     General: Skin is warm and dry.      Coloration: Skin is not jaundiced or pale.      Findings: No bruising, erythema or rash.   Neurological:      General: No focal deficit present.      Mental Status: He is alert and oriented to person, place, and time. Mental status is at baseline.      Cranial Nerves: No cranial nerve deficit.         Laboratory  Sodium   Date Value Ref Range Status   01/25/2025 137 136 - 145 mmol/L Final   11/23/2024 138 136 - 145 mmol/L Final   11/16/2024  137 136 - 145 mmol/L Final     Potassium   Date Value Ref Range Status   01/25/2025 3.8 3.5 - 5.1 mmol/L Final   11/23/2024 4.2 3.5 - 5.1 mmol/L Final   11/16/2024 4.2 3.5 - 5.1 mmol/L Final     Chloride   Date Value Ref Range Status   01/25/2025 103 95 - 110 mmol/L Final   11/23/2024 102 95 - 110 mmol/L Final   11/16/2024 102 95 - 110 mmol/L Final     CO2   Date Value Ref Range Status   01/25/2025 26 23 - 29 mmol/L Final   11/23/2024 27 23 - 29 mmol/L Final   11/16/2024 25 23 - 29 mmol/L Final     BUN   Date Value Ref Range Status   01/25/2025 21 (H) 6 - 20 mg/dL Final   11/23/2024 25 (H) 6 - 20 mg/dL Final   11/16/2024 26 (H) 6 - 20 mg/dL Final     Creatinine   Date Value Ref Range Status   01/25/2025 1.5 (H) 0.5 - 1.4 mg/dL Final   11/23/2024 1.5 (H) 0.5 - 1.4 mg/dL Final   11/16/2024 1.5 (H) 0.5 - 1.4 mg/dL Final     eGFR   Date Value Ref Range Status   01/25/2025 54 (A) >60 mL/min/1.73 m^2 Final   11/23/2024 53.6 (A) >60 mL/min/1.73 m^2 Final   11/16/2024 53.6 (A) >60 mL/min/1.73 m^2 Final     Calcium   Date Value Ref Range Status   01/25/2025 9.1 8.7 - 10.5 mg/dL Final   11/23/2024 9.4 8.7 - 10.5 mg/dL Final   11/16/2024 9.3 8.7 - 10.5 mg/dL Final     Phosphorus   Date Value Ref Range Status   01/25/2025 3.4 2.7 - 4.5 mg/dL Final   11/16/2024 3.4 2.7 - 4.5 mg/dL Final   09/26/2020 3.8 2.7 - 4.5 mg/dL Final     Albumin   Date Value Ref Range Status   01/25/2025 3.2 (L) 3.5 - 5.2 g/dL Final   11/16/2024 3.2 (L) 3.5 - 5.2 g/dL Final   08/19/2024 3.3 (L) 3.5 - 5.2 g/dL Final       Diagnostic Results:      Health Maintenance Due   Topic Date Due    COVID-19 Vaccine (5 - 2024-25 season) 09/01/2024    Diabetic Eye Exam  12/29/2024         ASSESSMENT & PLAN:   Mr. David Corona . is a 59 y.o. male     CKD3a - DM, HTN and obesity, gout  DM - on farxiga already, well controlled  HTN - on valsartan, well controlled  Obesity - is getting a gastric sleeve and has been adhering to the diet  Gout - start on allopurinol  100 mg, goal uric acid less than 6.0  Follow up RP US    Anemia - repeat labs in 1 year    Secondary hyperparathyroidism - repeat labs in 1 year      Stage 3a chronic kidney disease  -     US Retroperitoneal Complete; Future; Expected date: 01/27/2025  -     PTH, Intact; Future; Expected date: 01/27/2026  -     Ferritin; Future; Expected date: 01/27/2026  -     Iron and TIBC; Future; Expected date: 04/27/2025  -     Protein/Creatinine Ratio, Urine; Future; Expected date: 01/27/2026  -     Urinalysis; Future; Expected date: 01/27/2026  -     Renal Function Panel; Future; Expected date: 01/27/2026    CAL (acute kidney injury)  -     Ambulatory referral/consult to Nephrology    Other orders  -     allopurinoL (ZYLOPRIM) 100 MG tablet; Take 1 tablet (100 mg total) by mouth once daily.  Dispense: 90 tablet; Refill: 3        RTC in 1 year      Forrest Puckett MD  Nephrology [facility]

## 2025-01-27 NOTE — PATIENT INSTRUCTIONS
You have chronic kidney disease, remember that this is a reflection that your kidneys have seen some use rather than a specific process. There are many uncontrollable factors that progress chronic kidney disease - mainly that we use our kidneys 24/7. We will do what we can to prolong the life of your kidney:    Stay plenty hydrated: unless otherwise directed aim for at minimum 2 liters a day  Low salt diet: goal is less than 2 grams of salt daily  Avoid NSAIDS (nonsteroidal anti-inflammatories) like ibuprofen, motrin, goodies powder, advil. Tylenol is okay    Lastly, control of your other medical conditions, weight loss and exercise will always help as well.    Start the allopurinol for your gout; take it daily. If you get a rash, let me know    Get the ultrasound of your kidneys whenever, there is no rush on it.     I will see you back in 1 year.

## 2025-01-28 ENCOUNTER — TELEPHONE (OUTPATIENT)
Dept: FAMILY MEDICINE | Facility: CLINIC | Age: 60
End: 2025-01-28
Payer: COMMERCIAL

## 2025-01-28 RX ORDER — ATORVASTATIN CALCIUM 40 MG/1
40 TABLET, FILM COATED ORAL NIGHTLY
Qty: 90 TABLET | Refills: 1 | Status: SHIPPED | OUTPATIENT
Start: 2025-01-28

## 2025-01-28 NOTE — TELEPHONE ENCOUNTER
Spoke with patient. Patient scheduled for 06/16/25 for annual.  Patient requesting an appointment with NP Kaleigh Dick for medication concerns. Patient scheduled for 01/29/25 with Truman Dick.

## 2025-01-28 NOTE — TELEPHONE ENCOUNTER
----- Message from Riddhi Galindo MD sent at 1/28/2025  8:44 AM CST -----  Patient needs to be seen in June of this year for annual - please schedule. Needs to see me. Please address health maintenance, if applicable.

## 2025-01-28 NOTE — TELEPHONE ENCOUNTER
Refill Routing Note   Medication(s) are not appropriate for processing by [facility] for the following reason(s):        Patient not seen by provider within 15 months    [facility] action(s):  Defer               Appointments  past 12m or future 3m with PCP    Date Provider   Last Visit   6/5/2023 Riddhi Galindo MD   Next Visit   Visit date not found iRddhi Galindo MD   ED visits in past 90 days: 0        Note composed:3:43 AM 01/28/2025

## 2025-01-29 ENCOUNTER — OFFICE VISIT (OUTPATIENT)
Dept: FAMILY MEDICINE | Facility: CLINIC | Age: 60
End: 2025-01-29
Payer: COMMERCIAL

## 2025-01-29 VITALS
SYSTOLIC BLOOD PRESSURE: 132 MMHG | HEART RATE: 75 BPM | BODY MASS INDEX: 41.75 KG/M2 | WEIGHT: 315 LBS | OXYGEN SATURATION: 95 % | DIASTOLIC BLOOD PRESSURE: 60 MMHG | HEIGHT: 73 IN | TEMPERATURE: 98 F

## 2025-01-29 DIAGNOSIS — E11.3219: ICD-10-CM

## 2025-01-29 DIAGNOSIS — E66.01 MORBID OBESITY WITH BMI OF 50.0-59.9, ADULT: Primary | ICD-10-CM

## 2025-01-29 DIAGNOSIS — I10 ESSENTIAL HYPERTENSION: ICD-10-CM

## 2025-01-29 DIAGNOSIS — E11.40 CONTROLLED TYPE 2 DIABETES WITH NEUROPATHY: ICD-10-CM

## 2025-01-29 DIAGNOSIS — E11.65: ICD-10-CM

## 2025-01-29 PROCEDURE — 4010F ACE/ARB THERAPY RXD/TAKEN: CPT | Mod: CPTII,S$GLB,,

## 2025-01-29 PROCEDURE — 1160F RVW MEDS BY RX/DR IN RCRD: CPT | Mod: CPTII,S$GLB,,

## 2025-01-29 PROCEDURE — G2211 COMPLEX E/M VISIT ADD ON: HCPCS | Mod: S$GLB,,,

## 2025-01-29 PROCEDURE — 3066F NEPHROPATHY DOC TX: CPT | Mod: CPTII,S$GLB,,

## 2025-01-29 PROCEDURE — 99999 PR PBB SHADOW E&M-EST. PATIENT-LVL IV: CPT | Mod: PBBFAC,,,

## 2025-01-29 PROCEDURE — 3075F SYST BP GE 130 - 139MM HG: CPT | Mod: CPTII,S$GLB,,

## 2025-01-29 PROCEDURE — 99213 OFFICE O/P EST LOW 20 MIN: CPT | Mod: S$GLB,,,

## 2025-01-29 PROCEDURE — 3078F DIAST BP <80 MM HG: CPT | Mod: CPTII,S$GLB,,

## 2025-01-29 PROCEDURE — 1159F MED LIST DOCD IN RCRD: CPT | Mod: CPTII,S$GLB,,

## 2025-01-29 PROCEDURE — 3008F BODY MASS INDEX DOCD: CPT | Mod: CPTII,S$GLB,,

## 2025-01-29 NOTE — PROGRESS NOTES
HPI     Chief Complaint:  Chief Complaint   Patient presents with    Annual Exam       David Corona Jr. is a 59 y.o. male with multiple medical diagnoses as listed in the medical history and problem list that presents for   Chief Complaint   Patient presents with    Annual Exam    .     Patient is know to me with his last appointment with me on 12/4/2024.     HPI  Pt presents for medication reconciliation.  Requesting medication reconciliation before upcoming gastric bypass surgery. Inquiring about which medications are safe to crush.  March 19 scheduled for gastric bypass.    Assessment & Plan     Problem List Items Addressed This Visit       Morbid obesity with BMI of 50.0-59.9, adult - Primary  Scheduled for gastric bypass on March 19. Reviewed with pharmacy and patient which medications are safe to crush for after surgery.    Essential hypertension  BP in clinic today 132/60.  The current medical regimen is effective;  continue present plan and medications.      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 The current medical regimen is effective;  continue present plan and medications.    Hemoglobin A1C   Date Value Ref Range Status   11/16/2024 6.1 (H) 4.0 - 5.6 % Final     Comment:     ADA Screening Guidelines:  5.7-6.4%  Consistent with prediabetes  >or=6.5%  Consistent with diabetes    High levels of fetal hemoglobin interfere with the HbA1C  assay. Heterozygous hemoglobin variants (HbS, HgC, etc)do  not significantly interfere with this assay.   However, presence of multiple variants may affect accuracy.     08/19/2024 6.1 (H) 4.0 - 5.6 % Final     Comment:     ADA Screening Guidelines:  5.7-6.4%  Consistent with prediabetes  >or=6.5%  Consistent with diabetes    High levels of fetal hemoglobin interfere with the HbA1C  assay. Heterozygous hemoglobin variants (HbS, HgC, etc)do  not significantly interfere with this assay.   However,  presence of multiple variants may affect accuracy.     05/22/2024 6.7 (H) 4.0 - 5.6 % Final     Comment:     ADA Screening Guidelines:  5.7-6.4%  Consistent with prediabetes  >or=6.5%  Consistent with diabetes    High levels of fetal hemoglobin interfere with the HbA1C  assay. Heterozygous hemoglobin variants (HbS, HgC, etc)do  not significantly interfere with this assay.   However, presence of multiple variants may affect accuracy.     04/27/2021 7.4 (H) 4.8 - 5.6 % Final     Comment:     Peer39         Overview     - mild - right eye only          Other Visit Diagnoses       Controlled type 2 diabetes with neuropathy      Stable. The current medical regimen is effective;  continue present plan and medications.                --------------------------------------------      Health Maintenance:  Health Maintenance         Date Due Completion Date    COVID-19 Vaccine (5 - 2024-25 season) 09/01/2024 7/7/2022    Diabetic Eye Exam 12/29/2024 12/29/2023    Override on 1/30/2012: Done    PROSTATE-SPECIFIC ANTIGEN 03/28/2025 3/28/2024    Hemoglobin A1c 05/16/2025 11/16/2024    Foot Exam 08/16/2025 8/16/2024    Override on 6/20/2023: Done (seen by podiatry)    Diabetes Urine Screening 11/16/2025 11/16/2024    Lipid Panel 11/16/2025 11/16/2024    Low Dose Statin 01/29/2026 1/29/2025    Colorectal Cancer Screening 02/28/2027 2/28/2022    TETANUS VACCINE 09/04/2027 9/4/2017    RSV Vaccine (Age 60+ and Pregnant patients) (1 - 1-dose 75+ series) 05/07/2041 ---            Health maintenance reviewed    Follow Up:  No follow-ups on file.    Exam     Review of Systems:  (as noted above)  Review of Systems    Physical Exam:   Physical Exam  Constitutional:       General: He is not in acute distress.     Appearance: Normal appearance. He is obese. He is not ill-appearing or toxic-appearing.   Cardiovascular:      Rate and Rhythm: Normal rate.   Pulmonary:      Effort: Pulmonary effort is normal.   Neurological:      Mental  "Status: He is alert.       Vitals:    01/29/25 1402   BP: 132/60   BP Location: Right arm   Patient Position: Sitting   Pulse: 75   Temp: 97.6 °F (36.4 °C)   TempSrc: Oral   SpO2: 95%   Weight: (!) 196.5 kg (433 lb 3.3 oz)   Height: 6' 1" (1.854 m)      Body mass index is 57.15 kg/m².        History     Past Medical History:  Past Medical History:   Diagnosis Date    Arthritis     Cataract     left eye     CKD (chronic kidney disease) stage 3, GFR 30-59 ml/min     Colon polyp 11/2016    Dependent edema     Diabetes mellitus     Diverticulosis     Gout, arthritis     Hyperlipidemia LDL goal <100     Hypertension     Morbid obesity     TOSIN (obstructive sleep apnea)     Peripheral autonomic neuropathy in disorders classified elsewhere(337.1)     Proteinuria     Type II or unspecified type diabetes mellitus with neurological manifestations, uncontrolled(250.62)     Venous stasis of lower extremity        Past Surgical History:  Past Surgical History:   Procedure Laterality Date    CATARACT EXTRACTION W/  INTRAOCULAR LENS IMPLANT Right 2007    COLONOSCOPY N/A 11/22/2016    Procedure: COLONOSCOPY;  Surgeon: Abdi La MD;  Location: [facility] ([address] [place] [address]);  Service: Endoscopy;  Laterality: N/A;  2nd floor case/BMI 59.13/wants week of Thanksgiving    COLONOSCOPY N/A 02/28/2022    Procedure: COLONOSCOPY;  Surgeon: Jessi Martinez MD;  Location: [facility] ([address] [place] [address]);  Service: Endoscopy;  Laterality: N/A;  BMI 61.71  covid test [place] 2/25 2/24 pt confirmed appt and new arrival time-Kpvt    EYE SURGERY         Social History:  Social History     Socioeconomic History    Marital status:     Number of children: 3   Occupational History    Occupation:      Employer: [employer]    Occupation:    Tobacco Use    Smoking status: Never    Smokeless tobacco: Never   Substance and Sexual Activity    Alcohol use: Not Currently     Comment: rarely    Drug use: No    " Sexual activity: Yes     Partners: Female     Birth control/protection: None     Social Drivers of Health     Financial Resource Strain: Low Risk  (5/6/2024)    Received from [facility]    Overall Financial Resource Strain (CARDIA)     Difficulty of Paying Living Expenses: Not hard at all   Food Insecurity: No Food Insecurity (5/6/2024)    Received from [facility]    Hunger Vital Sign     Worried About Running Out of Food in the Last Year: Never true     Ran Out of Food in the Last Year: Never true   Transportation Needs: No Transportation Needs (5/6/2024)    Received from [facility]    PRAPARE - Transportation     Lack of Transportation (Medical): No     Lack of Transportation (Non-Medical): No   Physical Activity: Insufficiently Active (5/6/2024)    Received from [facility]    Exercise Vital Sign     Days of Exercise per Week: 5 days     Minutes of Exercise per Session: 20 min   Stress: No Stress Concern Present (5/6/2024)    Received from [facility]    [employer] [place] [employer] - Occupational Stress Questionnaire     Feeling of Stress : Not at all   Housing Stability: Low Risk  (4/8/2024)    Housing Stability Vital Sign     Unable to Pay for Housing in the Last Year: No     Number of Places Lived in the Last Year: 1     Unstable Housing in the Last Year: No       Family History:  Family History   Problem Relation Name Age of Onset    Breast cancer Mother inge trujillo     Diabetes Mother inge trujillo     Hypertension Mother inge trujillo     Stroke Mother inge blair doshirley     Cancer Mother inge blair doshirley     Prostate cancer Father demi cota dobblobo sr     Diabetes Father demi cota dobbins sr     Hypertension Father demi cota dobblobo sr     Cancer Father demi cota dobblobo sr     No Known Problems Sister Cass     No Known Problems Brother Bebeto     Breast cancer  Maternal Grandmother      Amblyopia Neg Hx      Blindness Neg Hx      Cataracts Neg Hx      Glaucoma Neg Hx      Macular degeneration Neg Hx      Retinal detachment Neg Hx      Strabismus Neg Hx         Allergies and Medications: (updated and reviewed)  Review of patient's allergies indicates:  No Known Allergies  Current Outpatient Medications   Medication Sig Dispense Refill    allopurinoL (ZYLOPRIM) 100 MG tablet Take 1 tablet (100 mg total) by mouth once daily. 90 tablet 3    aspirin (ECOTRIN) 81 MG EC tablet Take 1 tablet (81 mg total) by mouth once daily. 90 tablet 3    atorvastatin (LIPITOR) 40 MG tablet Take 1 tablet (40 mg total) by mouth every evening. 90 tablet 1    blood-glucose sensor (DEXCOM G7 SENSOR) Clare Change every 10 days 12 each 3    colchicine (COLCRYS) 0.6 mg tablet TAKE 1 TABLET(0.6 MG) BY MOUTH DAILY 90 tablet 1    dapagliflozin propanediol (FARXIGA) 10 mg tablet Take 1 tablet (10 mg total) by mouth once daily. 90 tablet 2    diltiaZEM (CARDIZEM CD) 240 MG 24 hr capsule TAKE 2 CAPSULES BY MOUTH EVERY  capsule 0    furosemide (LASIX) 40 MG tablet Take 1 tablet (40 mg total) by mouth 2 (two) times daily. 180 tablet 0    hydrALAZINE (APRESOLINE) 50 MG tablet Take 1 tablet (50 mg total) by mouth 2 (two) times a day. 180 tablet 1    ibuprofen (ADVIL,MOTRIN) 600 MG tablet TAKE 1 TABLET(600 MG) BY MOUTH EVERY NIGHT AS NEEDED FOR PAIN 30 tablet 0    insulin aspart U-100 (NOVOLOG FLEXPEN U-100 INSULIN) 100 unit/mL (3 mL) InPn pen Take as needed before meals: 150-200=+2, 201-250=+4; 251-300=+6; 301-350=+8, over 350=+10 units. Max daily dose 30 units 15 mL 0    insulin aspart, niacinamide, (FIASP FLEXTOUCH U-100 INSULIN) 100 unit/mL (3 mL) InPn Take as needed before meals: 150-200=+2, 201-250=+4; 251-300=+6; 301-350=+8, over 350=+10 units. Max daily dose 30 units 5 pen 5    insulin glargine U-100, Lantus, (BASAGLAR KWIKPEN U-100 INSULIN) 100 unit/mL (3 mL) InPn pen Inject 20 units into the skin  "once daily 15 mL 5    metoprolol succinate (TOPROL-XL) 50 MG 24 hr tablet TAKE 1 TABLET(50 MG) BY MOUTH DAILY 90 tablet 1    omega-3 fatty acids 1,000 mg Cap Take 2 capsules by mouth once daily.      pen needle, diabetic (NOVOFINE 32) 32 gauge x 1/4" Ndle USE FOUR TIMES DAILY AS NEEDED 400 each 3    pioglitazone (ACTOS) 45 MG tablet TAKE 1 TABLET (45 MG) BY MOUTH EVERY DAY 90 tablet 2    tirzepatide (MOUNJARO) 15 mg/0.5 mL PnIj Inject 15 mg (one pen) into the skin every 7 days. 12 Pen 2    valsartan (DIOVAN) 320 MG tablet Take 1 tablet (320 mg total) by mouth once daily. 90 tablet 1    omeprazole (PRILOSEC) 20 MG capsule Take 1 capsule (20 mg total) by mouth 2 (two) times daily. for 14 days 28 capsule 0     No current facility-administered medications for this visit.       Patient Care Team:  Riddhi Galindo MD as PCP - General (Internal Medicine)  Vick Cornelius MD as Consulting Physician (Nephrology)  Munira Garnett LPN as Care Coordinator  Sloane Vasquez PharmD as Hypertension Digital Medicine Clinician (Pharmacist)  Riddhi Galindo MD as Hypertension Digital Medicine Responsible Provider (Internal Medicine)         - The patient is given an After Visit Summary that lists all medications with directions, allergies, education, orders placed during this encounter and follow-up instructions.      - I have reviewed the patient's medical information including past medical, family, and social history sections including the medications and allergies.      - We discussed the patient's current medications.     This note was created by combination of typed  and MModal dictation.  Transcription errors may be present.  If there are any questions, please contact me.       Kaleigh Dick NP               "

## 2025-01-29 NOTE — PROGRESS NOTES
Subjective     Patient ID: David Corona Jr. is a 59 y.o. male.    Chief Complaint: Annual Exam    HPI  Review of Systems   Constitutional: Negative.    HENT: Negative.     Eyes: Negative.    Respiratory: Negative.     Cardiovascular: Negative.    Gastrointestinal: Negative.    Endocrine: Negative.    Genitourinary: Negative.    Musculoskeletal: Negative.    Integumentary:  Negative.   Allergic/Immunologic: Negative.    Neurological: Negative.    Hematological: Negative.    Psychiatric/Behavioral: Negative.       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   Constitutional:       Appearance: Normal appearance.   HENT:      Head: Normocephalic.      Nose: Nose normal.   Eyes:      Pupils: Pupils are equal, round, and reactive to light.   Cardiovascular:      Rate and Rhythm: Normal rate.      Heart sounds: Normal heart sounds.   Pulmonary:      Effort: Pulmonary effort is normal.   Musculoskeletal:         General: Normal range of motion.      Cervical back: Normal range of motion.   Skin:     General: Skin is warm and dry.      Capillary Refill: Capillary refill takes less than 2 seconds.   Neurological:       General: No focal deficit present.   Psychiatric:         Mood and Affect: Mood normal.        Assessment and Plan                  No follow-ups on file.

## 2025-01-31 ENCOUNTER — TELEPHONE (OUTPATIENT)
Dept: OPHTHALMOLOGY | Facility: CLINIC | Age: 60
End: 2025-01-31
Payer: COMMERCIAL

## 2025-01-31 NOTE — TELEPHONE ENCOUNTER
----- Message from Marguerite sent at 1/31/2025  1:21 PM CST -----  Type:  Sooner Apoointment Request  Caller is requesting a sooner appointment.  Name of Caller:David  When is the first available appointment?December overdue  Symptoms:Annual  Would the patient rather a call back or a response via [facility]? call  Best Call Back Number: [phone]  Additional Information:

## 2025-02-07 DIAGNOSIS — I10 ESSENTIAL HYPERTENSION: ICD-10-CM

## 2025-02-07 RX ORDER — METOPROLOL SUCCINATE 50 MG/1
TABLET, EXTENDED RELEASE ORAL
Qty: 90 TABLET | Refills: 1 | Status: SHIPPED | OUTPATIENT
Start: 2025-02-07

## 2025-02-07 NOTE — TELEPHONE ENCOUNTER
Refill Routing Note   Medication(s) are not appropriate for processing by [facility] for the following reason(s):        Patient not seen by provider within 15 months    [facility] action(s):  Defer               Appointments  past 12m or future 3m with PCP    Date Provider   Last Visit   6/5/2023 Riddhi Galindo MD   Next Visit   6/16/2025 Riddhi Galindo MD   ED visits in past 90 days: 0        Note composed:10:22 AM 02/07/2025

## 2025-02-07 NOTE — TELEPHONE ENCOUNTER
No care due was identified.  [facility] Embedded Care Due Messages. Reference number: [id number].   2/07/2025 5:52:23 AM CST

## 2025-02-10 ENCOUNTER — HOSPITAL ENCOUNTER (OUTPATIENT)
Dept: RADIOLOGY | Facility: HOSPITAL | Age: 60
Discharge: HOME | End: 2025-02-10
Attending: STUDENT IN AN ORGANIZED HEALTH CARE EDUCATION/TRAINING PROGRAM
Payer: COMMERCIAL

## 2025-02-10 ENCOUNTER — PATIENT MESSAGE (OUTPATIENT)
Dept: BARIATRICS | Facility: CLINIC | Age: 60
End: 2025-02-10
Payer: COMMERCIAL

## 2025-02-10 DIAGNOSIS — N18.31 STAGE 3A CHRONIC KIDNEY DISEASE: ICD-10-CM

## 2025-02-10 PROCEDURE — 76770 US EXAM ABDO BACK WALL COMP: CPT | Mod: TC

## 2025-02-10 PROCEDURE — 76770 US EXAM ABDO BACK WALL COMP: CPT | Mod: 26,,, | Performed by: STUDENT IN AN ORGANIZED HEALTH CARE EDUCATION/TRAINING PROGRAM

## 2025-02-12 ENCOUNTER — CLINICAL SUPPORT (OUTPATIENT)
Dept: BARIATRICS | Facility: CLINIC | Age: 60
End: 2025-02-12
Payer: COMMERCIAL

## 2025-02-12 DIAGNOSIS — E11.29 POORLY CONTROLLED TYPE 2 DIABETES MELLITUS WITH RENAL COMPLICATION: ICD-10-CM

## 2025-02-12 DIAGNOSIS — Z71.3 DIETARY COUNSELING: Primary | ICD-10-CM

## 2025-02-12 DIAGNOSIS — I10 ESSENTIAL HYPERTENSION: ICD-10-CM

## 2025-02-12 DIAGNOSIS — E11.65 POORLY CONTROLLED TYPE 2 DIABETES MELLITUS WITH RENAL COMPLICATION: ICD-10-CM

## 2025-02-12 DIAGNOSIS — E66.01 MORBID OBESITY WITH BMI OF 50.0-59.9, ADULT: ICD-10-CM

## 2025-02-12 PROCEDURE — 97803 MED NUTRITION INDIV SUBSEQ: CPT | Mod: 95,,, | Performed by: DIETITIAN, REGISTERED

## 2025-02-12 NOTE — PROGRESS NOTES
The patient location is: [place] ([place])  The chief complaint leading to consultation is: morbid obesity    Visit type: audiovisual    Face to Face time with patient: 15 min  15 minutes of total time spent on the encounter, which includes face to face time and non-face to face time preparing to see the patient (eg, review of tests), Obtaining and/or reviewing separately obtained history, Documenting clinical information in the electronic or other health record, Independently interpreting results (not separately reported) and communicating results to the patient/family/caregiver, or Care coordination (not separately reported).         Each patient to whom he or she provides medical services by telemedicine is:  (1) informed of the relationship between the physician and patient and the respective role of any other health care provider with respect to management of the patient; and (2) notified that he or she may decline to receive medical services by telemedicine and may withdraw from such care at any time.        NUTRITIONAL Note     Referring Physician: Musa Leone M.D.   Reason for MNT Referral: Follow up assessment for sleeve gastrectomy work-up     59 y.o. male presents for follow up with weight stable over the past 3 months; total 12 lbs weight loss by sticking with mostly good eating habits even over the Holidays. He occ picks up fast food with sweet tea for convenient dinners. Plans to cut that out. He has also been drinking coffee in the evenings instead of having a second protein shake. He plans to wean off the coffee in preparation for surgery and go back to having the second protein shake. He also plans to do more walking around [employer] in the evenings now that it is track season. States he is ready and willing to make the necessary dietary and lifestyle changes in preparation for bariatric surgery.              Past Medical History:   Diagnosis Date    Arthritis      Cataract       left eye      CKD (chronic kidney disease) stage 3, GFR 30-59 ml/min      Colon polyp 11/2016    Dependent edema      Diabetes mellitus      Diverticulosis      Gout, arthritis      Hyperlipidemia LDL goal <100      Hypertension      Morbid obesity      TOSIN (obstructive sleep apnea)      Peripheral autonomic neuropathy in disorders classified elsewhere(337.1)      Proteinuria      Type II or unspecified type diabetes mellitus with neurological manifestations, uncontrolled(250.62)      Venous stasis of lower extremity        CLINICAL DATA:  59 y.o.-year-old Black or  male.  Height: 6 ft. 1 in.  Weight: 431 lbs  IBW: 174 lbs  BMI: 56.8 (from 58.4)     DAILY NUTRITIONAL NEEDS: pre-op nutritional bariatric guidelines to promote weight loss  3124-8641 Calories    Grams Protein     NUTRITION & HEALTH HISTORY:  Greatest challenge: eating out/delivery frequency, sweets, starchy CHO, portion control, snacking at night, irregular meal patterns, high-fat diet, and sugar-sweetened beverages     Current diet recall:      B: 2 boiled eggs, fruit like banana or orange. Coffee with powder creamer and S&L  L: Fairlife shake OR Rot chicken  Sn: balanced breaks (cheese, nuts, raisins)  D: meatloaf and salad with light dressing OR fast food with sweet tea  Sn: coffee with powder creamer and S&L     Current Diet:  Meal pattern: 2 meals + snacking + 1-2 protein supplements  Protein supplements: Fairlife shakes  Snacking: fruit, nuts, balanced breaks (cheese, nuts, raisins)  Vegetables: Likes a variety. Eats daily.  Fruits: Likes a variety. Eats rarely.  Beverages: water, and coffee without sugar. Occ sweet tea with fast food meals.  Dining out/delivery: often for dinner. Mostly fast food.  Cooking at home: Weekly.     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 Also works at [employer] 5-9[employer], [employer].  Lives with his 32 yr old son.  Grocery shopping and food prep not done regularly.  Patient believes the household will be supportive after surgery.  Alcohol: None.   Smoking: None.     ASSESSMENT:  Patient demonstrated knowledge of healthy eating behaviors and exercise patterns.  Patient demonstrates willingness to change lifestyle and make behavior modifications AEB weight loss, dietary changes, protein supplements, exercises.     Barriers to Education: none     Stage of change: action     NUTRITION DIAGNOSIS:    Morbid Obesity related to Excessive carbohydrate intake, Excessive calorie intake, and Physical inactivity as evidence by BMI.  Status: Improved     BARIATRIC DIET DISCUSSION/PLAN:  Discussed diet after surgery and related to patient's food record.  Reviewed nutrition guidelines for before and after surgery.  Answered all questions.  Continue to review Bariatric Nutrition Guidebook at home and call with any questions.  Work on Bariatric Nutrition Checklist.  1200-calorie diet.  1500-calorie diet.  5-6 meals per day    Focus goals:  Increase walking, [place] evenings working at the park  Resume 2nd protein shake daily evenings  Avoid fast food. Choose lean protein and veg for dinner meal. Weekly meal prep as an option.     RECOMMENDATIONS:  Pt is a good candidate for bariatric surgery - Sleeve     Patient verbalized understanding.     Will f/u before/after surgery as needed.     Communicated nutrition plan with bariatric team.     SESSION TIME:  15 minutes

## 2025-02-13 ENCOUNTER — TELEPHONE (OUTPATIENT)
Dept: BARIATRICS | Facility: CLINIC | Age: 60
End: 2025-02-13
Payer: COMMERCIAL

## 2025-02-13 NOTE — TELEPHONE ENCOUNTER
Called pt to inquire about cards clearance. Pt stated she did not make appt, but would put on his list to take care of. Informed pt that he will need this by 2/25/2025. Understanding stated. All questions and concerns addressed.

## 2025-02-19 ENCOUNTER — PATIENT MESSAGE (OUTPATIENT)
Dept: ADMINISTRATIVE | Facility: HOSPITAL | Age: 60
End: 2025-02-19
Payer: COMMERCIAL

## 2025-02-19 ENCOUNTER — OFFICE VISIT (OUTPATIENT)
Dept: CARDIOLOGY | Facility: CLINIC | Age: 60
End: 2025-02-19
Payer: COMMERCIAL

## 2025-02-19 VITALS
DIASTOLIC BLOOD PRESSURE: 72 MMHG | SYSTOLIC BLOOD PRESSURE: 128 MMHG | OXYGEN SATURATION: 96 % | HEART RATE: 89 BPM | HEIGHT: 73 IN | BODY MASS INDEX: 41.75 KG/M2 | RESPIRATION RATE: 18 BRPM | WEIGHT: 315 LBS

## 2025-02-19 DIAGNOSIS — G47.33 OSA (OBSTRUCTIVE SLEEP APNEA): ICD-10-CM

## 2025-02-19 DIAGNOSIS — Z01.810 PREOP CARDIOVASCULAR EXAM: Primary | ICD-10-CM

## 2025-02-19 DIAGNOSIS — E66.01 MORBID OBESITY WITH BMI OF 50.0-59.9, ADULT: ICD-10-CM

## 2025-02-19 DIAGNOSIS — I10 ESSENTIAL HYPERTENSION: ICD-10-CM

## 2025-02-19 DIAGNOSIS — R94.31 ABNORMAL EKG: ICD-10-CM

## 2025-02-19 DIAGNOSIS — E78.2 MIXED HYPERLIPIDEMIA: ICD-10-CM

## 2025-02-19 LAB
OHS QRS DURATION: 98 MS
OHS QTC CALCULATION: 464 MS

## 2025-02-19 NOTE — PROGRESS NOTES
CARDIOVASCULAR PROGRESS NOTE    REASON FOR CONSULT:   David Corona Jr. is a 59 y.o. male who presents for f/u of HTN, preop CV eval.    PCP: Josie Andrade Surg: Vasu  HISTORY OF PRESENT ILLNESS:   Last seen November 2023    The patient comes in today for preoperative cardiac risk stratification prior to planned bariatric surgery.  He denies intercurrent angina dyspnea, palpitations, or syncope.  There has been no PND, orthopnea, melena, hematuria, or claudication symptoms.  He tells me he is able to climb up a flight of stairs without any limitation.  Dobutamine stress echocardiogram in November 2024 was negative.    CARDIOVASCULAR HISTORY:   TOSIN on CPAP    PAST MEDICAL HISTORY:     Past Medical History:   Diagnosis Date    Arthritis     Cataract     left eye     CKD (chronic kidney disease) stage 3, GFR 30-59 ml/min     Colon polyp 11/2016    Dependent edema     Diabetes mellitus     Diverticulosis     Gout, arthritis     Hyperlipidemia LDL goal <100     Hypertension     Morbid obesity     TOSIN (obstructive sleep apnea)     Peripheral autonomic neuropathy in disorders classified elsewhere(337.1)     Proteinuria     Type II or unspecified type diabetes mellitus with neurological manifestations, uncontrolled(250.62)     Venous stasis of lower extremity        PAST SURGICAL HISTORY:     Past Surgical History:   Procedure Laterality Date    CATARACT EXTRACTION W/  INTRAOCULAR LENS IMPLANT Right 2007    COLONOSCOPY N/A 11/22/2016    Procedure: COLONOSCOPY;  Surgeon: Abdi La MD;  Location: [facility] ([address] [place] [address]);  Service: Endoscopy;  Laterality: N/A;  2nd floor case/BMI 59.13/wants week of Thanksgiving    COLONOSCOPY N/A 02/28/2022    Procedure: COLONOSCOPY;  Surgeon: Jessi Martinez MD;  Location: [facility] ([address] [place] [address]);  Service: Endoscopy;  Laterality: N/A;  BMI 61.71  covid test [place] 2/25 2/24 pt confirmed appt and new arrival time-Kpvt    EYE SURGERY         ALLERGIES AND MEDICATION:   Review of  patient's allergies indicates:  No Known Allergies     Medication List            Accurate as of February 19, 2025 11:28 AM. If you have any questions, ask your nurse or doctor.                CONTINUE taking these medications      allopurinoL 100 MG tablet  Commonly known as: ZYLOPRIM  Take 1 tablet (100 mg total) by mouth once daily.     aspirin 81 MG EC tablet  Commonly known as: ECOTRIN  Take 1 tablet (81 mg total) by mouth once daily.     atorvastatin 40 MG tablet  Commonly known as: LIPITOR  Take 1 tablet (40 mg total) by mouth every evening.     BASAGLAR KWIKPEN U-100 INSULIN 100 unit/mL (3 mL) Inpn pen  Generic drug: insulin glargine U-100 (Lantus)  Inject 20 units into the skin once daily     colchicine 0.6 mg tablet  Commonly known as: COLCRYS  TAKE 1 TABLET(0.6 MG) BY MOUTH DAILY     DEXCOM G7 SENSOR Clare  Generic drug: blood-glucose sensor  Change every 10 days     diltiaZEM 240 MG 24 hr capsule  Commonly known as: CARDIZEM CD  TAKE 2 CAPSULES BY MOUTH EVERY DAY     FARXIGA 10 mg tablet  Generic drug: dapagliflozin propanediol  Take 1 tablet (10 mg total) by mouth once daily.     FIASP FLEXTOUCH U-100 INSULIN 100 unit/mL (3 mL) Inpn  Generic drug: insulin aspart (niacinamide)  Take as needed before meals: 150-200=+2, 201-250=+4; 251-300=+6; 301-350=+8, over 350=+10 units. Max daily dose 30 units     furosemide 40 MG tablet  Commonly known as: LASIX  Take 1 tablet (40 mg total) by mouth 2 (two) times daily.     hydrALAZINE 50 MG tablet  Commonly known as: APRESOLINE  Take 1 tablet (50 mg total) by mouth 2 (two) times a day.     ibuprofen 600 MG tablet  Commonly known as: ADVIL,MOTRIN  TAKE 1 TABLET(600 MG) BY MOUTH EVERY NIGHT AS NEEDED FOR PAIN     metoprolol succinate 50 MG 24 hr tablet  Commonly known as: TOPROL-XL  TAKE 1 TABLET(50 MG) BY MOUTH DAILY     MOUNJARO 15 mg/0.5 mL Pnij  Generic drug: tirzepatide  Inject 15 mg (one pen) into the skin every 7 days.     multivitamin with minerals tablet    "  NovoLOG Flexpen U-100 Insulin 100 unit/mL (3 mL) Inpn pen  Generic drug: insulin aspart U-100  Take as needed before meals: 150-200=+2, 201-250=+4; 251-300=+6; 301-350=+8, over 350=+10 units. Max daily dose 30 units     omega-3 fatty acids 1,000 mg Cap     omeprazole 20 MG capsule  Commonly known as: PRILOSEC  Take 1 capsule (20 mg total) by mouth 2 (two) times daily. for 14 days     pen needle, diabetic 32 gauge x 1/4" Ndle  Commonly known as: NOVOFINE 32  USE FOUR TIMES DAILY AS NEEDED     pioglitazone 45 MG tablet  Commonly known as: ACTOS  TAKE 1 TABLET (45 MG) BY MOUTH EVERY DAY     valsartan 320 MG tablet  Commonly known as: DIOVAN  Take 1 tablet (320 mg total) by mouth once daily.              SOCIAL HISTORY:     Social History     Socioeconomic History    Marital status:     Number of children: 3   Occupational History    Occupation:      Employer: [employer]    Occupation:    Tobacco Use    Smoking status: Never    Smokeless tobacco: Never   Substance and Sexual Activity    Alcohol use: Not Currently     Comment: rarely    Drug use: No    Sexual activity: Yes     Partners: Female     Birth control/protection: None     Social Drivers of Health     Financial Resource Strain: Low Risk  (5/6/2024)    Received from [facility]    Overall Financial Resource Strain (CARDIA)     Difficulty of Paying Living Expenses: Not hard at all   Food Insecurity: No Food Insecurity (5/6/2024)    Received from [facility]    Hunger Vital Sign     Worried About Running Out of Food in the Last Year: Never true     Ran Out of Food in the Last Year: Never true   Transportation Needs: No Transportation Needs (5/6/2024)    Received from [facility]    PRAPARE - Transportation     Lack of Transportation (Medical): No     Lack of Transportation (Non-Medical): No   Physical Activity: Insufficiently " Active (5/6/2024)    Received from [facility]    Exercise Vital Sign     Days of Exercise per Week: 5 days     Minutes of Exercise per Session: 20 min   Stress: No Stress Concern Present (5/6/2024)    Received from [facility]    [employer] - Occupational Stress Questionnaire     Feeling of Stress : Not at all   Housing Stability: Low Risk  (4/8/2024)    Housing Stability Vital Sign     Unable to Pay for Housing in the Last Year: No     Number of Places Lived in the Last Year: 1     Unstable Housing in the Last Year: No       FAMILY HISTORY:     Family History   Problem Relation Name Age of Onset    Breast cancer Mother inge blair doshirley     Diabetes Mother inge blair doshirley     Hypertension Mother inge blair doshirley     Stroke Mother inge blair dobblobo     Cancer Mother inge blair doshirley     Prostate cancer Father demi cota dobblobo sr     Diabetes Father demi cota dobblobo sr     Hypertension Father demi cota doshirley sr     Cancer Father demi trujillo sr     No Known Problems Sister Cass     No Known Problems Brother Bebeto     Breast cancer Maternal Grandmother      Amblyopia Neg Hx      Blindness Neg Hx      Cataracts Neg Hx      Glaucoma Neg Hx      Macular degeneration Neg Hx      Retinal detachment Neg Hx      Strabismus Neg Hx         REVIEW OF SYSTEMS:   Review of Systems   Constitutional:  Negative for chills, diaphoresis and fever.   HENT:  Negative for nosebleeds.    Eyes:  Negative for blurred vision, double vision and photophobia.   Respiratory:  Negative for hemoptysis, shortness of breath and wheezing.    Cardiovascular:  Negative for chest pain, palpitations, orthopnea, claudication, leg swelling and PND.   Gastrointestinal:  Negative for abdominal pain, blood in stool, heartburn, melena, nausea and vomiting.   Genitourinary:  Negative for flank pain and hematuria.   Musculoskeletal:  Negative for falls, myalgias and neck pain.  "  Skin:  Negative for rash.   Neurological:  Negative for dizziness, seizures, loss of consciousness, weakness and headaches.   Endo/Heme/Allergies:  Negative for polydipsia. Does not bruise/bleed easily.   Psychiatric/Behavioral:  Negative for depression and memory loss. The patient is not nervous/anxious.        PHYSICAL EXAM:     Vitals:    02/19/25 1115   BP: 128/72   Pulse: 89   Resp: 18        Body mass index is 57.45 kg/m².  Weight: (!) 197.5 kg (435 lb 6.5 oz)   Height: 6' 1" (185.4 cm)     Physical Exam  Vitals reviewed.   Constitutional:       General: He is not in acute distress.     Appearance: He is well-developed. He is obese. He is not ill-appearing, toxic-appearing or diaphoretic.   HENT:      Head: Normocephalic and atraumatic.   Eyes:      General: No scleral icterus.     Extraocular Movements: Extraocular movements intact.      Conjunctiva/sclera: Conjunctivae normal.      Pupils: Pupils are equal, round, and reactive to light.   Neck:      Thyroid: No thyromegaly.      Vascular: Normal carotid pulses. No carotid bruit or JVD.      Trachea: Trachea normal. No tracheal deviation.   Cardiovascular:      Rate and Rhythm: Normal rate and regular rhythm.      Pulses:           Carotid pulses are 2+ on the right side and 2+ on the left side.     Heart sounds: S1 normal and S2 normal. Heart sounds are distant. No murmur heard.     No friction rub. No gallop.   Pulmonary:      Effort: Pulmonary effort is normal. No respiratory distress.      Breath sounds: Normal breath sounds. No stridor. No wheezing, rhonchi or rales.   Chest:      Chest wall: No tenderness.   Abdominal:      General: There is no distension.      Palpations: Abdomen is soft.   Musculoskeletal:         General: No swelling or tenderness. Normal range of motion.      Cervical back: Normal range of motion and neck supple. No edema or rigidity.      Right lower leg: No edema.      Left lower leg: No edema.   Feet:      Right foot:      Skin " integrity: No ulcer.      Left foot:      Skin integrity: No ulcer.   Skin:     General: Skin is warm and dry.      Coloration: Skin is not jaundiced.   Neurological:      General: No focal deficit present.      Mental Status: He is alert and oriented to person, place, and time.      Cranial Nerves: No cranial nerve deficit.   Psychiatric:         Mood and Affect: Mood normal.         Speech: Speech normal.         Behavior: Behavior normal. Behavior is cooperative.         DATA:   EKG: (personally reviewed tracing(s))  2/19/25 SR 85, PRWP, similar to 11/19/24    Laboratory:  CBC:  Recent Labs   Lab 11/16/24  0820 01/25/25  0813   WBC 7.36 8.38   Hemoglobin 13.6 L 13.6 L   Hematocrit 42.9 41.9   Platelets 290 263       CHEMISTRIES:  Recent Labs   Lab 11/16/24  0820 11/23/24  0947 01/25/25  0813   Glucose 69 L 107 116 H   Sodium 137 138 137   Potassium 4.2 4.2 3.8   BUN 26 H 25 H 21 H   Creatinine 1.5 H 1.5 H 1.5 H   Calcium 9.3 9.4 9.1   Magnesium 2.0  --   --    Again again    CARDIAC BIOMARKERS:        COAGS:        LIPIDS/LFTS:  Recent Labs   Lab 06/03/23  0810 11/04/23  0945 08/19/24  0725 11/16/24  0820   Cholesterol 111 L 107 L  --  116 L  116 L   Triglycerides 84 56  --  48  48   HDL 44 43  --  37 L  37 L   LDL Cholesterol 50.2 L 52.8 L  --  69.4  69.4   Non-HDL Cholesterol 67 64  --  79  79   AST  --  26 25 30   ALT  --  8 L 8 L 8 L       Cardiovascular Testing:  DSE 11/19/24    Left Ventricle: The left ventricle is normal in size. Normal wall thickness. There is concentric remodeling. Normal wall motion. There is normal systolic function with a visually estimated ejection fraction of 60 - 65%. There is normal diastolic function. Normal left ventricular filling pressure.    Right Ventricle: Normal right ventricular cavity size. Wall thickness is normal. Right ventricle wall motion  is normal. Systolic function is normal.    Left Atrium: Normal left atrial size.    Right Atrium: Normal right atrial  size.    Aortic Valve: The aortic valve is a trileaflet valve. There is moderate aortic valve sclerosis. Mildly calcified cusps. There is mild annular calcification present.    Aorta: Aortic root is normal in size measuring 3.49 cm. Ascending aorta is normal measuring 3.16 cm.    IVC/SVC: Normal venous pressure at 3 mmHg.    Pericardium: There is no pericardial effusion.    Stress Protocol: The patient was infused intravenously with dobutamine. The patient received a graduated infusion of the stress agent beginning at 10.0 mcg/kg/min to a peak dose of 30.0 mcg/kg/min. The peak heart rate was 142 bpm, which is 88% of age predicted maximum heart rate. The patient was also given atropine for a total dose of .25 mg. The patient reported no symptoms during the stress test. The test was stopped because the end of the protocol was reached.    Baseline ECG: The Baseline ECG reveals sinus rhythm with 1st degree A/V block. The axis is normal. The ST segments are normal.    Stress ECG: There is no ST segment deviation identified during the protocol. During stress, occasional PVCs are noted. There is normal blood pressure response with stress.    ECG Conclusion: The ECG portion of the study is negative for ischemia.    Post-stress Echo: Left ventricle cavity appears normal post-stress. The left ventricle systolic function is hyperdynamic. The post-stress images show normal wall motion. Right ventricle cavity size appears normal. Right ventricle systolic function is normal.    Post-stress Conclusion: The study is negative with no echocardiographic evidence of stress induced ischemia.    Echo 5/26/23  The estimated ejection fraction is 65%.  The left ventricle is normal in size with mild concentric hypertrophy and normal systolic function.  Normal right ventricular size with normal right ventricular systolic function.  Mild left atrial enlargement.  The estimated PA systolic pressure is 11 mmHg.  Normal central venous pressure (3  mmHg).    ASSESSMENT:   # HTN, controlled in office today, following in digital med program.  # BMI 57, down 2 unit(s) vs last OV.  Preop for gloria surgery.  The patient describes acceptable exercise capacity, recent negative stress echocardiogram.  RCRI=1 (IDDM).  # TOSIN on CPAP  # DM  # HLP on atorva 40mg, LDL at goal.    PLAN:   Cont med rx  The patient is at low cardiac risk for the planned bariatric surgery and associated anesthesia.  No further preoperative cardiac testing is required.  Diet/exercise/weight loss  RTC prn    Kavon Messer MD, FACC

## 2025-02-25 ENCOUNTER — OFFICE VISIT (OUTPATIENT)
Dept: BARIATRICS | Facility: CLINIC | Age: 60
End: 2025-02-25
Payer: COMMERCIAL

## 2025-02-25 ENCOUNTER — LAB VISIT (OUTPATIENT)
Dept: LAB | Facility: HOSPITAL | Age: 60
End: 2025-02-25
Attending: SURGERY
Payer: COMMERCIAL

## 2025-02-25 ENCOUNTER — DOCUMENTATION ONLY (OUTPATIENT)
Dept: BARIATRICS | Facility: CLINIC | Age: 60
End: 2025-02-25
Payer: COMMERCIAL

## 2025-02-25 VITALS
SYSTOLIC BLOOD PRESSURE: 136 MMHG | HEART RATE: 85 BPM | OXYGEN SATURATION: 98 % | DIASTOLIC BLOOD PRESSURE: 73 MMHG | HEIGHT: 73 IN | WEIGHT: 315 LBS | BODY MASS INDEX: 41.75 KG/M2

## 2025-02-25 DIAGNOSIS — E66.01 MORBID OBESITY WITH BMI OF 50.0-59.9, ADULT: ICD-10-CM

## 2025-02-25 DIAGNOSIS — G47.33 OSA (OBSTRUCTIVE SLEEP APNEA): ICD-10-CM

## 2025-02-25 DIAGNOSIS — E11.40 POORLY CONTROLLED TYPE 2 DIABETES MELLITUS WITH NEUROPATHY: ICD-10-CM

## 2025-02-25 DIAGNOSIS — E66.01 MORBID OBESITY WITH BMI OF 50.0-59.9, ADULT: Primary | ICD-10-CM

## 2025-02-25 DIAGNOSIS — I87.8 VENOUS STASIS OF LOWER EXTREMITY: Primary | ICD-10-CM

## 2025-02-25 DIAGNOSIS — I10 ESSENTIAL HYPERTENSION: ICD-10-CM

## 2025-02-25 DIAGNOSIS — E11.65 POORLY CONTROLLED TYPE 2 DIABETES MELLITUS WITH NEUROPATHY: ICD-10-CM

## 2025-02-25 DIAGNOSIS — E78.5 HYPERLIPIDEMIA LDL GOAL <100: ICD-10-CM

## 2025-02-25 LAB
ALBUMIN SERPL BCP-MCNC: 3.5 G/DL (ref 3.5–5.2)
ALP SERPL-CCNC: 127 U/L (ref 40–150)
ALT SERPL W/O P-5'-P-CCNC: 8 U/L (ref 10–44)
ANION GAP SERPL CALC-SCNC: 7 MMOL/L (ref 8–16)
AST SERPL-CCNC: 29 U/L (ref 10–40)
BASOPHILS # BLD AUTO: 0.04 K/UL (ref 0–0.2)
BASOPHILS NFR BLD: 0.5 % (ref 0–1.9)
BILIRUB SERPL-MCNC: 0.7 MG/DL (ref 0.1–1)
BUN SERPL-MCNC: 23 MG/DL (ref 6–20)
CALCIUM SERPL-MCNC: 9.2 MG/DL (ref 8.7–10.5)
CHLORIDE SERPL-SCNC: 105 MMOL/L (ref 95–110)
CO2 SERPL-SCNC: 26 MMOL/L (ref 23–29)
CREAT SERPL-MCNC: 1.3 MG/DL (ref 0.5–1.4)
DIFFERENTIAL METHOD BLD: ABNORMAL
EOSINOPHIL # BLD AUTO: 0.2 K/UL (ref 0–0.5)
EOSINOPHIL NFR BLD: 1.9 % (ref 0–8)
ERYTHROCYTE [DISTWIDTH] IN BLOOD BY AUTOMATED COUNT: 16.8 % (ref 11.5–14.5)
EST. GFR  (NO RACE VARIABLE): >60 ML/MIN/1.73 M^2
ESTIMATED AVG GLUCOSE: 128 MG/DL (ref 68–131)
GLUCOSE SERPL-MCNC: 119 MG/DL (ref 70–110)
HBA1C MFR BLD: 6.1 % (ref 4–5.6)
HCT VFR BLD AUTO: 43.1 % (ref 40–54)
HGB BLD-MCNC: 14.3 G/DL (ref 14–18)
IMM GRANULOCYTES # BLD AUTO: 0.02 K/UL (ref 0–0.04)
IMM GRANULOCYTES NFR BLD AUTO: 0.3 % (ref 0–0.5)
LYMPHOCYTES # BLD AUTO: 2 K/UL (ref 1–4.8)
LYMPHOCYTES NFR BLD: 25.1 % (ref 18–48)
MCH RBC QN AUTO: 31.7 PG (ref 27–31)
MCHC RBC AUTO-ENTMCNC: 33.2 G/DL (ref 32–36)
MCV RBC AUTO: 96 FL (ref 82–98)
MONOCYTES # BLD AUTO: 0.6 K/UL (ref 0.3–1)
MONOCYTES NFR BLD: 8.1 % (ref 4–15)
NEUTROPHILS # BLD AUTO: 5 K/UL (ref 1.8–7.7)
NEUTROPHILS NFR BLD: 64.1 % (ref 38–73)
NRBC BLD-RTO: 0 /100 WBC
PLATELET # BLD AUTO: 264 K/UL (ref 150–450)
PMV BLD AUTO: 11.3 FL (ref 9.2–12.9)
POTASSIUM SERPL-SCNC: 4.1 MMOL/L (ref 3.5–5.1)
PROT SERPL-MCNC: 7.7 G/DL (ref 6–8.4)
RBC # BLD AUTO: 4.51 M/UL (ref 4.6–6.2)
SODIUM SERPL-SCNC: 138 MMOL/L (ref 136–145)
WBC # BLD AUTO: 7.76 K/UL (ref 3.9–12.7)

## 2025-02-25 PROCEDURE — 3078F DIAST BP <80 MM HG: CPT | Mod: CPTII,S$GLB,, | Performed by: SURGERY

## 2025-02-25 PROCEDURE — 1159F MED LIST DOCD IN RCRD: CPT | Mod: CPTII,S$GLB,, | Performed by: SURGERY

## 2025-02-25 PROCEDURE — 4010F ACE/ARB THERAPY RXD/TAKEN: CPT | Mod: CPTII,S$GLB,, | Performed by: SURGERY

## 2025-02-25 PROCEDURE — 99999 PR PBB SHADOW E&M-EST. PATIENT-LVL IV: CPT | Mod: PBBFAC,,, | Performed by: SURGERY

## 2025-02-25 PROCEDURE — 3044F HG A1C LEVEL LT 7.0%: CPT | Mod: CPTII,S$GLB,, | Performed by: SURGERY

## 2025-02-25 PROCEDURE — 1160F RVW MEDS BY RX/DR IN RCRD: CPT | Mod: CPTII,S$GLB,, | Performed by: SURGERY

## 2025-02-25 PROCEDURE — 3066F NEPHROPATHY DOC TX: CPT | Mod: CPTII,S$GLB,, | Performed by: SURGERY

## 2025-02-25 PROCEDURE — 3008F BODY MASS INDEX DOCD: CPT | Mod: CPTII,S$GLB,, | Performed by: SURGERY

## 2025-02-25 PROCEDURE — 99214 OFFICE O/P EST MOD 30 MIN: CPT | Mod: S$GLB,,, | Performed by: SURGERY

## 2025-02-25 PROCEDURE — 85025 COMPLETE CBC W/AUTO DIFF WBC: CPT | Performed by: SURGERY

## 2025-02-25 PROCEDURE — 80053 COMPREHEN METABOLIC PANEL: CPT | Performed by: SURGERY

## 2025-02-25 PROCEDURE — 3075F SYST BP GE 130 - 139MM HG: CPT | Mod: CPTII,S$GLB,, | Performed by: SURGERY

## 2025-02-25 PROCEDURE — 36415 COLL VENOUS BLD VENIPUNCTURE: CPT | Performed by: SURGERY

## 2025-02-25 PROCEDURE — 83036 HEMOGLOBIN GLYCOSYLATED A1C: CPT | Performed by: SURGERY

## 2025-02-25 RX ORDER — PROCHLORPERAZINE EDISYLATE 5 MG/ML
5 INJECTION INTRAMUSCULAR; INTRAVENOUS EVERY 6 HOURS PRN
OUTPATIENT
Start: 2025-02-25

## 2025-02-25 RX ORDER — ONDANSETRON HYDROCHLORIDE 2 MG/ML
8 INJECTION, SOLUTION INTRAVENOUS EVERY 6 HOURS
OUTPATIENT
Start: 2025-02-25

## 2025-02-25 RX ORDER — GABAPENTIN 250 MG/5ML
300 SOLUTION ORAL 2 TIMES DAILY
OUTPATIENT
Start: 2025-02-25

## 2025-02-25 RX ORDER — DEXTROMETHORPHAN/PSEUDOEPHED 2.5-7.5/.8
40 DROPS ORAL 4 TIMES DAILY PRN
OUTPATIENT
Start: 2025-02-25

## 2025-02-25 RX ORDER — MUPIROCIN 20 MG/G
OINTMENT TOPICAL
OUTPATIENT
Start: 2025-02-25

## 2025-02-25 RX ORDER — POLYETHYLENE GLYCOL 3350 17 G/17G
17 POWDER, FOR SOLUTION ORAL DAILY
Qty: 510 G | Refills: 0 | Status: SHIPPED | OUTPATIENT
Start: 2025-02-25 | End: 2025-03-27

## 2025-02-25 RX ORDER — CLARITHROMYCIN 500 MG/1
500 TABLET, FILM COATED ORAL 2 TIMES DAILY
Qty: 28 TABLET | Refills: 0 | Status: SHIPPED | OUTPATIENT
Start: 2025-02-25 | End: 2025-03-12

## 2025-02-25 RX ORDER — ACETAMINOPHEN 650 MG/20.3ML
500 LIQUID ORAL
OUTPATIENT
Start: 2025-02-25

## 2025-02-25 RX ORDER — OMEPRAZOLE 20 MG/1
20 CAPSULE, DELAYED RELEASE ORAL 2 TIMES DAILY
Qty: 28 CAPSULE | Refills: 0 | Status: SHIPPED | OUTPATIENT
Start: 2025-02-25 | End: 2025-03-13

## 2025-02-25 RX ORDER — ONDANSETRON 8 MG/1
8 TABLET, ORALLY DISINTEGRATING ORAL EVERY 6 HOURS PRN
Qty: 30 TABLET | Refills: 0 | Status: SHIPPED | OUTPATIENT
Start: 2025-02-25

## 2025-02-25 RX ORDER — AMOXICILLIN 500 MG/1
1000 TABLET, FILM COATED ORAL EVERY 12 HOURS
Qty: 56 TABLET | Refills: 0 | Status: SHIPPED | OUTPATIENT
Start: 2025-02-25 | End: 2025-03-13

## 2025-02-25 RX ORDER — KETOROLAC TROMETHAMINE 15 MG/ML
15 INJECTION, SOLUTION INTRAMUSCULAR; INTRAVENOUS ONCE
OUTPATIENT
Start: 2025-02-25 | End: 2025-02-25

## 2025-02-25 RX ORDER — HEPARIN SODIUM 5000 [USP'U]/ML
5000 INJECTION, SOLUTION INTRAVENOUS; SUBCUTANEOUS ONCE
OUTPATIENT
Start: 2025-02-25 | End: 2025-02-25

## 2025-02-25 RX ORDER — ENOXAPARIN SODIUM 100 MG/ML
60 INJECTION SUBCUTANEOUS EVERY 12 HOURS
OUTPATIENT
Start: 2025-02-25

## 2025-02-25 RX ORDER — FAMOTIDINE 10 MG/ML
20 INJECTION INTRAVENOUS ONCE
OUTPATIENT
Start: 2025-02-25 | End: 2025-02-25

## 2025-02-25 RX ORDER — ACETAMINOPHEN 650 MG/20.3ML
1000 LIQUID ORAL EVERY 8 HOURS
OUTPATIENT
Start: 2025-02-25

## 2025-02-25 RX ORDER — SODIUM CHLORIDE 9 MG/ML
INJECTION, SOLUTION INTRAVENOUS CONTINUOUS
OUTPATIENT
Start: 2025-02-25

## 2025-02-25 RX ORDER — GABAPENTIN 300 MG/1
CAPSULE ORAL
Qty: 11 CAPSULE | Refills: 0 | Status: SHIPPED | OUTPATIENT
Start: 2025-02-25

## 2025-02-25 RX ORDER — OXYCODONE HCL 5 MG/5 ML
5 SOLUTION, ORAL ORAL EVERY 6 HOURS PRN
Refills: 0 | OUTPATIENT
Start: 2025-02-25

## 2025-02-25 RX ORDER — SODIUM CHLORIDE, SODIUM LACTATE, POTASSIUM CHLORIDE, CALCIUM CHLORIDE 600; 310; 30; 20 MG/100ML; MG/100ML; MG/100ML; MG/100ML
INJECTION, SOLUTION INTRAVENOUS CONTINUOUS
OUTPATIENT
Start: 2025-02-25

## 2025-02-25 RX ORDER — URSODIOL 500 MG/1
500 TABLET, FILM COATED ORAL DAILY
Qty: 30 TABLET | Refills: 5 | Status: SHIPPED | OUTPATIENT
Start: 2025-02-25 | End: 2025-08-24

## 2025-02-25 RX ORDER — PANTOPRAZOLE SODIUM 40 MG/10ML
40 INJECTION, POWDER, LYOPHILIZED, FOR SOLUTION INTRAVENOUS 2 TIMES DAILY
OUTPATIENT
Start: 2025-02-25

## 2025-02-25 RX ORDER — LIDOCAINE HYDROCHLORIDE 10 MG/ML
1 INJECTION, SOLUTION EPIDURAL; INFILTRATION; INTRACAUDAL; PERINEURAL ONCE
OUTPATIENT
Start: 2025-02-25 | End: 2025-02-25

## 2025-02-25 RX ORDER — ACETAMINOPHEN 10 MG/ML
1000 INJECTION, SOLUTION INTRAVENOUS ONCE
OUTPATIENT
Start: 2025-02-25 | End: 2025-02-25

## 2025-02-25 RX ORDER — OMEPRAZOLE 40 MG/1
40 CAPSULE, DELAYED RELEASE ORAL EVERY MORNING
Qty: 30 CAPSULE | Refills: 2 | Status: SHIPPED | OUTPATIENT
Start: 2025-02-25

## 2025-02-25 RX ORDER — SCOPOLAMINE 1 MG/3D
1 PATCH, EXTENDED RELEASE TRANSDERMAL ONCE
OUTPATIENT
Start: 2025-02-25 | End: 2025-02-25

## 2025-02-25 NOTE — PROGRESS NOTES
History & Physical    SUBJECTIVE:     History of Present Illness:  David Corona Jr. is a 59 y.o. male who presents for pre-operative exam for weight loss surgery.  he has completed him pre-operative evaluation.  he has failed medical treatment for obesity.      Chief Complaint   Patient presents with    Pre-op Exam     Dr. Vasu chaparro       Review of patient's allergies indicates:  No Known Allergies    Current Outpatient Medications   Medication Sig    allopurinoL (ZYLOPRIM) 100 MG tablet Take 1 tablet (100 mg total) by mouth once daily.    aspirin (ECOTRIN) 81 MG EC tablet Take 1 tablet (81 mg total) by mouth once daily.    atorvastatin (LIPITOR) 40 MG tablet Take 1 tablet (40 mg total) by mouth every evening.    blood-glucose sensor (DEXCOM G7 SENSOR) Clare Change every 10 days    colchicine (COLCRYS) 0.6 mg tablet TAKE 1 TABLET(0.6 MG) BY MOUTH DAILY    dapagliflozin propanediol (FARXIGA) 10 mg tablet Take 1 tablet (10 mg total) by mouth once daily.    diltiaZEM (CARDIZEM CD) 240 MG 24 hr capsule TAKE 2 CAPSULES BY MOUTH EVERY DAY    furosemide (LASIX) 40 MG tablet Take 1 tablet (40 mg total) by mouth 2 (two) times daily.    hydrALAZINE (APRESOLINE) 50 MG tablet Take 1 tablet (50 mg total) by mouth 2 (two) times a day.    ibuprofen (ADVIL,MOTRIN) 600 MG tablet TAKE 1 TABLET(600 MG) BY MOUTH EVERY NIGHT AS NEEDED FOR PAIN    insulin aspart U-100 (NOVOLOG FLEXPEN U-100 INSULIN) 100 unit/mL (3 mL) InPn pen Take as needed before meals: 150-200=+2, 201-250=+4; 251-300=+6; 301-350=+8, over 350=+10 units. Max daily dose 30 units    insulin aspart, niacinamide, (FIASP FLEXTOUCH U-100 INSULIN) 100 unit/mL (3 mL) InPn Take as needed before meals: 150-200=+2, 201-250=+4; 251-300=+6; 301-350=+8, over 350=+10 units. Max daily dose 30 units    insulin glargine U-100, Lantus, (BASAGLAR KWIKPEN U-100 INSULIN) 100 unit/mL (3 mL) InPn pen Inject 20 units into the skin once daily    metoprolol succinate (TOPROL-XL) 50 MG  "24 hr tablet TAKE 1 TABLET(50 MG) BY MOUTH DAILY    multivitamin with minerals tablet Take 1 tablet by mouth once daily.    omega-3 fatty acids 1,000 mg Cap Take 2 capsules by mouth once daily.    pen needle, diabetic (NOVOFINE 32) 32 gauge x 1/4" Ndle USE FOUR TIMES DAILY AS NEEDED    pioglitazone (ACTOS) 45 MG tablet TAKE 1 TABLET (45 MG) BY MOUTH EVERY DAY    tirzepatide (MOUNJARO) 15 mg/0.5 mL PnIj Inject 15 mg (one pen) into the skin every 7 days.    valsartan (DIOVAN) 320 MG tablet Take 1 tablet (320 mg total) by mouth once daily.    omeprazole (PRILOSEC) 20 MG capsule Take 1 capsule (20 mg total) by mouth 2 (two) times daily. for 14 days     No current facility-administered medications for this visit.       Past Medical History:   Diagnosis Date    Arthritis     Cataract     left eye     CKD (chronic kidney disease) stage 3, GFR 30-59 ml/min     Colon polyp 11/2016    Dependent edema     Diabetes mellitus     Diverticulosis     Gout, arthritis     Hyperlipidemia LDL goal <100     Hypertension     Morbid obesity     TOSIN (obstructive sleep apnea)     Peripheral autonomic neuropathy in disorders classified elsewhere(337.1)     Proteinuria     Type II or unspecified type diabetes mellitus with neurological manifestations, uncontrolled(250.62)     Venous stasis of lower extremity        Past Surgical History:   Procedure Laterality Date    CATARACT EXTRACTION W/  INTRAOCULAR LENS IMPLANT Right 2007    COLONOSCOPY N/A 11/22/2016    Procedure: COLONOSCOPY;  Surgeon: Abdi La MD;  Location: [address]);  Service: Endoscopy;  Laterality: N/A;  2nd floor case/BMI 59.13/wants week of Thanksgiving    COLONOSCOPY N/A 02/28/2022    Procedure: COLONOSCOPY;  Surgeon: Jessi Martinez MD;  Location: [address]);  Service: Endoscopy;  Laterality: N/A;  BMI 61.71  covid test [place] 2/25 2/24 pt confirmed appt and new arrival time-Kpvt    EYE SURGERY         Review of Systems   Constitutional:  Negative " "for diaphoresis, fever and malaise/fatigue.   HENT:  Negative for sore throat.    Respiratory:  Negative for cough.    Cardiovascular:  Negative for chest pain and claudication.   Gastrointestinal:  Negative for abdominal pain, blood in stool, constipation, diarrhea, heartburn, nausea and vomiting.   Genitourinary:  Negative for dysuria, frequency and urgency.   Neurological:  Negative for dizziness, sensory change, focal weakness and headaches.          OBJECTIVE:     Vitals:    02/25/25 1328   BP: 136/73   Pulse: 85   SpO2: 98%   Weight: (!) 197.1 kg (434 lb 8.4 oz)   Height: 6' 1" (1.854 m)       Physical Exam  Cardiovascular:      Rate and Rhythm: Normal rate and regular rhythm.   Pulmonary:      Effort: Pulmonary effort is normal.      Breath sounds: Normal breath sounds.   Abdominal:      Palpations: Abdomen is soft.   Neurological:      Mental Status: He is alert.          Laboratory  CBC: Reviewed  CMP: Reviewed    Diagnostic Results:  Labs: Reviewed  ECG: Reviewed  Echo: Reviewed     Dietitian: Patient has participated in pre-operative nutritional program with understanding of necessary lifelong dietary changes required with surgery.    Psych: No overt contraindications to bariatric surgery, patient has completed psychological evaluation and is able to give informed consent.    Clearance: Approved pending leg US to r/o DVT    ASSESSMENT/PLAN:     Morbid obesity with failure of medical conservative therapy.    Co Morbid Conditions:   Problem List[1]    Patient wishes to undergo sleeve gastrectomy .      The patient was informed that risks include, but are not limited to: death, leak, obstruction, bleeding, and sepsis. Any of these could require further surgery. Other risks include DVT, PE, pneumonia, wound dehiscence, hernia, wound infection, the need for dilatations and the inability to lose appropriate weight and keep it off.     We discussed that our goal is to ameliorate his medical problems and not to " obtain a specific body mass index. he understands the risks and benefits and wishes to proceed with the procedure.  he has signed a consent form.     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

## 2025-02-25 NOTE — PROGRESS NOTES
"2/25/26 Clinic    Per Dr. Leone"    - Order doppler for bilateral legs  - verify treatment for H-Pylori      Order placed for doppler and Staff Message sent to Vascular to follow up ASAP    "

## 2025-02-25 NOTE — PROGRESS NOTES
I have seen the patient, reviewed the Student's history and physical, assessment and plan. I have personally interviewed and examined the patient at bedside and: agree with the findings.       1. Morbid obesity, body mass index is 58.49 kg/m². and inability to lose weight.  He is on monjouro and has lost only 40lb on this.  2. 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 He denies gerd symptoms    PE: abdomen soft    Cbc, cmp, vitamins reviewed, iron deficiency with mild anemia, ckd3, dm in control, elevated pth, positive hpylori  Cxr reviewed, ok  EKG reviewed, av block, possible ami  Stress echo reviewed    Left Ventricle: The left ventricle is normal in size. Normal wall thickness. There is concentric remodeling. Normal wall motion. There is normal systolic function with a visually estimated ejection fraction of 60 - 65%. There is normal diastolic function. Normal left ventricular filling pressure.    Right Ventricle: Normal right ventricular cavity size. Wall thickness is normal. Right ventricle wall motion  is normal. Systolic function is normal.    Left Atrium: Normal left atrial size.    Right Atrium: Normal right atrial size.    Aortic Valve: The aortic valve is a trileaflet valve. There is moderate aortic valve sclerosis. Mildly calcified cusps. There is mild annular calcification present.    Aorta: Aortic root is normal in size measuring 3.49 cm. Ascending aorta is normal measuring 3.16 cm.    IVC/SVC: Normal venous pressure at 3 mmHg.    Pericardium: There is no pericardial effusion.    Stress Protocol: The patient was infused intravenously with dobutamine. The patient received a graduated infusion of the stress agent beginning at 10.0 mcg/kg/min to a peak dose of 30.0 mcg/kg/min. The peak heart rate was 142 bpm, which is 88% of age predicted maximum heart rate. The patient was also given atropine for a  total dose of .25 mg. The patient reported no symptoms during the stress test. The test was stopped because the end of the protocol was reached.    Baseline ECG: The Baseline ECG reveals sinus rhythm with 1st degree A/V block. The axis is normal. The ST segments are normal.    Stress ECG: There is no ST segment deviation identified during the protocol. During stress, occasional PVCs are noted. There is normal blood pressure response with stress.    ECG Conclusion: The ECG portion of the study is negative for ischemia.    Post-stress Echo: Left ventricle cavity appears normal post-stress. The left ventricle systolic function is hyperdynamic. The post-stress images show normal wall motion. Right ventricle cavity size appears normal. Right ventricle systolic function is normal.    Post-stress Conclusion: The study is negative with no echocardiographic evidence of stress induced ischemia.    Morbid obesity with comorbidities, unable to lose weight for robotic sleeve gastrectomy with egd.  Obtain Aria.  Hpylori treatment.

## 2025-02-26 ENCOUNTER — TELEPHONE (OUTPATIENT)
Dept: BARIATRICS | Facility: CLINIC | Age: 60
End: 2025-02-26
Payer: COMMERCIAL

## 2025-02-26 DIAGNOSIS — I87.8 VENOUS STASIS OF LOWER EXTREMITY: Primary | ICD-10-CM

## 2025-03-05 ENCOUNTER — CLINICAL SUPPORT (OUTPATIENT)
Dept: BARIATRICS | Facility: CLINIC | Age: 60
End: 2025-03-05
Payer: COMMERCIAL

## 2025-03-05 DIAGNOSIS — Z71.3 DIETARY COUNSELING: Primary | ICD-10-CM

## 2025-03-05 DIAGNOSIS — E66.01 MORBID OBESITY WITH BMI OF 50.0-59.9, ADULT: ICD-10-CM

## 2025-03-05 DIAGNOSIS — I10 ESSENTIAL HYPERTENSION: ICD-10-CM

## 2025-03-05 DIAGNOSIS — G47.33 OSA (OBSTRUCTIVE SLEEP APNEA): ICD-10-CM

## 2025-03-05 NOTE — PROGRESS NOTES
Audio Only Telehealth Visit     The patient location is: [place]  The chief complaint leading to consultation is: Pre-op liquid diet and nutrition instructions  Visit type: Virtual visit with audio only (telephone)  Total time spent with patient: 15 mins     The reason for the audio only service rather than synchronous audio and video virtual visit was related to technical difficulties or patient preference/necessity.     Each patient to whom I provide medical services by telemedicine is:  (1) informed of the relationship between the physician and patient and the respective role of any other health care provider with respect to management of the patient; and (2) notified that they may decline to receive medical services by telemedicine and may withdraw from such care at any time. Patient verbally consented to receive this service via voice-only telephone call.    This service was not originating from a related E/M service provided within the previous 7 days nor will  to an E/M service or procedure within the next 24 hours or my soonest available appointment.  Prevailing standard of care was able to be met in this audio-only visit.      NUTRITION NOTE    Bariatric Surgeon: Musa Leone M.D.  Reason for MNT Referral: Pre-op liquid diet and nutrition instructions  Procedure: sleeve gastrectomy  Date of Surgery: 3/19/25    Pre-op liquid diet  Pt using Muscle Milk powder with skim milk, Breezeplay Nutrition Plan for preop liquid diet  Fluids include: Water, Crystal Light, broth, decaf coffee, Gatorade Zero, SF popsicles, SF Jell-o    Discussion:  -  gms of protein per day  - 600-800 calories per day   - No more than 4 g sugar per protein shake/water/powder  - Sugar-free, decaffeinated, non-carbonated fluids  - No fruits, juices, yogurt or pudding on liquid diet  - No herbal supplements including green tea and fish oils for 2 weeks prior to surgery   - No vitamins for 1 week prior to surgery  - Stop GLP-1 1  week prior to surgery    Post-op instructions  - Reviewed nutritional guidelines for post-surgery.    - 1 ounce medicine cups and tracking sheet provided for patient to measure and track fluid intake after surgery.  - Start with 1 oz clear liquids every 15 minutes following surgery, and to increase as tolerated. Aim for 48 fl oz clear fluids daily (includes clear protein drinks and protein soups).  - May begin sipping on protein shakes, as long as maintaining 48 fl oz non-caffeinated clear liquids per day.  - Reviewed bariatric vitamin/mineral regimen, starts 1 week after surgery as tolerated.  - Reviewed common nutritional concerns and prevention tips after bariatric surgery.  - Reminded not to lift anything greater than 10 lbs for 6 week post-surgery   - Encouraged PT to walk as much as tolerable after surgery.     Pt has the following vitamins and minerals to start taking one week after being discharged from hospital:    Flintstones Multivitamin with 18 mg iron  Equate Calcium Citrate 630 mg with vitamin D   B-1 with 250 mg  B-12 sublingual 2500 mcg     Reviewed dosage and timing of vitamin/mineral guidelines.    Remind pt per nursing and medical team to inform our department if taking antibiotics within the 30 days post bariatric surgery or it any other surgeries/procedures are scheduled within 30 days after bariatric surgery.    Pt verbalized understanding of information provided with appropriate questions and comments.     SESSION TIME: 15 minutes

## 2025-03-07 ENCOUNTER — HOSPITAL ENCOUNTER (OUTPATIENT)
Dept: RADIOLOGY | Facility: HOSPITAL | Age: 60
Discharge: HOME | End: 2025-03-07
Attending: SURGERY
Payer: COMMERCIAL

## 2025-03-07 DIAGNOSIS — I87.8 VENOUS STASIS OF LOWER EXTREMITY: ICD-10-CM

## 2025-03-07 PROCEDURE — 93970 EXTREMITY STUDY: CPT | Mod: 26,,, | Performed by: RADIOLOGY

## 2025-03-07 PROCEDURE — 93970 EXTREMITY STUDY: CPT | Mod: TC

## 2025-03-09 RX ORDER — GABAPENTIN 300 MG/1
CAPSULE ORAL
Qty: 11 CAPSULE | Refills: 0 | Status: CANCELLED | OUTPATIENT
Start: 2025-03-09

## 2025-03-10 ENCOUNTER — TELEPHONE (OUTPATIENT)
Dept: BARIATRICS | Facility: CLINIC | Age: 60
End: 2025-03-10
Payer: COMMERCIAL

## 2025-03-10 ENCOUNTER — PATIENT MESSAGE (OUTPATIENT)
Dept: BARIATRICS | Facility: CLINIC | Age: 60
End: 2025-03-10
Payer: COMMERCIAL

## 2025-03-10 RX ORDER — GABAPENTIN 300 MG/1
CAPSULE ORAL
Qty: 11 CAPSULE | Refills: 0 | Status: SHIPPED | OUTPATIENT
Start: 2025-03-10

## 2025-03-10 NOTE — TELEPHONE ENCOUNTER
Called and spoke with the pt. Requested if he had taken one dose of the Gabapentin.  He stated no, he had taken all of it but over time (twice a day).  He said he was confused but he had read that he needs to take it 14 days.  Reviewed the medication instructions with him:      Included that I will call him the working day before his surgery with the time to take the first dose of Gabapentin and the postop instructions for it.     Pt stated preferred Pharmacy:

## 2025-03-10 NOTE — TELEPHONE ENCOUNTER
Called and spoke with the pt.  He stated he already spoke with Ava SAWYER and was on his way to pickup the H pylori stool container now.

## 2025-03-10 NOTE — TELEPHONE ENCOUNTER
----- Message from Marilee sent at 3/10/2025 10:32 AM CDT -----  Type:  Patient Returning CallWho Called:Mr Hernandez Who Left Message for Patient:Ava Does the patient know what this is regarding?:yes Would the patient rather a call back or a response via [employer]? Call Best Call Back Number:[phone] Information: please call back

## 2025-03-10 NOTE — TELEPHONE ENCOUNTER
Called pt to inquire whether he completed his H.Pylori medication regime. Pt stated he has completed all medication. Instructed pt to go his nearest [facility] and  a stool kit. Understanding stated. All questions and concerns addressed.   Needs stool kit, pt to  on 3/12/25 at nearest [facility].

## 2025-03-11 ENCOUNTER — LAB VISIT (OUTPATIENT)
Dept: LAB | Facility: HOSPITAL | Age: 60
End: 2025-03-11
Attending: SURGERY
Payer: COMMERCIAL

## 2025-03-11 DIAGNOSIS — E66.01 MORBID OBESITY WITH BMI OF 50.0-59.9, ADULT: ICD-10-CM

## 2025-03-11 DIAGNOSIS — I10 ESSENTIAL HYPERTENSION: ICD-10-CM

## 2025-03-11 DIAGNOSIS — G47.33 OSA (OBSTRUCTIVE SLEEP APNEA): ICD-10-CM

## 2025-03-11 DIAGNOSIS — E11.40 POORLY CONTROLLED TYPE 2 DIABETES MELLITUS WITH NEUROPATHY: ICD-10-CM

## 2025-03-11 DIAGNOSIS — E78.5 HYPERLIPIDEMIA LDL GOAL <100: ICD-10-CM

## 2025-03-11 DIAGNOSIS — E11.65 POORLY CONTROLLED TYPE 2 DIABETES MELLITUS WITH NEUROPATHY: ICD-10-CM

## 2025-03-11 PROCEDURE — 87338 HPYLORI STOOL AG IA: CPT | Performed by: SURGERY

## 2025-03-12 LAB — H.PYLORI ANTIGEN INTERPRETATION: NEGATIVE

## 2025-03-16 RX ORDER — AMOXICILLIN 500 MG/1
1000 TABLET, FILM COATED ORAL EVERY 12 HOURS
Qty: 56 TABLET | Refills: 0 | Status: CANCELLED | OUTPATIENT
Start: 2025-03-16 | End: 2025-03-30

## 2025-03-18 ENCOUNTER — ANESTHESIA EVENT (OUTPATIENT)
Dept: SURGERY | Facility: HOSPITAL | Age: 60
DRG: 621 | End: 2025-03-18
Payer: COMMERCIAL

## 2025-03-18 NOTE — TELEPHONE ENCOUNTER
Notified patient of arrival time to the [facility]nd floor [facility] at 0600 with expected surgery start time 0800 on 3/19/2025. Instructed patient regarding pre-op instructions including no protein shakes or sugar free clear liquids after midnight but can have a rare sip of water for comfort, showering and preop medications to hold/take per anesthesia/preop. Reminded pt to drink pre-surgery beverage or 8 oz water and take one dose of Gabapentin at 0530. Instructed patient on the s/s of dehydration and for patient to call at the first sign of dehydration. Informed patient that someone from bariatrics will call them 1 week post op to review diet/fluid intake and to ensure adequate hydration. Reminded patient not to take antibiotics for 30 days following surgery or schedule elective procedures involving anesthesia/sedation for 30 days following surgery unless checking with the bariatric clinic first. Pt verbalized understanding. Pt given office phone number for any additional questions/concerns.

## 2025-03-19 ENCOUNTER — HOSPITAL ENCOUNTER (INPATIENT)
Facility: HOSPITAL | Age: 60
LOS: 1 days | Discharge: HOME | DRG: 621 | End: 2025-03-20
Attending: SURGERY | Admitting: SURGERY
Payer: COMMERCIAL

## 2025-03-19 ENCOUNTER — ANESTHESIA (OUTPATIENT)
Dept: SURGERY | Facility: HOSPITAL | Age: 60
DRG: 621 | End: 2025-03-19
Payer: COMMERCIAL

## 2025-03-19 DIAGNOSIS — E66.01 MORBID OBESITY WITH BMI OF 50.0-59.9, ADULT: Primary | ICD-10-CM

## 2025-03-19 PROBLEM — E66.9 OBESITY: Status: ACTIVE | Noted: 2025-03-19

## 2025-03-19 LAB
POCT GLUCOSE: 159 MG/DL (ref 70–110)
POCT GLUCOSE: 232 MG/DL (ref 70–110)
POCT GLUCOSE: 242 MG/DL (ref 70–110)

## 2025-03-19 PROCEDURE — 63600175 PHARM REV CODE 636 W HCPCS

## 2025-03-19 PROCEDURE — 71000016 HC POSTOP RECOV ADDL HR: Performed by: SURGERY

## 2025-03-19 PROCEDURE — 88307 TISSUE EXAM BY PATHOLOGIST: CPT | Mod: 26,,, | Performed by: PATHOLOGY

## 2025-03-19 PROCEDURE — 27100171 HC OXYGEN HIGH FLOW UP TO 24 HOURS

## 2025-03-19 PROCEDURE — 99900035 HC TECH TIME PER 15 MIN (STAT)

## 2025-03-19 PROCEDURE — 0DB64Z3 EXCISION OF STOMACH, PERCUTANEOUS ENDOSCOPIC APPROACH, VERTICAL: ICD-10-PCS | Performed by: SURGERY

## 2025-03-19 PROCEDURE — 37000009 HC ANESTHESIA EA ADD 15 MINS: Performed by: SURGERY

## 2025-03-19 PROCEDURE — 94660 CPAP INITIATION&MGMT: CPT

## 2025-03-19 PROCEDURE — 94761 N-INVAS EAR/PLS OXIMETRY MLT: CPT

## 2025-03-19 PROCEDURE — 63600175 PHARM REV CODE 636 W HCPCS: Performed by: STUDENT IN AN ORGANIZED HEALTH CARE EDUCATION/TRAINING PROGRAM

## 2025-03-19 PROCEDURE — 43775 LAP SLEEVE GASTRECTOMY: CPT | Mod: ,,, | Performed by: SURGERY

## 2025-03-19 PROCEDURE — 88342 IMHCHEM/IMCYTCHM 1ST ANTB: CPT | Performed by: PATHOLOGY

## 2025-03-19 PROCEDURE — 25000003 PHARM REV CODE 250: Performed by: SURGERY

## 2025-03-19 PROCEDURE — 63600175 PHARM REV CODE 636 W HCPCS: Performed by: ANESTHESIOLOGY

## 2025-03-19 PROCEDURE — D9220A PRA ANESTHESIA: Mod: CRNA,,, | Performed by: STUDENT IN AN ORGANIZED HEALTH CARE EDUCATION/TRAINING PROGRAM

## 2025-03-19 PROCEDURE — 82962 GLUCOSE BLOOD TEST: CPT | Performed by: SURGERY

## 2025-03-19 PROCEDURE — 88307 TISSUE EXAM BY PATHOLOGIST: CPT | Performed by: PATHOLOGY

## 2025-03-19 PROCEDURE — 25000003 PHARM REV CODE 250: Performed by: STUDENT IN AN ORGANIZED HEALTH CARE EDUCATION/TRAINING PROGRAM

## 2025-03-19 PROCEDURE — 71000033 HC RECOVERY, INTIAL HOUR: Performed by: SURGERY

## 2025-03-19 PROCEDURE — 27201423 OPTIME MED/SURG SUP & DEVICES STERILE SUPPLY: Performed by: SURGERY

## 2025-03-19 PROCEDURE — 8E0W4CZ ROBOTIC ASSISTED PROCEDURE OF TRUNK REGION, PERCUTANEOUS ENDOSCOPIC APPROACH: ICD-10-PCS | Performed by: SURGERY

## 2025-03-19 PROCEDURE — 64461 PVB THORACIC SINGLE INJ SITE: CPT | Performed by: STUDENT IN AN ORGANIZED HEALTH CARE EDUCATION/TRAINING PROGRAM

## 2025-03-19 PROCEDURE — 36000713 HC OR TIME LEV V EA ADD 15 MIN: Performed by: SURGERY

## 2025-03-19 PROCEDURE — 37000008 HC ANESTHESIA 1ST 15 MINUTES: Performed by: SURGERY

## 2025-03-19 PROCEDURE — 0DJ08ZZ INSPECTION OF UPPER INTESTINAL TRACT, VIA NATURAL OR ARTIFICIAL OPENING ENDOSCOPIC: ICD-10-PCS | Performed by: SURGERY

## 2025-03-19 PROCEDURE — 25000242 PHARM REV CODE 250 ALT 637 W/ HCPCS: Performed by: SURGERY

## 2025-03-19 PROCEDURE — 27000221 HC OXYGEN, UP TO 24 HOURS

## 2025-03-19 PROCEDURE — 27000190 HC CPAP FULL FACE MASK W/VALVE

## 2025-03-19 PROCEDURE — 5A09357 ASSISTANCE WITH RESPIRATORY VENTILATION, LESS THAN 24 CONSECUTIVE HOURS, CONTINUOUS POSITIVE AIRWAY PRESSURE: ICD-10-PCS | Performed by: SURGERY

## 2025-03-19 PROCEDURE — 71000015 HC POSTOP RECOV 1ST HR: Performed by: SURGERY

## 2025-03-19 PROCEDURE — 63600175 PHARM REV CODE 636 W HCPCS: Performed by: SURGERY

## 2025-03-19 PROCEDURE — 11000001 HC ACUTE MED/SURG PRIVATE ROOM

## 2025-03-19 PROCEDURE — D9220A PRA ANESTHESIA: Mod: ANES,,, | Performed by: ANESTHESIOLOGY

## 2025-03-19 PROCEDURE — 88342 IMHCHEM/IMCYTCHM 1ST ANTB: CPT | Mod: 26,,, | Performed by: PATHOLOGY

## 2025-03-19 PROCEDURE — 36000712 HC OR TIME LEV V 1ST 15 MIN: Performed by: SURGERY

## 2025-03-19 RX ORDER — CEFAZOLIN SODIUM 1 G/3ML
INJECTION, POWDER, FOR SOLUTION INTRAMUSCULAR; INTRAVENOUS
Status: DISCONTINUED | OUTPATIENT
Start: 2025-03-19 | End: 2025-03-19

## 2025-03-19 RX ORDER — GABAPENTIN 250 MG/5ML
300 SOLUTION ORAL 2 TIMES DAILY
Status: DISCONTINUED | OUTPATIENT
Start: 2025-03-19 | End: 2025-03-20 | Stop reason: HOSPADM

## 2025-03-19 RX ORDER — PANTOPRAZOLE SODIUM 40 MG/10ML
40 INJECTION, POWDER, LYOPHILIZED, FOR SOLUTION INTRAVENOUS 2 TIMES DAILY
Status: DISCONTINUED | OUTPATIENT
Start: 2025-03-19 | End: 2025-03-20 | Stop reason: HOSPADM

## 2025-03-19 RX ORDER — ONDANSETRON HYDROCHLORIDE 2 MG/ML
8 INJECTION, SOLUTION INTRAVENOUS EVERY 6 HOURS
Status: DISCONTINUED | OUTPATIENT
Start: 2025-03-19 | End: 2025-03-20 | Stop reason: HOSPADM

## 2025-03-19 RX ORDER — LIDOCAINE HYDROCHLORIDE 10 MG/ML
1 INJECTION, SOLUTION EPIDURAL; INFILTRATION; INTRACAUDAL; PERINEURAL ONCE
Status: DISCONTINUED | OUTPATIENT
Start: 2025-03-19 | End: 2025-03-19 | Stop reason: HOSPADM

## 2025-03-19 RX ORDER — METOPROLOL TARTRATE 25 MG/1
25 TABLET, FILM COATED ORAL 2 TIMES DAILY
Status: DISCONTINUED | OUTPATIENT
Start: 2025-03-20 | End: 2025-03-20 | Stop reason: HOSPADM

## 2025-03-19 RX ORDER — LIDOCAINE HYDROCHLORIDE 20 MG/ML
INJECTION INTRAVENOUS
Status: DISCONTINUED | OUTPATIENT
Start: 2025-03-19 | End: 2025-03-19

## 2025-03-19 RX ORDER — ACETAMINOPHEN 650 MG/20.3ML
500 LIQUID ORAL
Status: DISCONTINUED | OUTPATIENT
Start: 2025-03-19 | End: 2025-03-20 | Stop reason: HOSPADM

## 2025-03-19 RX ORDER — ROCURONIUM BROMIDE 10 MG/ML
INJECTION, SOLUTION INTRAVENOUS
Status: DISCONTINUED | OUTPATIENT
Start: 2025-03-19 | End: 2025-03-19

## 2025-03-19 RX ORDER — EPHEDRINE SULFATE 50 MG/ML
INJECTION, SOLUTION INTRAVENOUS
Status: DISCONTINUED | OUTPATIENT
Start: 2025-03-19 | End: 2025-03-19

## 2025-03-19 RX ORDER — GLUCAGON 1 MG
1 KIT INJECTION
Status: DISCONTINUED | OUTPATIENT
Start: 2025-03-19 | End: 2025-03-20 | Stop reason: HOSPADM

## 2025-03-19 RX ORDER — ONDANSETRON HYDROCHLORIDE 2 MG/ML
INJECTION, SOLUTION INTRAVENOUS
Status: DISCONTINUED | OUTPATIENT
Start: 2025-03-19 | End: 2025-03-19

## 2025-03-19 RX ORDER — KETAMINE HCL IN 0.9 % NACL 50 MG/5 ML
SYRINGE (ML) INTRAVENOUS
Status: DISCONTINUED | OUTPATIENT
Start: 2025-03-19 | End: 2025-03-19

## 2025-03-19 RX ORDER — HEPARIN SODIUM 5000 [USP'U]/ML
5000 INJECTION, SOLUTION INTRAVENOUS; SUBCUTANEOUS ONCE
Status: COMPLETED | OUTPATIENT
Start: 2025-03-19 | End: 2025-03-19

## 2025-03-19 RX ORDER — INSULIN ASPART 100 [IU]/ML
0-10 INJECTION, SOLUTION INTRAVENOUS; SUBCUTANEOUS EVERY 6 HOURS PRN
Status: DISCONTINUED | OUTPATIENT
Start: 2025-03-19 | End: 2025-03-20

## 2025-03-19 RX ORDER — SUCCINYLCHOLINE CHLORIDE 20 MG/ML
INJECTION INTRAMUSCULAR; INTRAVENOUS
Status: DISCONTINUED | OUTPATIENT
Start: 2025-03-19 | End: 2025-03-19

## 2025-03-19 RX ORDER — FAMOTIDINE 10 MG/ML
20 INJECTION, SOLUTION INTRAVENOUS ONCE
Status: COMPLETED | OUTPATIENT
Start: 2025-03-19 | End: 2025-03-19

## 2025-03-19 RX ORDER — PHENYLEPHRINE HYDROCHLORIDE 10 MG/ML
INJECTION INTRAVENOUS
Status: DISCONTINUED | OUTPATIENT
Start: 2025-03-19 | End: 2025-03-19

## 2025-03-19 RX ORDER — DEXAMETHASONE SODIUM PHOSPHATE 4 MG/ML
INJECTION, SOLUTION INTRA-ARTICULAR; INTRALESIONAL; INTRAMUSCULAR; INTRAVENOUS; SOFT TISSUE
Status: DISCONTINUED | OUTPATIENT
Start: 2025-03-19 | End: 2025-03-19

## 2025-03-19 RX ORDER — MIDAZOLAM HYDROCHLORIDE 1 MG/ML
.5-4 INJECTION, SOLUTION INTRAMUSCULAR; INTRAVENOUS
Status: DISCONTINUED | OUTPATIENT
Start: 2025-03-19 | End: 2025-03-19 | Stop reason: HOSPADM

## 2025-03-19 RX ORDER — ACETAMINOPHEN 10 MG/ML
1000 INJECTION, SOLUTION INTRAVENOUS ONCE
Status: COMPLETED | OUTPATIENT
Start: 2025-03-19 | End: 2025-03-19

## 2025-03-19 RX ORDER — PROPOFOL 10 MG/ML
VIAL (ML) INTRAVENOUS
Status: DISCONTINUED | OUTPATIENT
Start: 2025-03-19 | End: 2025-03-19

## 2025-03-19 RX ORDER — MUPIROCIN 20 MG/G
OINTMENT TOPICAL
Status: DISCONTINUED | OUTPATIENT
Start: 2025-03-19 | End: 2025-03-19 | Stop reason: HOSPADM

## 2025-03-19 RX ORDER — ACETAMINOPHEN 650 MG/20.3ML
1000 LIQUID ORAL EVERY 8 HOURS
Status: DISCONTINUED | OUTPATIENT
Start: 2025-03-19 | End: 2025-03-20 | Stop reason: HOSPADM

## 2025-03-19 RX ORDER — GLUCAGON 1 MG
1 KIT INJECTION
Status: DISCONTINUED | OUTPATIENT
Start: 2025-03-19 | End: 2025-03-19 | Stop reason: HOSPADM

## 2025-03-19 RX ORDER — SODIUM CHLORIDE, SODIUM LACTATE, POTASSIUM CHLORIDE, CALCIUM CHLORIDE 600; 310; 30; 20 MG/100ML; MG/100ML; MG/100ML; MG/100ML
INJECTION, SOLUTION INTRAVENOUS CONTINUOUS
Status: DISCONTINUED | OUTPATIENT
Start: 2025-03-19 | End: 2025-03-20 | Stop reason: HOSPADM

## 2025-03-19 RX ORDER — SCOPOLAMINE 1 MG/3D
1 PATCH, EXTENDED RELEASE TRANSDERMAL ONCE
Status: DISCONTINUED | OUTPATIENT
Start: 2025-03-19 | End: 2025-03-20 | Stop reason: HOSPADM

## 2025-03-19 RX ORDER — KETOROLAC TROMETHAMINE 15 MG/ML
15 INJECTION, SOLUTION INTRAMUSCULAR; INTRAVENOUS ONCE
Status: COMPLETED | OUTPATIENT
Start: 2025-03-19 | End: 2025-03-19

## 2025-03-19 RX ORDER — BUPIVACAINE HYDROCHLORIDE 7.5 MG/ML
INJECTION, SOLUTION EPIDURAL; RETROBULBAR
Status: COMPLETED | OUTPATIENT
Start: 2025-03-19 | End: 2025-03-19

## 2025-03-19 RX ORDER — SODIUM CHLORIDE 9 MG/ML
INJECTION, SOLUTION INTRAVENOUS CONTINUOUS
Status: DISCONTINUED | OUTPATIENT
Start: 2025-03-19 | End: 2025-03-20 | Stop reason: HOSPADM

## 2025-03-19 RX ORDER — OXYCODONE HCL 5 MG/5 ML
5 SOLUTION, ORAL ORAL EVERY 6 HOURS PRN
Refills: 0 | Status: DISCONTINUED | OUTPATIENT
Start: 2025-03-19 | End: 2025-03-20 | Stop reason: HOSPADM

## 2025-03-19 RX ORDER — PROCHLORPERAZINE EDISYLATE 5 MG/ML
5 INJECTION INTRAMUSCULAR; INTRAVENOUS EVERY 6 HOURS PRN
Status: DISCONTINUED | OUTPATIENT
Start: 2025-03-19 | End: 2025-03-20 | Stop reason: HOSPADM

## 2025-03-19 RX ORDER — ENOXAPARIN SODIUM 100 MG/ML
60 INJECTION SUBCUTANEOUS EVERY 12 HOURS
Status: DISCONTINUED | OUTPATIENT
Start: 2025-03-19 | End: 2025-03-20 | Stop reason: HOSPADM

## 2025-03-19 RX ORDER — SODIUM CHLORIDE 0.9 % (FLUSH) 0.9 %
10 SYRINGE (ML) INJECTION
Status: DISCONTINUED | OUTPATIENT
Start: 2025-03-19 | End: 2025-03-19 | Stop reason: HOSPADM

## 2025-03-19 RX ADMIN — ONDANSETRON 4 MG: 2 INJECTION INTRAMUSCULAR; INTRAVENOUS at 09:03

## 2025-03-19 RX ADMIN — SODIUM CHLORIDE, POTASSIUM CHLORIDE, SODIUM LACTATE AND CALCIUM CHLORIDE: 600; 310; 30; 20 INJECTION, SOLUTION INTRAVENOUS at 10:03

## 2025-03-19 RX ADMIN — PHENYLEPHRINE HYDROCHLORIDE 150 MCG: 10 INJECTION INTRAVENOUS at 08:03

## 2025-03-19 RX ADMIN — FAMOTIDINE 20 MG: 10 INJECTION, SOLUTION INTRAVENOUS at 06:03

## 2025-03-19 RX ADMIN — SCOLOPAMINE TRANSDERMAL SYSTEM 1 PATCH: 1 PATCH, EXTENDED RELEASE TRANSDERMAL at 06:03

## 2025-03-19 RX ADMIN — EPHEDRINE SULFATE 10 MG: 50 INJECTION INTRAVENOUS at 09:03

## 2025-03-19 RX ADMIN — SODIUM CHLORIDE: 9 INJECTION, SOLUTION INTRAVENOUS at 07:03

## 2025-03-19 RX ADMIN — ROCURONIUM BROMIDE 10 MG: 10 INJECTION, SOLUTION INTRAVENOUS at 08:03

## 2025-03-19 RX ADMIN — PROPOFOL 200 MG: 10 INJECTION, EMULSION INTRAVENOUS at 08:03

## 2025-03-19 RX ADMIN — INSULIN ASPART 4 UNITS: 100 INJECTION, SOLUTION INTRAVENOUS; SUBCUTANEOUS at 04:03

## 2025-03-19 RX ADMIN — ACETAMINOPHEN 999.01 MG: 650 SOLUTION ORAL at 10:03

## 2025-03-19 RX ADMIN — SUGAMMADEX 400 MG: 100 INJECTION, SOLUTION INTRAVENOUS at 10:03

## 2025-03-19 RX ADMIN — ACETAMINOPHEN 1000 MG: 10 INJECTION, SOLUTION INTRAVENOUS at 06:03

## 2025-03-19 RX ADMIN — PHENYLEPHRINE HYDROCHLORIDE 200 MCG: 10 INJECTION INTRAVENOUS at 08:03

## 2025-03-19 RX ADMIN — KETOROLAC TROMETHAMINE 15 MG: 15 INJECTION, SOLUTION INTRAMUSCULAR; INTRAVENOUS at 06:03

## 2025-03-19 RX ADMIN — EPHEDRINE SULFATE 5 MG: 50 INJECTION INTRAVENOUS at 08:03

## 2025-03-19 RX ADMIN — ROCURONIUM BROMIDE 50 MG: 10 INJECTION, SOLUTION INTRAVENOUS at 08:03

## 2025-03-19 RX ADMIN — GABAPENTIN 300 MG: 250 SOLUTION ORAL at 10:03

## 2025-03-19 RX ADMIN — DEXAMETHASONE SODIUM PHOSPHATE 4 MG: 4 INJECTION, SOLUTION INTRAMUSCULAR; INTRAVENOUS at 08:03

## 2025-03-19 RX ADMIN — SODIUM CHLORIDE: 9 INJECTION, SOLUTION INTRAVENOUS at 09:03

## 2025-03-19 RX ADMIN — ROCURONIUM BROMIDE 40 MG: 10 INJECTION, SOLUTION INTRAVENOUS at 08:03

## 2025-03-19 RX ADMIN — INSULIN ASPART 2 UNITS: 100 INJECTION, SOLUTION INTRAVENOUS; SUBCUTANEOUS at 10:03

## 2025-03-19 RX ADMIN — ACETAMINOPHEN 999.01 MG: 650 SOLUTION ORAL at 02:03

## 2025-03-19 RX ADMIN — EPHEDRINE SULFATE 10 MG: 50 INJECTION INTRAVENOUS at 08:03

## 2025-03-19 RX ADMIN — PANTOPRAZOLE SODIUM 40 MG: 40 INJECTION, POWDER, FOR SOLUTION INTRAVENOUS at 10:03

## 2025-03-19 RX ADMIN — SODIUM CHLORIDE, POTASSIUM CHLORIDE, SODIUM LACTATE AND CALCIUM CHLORIDE: 600; 310; 30; 20 INJECTION, SOLUTION INTRAVENOUS at 02:03

## 2025-03-19 RX ADMIN — SUCCINYLCHOLINE 160 MG: 20 INJECTION, SOLUTION INTRAMUSCULAR; INTRAVENOUS at 08:03

## 2025-03-19 RX ADMIN — Medication 30 MG: at 08:03

## 2025-03-19 RX ADMIN — ONDANSETRON 8 MG: 2 INJECTION INTRAMUSCULAR; INTRAVENOUS at 05:03

## 2025-03-19 RX ADMIN — OXYCODONE HYDROCHLORIDE 5 MG: 5 SOLUTION ORAL at 10:03

## 2025-03-19 RX ADMIN — PHENYLEPHRINE HYDROCHLORIDE 200 MCG: 10 INJECTION INTRAVENOUS at 09:03

## 2025-03-19 RX ADMIN — HEPARIN SODIUM 5000 UNITS: 5000 INJECTION INTRAVENOUS; SUBCUTANEOUS at 06:03

## 2025-03-19 RX ADMIN — LIDOCAINE HYDROCHLORIDE 40 MG: 20 INJECTION INTRAVENOUS at 08:03

## 2025-03-19 RX ADMIN — MUPIROCIN: 20 OINTMENT TOPICAL at 06:03

## 2025-03-19 RX ADMIN — BUPIVACAINE HYDROCHLORIDE 30 ML: 7.5 INJECTION, SOLUTION EPIDURAL; RETROBULBAR at 07:03

## 2025-03-19 RX ADMIN — CEFAZOLIN 3 G: 330 INJECTION, POWDER, FOR SOLUTION INTRAMUSCULAR; INTRAVENOUS at 08:03

## 2025-03-19 RX ADMIN — ENOXAPARIN SODIUM 60 MG: 60 INJECTION SUBCUTANEOUS at 10:03

## 2025-03-19 NOTE — BRIEF OP NOTE
[facility] - Surgery ([facility])  Brief Operative Note    SUMMARY     Surgery Date: 3/19/2025     Surgeons and Role:     * Musa Leone MD - Primary     * Danna Salazar MD - Resident - Assisting        Pre-op Diagnosis:  Essential hypertension [I10]  TOSIN (obstructive sleep apnea) [G47.33]  Morbid obesity with BMI of 50.0-59.9, adult [E66.01, Z68.43]  Poorly controlled type 2 diabetes mellitus with neuropathy [E11.40, E11.65]  Hyperlipidemia LDL goal <100 [E78.5]    Post-op Diagnosis:  Post-Op Diagnosis Codes:     * Essential hypertension [I10]     * TOSIN (obstructive sleep apnea) [G47.33]     * Morbid obesity with BMI of 50.0-59.9, adult [E66.01, Z68.43]     * Poorly controlled type 2 diabetes mellitus with neuropathy [E11.40, E11.65]     * Hyperlipidemia LDL goal <100 [E78.5]    Procedure(s) (LRB):  XI ROBOTIC SLEEVE GASTRECTOMY w/intraop EGD (N/A)    Anesthesia: General/Regional    Implants:  * No implants in log *    Operative Findings: XI sleeve gastrectomy with intra-op EGD. No intra op complications.     Estimated Blood Loss: * No values recorded between 3/19/2025  7:53 AM and 3/19/2025 10:17 AM *    Estimated Blood Loss has been documented.         Specimens:   Specimen (24h ago, onward)       Start     Ordered    03/19/25 0948  Specimen to Pathology, Surgery General Surgery  Once        Comments: Pre-op Diagnosis: Essential hypertension [I10]TOSIN (obstructive sleep apnea) [G47.33]Morbid obesity with BMI of 50.0-59.9, adult [E66.01, Z68.43]Poorly controlled type 2 diabetes mellitus with neuropathy [E11.40, E11.65]Hyperlipidemia LDL goal <100 [E78.5]Procedure(s):XI ROBOTIC SLEEVE GASTRECTOMY w/intraop EGD Number of specimens: 1Name of specimens: 1.) Stomach, Permanent.     References:    Click here for ordering Quick Tip   Question Answer Comment   Procedure Type: General Surgery    Specimen Class: Routine/Screening        03/19/25 0986                    [id number]

## 2025-03-19 NOTE — TRANSFER OF CARE
"Anesthesia Transfer of Care Note    Patient: David Corona Jr.    Procedure(s) Performed: Procedure(s) (LRB):  XI ROBOTIC SLEEVE GASTRECTOMY w/intraop EGD (N/A)    Patient location: PACU    Anesthesia Type: general    Transport from OR: Transported from OR on 6-10 L/min O2 by face mask with adequate spontaneous ventilation    Post pain: adequate analgesia    Post assessment: no apparent anesthetic complications and tolerated procedure well    Post vital signs: stable    Level of consciousness: awake and alert    Nausea/Vomiting: no nausea/vomiting    Complications: none    Transfer of care protocol was followed      Last vitals: Visit Vitals  BP (!) 120/58   Pulse 80   Temp 36.6 °C (97.9 °F) (Temporal)   Resp 18   Ht 6' 2" (1.88 m)   Wt (!) 182.7 kg (402 lb 12.5 oz)   SpO2 100%   BMI 51.71 kg/m²     "

## 2025-03-19 NOTE — ANESTHESIA PROCEDURE NOTES
Bilateral TARA SS    Patient location during procedure: pre-op   Block not for primary anesthetic.  Reason for block: at surgeon's request and post-op pain management   Post-op Pain Location: Bilateral TARA SS   Start time: 3/19/2025 7:16 AM  Timeout: 3/19/2025 7:15 AM   End time: 3/19/2025 7:50 AM    Staffing  Authorizing Provider: Molly Jean MD  Performing Provider: Nicolas Nam MD    Staffing  Performed by: Nicolas Nam MD  Authorized by: Molly Jean MD    Preanesthetic Checklist  Completed: patient identified, IV checked, site marked, risks and benefits discussed, surgical consent, monitors and equipment checked, pre-op evaluation and timeout performed  Peripheral Block  Patient position: sitting  Prep: ChloraPrep  Patient monitoring: heart rate, cardiac monitor, continuous pulse ox, continuous capnometry and frequent blood pressure checks  Block type: erector spinae plane  Laterality: bilateral  Injection technique: single shot  Interspace: T10-11    Needle  Needle type: Echogenic   Needle gauge: 20 G  Needle length: 4 in  Needle localization: anatomical landmarks and ultrasound guidance   -ultrasound image captured on disc.  Assessment  Injection assessment: negative aspiration, negative parasthesia and local visualized surrounding nerve  Paresthesia pain: none  Heart rate change: no  Slow fractionated injection: yes  Pain Tolerance: comfortable throughout block and no complaints  Medications:    Medications: bupivacaine (pf) (MARCAINE) injection 0.75% - Perineural   30 mL - 3/19/2025 7:23:00 AM    Additional Notes  Patient tolerated well.  See [facility] RN record for vitals.    A time out was conducted. Site aleksandr confirmed with team and patient. Allergies reviewed.   Vital signs stable throughout block. RN monitoring vitals throughout.   Needle advanced under continuous ultrasound guidance.  Local injected incrementally after confirming negative aspiration. No signs or symptoms of  intravascular or intraneural injection noted.   No persistent paresthesias elicited or expressed. Patient tolerated procedure well.  30mL bupivicaine .75% with epinephrine 1:300K, PF dexamethasone 1 mg, and clonidine 50 mcg used for the block.

## 2025-03-19 NOTE — NURSING
Pt able to ambulate approximately 300 ft in the hallway, pt reporting slight dizziness and lightheadedness.

## 2025-03-19 NOTE — NURSING TRANSFER
Nursing Transfer Note      3/19/2025   1:16 PM    Nurse giving handoff:ashleigh rn  Nurse receiving handoff:DORIE RN    Reason patient is being transferred: recovery care complete    Transfer To: [id number]    Transfer via bed    Transfer with IV PUMP    Transported by PCT X 2      Order for Tele Monitor?       Medicines sent: LR @ 125 ML/HR    Any special needs or follow-up needed: routine    Patient belongings transferred with patient:  n/a    Chart send with patient: Yes    Notified: S/O    Patient reassessed at: 03/19/25 1300

## 2025-03-19 NOTE — BRIEF OP NOTE
Operative Note       Surgery Date: 3/19/2025     Surgeons and Role:     * Musa Leone MD - Primary     * Danna Salazar MD - Resident - Assisting    Pre-op Diagnosis:  Essential hypertension [I10]  TOSIN (obstructive sleep apnea) [G47.33]  Morbid obesity with BMI of 50.0-59.9, adult [E66.01, Z68.43]  Poorly controlled type 2 diabetes mellitus with neuropathy [E11.40, E11.65]  Hyperlipidemia LDL goal <100 [E78.5]    Post-op Diagnosis:  Essential hypertension [I10]  TOSIN (obstructive sleep apnea) [G47.33]  Morbid obesity with BMI of 50.0-59.9, adult [E66.01, Z68.43]  Poorly controlled type 2 diabetes mellitus with neuropathy [E11.40, E11.65]  Hyperlipidemia LDL goal <100 [E78.5]    Procedure(s) (LRB):  XI ROBOTIC SLEEVE GASTRECTOMY w/intraop EGD (N/A)    Anesthesia: General/Regional    Procedure in Detail/Findings:  Sleeve without apparent complication.    Estimated Blood Loss: Minimal           Specimens (From admission, onward)       Start     Ordered    03/19/25 0948  Specimen to Pathology, Surgery General Surgery  Once        Comments: Pre-op Diagnosis: Essential hypertension [I10]TOSIN (obstructive sleep apnea) [G47.33]Morbid obesity with BMI of 50.0-59.9, adult [E66.01, Z68.43]Poorly controlled type 2 diabetes mellitus with neuropathy [E11.40, E11.65]Hyperlipidemia LDL goal <100 [E78.5]Procedure(s):XI ROBOTIC SLEEVE GASTRECTOMY w/intraop EGD Number of specimens: 1Name of specimens: 1.) Stomach, Permanent.     References:    Click here for ordering Quick Tip   Question Answer Comment   Procedure Type: General Surgery    Specimen Class: Routine/Screening        03/19/25 0947                  Implants: * No implants in log *           Disposition: PACU - hemodynamically stable.           Condition: Good    Attestation:  I was present and scrubbed for the entire procedure.

## 2025-03-19 NOTE — NURSING TRANSFER
Nursing Transfer Note      3/19/2025   10:37 AM    Nurse giving handoff:Madi BARLOW RN  Nurse receiving handoff:***    Reason patient is being transferred: meets criteria    Transfer To: ***    Transfer via bed    Transfer with none    Transported by transport x2    Transfer Vital Signs:  Blood Pressure:***  Heart Rate:***  O2:***  Temperature:***  Respirations:***    Telemetry: Rhythm sr  Order for Tele Monitor? No    Additional Lines: none    Medicines sent: LR @ 125, insulin pen    Any special needs or follow-up needed: none    Patient belongings transferred with patient: ***    Chart send with patient: Yes    Notified: {NOTIFIED:89554}    Patient reassessed at: 3/19

## 2025-03-19 NOTE — OP NOTE
DATE OF PROCEDURE: 3/19/2025    PRE OP DIAGNOSIS: Essential hypertension [I10]  TOSIN (obstructive sleep apnea) [G47.33]  Morbid obesity with BMI of 50.0-59.9, adult [E66.01, Z68.43]  Poorly controlled type 2 diabetes mellitus with neuropathy [E11.40, E11.65]  Hyperlipidemia LDL goal <100 [E78.5]    POST OP DIAGNOSIS: Essential hypertension [I10]  TOSIN (obstructive sleep apnea) [G47.33]  Morbid obesity with BMI of 50.0-59.9, adult [E66.01, Z68.43]  Poorly controlled type 2 diabetes mellitus with neuropathy [E11.40, E11.65]  Hyperlipidemia LDL goal <100 [E78.5]    PROCEDURE: Procedure(s) (LRB):  XI ROBOTIC SLEEVE GASTRECTOMY w/intraop EGD (N/A)    Surgeons and Role:     * Musa Leone MD - Primary     * Danna Salazar MD - Resident - Assisting    ANESTHESIA: General.     Indication:    1. Morbid obesity, body mass index is 58.49 kg/m². and inability to lose weight.  He is on monjouro and has lost only 40lb on this.  2. 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 He denies gerd symptoms    Procedure:    The patient was placed under general anesthesia and the abdomen prepped and draped in usual manner.  Access to the peritoneum was gained approximately 20 cm below the xiphoid at the right anteriaxillary line with a 5 mm Optiview trocar under direct vision, pneumoperitoneum to 15 mmHg CO2 gas was obtained and the patient was placed in reverse Trendelenburg.  Four robotic trocars were placed 7 cm apart to the left of the primary trocar under direct vision.  The liver retractor was placed through the right sided port site to elevate the left lobe of the liver.  There was no hiatal hernia.  Using caudier graspers and the synchroseal the greater curve was taken down 6 cm from the pylorus and all the way to the base of the left fernie.  Posterior gastric attachments were taken down.  A 42 Czech bougie was placed  through the mouth and easily traversed the stomach towards the pylorus and using the robotic stapler the stomach was divided along the dilator starting 4-6 cm from the pylorus, being careful at the incisura and making sure to come out just a little bit at the lower esophageal sphincter.  The dilator was removed and endoscopy was performed.  The endoscope easily traverse the pharynx, esophagus and the stomach.  The stomach was insufflated with gas and appeared of appropriate size and configuration and there were no air leak seen laparoscopically and no bleeding into the stomach.  Air was aspirated and the endoscope was withdrawn.   A gastropexy 2-0 silk stitch between the bottom of the staple line and the omentum was placed to keep the sleeve straight. Careful inspection of the abdominal cavity was done to ensure hemostasis and then the liver retractor was removed.  The gastric resection was placed in the Endo-Catch bag and removed from the abdomen through larger trocar site. The fascia of that site was closed transfascially 0 Vicryl.  The trocars were removed under direct vision and prior removing last trocar pneumoperitoneum was allowed to escape.  The skin incisions were infiltrated with Marcaine and reapproximated with 4-0 plain catgut.  Skin glue was applied.  The patient tolerated procedure well was brought to the recovery room stable condition.  Sponge and needle counts were correct at the end of the case.  Blood loss was minimal, complications none and pathology gastric resection.

## 2025-03-19 NOTE — ANESTHESIA PROCEDURE NOTES
Intubation    Date/Time: 3/19/2025 8:09 AM    Performed by: Keira Sood CRNA  Authorized by: Lindsey Sainz MD    Intubation:     Induction:  Intravenous    Intubated:  Postinduction    Mask Ventilation:  Easy with oral airway    Attempts:  1    Attempted By:  CRNA    Method of Intubation:  Video laryngoscopy    Blade:  Overton 4    Laryngeal View Grade: Grade I - full view of cords      Difficult Airway Encountered?: No      Complications:  None    Airway Device:  Oral endotracheal tube    Airway Device Size:  8.0    Style/Cuff Inflation:  Cuffed (inflated to minimal occlusive pressure)    Tube secured:  24    Secured at:  The lips    Placement Verified By:  Capnometry    Complicating Factors:  Obesity, oropharyngeal edema or fat and large/floppy epiglottis    Findings Post-Intubation:  BS equal bilateral and atraumatic/condition of teeth unchanged

## 2025-03-19 NOTE — ANESTHESIA PREPROCEDURE EVALUATION
03/19/2025  David Corona Jr. is a 59 y.o., male.      Pre-op Assessment    I have reviewed the Patient Summary Reports.     I have reviewed the Nursing Notes.    I have reviewed the Medications.     Review of Systems  Anesthesia Hx:  No problems with previous Anesthesia             Denies Family Hx of Anesthesia complications.     Cardiovascular:  Exercise tolerance: good   Hypertension                                          Pulmonary:        Sleep Apnea                Renal/:  Chronic Renal Disease                Neurological:  Neurology Normal                                      Endocrine:  Diabetes, type 2             Past Medical History:   Diagnosis Date    Arthritis     Cataract     left eye     CKD (chronic kidney disease) stage 3, GFR 30-59 ml/min     Colon polyp 11/2016    Dependent edema     Diabetes mellitus     Diverticulosis     Gout, arthritis     Hyperlipidemia LDL goal <100     Hypertension     Morbid obesity     TOSIN (obstructive sleep apnea)     Peripheral autonomic neuropathy in disorders classified elsewhere(337.1)     Proteinuria     Type II or unspecified type diabetes mellitus with neurological manifestations, uncontrolled(250.62)     Venous stasis of lower extremity      Past Surgical History:   Procedure Laterality Date    CATARACT EXTRACTION W/  INTRAOCULAR LENS IMPLANT Right 2007    COLONOSCOPY N/A 11/22/2016    Procedure: COLONOSCOPY;  Surgeon: Abdi La MD;  Location: [facility] ([address] [place] [address]);  Service: Endoscopy;  Laterality: N/A;  2nd floor case/BMI 59.13/wants week of Thanksgiving    COLONOSCOPY N/A 02/28/2022    Procedure: COLONOSCOPY;  Surgeon: Jessi Martinez MD;  Location: [facility] ([address] [place] [address]);  Service: Endoscopy;  Laterality: N/A;  BMI 61.71  covid test [place] 2/25 2/24 pt confirmed appt and new arrival time-Kpvt    EYE SURGERY        Problem List[1]  Facility-Administered Medications as of 3/19/2025   Medication Dose Route Frequency Provider Last Rate Last Admin    0.9% NaCl infusion   Intravenous Continuous Musa Leone MD        [COMPLETED] acetaminophen 1,000 mg/100 mL (10 mg/mL) injection 1,000 mg  1,000 mg Intravenous Once Musa Leone  mL/hr at 03/19/25 0640 1,000 mg at 03/19/25 0640    ceFAZolin (Ancef) 3 g in 0.9% NaCl 100 mL IVPB  3 g Intravenous On Call Procedure Musa Leone MD        [COMPLETED] famotidine (PF) injection 20 mg  20 mg Intravenous Once Musa Leone MD   20 mg at 03/19/25 0647    [COMPLETED] heparin (porcine) injection 5,000 Units  5,000 Units Subcutaneous Once Musa Leone MD   5,000 Units at 03/19/25 0643    [COMPLETED] ketorolac injection 15 mg  15 mg Intravenous Once Musa Leone MD   15 mg at 03/19/25 0646    LIDOcaine (PF) 10 mg/ml (1%) injection 10 mg  1 mL Intradermal Once Musa Leone MD        midazolam injection 0.5-4 mg  0.5-4 mg Intravenous PRN Evita Frankel MD        mupirocin 2 % ointment   Nasal On Call Procedure Musa Leone MD   Given at 03/19/25 0639    scopolamine 1.3-1.5 mg (1 mg over 3 days) 1 patch  1 patch Transdermal Once Musa Leone MD   1 patch at 03/19/25 0650     Outpatient Medications as of 3/19/2025   Medication Sig Dispense Refill    colchicine (COLCRYS) 0.6 mg tablet TAKE 1 TABLET(0.6 MG) BY MOUTH DAILY 90 tablet 1    furosemide (LASIX) 40 MG tablet Take 1 tablet (40 mg total) by mouth 2 (two) times daily. 180 tablet 0    hydrALAZINE (APRESOLINE) 50 MG tablet Take 1 tablet (50 mg total) by mouth 2 (two) times a day. 180 tablet 1    insulin aspart U-100 (NOVOLOG FLEXPEN U-100 INSULIN) 100 unit/mL (3 mL) InPn pen Take as needed before meals: 150-200=+2, 201-250=+4; 251-300=+6; 301-350=+8, over 350=+10 units. Max daily dose 30 units 15 mL 0    insulin  "aspart, niacinamide, (FIASP FLEXTOUCH U-100 INSULIN) 100 unit/mL (3 mL) InPn Take as needed before meals: 150-200=+2, 201-250=+4; 251-300=+6; 301-350=+8, over 350=+10 units. Max daily dose 30 units 5 pen 5    pioglitazone (ACTOS) 45 MG tablet TAKE 1 TABLET (45 MG) BY MOUTH EVERY DAY 90 tablet 2    valsartan (DIOVAN) 320 MG tablet Take 1 tablet (320 mg total) by mouth once daily. 90 tablet 1    aspirin (ECOTRIN) 81 MG EC tablet Take 1 tablet (81 mg total) by mouth once daily. 90 tablet 3    blood-glucose sensor (DEXCOM G7 SENSOR) Clare Change every 10 days 12 each 3    dapagliflozin propanediol (FARXIGA) 10 mg tablet Take 1 tablet (10 mg total) by mouth once daily. 90 tablet 2    ibuprofen (ADVIL,MOTRIN) 600 MG tablet TAKE 1 TABLET(600 MG) BY MOUTH EVERY NIGHT AS NEEDED FOR PAIN 30 tablet 0    insulin glargine U-100, Lantus, (BASAGLAR KWIKPEN U-100 INSULIN) 100 unit/mL (3 mL) InPn pen Inject 20 units into the skin once daily (Patient not taking: Reported on 3/17/2025) 15 mL 5    omega-3 fatty acids 1,000 mg Cap Take 2 capsules by mouth once daily.      omeprazole (PRILOSEC) 20 MG capsule Take 1 capsule (20 mg total) by mouth 2 (two) times daily. for 14 days 28 capsule 0    pen needle, diabetic (NOVOFINE 32) 32 gauge x 1/4" Ndle USE FOUR TIMES DAILY AS NEEDED 400 each 3    tirzepatide (MOUNJARO) 15 mg/0.5 mL PnIj Inject 15 mg (one pen) into the skin every 7 days. 12 Pen 2      Review of patient's allergies indicates:  No Known Allergies     Physical Exam  General: Well nourished, Cooperative, Alert and Oriented    Airway:  Mallampati: II   Mouth Opening: Normal  TM Distance: Normal  Tongue: Normal  Neck ROM: Normal ROM    Chest/Lungs:  Normal Respiratory Rate    Heart:  Rate: Normal    Wt Readings from Last 3 Encounters:   03/19/25 (!) 182.7 kg (402 lb 12.5 oz)   02/25/25 (!) 197.1 kg (434 lb 8.4 oz)   02/19/25 (!) 197.5 kg (435 lb 6.5 oz)     Temp Readings from Last 3 Encounters:   03/19/25 37.1 °C (98.8 " °F) (Temporal)   01/29/25 36.4 °C (97.6 °F) (Oral)   12/04/24 36.5 °C (97.7 °F) (Oral)     BP Readings from Last 3 Encounters:   03/19/25 (!) 131/57   02/25/25 136/73   02/19/25 128/72     Pulse Readings from Last 3 Encounters:   03/19/25 96   02/25/25 85   02/19/25 89     Lab Results   Component Value Date    WBC 7.76 02/25/2025    HGB 14.3 02/25/2025    HCT 43.1 02/25/2025    MCV 96 02/25/2025     02/25/2025         Chemistry        Component Value Date/Time     02/25/2025 1250    K 4.1 02/25/2025 1250     02/25/2025 1250    CO2 26 02/25/2025 1250    BUN 23 (H) 02/25/2025 1250    CREATININE 1.3 02/25/2025 1250     (H) 02/25/2025 1250        Component Value Date/Time    CALCIUM 9.2 02/25/2025 1250    ALKPHOS 127 02/25/2025 1250    AST 29 02/25/2025 1250    ALT 8 (L) 02/25/2025 1250    BILITOT 0.7 02/25/2025 1250    ESTGFRAFRICA >60 08/07/2021 0810    EGFRNONAA >60 08/07/2021 0810        Results for orders placed or performed in visit on 02/19/25   IN OFFICE EKG 12-LEAD (to [place])    Collection Time: 02/19/25 11:14 AM   Result Value Ref Range    QRS Duration 98 ms    OHS QTC Calculation 464 ms    Narrative    Test Reason : Z01.810,    Vent. Rate :  85 BPM     Atrial Rate :  85 BPM     P-R Int : 242 ms          QRS Dur :  98 ms      QT Int : 390 ms       P-R-T Axes :  63  61  43 degrees    QTcB Int : 464 ms    Sinus rhythm with 1st degree A-V block  Cannot rule out Anterior infarct ,age undetermined  Abnormal ECG  When compared with ECG of 19-Nov-2024 10:14,  No significant change was found  Confirmed by Prakash Lewis ([id number]) on 2/19/2025 6:16:06 PM    Referred By: DARRIN           Confirmed By: Prakash Lewis      Echo 11/19/2024    Summary  Show Result Comparison     Left Ventricle: The left ventricle is normal in size. Normal wall thickness. There is concentric remodeling. Normal wall motion. There is normal systolic function with a visually estimated ejection fraction of 60 - 65%. There is  normal diastolic function. Normal left ventricular filling pressure.    Right Ventricle: Normal right ventricular cavity size. Wall thickness is normal. Right ventricle wall motion  is normal. Systolic function is normal.    Left Atrium: Normal left atrial size.    Right Atrium: Normal right atrial size.    Aortic Valve: The aortic valve is a trileaflet valve. There is moderate aortic valve sclerosis. Mildly calcified cusps. There is mild annular calcification present.    Aorta: Aortic root is normal in size measuring 3.49 cm. Ascending aorta is normal measuring 3.16 cm.    IVC/SVC: Normal venous pressure at 3 mmHg.    Pericardium: There is no pericardial effusion.    Stress Protocol: The patient was infused intravenously with dobutamine. The patient received a graduated infusion of the stress agent beginning at 10.0 mcg/kg/min to a peak dose of 30.0 mcg/kg/min. The peak heart rate was 142 bpm, which is 88% of age predicted maximum heart rate. The patient was also given atropine for a total dose of .25 mg. The patient reported no symptoms during the stress test. The test was stopped because the end of the protocol was reached.    Baseline ECG: The Baseline ECG reveals sinus rhythm with 1st degree A/V block. The axis is normal. The ST segments are normal.    Stress ECG: There is no ST segment deviation identified during the protocol. During stress, occasional PVCs are noted. There is normal blood pressure response with stress.    ECG Conclusion: The ECG portion of the study is negative for ischemia.    Post-stress Echo: Left ventricle cavity appears normal post-stress. The left ventricle systolic function is hyperdynamic. The post-stress images show normal wall motion. Right ventricle cavity size appears normal. Right ventricle systolic function is normal.    Post-stress Conclusion: The study is negative with no echocardiographic evidence of stress induced ischemia.    Anesthesia Plan  Type of  Anesthesia, risks & benefits discussed:    Anesthesia Type: Gen ETT  Intra-op Monitoring Plan: Standard ASA Monitors  Post Op Pain Control Plan: multimodal analgesia  Airway Plan: Video  Informed Consent: Informed consent signed with the Patient and all parties understand the risks and agree with anesthesia plan.  All questions answered.   ASA Score: 3  Day of Surgery Review of History & Physical: H&P Update referred to the surgeon/provider.    Ready For Surgery From Anesthesia Perspective.     .             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

## 2025-03-19 NOTE — NURSING
Patient arrived to room 557 via bed, jluis present at bedside, VS taken and documented, admission documentation completed, SCD's applied, educated pt on water protocol and the importance of ambulating, pt verbalized understanding and agreeable to ambulate to bathroom and hallway. Instructed on IS use, oriented to room and equipment, allowed time for questions, all questions answered, no further concerns voiced at this time, safety measures in place, call light within reach, instructed to call with any needs, verbalized understanding, continue current plan of care.

## 2025-03-19 NOTE — PLAN OF CARE
Problem: Adult Inpatient Plan of Care  Goal: Plan of Care Review  Outcome: Progressing  Goal: Patient-Specific Goal (Individualized)  Outcome: Progressing  Goal: Absence of Hospital-Acquired Illness or Injury  Outcome: Progressing  Goal: Optimal Comfort and Wellbeing  Outcome: Progressing  Goal: Readiness for Transition of Care  Outcome: Progressing     Problem: Diabetes Comorbidity  Goal: Blood Glucose Level Within Targeted Range  Outcome: Progressing     Problem: Bariatric Environmental Safety  Goal: Safety Maintained with Care  Outcome: Progressing     Problem: Wound  Goal: Optimal Coping  Outcome: Progressing  Goal: Optimal Functional Ability  Outcome: Progressing  Goal: Absence of Infection Signs and Symptoms  Outcome: Progressing  Goal: Improved Oral Intake  Outcome: Progressing  Goal: Optimal Pain Control and Function  Outcome: Progressing  Goal: Skin Health and Integrity  Outcome: Progressing  Goal: Optimal Wound Healing  Outcome: Progressing     Problem: Fall Injury Risk  Goal: Absence of Fall and Fall-Related Injury  Outcome: Progressing   Pt is aaox4, progressing well in his plan of care. Pt ambulated twice upon arrival to unit from his procedure. Pt tolerated and finished his water protocol and denies any nausea/vomiting. Encouraged pt to continue ambulation when possible, instructed on IS use, pt verbalized understanding. Pt was able to void post tyler removal. Wife is present at bedside and attentive to pt. Safety measures in place, bed in the lowest position with wheels locked, side rails up x2, call light and personal belongings within reach. Incision is CDI. Continue plan of care.

## 2025-03-20 ENCOUNTER — DOCUMENTATION ONLY (OUTPATIENT)
Dept: BARIATRICS | Facility: CLINIC | Age: 60
End: 2025-03-20
Payer: COMMERCIAL

## 2025-03-20 VITALS
OXYGEN SATURATION: 97 % | HEIGHT: 74 IN | WEIGHT: 315 LBS | TEMPERATURE: 98 F | SYSTOLIC BLOOD PRESSURE: 133 MMHG | DIASTOLIC BLOOD PRESSURE: 79 MMHG | HEART RATE: 64 BPM | RESPIRATION RATE: 18 BRPM | BODY MASS INDEX: 40.43 KG/M2

## 2025-03-20 LAB
ANION GAP SERPL CALC-SCNC: 10 MMOL/L (ref 8–16)
ANION GAP SERPL CALC-SCNC: 10 MMOL/L (ref 8–16)
BASOPHILS # BLD AUTO: 0.02 K/UL (ref 0–0.2)
BASOPHILS NFR BLD: 0.1 % (ref 0–1.9)
BUN SERPL-MCNC: 25 MG/DL (ref 6–20)
BUN SERPL-MCNC: 29 MG/DL (ref 6–20)
CALCIUM SERPL-MCNC: 8.9 MG/DL (ref 8.7–10.5)
CALCIUM SERPL-MCNC: 9.2 MG/DL (ref 8.7–10.5)
CHLORIDE SERPL-SCNC: 104 MMOL/L (ref 95–110)
CHLORIDE SERPL-SCNC: 104 MMOL/L (ref 95–110)
CO2 SERPL-SCNC: 21 MMOL/L (ref 23–29)
CO2 SERPL-SCNC: 21 MMOL/L (ref 23–29)
CREAT SERPL-MCNC: 1.6 MG/DL (ref 0.5–1.4)
CREAT SERPL-MCNC: 1.8 MG/DL (ref 0.5–1.4)
DIFFERENTIAL METHOD BLD: ABNORMAL
EOSINOPHIL # BLD AUTO: 0 K/UL (ref 0–0.5)
EOSINOPHIL NFR BLD: 0 % (ref 0–8)
ERYTHROCYTE [DISTWIDTH] IN BLOOD BY AUTOMATED COUNT: 16.2 % (ref 11.5–14.5)
EST. GFR  (NO RACE VARIABLE): 43.1 ML/MIN/1.73 M^2
EST. GFR  (NO RACE VARIABLE): 49.6 ML/MIN/1.73 M^2
GLUCOSE SERPL-MCNC: 139 MG/DL (ref 70–110)
GLUCOSE SERPL-MCNC: 167 MG/DL (ref 70–110)
HCT VFR BLD AUTO: 44.2 % (ref 40–54)
HGB BLD-MCNC: 14.5 G/DL (ref 14–18)
IMM GRANULOCYTES # BLD AUTO: 0.05 K/UL (ref 0–0.04)
IMM GRANULOCYTES NFR BLD AUTO: 0.3 % (ref 0–0.5)
LYMPHOCYTES # BLD AUTO: 0.8 K/UL (ref 1–4.8)
LYMPHOCYTES NFR BLD: 5.7 % (ref 18–48)
MAGNESIUM SERPL-MCNC: 1.9 MG/DL (ref 1.6–2.6)
MCH RBC QN AUTO: 31.5 PG (ref 27–31)
MCHC RBC AUTO-ENTMCNC: 32.8 G/DL (ref 32–36)
MCV RBC AUTO: 96 FL (ref 82–98)
MONOCYTES # BLD AUTO: 0.7 K/UL (ref 0.3–1)
MONOCYTES NFR BLD: 5 % (ref 4–15)
NEUTROPHILS # BLD AUTO: 12.8 K/UL (ref 1.8–7.7)
NEUTROPHILS NFR BLD: 88.9 % (ref 38–73)
NRBC BLD-RTO: 0 /100 WBC
PHOSPHATE SERPL-MCNC: 3.8 MG/DL (ref 2.7–4.5)
PLATELET # BLD AUTO: 213 K/UL (ref 150–450)
PMV BLD AUTO: 12 FL (ref 9.2–12.9)
POTASSIUM SERPL-SCNC: 4.7 MMOL/L (ref 3.5–5.1)
POTASSIUM SERPL-SCNC: 4.8 MMOL/L (ref 3.5–5.1)
RBC # BLD AUTO: 4.6 M/UL (ref 4.6–6.2)
SODIUM SERPL-SCNC: 135 MMOL/L (ref 136–145)
SODIUM SERPL-SCNC: 135 MMOL/L (ref 136–145)
WBC # BLD AUTO: 14.45 K/UL (ref 3.9–12.7)

## 2025-03-20 PROCEDURE — 25000003 PHARM REV CODE 250: Performed by: SURGERY

## 2025-03-20 PROCEDURE — 84100 ASSAY OF PHOSPHORUS: CPT | Performed by: SURGERY

## 2025-03-20 PROCEDURE — 80048 BASIC METABOLIC PNL TOTAL CA: CPT | Performed by: SURGERY

## 2025-03-20 PROCEDURE — 36415 COLL VENOUS BLD VENIPUNCTURE: CPT

## 2025-03-20 PROCEDURE — 83735 ASSAY OF MAGNESIUM: CPT | Performed by: SURGERY

## 2025-03-20 PROCEDURE — 85025 COMPLETE CBC W/AUTO DIFF WBC: CPT | Performed by: SURGERY

## 2025-03-20 PROCEDURE — 63600175 PHARM REV CODE 636 W HCPCS: Performed by: SURGERY

## 2025-03-20 PROCEDURE — 99900035 HC TECH TIME PER 15 MIN (STAT)

## 2025-03-20 PROCEDURE — 25000003 PHARM REV CODE 250

## 2025-03-20 PROCEDURE — 94761 N-INVAS EAR/PLS OXIMETRY MLT: CPT

## 2025-03-20 PROCEDURE — 80048 BASIC METABOLIC PNL TOTAL CA: CPT | Mod: 91

## 2025-03-20 PROCEDURE — 63600175 PHARM REV CODE 636 W HCPCS

## 2025-03-20 PROCEDURE — 36415 COLL VENOUS BLD VENIPUNCTURE: CPT | Performed by: SURGERY

## 2025-03-20 RX ORDER — ASPIRIN 81 MG/1
81 TABLET ORAL DAILY
Status: DISCONTINUED | OUTPATIENT
Start: 2025-03-20 | End: 2025-03-20 | Stop reason: HOSPADM

## 2025-03-20 RX ORDER — INSULIN ASPART 100 [IU]/ML
0-10 INJECTION, SOLUTION INTRAVENOUS; SUBCUTANEOUS
Status: DISCONTINUED | OUTPATIENT
Start: 2025-03-20 | End: 2025-03-20 | Stop reason: HOSPADM

## 2025-03-20 RX ORDER — DILTIAZEM HYDROCHLORIDE 60 MG/1
60 TABLET, FILM COATED ORAL EVERY 6 HOURS
Qty: 56 TABLET | Refills: 0 | Status: SHIPPED | OUTPATIENT
Start: 2025-03-20 | End: 2025-04-03

## 2025-03-20 RX ORDER — OXYCODONE HCL 5 MG/5 ML
5 SOLUTION, ORAL ORAL EVERY 6 HOURS PRN
Qty: 15 ML | Refills: 0 | Status: SHIPPED | OUTPATIENT
Start: 2025-03-20

## 2025-03-20 RX ORDER — DILTIAZEM HYDROCHLORIDE 60 MG/1
60 TABLET, FILM COATED ORAL EVERY 6 HOURS
Status: DISCONTINUED | OUTPATIENT
Start: 2025-03-20 | End: 2025-03-20 | Stop reason: HOSPADM

## 2025-03-20 RX ORDER — ALLOPURINOL 100 MG/1
100 TABLET ORAL DAILY
Status: DISCONTINUED | OUTPATIENT
Start: 2025-03-20 | End: 2025-03-20 | Stop reason: HOSPADM

## 2025-03-20 RX ORDER — ATORVASTATIN CALCIUM 40 MG/1
40 TABLET, FILM COATED ORAL NIGHTLY
Status: DISCONTINUED | OUTPATIENT
Start: 2025-03-20 | End: 2025-03-20 | Stop reason: HOSPADM

## 2025-03-20 RX ORDER — METOPROLOL TARTRATE 25 MG/1
25 TABLET, FILM COATED ORAL 2 TIMES DAILY
Qty: 28 TABLET | Refills: 0 | Status: SHIPPED | OUTPATIENT
Start: 2025-03-20 | End: 2025-04-03

## 2025-03-20 RX ADMIN — ONDANSETRON 8 MG: 2 INJECTION INTRAMUSCULAR; INTRAVENOUS at 11:03

## 2025-03-20 RX ADMIN — ONDANSETRON 8 MG: 2 INJECTION INTRAMUSCULAR; INTRAVENOUS at 06:03

## 2025-03-20 RX ADMIN — ALLOPURINOL 100 MG: 100 TABLET ORAL at 09:03

## 2025-03-20 RX ADMIN — ENOXAPARIN SODIUM 60 MG: 60 INJECTION SUBCUTANEOUS at 08:03

## 2025-03-20 RX ADMIN — PANTOPRAZOLE SODIUM 40 MG: 40 INJECTION, POWDER, FOR SOLUTION INTRAVENOUS at 08:03

## 2025-03-20 RX ADMIN — SODIUM CHLORIDE, POTASSIUM CHLORIDE, SODIUM LACTATE AND CALCIUM CHLORIDE 500 ML: 600; 310; 30; 20 INJECTION, SOLUTION INTRAVENOUS at 08:03

## 2025-03-20 RX ADMIN — ONDANSETRON 8 MG: 2 INJECTION INTRAMUSCULAR; INTRAVENOUS at 12:03

## 2025-03-20 RX ADMIN — GABAPENTIN 300 MG: 250 SOLUTION ORAL at 08:03

## 2025-03-20 RX ADMIN — ACETAMINOPHEN 999.01 MG: 650 SOLUTION ORAL at 06:03

## 2025-03-20 RX ADMIN — SODIUM CHLORIDE, POTASSIUM CHLORIDE, SODIUM LACTATE AND CALCIUM CHLORIDE: 600; 310; 30; 20 INJECTION, SOLUTION INTRAVENOUS at 02:03

## 2025-03-20 RX ADMIN — INSULIN ASPART 1 UNITS: 100 INJECTION, SOLUTION INTRAVENOUS; SUBCUTANEOUS at 12:03

## 2025-03-20 RX ADMIN — ASPIRIN 81 MG: 81 TABLET, COATED ORAL at 09:03

## 2025-03-20 RX ADMIN — METOPROLOL TARTRATE 25 MG: 25 TABLET, FILM COATED ORAL at 08:03

## 2025-03-20 RX ADMIN — DILTIAZEM HYDROCHLORIDE 60 MG: 60 TABLET, FILM COATED ORAL at 11:03

## 2025-03-20 NOTE — PROGRESS NOTES
Rounded on pt s/p bariatric surgery. Provided pt postooperative review material, Immediate Post Bariatric Surgery Highlights. All information reviewed and questions answered.  Pt displayed underatanding.

## 2025-03-20 NOTE — PLAN OF CARE
Problem: Adult Inpatient Plan of Care  Goal: Plan of Care Review  Outcome: Progressing  Goal: Patient-Specific Goal (Individualized)  Outcome: Progressing  Goal: Absence of Hospital-Acquired Illness or Injury  Outcome: Progressing  Goal: Optimal Comfort and Wellbeing  Outcome: Progressing  Goal: Readiness for Transition of Care  Outcome: Progressing     Problem: Diabetes Comorbidity  Goal: Blood Glucose Level Within Targeted Range  Outcome: Progressing     Problem: Bariatric Environmental Safety  Goal: Safety Maintained with Care  Outcome: Progressing     Problem: Wound  Goal: Optimal Coping  Outcome: Progressing  Goal: Optimal Functional Ability  Outcome: Progressing  Goal: Absence of Infection Signs and Symptoms  Outcome: Progressing  Goal: Improved Oral Intake  Outcome: Progressing  Goal: Optimal Pain Control and Function  Outcome: Progressing  Goal: Skin Health and Integrity  Outcome: Progressing  Goal: Optimal Wound Healing  Outcome: Progressing     Problem: Fall Injury Risk  Goal: Absence of Fall and Fall-Related Injury  Outcome: Progressing    Pt AAO X4 and is calm and cooperative to care. Pt established communication with use of call light for need of assistance. Pt able to ambulate to bathroom with no outlying s/s after completion. Pt able to listen and voice concerns if needed. At this time no concerns of questions at this time and safety measures in place. Continue plan of care.

## 2025-03-20 NOTE — PLAN OF CARE
[facility] - Surgery  Initial Discharge Assessment       Primary Care Provider: Riddhi Galindo MD    Admission Diagnosis: Essential hypertension [I10]  TOSIN (obstructive sleep apnea) [G47.33]  Morbid obesity with BMI of 50.0-59.9, adult [E66.01, Z68.43]  Poorly controlled type 2 diabetes mellitus with neuropathy [E11.40, E11.65]  Hyperlipidemia LDL goal <100 [E78.5]  Obesity [E66.9]    Admission Date: 3/19/2025  Expected Discharge Date: 3/20/2025    Transition of Care Barriers: None    Payor: BLUE CROSS BLUE SHIELD / Plan: BCBS OF [employer] LOCAL PLUS / Product Type: Commercial /     Extended Emergency Contact Information  Primary Emergency Contact: Zahira [facility]  Mobile Phone: [phone]  Relation: Significant other    Discharge Plan A: Home with family  Discharge Plan B: Home Health, Home with family      [facility] [id number] [place] [address] EXP AT [facility] & [address] [address]  Phone: [phone] Fax: [phone]    [facility]  [address]  [address]   [facility] [address]  Phone: [phone] Fax: [phone]    [facility] [place] - [address]  [address]  [facility] [address]  Phone: [phone] Fax: [phone]      Initial Assessment (most recent)       Adult Discharge Assessment - 03/20/25 1028          Discharge Assessment    Assessment Type Discharge Planning Assessment     Confirmed/corrected address, phone number and insurance Yes     Confirmed Demographics Correct on Facesheet     Source of Information patient     Communicated ESTEFANÍA with patient/caregiver Yes     People in Home child(kate), adult     Do you have help at home or someone to help you manage your care at home? Yes     Who are your caregiver(s) and their phone number(s)? s.o Zahira     Prior to hospitilization cognitive status: Alert/Oriented     Current cognitive status:  Alert/Oriented     Home Layout Able to live on 1st floor     Equipment Currently Used at Home CPAP;glucometer;blood pressure machine     Do you currently have service(s) that help you manage your care at home? No     Do you take prescription medications? Yes     Do you have prescription coverage? Yes     Do you have any problems affording any of your prescribed medications? No     Is the patient taking medications as prescribed? yes     How do you get to doctors appointments? car, drives self     Are you on dialysis? No     Do you take coumadin? No     Discharge Plan A Home with family     Discharge Plan B Home Health;Home with family     DME Needed Upon Discharge  none     Discharge Plan discussed with: Patient     Transition of Care Barriers None                   Patient lives with 31 y/o son in a single story home with one entry step. Patient does not feel he will need any post acute services upon hospital discharge. Ms Rodriges and his son will provide patient care once patient discharged home. Ms. Rodriges will provide transportation home.

## 2025-03-20 NOTE — PLAN OF CARE
[facility] - Surgery  Discharge Final Note    Primary Care Provider: Riddhi Galindo MD    Expected Discharge Date: 3/20/2025    Final Discharge Note (most recent)       Final Note - 03/20/25 1446          Final Note    Assessment Type Final Discharge Note     Anticipated Discharge Disposition Home or Self Care     Hospital Resources/Appts/Education Provided Provided patient/caregiver with written discharge plan information;Provided education on problems/symptoms using teachback;Appointments scheduled and added to S                   Future Appointments   Date Time Provider Department Center   3/26/2025  9:00 AM Cindy Carrera RD [facility]   4/2/2025 10:20 AM LAB[facility]   4/2/2025 10:40 AM Jes Ram PA-C [facility]   4/2/2025 11:30 AM Cindy Carrera RD [facility]   4/25/2025  1:30 PM Festus Mata MD [facility]   5/14/2025  8:40 AM LAB, APPOINTMENT [facility]   5/14/2025  9:00 AM Maria Luisa Hay NP [facility]   5/14/2025  9:30 AM Cindy Carrera RD [facility]   5/17/2025  8:00 AM LAB, [facility] [place]   5/21/2025  2:30 PM Chiquis Brink, HECTOR [facility]   6/16/2025  8:00 AM Riddhi Galindo MD [facility]   9/24/2025  8:40 AM LAB, APPOINTMENT [facility]   9/24/2025  9:00 AM Jes Ram PA-C [facility]

## 2025-03-20 NOTE — SUBJECTIVE & OBJECTIVE
Interval History: NAEO. VSS. Patient doing well this morning. Denies any nausea or vomiting this morning. Passed water protocol last night about to start clears this morning. Mild CAL on labs, patient reports voiding spontaneously.     Medications:  Continuous Infusions:   0.9% NaCl   Intravenous Continuous        lactated ringers   Intravenous Continuous 125 mL/hr at 03/20/25 0248 New Bag at 03/20/25 0248     Scheduled Meds:   acetaminophen  999.0148 mg Oral Q8H    enoxparin  60 mg Subcutaneous Q12H    gabapentin  300 mg Oral BID    lactated ringers  500 mL Intravenous Once    metoprolol tartrate  25 mg Oral BID    ondansetron  8 mg Intravenous Q6H    pantoprazole  40 mg Intravenous BID    scopolamine  1 patch Transdermal Once     PRN Meds:  Current Facility-Administered Medications:     acetaminophen, 499.5074 mg, Oral, Q24H PRN    dextrose 50%, 12.5 g, Intravenous, PRN    glucagon (human recombinant), 1 mg, Intramuscular, PRN    insulin aspart U-100, 0-10 Units, Subcutaneous, QID (AC + HS) PRN    oxyCODONE, 5 mg, Oral, Q6H PRN    prochlorperazine, 5 mg, Intravenous, Q6H PRN    simethicone, 40 mg, Oral, QID PRN     Review of patient's allergies indicates:  No Known Allergies  Objective:     Vital Signs (Most Recent):  Temp: 97.8 °F (36.6 °C) (03/20/25 0801)  Pulse: 86 (03/20/25 0801)  Resp: 20 (03/20/25 0801)  BP: 131/71 (03/20/25 0801)  SpO2: (!) 94 % (03/20/25 0801) Vital Signs (24h Range):  Temp:  [97 °F (36.1 °C)-98 °F (36.7 °C)] 97.8 °F (36.6 °C)  Pulse:  [78-91] 86  Resp:  [15-27] 20  SpO2:  [89 %-100 %] 94 %  BP: (120-149)/(55-75) 131/71     Weight: (!) 182.7 kg (402 lb 12.5 oz)  Body mass index is 51.71 kg/m².    Intake/Output - Last 3 Shifts         03/18 0700  03/19 0659 03/19 0700 03/20 0659 03/20 0700 03/21 0659    IV Piggyback  1200     Total Intake(mL/kg)  1200 (6.6)     Urine (mL/kg/hr)  300 (0.1)     Total Output  300     Net  +900                     Physical Exam  Vitals and nursing note  reviewed.   Constitutional:       General: He is not in acute distress.     Appearance: Normal appearance.   HENT:      Head: Normocephalic and atraumatic.      Nose: Nose normal.      Mouth/Throat:      Mouth: Mucous membranes are moist.   Cardiovascular:      Rate and Rhythm: Normal rate.   Pulmonary:      Effort: Pulmonary effort is normal. No respiratory distress.   Abdominal:      General: Abdomen is flat. There is no distension.      Palpations: Abdomen is soft.      Tenderness: There is no abdominal tenderness. There is no guarding or rebound.      Comments: Incisions c/d/i   Skin:     General: Skin is warm.   Neurological:      General: No focal deficit present.      Mental Status: He is alert and oriented to person, place, and time.   Psychiatric:         Mood and Affect: Mood normal.          Significant Labs:  I have reviewed all pertinent lab results within the past 24 hours.  CBC:   Recent Labs   Lab 03/20/25  0449   WBC 14.45*   RBC 4.60   HGB 14.5   HCT 44.2      MCV 96   MCH 31.5*   MCHC 32.8     CMP:   Recent Labs   Lab 03/20/25  0449   *   CALCIUM 9.2   *   K 4.8   CO2 21*      BUN 29*   CREATININE 1.8*       Significant Diagnostics:  I have reviewed all pertinent imaging results/findings within the past 24 hours.

## 2025-03-20 NOTE — DISCHARGE SUMMARY
[facility]  General Surgery  Discharge Summary      Patient Name: David Corona Jr.  MRN: [id number]  Admission Date: 3/19/2025  Hospital Length of Stay: 1 days  Discharge Date and Time:  03/20/2025 2:06 PM  Attending Physician: Musa Leone, *   Discharging Provider: Erika Tyler MD  Primary Care Provider: Riddhi Galindo MD    HPI:   1. Morbid obesity, body mass index is 58.49 kg/m². and inability to lose weight.  He is on monjouro and has lost only 40lb on this.  2. 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 He denies gerd symptoms     PE: abdomen soft     Cbc, cmp, vitamins reviewed, iron deficiency with mild anemia, ckd3, dm in control, elevated pth, positive hpylori  Cxr reviewed, ok  EKG reviewed, av block, possible ami  Stress echo reviewed    Left Ventricle: The left ventricle is normal in size. Normal wall thickness. There is concentric remodeling. Normal wall motion. There is normal systolic function with a visually estimated ejection fraction of 60 - 65%. There is normal diastolic function. Normal left ventricular filling pressure.    Right Ventricle: Normal right ventricular cavity size. Wall thickness is normal. Right ventricle wall motion  is normal. Systolic function is normal.    Left Atrium: Normal left atrial size.    Right Atrium: Normal right atrial size.    Aortic Valve: The aortic valve is a trileaflet valve. There is moderate aortic valve sclerosis. Mildly calcified cusps. There is mild annular calcification present.    Aorta: Aortic root is normal in size measuring 3.49 cm. Ascending aorta is normal measuring 3.16 cm.    IVC/SVC: Normal venous pressure at 3 mmHg.    Pericardium: There is no pericardial effusion.    Stress Protocol: The patient was infused intravenously with dobutamine. The patient received a graduated infusion of the stress agent beginning at 10.0  mcg/kg/min to a peak dose of 30.0 mcg/kg/min. The peak heart rate was 142 bpm, which is 88% of age predicted maximum heart rate. The patient was also given atropine for a total dose of .25 mg. The patient reported no symptoms during the stress test. The test was stopped because the end of the protocol was reached.    Baseline ECG: The Baseline ECG reveals sinus rhythm with 1st degree A/V block. The axis is normal. The ST segments are normal.    Stress ECG: There is no ST segment deviation identified during the protocol. During stress, occasional PVCs are noted. There is normal blood pressure response with stress.    ECG Conclusion: The ECG portion of the study is negative for ischemia.    Post-stress Echo: Left ventricle cavity appears normal post-stress. The left ventricle systolic function is hyperdynamic. The post-stress images show normal wall motion. Right ventricle cavity size appears normal. Right ventricle systolic function is normal.    Post-stress Conclusion: The study is negative with no echocardiographic evidence of stress induced ischemia.     Morbid obesity with comorbidities, unable to lose weight for robotic sleeve gastrectomy with egd.  Obtain Aria.  ylori treatment.    Procedure(s) (LRB):  XI ROBOTIC SLEEVE GASTRECTOMY w/intraop EGD (N/A)      Indwelling Lines/Drains at time of discharge:   Lines/Drains/Airways       None                 Hospital Course: Patient underwent above procedure and tolerated it well. He progressed as expected and tolerated bariatric clear liquid protocol per post operative pathway. POD 1 he was ambulating at baseline, tolerating adequate PO intake. Will plan for DC with two week follow up    Goals of Care Treatment Preferences:         Consults:     Significant Diagnostic Studies: N/A    Pending Diagnostic Studies:       Procedure Component Value Units Date/Time    Specimen to Pathology, Surgery General Surgery [[id number]] Collected: 03/19/25 0926    Order Status:  Sent Lab Status: In process Updated: 03/19/25 1245    Specimen: Tissue           Final Active Diagnoses:    Diagnosis Date Noted POA    PRINCIPAL PROBLEM:  Obesity [E66.9] 03/19/2025 Yes      Problems Resolved During this Admission:      Discharged Condition: good    Disposition: Home or Self Care    Follow Up:    Patient Instructions:      Diet Bariatric High Protein Clear Liquid   Order Comments: No soft drinks     Lifting restrictions (nothing greater than 10lbs)   Scheduling Instructions: Lifting restrictions:  nothing greater than 10lbs     No driving until:   Order Comments: No driving until off narcotic pain medication.  Can turn around without pain off narcotics.  At least 1 week.     Notify your health care provider if you experience any of the following:   Order Comments: temperature > 100.4, persistent nausea and vomiting or diarrhea, severe uncontrolled pain, redness, tenderness, or signs of infection (pain, swelling, redness, odor or green/yellow discharge around incision site), difficulty breathing or increased cough, severe persistent headache, worsening rash, persistent dizziness, light-headedness, or visual disturbances, increased confusion or weakness     Activity as tolerated     Shower on day two and no bath     Medications:  Reconciled Home Medications:      Medication List        ASK your doctor about these medications      acetaminophen 500 mg Pwpk  Take 1,000 mg by mouth 3 (three) times daily. Take 1,000mg (2 packets) THREE TIMES DAILY for at least 3 days and up to 5 days as needed for pain. May take an additional packet (500mg) once daily if required for pain. Do not take more than 4,000mg in 24 hours.     allopurinoL 100 MG tablet  Commonly known as: ZYLOPRIM  Take 1 tablet (100 mg total) by mouth once daily.     aspirin 81 MG EC tablet  Commonly known as: ECOTRIN  Take 1 tablet (81 mg total) by mouth once daily.     atorvastatin 40 MG tablet  Commonly known as: LIPITOR  Take 1 tablet (40  mg total) by mouth every evening.     BASAGLAR KWIKPEN U-100 INSULIN 100 unit/mL (3 mL) Inpn pen  Generic drug: insulin glargine U-100 (Lantus)  Inject 20 units into the skin once daily     colchicine 0.6 mg tablet  Commonly known as: COLCRYS  TAKE 1 TABLET(0.6 MG) BY MOUTH DAILY     DEXCOM G7 SENSOR Clare  Generic drug: blood-glucose sensor  Change every 10 days     diltiaZEM 240 MG 24 hr capsule  Commonly known as: CARDIZEM CD  TAKE 2 CAPSULES BY MOUTH EVERY DAY     FARXIGA 10 mg tablet  Generic drug: dapagliflozin propanediol  Take 1 tablet (10 mg total) by mouth once daily.     FIASP FLEXTOUCH U-100 INSULIN 100 unit/mL (3 mL) Inpn  Generic drug: insulin aspart (niacinamide)  Take as needed before meals: 150-200=+2, 201-250=+4; 251-300=+6; 301-350=+8, over 350=+10 units. Max daily dose 30 units     furosemide 40 MG tablet  Commonly known as: LASIX  Take 1 tablet (40 mg total) by mouth 2 (two) times daily.     gabapentin 300 MG capsule  Commonly known as: NEURONTIN  OPEN capsule into a tablespoon and consume with sip of liquid. Take once the MORNING OF SURGERY. After surgery: take one capsule TWICE DAILY for at least 3 days and up to 5 days as needed for pain.     hydrALAZINE 50 MG tablet  Commonly known as: APRESOLINE  Take 1 tablet (50 mg total) by mouth 2 (two) times a day.     ibuprofen 600 MG tablet  Commonly known as: ADVIL,MOTRIN  TAKE 1 TABLET(600 MG) BY MOUTH EVERY NIGHT AS NEEDED FOR PAIN     metoprolol succinate 50 MG 24 hr tablet  Commonly known as: TOPROL-XL  TAKE 1 TABLET(50 MG) BY MOUTH DAILY     MOUNJARO 15 mg/0.5 mL Pnij  Generic drug: tirzepatide  Inject 15 mg (one pen) into the skin every 7 days.     multivitamin with minerals tablet  Take 1 tablet by mouth once daily.     NovoLOG Flexpen U-100 Insulin 100 unit/mL (3 mL) Inpn pen  Generic drug: insulin aspart U-100  Take as needed before meals: 150-200=+2, 201-250=+4; 251-300=+6; 301-350=+8, over 350=+10 units. Max daily dose 30 units     omega-3  "fatty acids 1,000 mg Cap  Take 2 capsules by mouth once daily.     omeprazole 40 MG capsule  Commonly known as: PRILOSEC  Take 1 capsule (40 mg total) by mouth every morning. Open capsule and take with apple sauce     ondansetron 8 MG Tbdl  Commonly known as: ZOFRAN-ODT  Dissolve 1 tablet (8 mg total) by mouth every 6 (six) hours as needed (Nausea).     pen needle, diabetic 32 gauge x 1/4" Ndle  Commonly known as: NOVOFINE 32  USE FOUR TIMES DAILY AS NEEDED     pioglitazone 45 MG tablet  Commonly known as: ACTOS  TAKE 1 TABLET (45 MG) BY MOUTH EVERY DAY     polyethylene glycol 17 gram/dose powder  Commonly known as: GLYCOLAX  Use cap to measure 17 grams.  Dissolve as directed and take by mouth once daily.     ursodioL 500 MG tablet  Commonly known as: MICHELINE FORTE  Take 1 tablet (500 mg total) by mouth once daily. For gallstone prevention.     valsartan 320 MG tablet  Commonly known as: DIOVAN  Take 1 tablet (320 mg total) by mouth once daily.            Time spent on the discharge of patient: 30 minutes    Erika Tyler MD  General Surgery  [facility] - Surgery  "

## 2025-03-20 NOTE — ASSESSMENT & PLAN NOTE
David MARVIN Doshirley Savage. is an 59 y.o. male that is s/p XI sleeve gastrectomy on 3/19. On pathway, tolerated water protocol yesterday. Doing well this morning. Repeat labs to trend CAL.       - Diet: Bariatric clear liquid  - Pain: multimodal, minimize narcotics, discontinue robaxin   - 1200 repeat BMP   - OK for pills < pencil eraser, liquids, or crushed  - Nausea control w/ scheduled and PRN antiemetics   - Home meds as appropriate  - Replete lytes PRN  - Daily labs  - OOB to chair and ambulate in halls   - Encourage IS  - DVT ppx     Dispo: Will plan for possible discharge this afternoon if tolerating diet and improving CAL, able to remain well hydrated. Post-op instructions given and discussed with patient this morning.

## 2025-03-20 NOTE — PROGRESS NOTES
[facility]  General Surgery  Progress Note    Subjective:     History of Present Illness:  No notes on file    Post-Op Info:  Procedure(s) (LRB):  XI ROBOTIC SLEEVE GASTRECTOMY w/intraop EGD (N/A)   1 Day Post-Op     Interval History: NAEO. VSS. Patient doing well this morning. Denies any nausea or vomiting this morning. Passed water protocol last night about to start clears this morning. Mild CAL on labs, patient reports voiding spontaneously.     Medications:  Continuous Infusions:   0.9% NaCl   Intravenous Continuous        lactated ringers   Intravenous Continuous 125 mL/hr at 03/20/25 0248 New Bag at 03/20/25 0248     Scheduled Meds:   acetaminophen  999.0148 mg Oral Q8H    enoxparin  60 mg Subcutaneous Q12H    gabapentin  300 mg Oral BID    lactated ringers  500 mL Intravenous Once    metoprolol tartrate  25 mg Oral BID    ondansetron  8 mg Intravenous Q6H    pantoprazole  40 mg Intravenous BID    scopolamine  1 patch Transdermal Once     PRN Meds:  Current Facility-Administered Medications:     acetaminophen, 499.5074 mg, Oral, Q24H PRN    dextrose 50%, 12.5 g, Intravenous, PRN    glucagon (human recombinant), 1 mg, Intramuscular, PRN    insulin aspart U-100, 0-10 Units, Subcutaneous, QID (AC + HS) PRN    oxyCODONE, 5 mg, Oral, Q6H PRN    prochlorperazine, 5 mg, Intravenous, Q6H PRN    simethicone, 40 mg, Oral, QID PRN     Review of patient's allergies indicates:  No Known Allergies  Objective:     Vital Signs (Most Recent):  Temp: 97.8 °F (36.6 °C) (03/20/25 0801)  Pulse: 86 (03/20/25 0801)  Resp: 20 (03/20/25 0801)  BP: 131/71 (03/20/25 0801)  SpO2: (!) 94 % (03/20/25 0801) Vital Signs (24h Range):  Temp:  [97 °F (36.1 °C)-98 °F (36.7 °C)] 97.8 °F (36.6 °C)  Pulse:  [78-91] 86  Resp:  [15-27] 20  SpO2:  [89 %-100 %] 94 %  BP: (120-149)/(55-75) 131/71     Weight: (!) 182.7 kg (402 lb 12.5 oz)  Body mass index is 51.71 kg/m².    Intake/Output - Last 3 Shifts         03/18 0700  03/19 0659 03/19  0700  03/20 0659 03/20 0700  03/21 0659    IV Piggyback  1200     Total Intake(mL/kg)  1200 (6.6)     Urine (mL/kg/hr)  300 (0.1)     Total Output  300     Net  +900                     Physical Exam  Vitals and nursing note reviewed.   Constitutional:       General: He is not in acute distress.     Appearance: Normal appearance.   HENT:      Head: Normocephalic and atraumatic.      Nose: Nose normal.      Mouth/Throat:      Mouth: Mucous membranes are moist.   Cardiovascular:      Rate and Rhythm: Normal rate.   Pulmonary:      Effort: Pulmonary effort is normal. No respiratory distress.   Abdominal:      General: Abdomen is flat. There is no distension.      Palpations: Abdomen is soft.      Tenderness: There is no abdominal tenderness. There is no guarding or rebound.      Comments: Incisions c/d/i   Skin:     General: Skin is warm.   Neurological:      General: No focal deficit present.      Mental Status: He is alert and oriented to person, place, and time.   Psychiatric:         Mood and Affect: Mood normal.          Significant Labs:  I have reviewed all pertinent lab results within the past 24 hours.  CBC:   Recent Labs   Lab 03/20/25  0449   WBC 14.45*   RBC 4.60   HGB 14.5   HCT 44.2      MCV 96   MCH 31.5*   MCHC 32.8     CMP:   Recent Labs   Lab 03/20/25  0449   *   CALCIUM 9.2   *   K 4.8   CO2 21*      BUN 29*   CREATININE 1.8*       Significant Diagnostics:  I have reviewed all pertinent imaging results/findings within the past 24 hours.  Assessment/Plan:     * Obesity  David Corona Jr. is an 59 y.o. male that is s/p XI sleeve gastrectomy on 3/19. On pathway, tolerated water protocol yesterday. Doing well this morning. Repeat labs to trend CAL.       - Diet: Bariatric clear liquid  - Pain: multimodal, minimize narcotics, discontinue robaxin   - 1200 repeat BMP   - OK for pills < pencil eraser, liquids, or crushed  - Nausea control w/ scheduled and PRN antiemetics   -  Home meds as appropriate  - Replete lytes PRN  - Daily labs  - OOB to chair and ambulate in halls   - Encourage IS  - DVT ppx     Dispo: Will plan for possible discharge this afternoon if tolerating diet and improving CAL, able to remain well hydrated. Post-op instructions given and discussed with patient this morning.         Danna Salazar MD  General Surgery  [facility]

## 2025-03-21 ENCOUNTER — PATIENT OUTREACH (OUTPATIENT)
Dept: ADMINISTRATIVE | Facility: CLINIC | Age: 60
End: 2025-03-21
Payer: COMMERCIAL

## 2025-03-23 NOTE — ANESTHESIA POSTPROCEDURE EVALUATION
Anesthesia Post Evaluation    Patient: David Corona     Procedure(s) Performed: Procedure(s) (LRB):  XI ROBOTIC SLEEVE GASTRECTOMY w/intraop EGD (N/A)    Final Anesthesia Type: general      Patient location during evaluation: PACU  Patient participation: Yes- Able to Participate  Level of consciousness: awake and alert  Post-procedure vital signs: reviewed and stable  Pain management: adequate  Airway patency: patent    PONV status at discharge: No PONV  Anesthetic complications: no      Cardiovascular status: blood pressure returned to baseline  Respiratory status: unassisted  Hydration status: euvolemic  Follow-up not needed.          Vitals Value Taken Time   /79 03/20/25 12:26   Temp 36.6 °C (97.8 °F) 03/20/25 12:26   Pulse 64 03/20/25 12:26   Resp 18 03/20/25 12:26   SpO2 97 % 03/20/25 12:26         Event Time   Out of Recovery 10:34:00         Pain/López Score: No data recorded

## 2025-03-24 LAB
COMMENT: NORMAL
FINAL PATHOLOGIC DIAGNOSIS: NORMAL
GROSS: NORMAL
Lab: NORMAL

## 2025-03-26 ENCOUNTER — CLINICAL SUPPORT (OUTPATIENT)
Dept: BARIATRICS | Facility: CLINIC | Age: 60
End: 2025-03-26
Payer: COMMERCIAL

## 2025-03-26 DIAGNOSIS — I10 ESSENTIAL HYPERTENSION: ICD-10-CM

## 2025-03-26 DIAGNOSIS — E66.01 MORBID OBESITY WITH BMI OF 50.0-59.9, ADULT: ICD-10-CM

## 2025-03-26 DIAGNOSIS — E11.29 POORLY CONTROLLED TYPE 2 DIABETES MELLITUS WITH RENAL COMPLICATION: ICD-10-CM

## 2025-03-26 DIAGNOSIS — G47.33 OSA (OBSTRUCTIVE SLEEP APNEA): ICD-10-CM

## 2025-03-26 DIAGNOSIS — Z98.84 S/P BARIATRIC SURGERY: ICD-10-CM

## 2025-03-26 DIAGNOSIS — N18.31 STAGE 3A CHRONIC KIDNEY DISEASE: ICD-10-CM

## 2025-03-26 DIAGNOSIS — E11.65 POORLY CONTROLLED TYPE 2 DIABETES MELLITUS WITH RENAL COMPLICATION: ICD-10-CM

## 2025-03-26 DIAGNOSIS — Z71.3 DIETARY COUNSELING: Primary | ICD-10-CM

## 2025-03-26 NOTE — PHYSICIAN QUERY
Due to the conflicting clinical picture, please clinically validate the CAL.. If validated, please provide additional clinical support for the CAL:   Other clarification (please specify): chronic renal insufficiency.

## 2025-03-26 NOTE — PROGRESS NOTES
Audio Only Telehealth Visit     The patient location is: [place]  The chief complaint leading to consultation is: Follow-up 1 Week s/p laproscopic sleeve gastrectomy  Visit type: Virtual visit with audio only (telephone)  Total time spent with patient: 15 mins     The reason for the audio only service rather than synchronous audio and video virtual visit was related to technical difficulties or patient preference/necessity.     Each patient to whom I provide medical services by telemedicine is:  (1) informed of the relationship between the physician and patient and the respective role of any other health care provider with respect to management of the patient; and (2) notified that they may decline to receive medical services by telemedicine and may withdraw from such care at any time. Patient verbally consented to receive this service via voice-only telephone call.    This service was not originating from a related E/M service provided within the previous 7 days nor will  to an E/M service or procedure within the next 24 hours or my soonest available appointment.  Prevailing standard of care was able to be met in this audio-only visit.      NUTRITION NOTE    Referring Physician: Musa Leone M.D.   Reason for MNT Referral: Follow-up 1 Week s/p laproscopic sleeve gastrectomy    CURRENT DIET:  Bariatric Liquid Diet    Dehydration assessment:  Urine output/color: yellow  Chest pain: N  Persistent increased heart rate: N  Fatigue: N  Dizzy/weak: N  N/V: N  BM: Y    Protein and fluid intake assessment: (food diary)    Fluids include: Water, Crystal Light, chicken broth, decaf coffee, Gatorade Zero, SF popsicles, SF Jell-o   Fluid intake yesterday: 64 oz  Protein supplements:  [employer] Nutrition Plan   Protein intake yesterday:  30 g    Vitamins  Flintstones Multivitamin with 18 mg iron 2 x day  Equate Calcium Citrate 630 mg with vitamin D, 2 pills, 2 x day   B-1 with 250 mg, 1 x week  B-12 sublingual 2500 mcg,  1 x week     Medications  Omeprazole: Y  How are you tolerating pain at this time? (rate on a scale from 1 to 10; >7 notify PA/MD): 0  Did you take your acetaminophen and gabapentin for 3 days? N  Did you have to take additional acetaminophen for break through pain (pain scale 4-6)? N  Did you have any severe pain that required oxycodone? If yes, how much did you use and do you have left? Y, all    How is the support system at home?  Exercise reminder (light exercise at this time, no lifting above 10 lbs)     Questions for nurse/MA/PA:     BARIATRIC DIET DISCUSSION:  Reinforced post-op nutrition guidelines.  Continue to work on fluid and protein intake.    PLAN/RECOMMONDATIONS:  Continue bariatric high protein liquid diet.  Maintain protein intake or increase gradually to goal of 60 g prot/day.  Maintain fluid intake or increase gradually to goal of 64 floz/day.  Begin appropriate vitamins & minerals.  Begin or continue light exercise.     Confirmed date and time for 2 week po labs and clinic visit     SESSION TIME: 15 minutes

## 2025-03-28 NOTE — ADDENDUM NOTE
Addendum  created 03/28/25 1656 by Molly Jean MD    Attestation recorded in Intraprocedure, Intraprocedure Attestations filed

## 2025-03-31 RX ORDER — AMOXICILLIN 500 MG/1
1000 TABLET, FILM COATED ORAL EVERY 12 HOURS
Qty: 56 TABLET | Refills: 0 | OUTPATIENT
Start: 2025-03-31 | End: 2025-04-14

## 2025-04-02 ENCOUNTER — RESULTS FOLLOW-UP (OUTPATIENT)
Dept: BARIATRICS | Facility: CLINIC | Age: 60
End: 2025-04-02

## 2025-04-02 ENCOUNTER — CLINICAL SUPPORT (OUTPATIENT)
Dept: BARIATRICS | Facility: CLINIC | Age: 60
End: 2025-04-02
Payer: COMMERCIAL

## 2025-04-02 ENCOUNTER — LAB VISIT (OUTPATIENT)
Dept: LAB | Facility: HOSPITAL | Age: 60
End: 2025-04-02
Payer: COMMERCIAL

## 2025-04-02 ENCOUNTER — OFFICE VISIT (OUTPATIENT)
Dept: BARIATRICS | Facility: CLINIC | Age: 60
End: 2025-04-02
Payer: COMMERCIAL

## 2025-04-02 VITALS
BODY MASS INDEX: 40.43 KG/M2 | DIASTOLIC BLOOD PRESSURE: 72 MMHG | OXYGEN SATURATION: 96 % | SYSTOLIC BLOOD PRESSURE: 128 MMHG | HEART RATE: 83 BPM | HEIGHT: 74 IN | WEIGHT: 315 LBS

## 2025-04-02 DIAGNOSIS — Z71.3 DIETARY COUNSELING: Primary | ICD-10-CM

## 2025-04-02 DIAGNOSIS — I10 ESSENTIAL HYPERTENSION: ICD-10-CM

## 2025-04-02 DIAGNOSIS — E78.5 HYPERLIPIDEMIA LDL GOAL <100: ICD-10-CM

## 2025-04-02 DIAGNOSIS — E66.01 MORBID OBESITY WITH BMI OF 50.0-59.9, ADULT: ICD-10-CM

## 2025-04-02 DIAGNOSIS — Z98.890 POST-OPERATIVE STATE: Primary | ICD-10-CM

## 2025-04-02 DIAGNOSIS — E11.65 POORLY CONTROLLED TYPE 2 DIABETES MELLITUS WITH NEUROPATHY: ICD-10-CM

## 2025-04-02 DIAGNOSIS — E11.29 POORLY CONTROLLED TYPE 2 DIABETES MELLITUS WITH RENAL COMPLICATION: ICD-10-CM

## 2025-04-02 DIAGNOSIS — E11.65 POORLY CONTROLLED TYPE 2 DIABETES MELLITUS WITH RENAL COMPLICATION: ICD-10-CM

## 2025-04-02 DIAGNOSIS — N18.31 STAGE 3A CHRONIC KIDNEY DISEASE: ICD-10-CM

## 2025-04-02 DIAGNOSIS — Z98.84 S/P BARIATRIC SURGERY: ICD-10-CM

## 2025-04-02 DIAGNOSIS — G47.33 OSA (OBSTRUCTIVE SLEEP APNEA): ICD-10-CM

## 2025-04-02 DIAGNOSIS — E11.40 POORLY CONTROLLED TYPE 2 DIABETES MELLITUS WITH NEUROPATHY: ICD-10-CM

## 2025-04-02 LAB
ABSOLUTE EOSINOPHIL (OHS): 0.16 K/UL
ABSOLUTE MONOCYTE (OHS): 0.8 K/UL (ref 0.3–1)
ABSOLUTE NEUTROPHIL COUNT (OHS): 5.46 K/UL (ref 1.8–7.7)
ALBUMIN SERPL BCP-MCNC: 3.3 G/DL (ref 3.5–5.2)
ALBUMIN SERPL BCP-MCNC: 3.4 G/DL (ref 3.5–5.2)
ALP SERPL-CCNC: 123 UNIT/L (ref 40–150)
ALT SERPL W/O P-5'-P-CCNC: 6 UNIT/L (ref 10–44)
ANION GAP (OHS): 10 MMOL/L (ref 8–16)
ANION GAP (OHS): 11 MMOL/L (ref 8–16)
AST SERPL-CCNC: 19 UNIT/L (ref 11–45)
BASOPHILS # BLD AUTO: 0.04 K/UL
BASOPHILS NFR BLD AUTO: 0.5 %
BILIRUB SERPL-MCNC: 0.8 MG/DL (ref 0.1–1)
BUN SERPL-MCNC: 22 MG/DL (ref 6–20)
BUN SERPL-MCNC: 22 MG/DL (ref 6–20)
CALCIUM SERPL-MCNC: 9.6 MG/DL (ref 8.7–10.5)
CALCIUM SERPL-MCNC: 9.7 MG/DL (ref 8.7–10.5)
CHLORIDE SERPL-SCNC: 98 MMOL/L (ref 95–110)
CHLORIDE SERPL-SCNC: 99 MMOL/L (ref 95–110)
CO2 SERPL-SCNC: 27 MMOL/L (ref 23–29)
CO2 SERPL-SCNC: 28 MMOL/L (ref 23–29)
CREAT SERPL-MCNC: 1.6 MG/DL (ref 0.5–1.4)
CREAT SERPL-MCNC: 1.6 MG/DL (ref 0.5–1.4)
ERYTHROCYTE [DISTWIDTH] IN BLOOD BY AUTOMATED COUNT: 15.2 % (ref 11.5–14.5)
FERRITIN SERPL-MCNC: 202 NG/ML (ref 20–300)
GFR SERPLBLD CREATININE-BSD FMLA CKD-EPI: 50 ML/MIN/1.73/M2
GFR SERPLBLD CREATININE-BSD FMLA CKD-EPI: 50 ML/MIN/1.73/M2
GLUCOSE SERPL-MCNC: 138 MG/DL (ref 70–110)
GLUCOSE SERPL-MCNC: 144 MG/DL (ref 70–110)
HCT VFR BLD AUTO: 45.4 % (ref 40–54)
HGB BLD-MCNC: 14.8 GM/DL (ref 14–18)
IMM GRANULOCYTES # BLD AUTO: 0.03 K/UL (ref 0–0.04)
IMM GRANULOCYTES NFR BLD AUTO: 0.4 % (ref 0–0.5)
IRON SATN MFR SERPL: 17 % (ref 20–50)
IRON SERPL-MCNC: 45 UG/DL (ref 45–160)
LYMPHOCYTES # BLD AUTO: 1.15 K/UL (ref 1–4.8)
MCH RBC QN AUTO: 31.2 PG (ref 27–31)
MCHC RBC AUTO-ENTMCNC: 32.6 G/DL (ref 32–36)
MCV RBC AUTO: 96 FL (ref 82–98)
NUCLEATED RBC (/100WBC) (OHS): 0 /100 WBC
PHOSPHATE SERPL-MCNC: 3.4 MG/DL (ref 2.7–4.5)
PLATELET # BLD AUTO: 264 K/UL (ref 150–450)
PMV BLD AUTO: 11.4 FL (ref 9.2–12.9)
POTASSIUM SERPL-SCNC: 4 MMOL/L (ref 3.5–5.1)
POTASSIUM SERPL-SCNC: 4 MMOL/L (ref 3.5–5.1)
PROT SERPL-MCNC: 7.7 GM/DL (ref 6–8.4)
PTH-INTACT SERPL-MCNC: 120.1 PG/ML (ref 9–77)
RBC # BLD AUTO: 4.74 M/UL (ref 4.6–6.2)
RELATIVE EOSINOPHIL (OHS): 2.1 %
RELATIVE LYMPHOCYTE (OHS): 15.1 % (ref 18–48)
RELATIVE MONOCYTE (OHS): 10.5 % (ref 4–15)
RELATIVE NEUTROPHIL (OHS): 71.4 % (ref 38–73)
SODIUM SERPL-SCNC: 136 MMOL/L (ref 136–145)
SODIUM SERPL-SCNC: 137 MMOL/L (ref 136–145)
TIBC SERPL-MCNC: 263 UG/DL (ref 250–450)
TRANSFERRIN SERPL-MCNC: 178 MG/DL (ref 200–375)
VIT B12 SERPL-MCNC: 869 PG/ML (ref 210–950)
WBC # BLD AUTO: 7.64 K/UL (ref 3.9–12.7)

## 2025-04-02 PROCEDURE — 4010F ACE/ARB THERAPY RXD/TAKEN: CPT | Mod: CPTII,S$GLB,, | Performed by: PHYSICIAN ASSISTANT

## 2025-04-02 PROCEDURE — 80069 RENAL FUNCTION PANEL: CPT

## 2025-04-02 PROCEDURE — 3066F NEPHROPATHY DOC TX: CPT | Mod: CPTII,S$GLB,, | Performed by: PHYSICIAN ASSISTANT

## 2025-04-02 PROCEDURE — 83970 ASSAY OF PARATHORMONE: CPT

## 2025-04-02 PROCEDURE — 1160F RVW MEDS BY RX/DR IN RCRD: CPT | Mod: CPTII,S$GLB,, | Performed by: PHYSICIAN ASSISTANT

## 2025-04-02 PROCEDURE — 3044F HG A1C LEVEL LT 7.0%: CPT | Mod: CPTII,S$GLB,, | Performed by: PHYSICIAN ASSISTANT

## 2025-04-02 PROCEDURE — 36415 COLL VENOUS BLD VENIPUNCTURE: CPT | Mod: 59

## 2025-04-02 PROCEDURE — 82728 ASSAY OF FERRITIN: CPT

## 2025-04-02 PROCEDURE — 84425 ASSAY OF VITAMIN B-1: CPT

## 2025-04-02 PROCEDURE — 3074F SYST BP LT 130 MM HG: CPT | Mod: CPTII,S$GLB,, | Performed by: PHYSICIAN ASSISTANT

## 2025-04-02 PROCEDURE — 36415 COLL VENOUS BLD VENIPUNCTURE: CPT

## 2025-04-02 PROCEDURE — 99999 PR PBB SHADOW E&M-EST. PATIENT-LVL III: CPT | Mod: PBBFAC,,, | Performed by: PHYSICIAN ASSISTANT

## 2025-04-02 PROCEDURE — 84466 ASSAY OF TRANSFERRIN: CPT

## 2025-04-02 PROCEDURE — 3078F DIAST BP <80 MM HG: CPT | Mod: CPTII,S$GLB,, | Performed by: PHYSICIAN ASSISTANT

## 2025-04-02 PROCEDURE — 82607 VITAMIN B-12: CPT

## 2025-04-02 PROCEDURE — 1159F MED LIST DOCD IN RCRD: CPT | Mod: CPTII,S$GLB,, | Performed by: PHYSICIAN ASSISTANT

## 2025-04-02 PROCEDURE — 99999 PR PBB SHADOW E&M-EST. PATIENT-LVL I: CPT | Mod: PBBFAC,,, | Performed by: DIETITIAN, REGISTERED

## 2025-04-02 PROCEDURE — 99024 POSTOP FOLLOW-UP VISIT: CPT | Mod: S$GLB,,, | Performed by: PHYSICIAN ASSISTANT

## 2025-04-02 PROCEDURE — 85025 COMPLETE CBC W/AUTO DIFF WBC: CPT

## 2025-04-02 RX ORDER — MULTIVITAMIN
1 TABLET ORAL
COMMUNITY

## 2025-04-02 RX ORDER — ACETAMINOPHEN 500 MG
1000 TABLET ORAL 3 TIMES DAILY PRN
COMMUNITY
Start: 2025-03-20

## 2025-04-02 NOTE — PROGRESS NOTES
NUTRITION NOTE    Referring Physician: Musa Leone M.D.   Reason for MNT Referral: Follow-up 2 Weeks s/p laparoscopic sleeve gastrectomy    PAST MEDICAL HISTORY:  Denies nausea, vomiting, constipation, and diarrhea.  Reports doing well.    Past Medical History:   Diagnosis Date    Arthritis     Cataract     left eye     CKD (chronic kidney disease) stage 3, GFR 30-59 ml/min     Colon polyp 11/2016    Dependent edema     Diabetes mellitus     Diverticulosis     Gout, arthritis     Hyperlipidemia LDL goal <100     Hypertension     Morbid obesity     TOSIN (obstructive sleep apnea)     Peripheral autonomic neuropathy in disorders classified elsewhere(337.1)     Proteinuria     Type II or unspecified type diabetes mellitus with neurological manifestations, uncontrolled(250.62)     Venous stasis of lower extremity        CLINICAL DATA:  59 y.o.-year-old Black or  male.    Current Weight: 392 lbs  BMI: 50.38  Total Weight Loss:  42 lbs   Excess Weight Loss:  16%     CURRENT DIET:  Bariatric Liquid Diet    PT reports trying scrambled eggs, mashed potatoes    Diet Recall: 60 grams of protein/day; 35-51 oz of fluids/day    Diet Includes:  Protein Supplements: [employer] Nutrition Plan   Fluids include: Water, Crystal Light, chicken broth, decaf coffee, Gatorade Zero, SF popsicles, SF Jell-o     EXERCISE:  Walking     LABS:  None available at time of visit    VITAMINS/MINERALS:  Flintstones Multivitamin with 18 mg iron 2 x day  Equate Calcium Citrate 630 mg with vitamin D, 2 pills, 2 x day   B-1 with 250 mg, 1 x week  B-12 sublingual 2500 mcg, 1 x week     ASSESSMENT:  Doing fairly well overall.  Adequate protein intake.  Inadequate fluid intake.    BARIATRIC DIET DISCUSSION:  Instructed and provided written materials on bariatric puree diet plan   Bariatric soft diet plan to start in 2 weeks as madelin  Pt may swallow tablets and pills at 2 week post op   Reinforced post-op nutrition guidelines.  Reminded PT  no starchy CHOs    PLAN/RECOMMONDATIONS:  Advance to bariatric puree diet  Increase protein intake.  Increase fluid intake.  Continue light exercise.  Continue appropriate vitamins & minerals.    Return to clinic in 6 weeks.    SESSION TIME: 20 minutes

## 2025-04-02 NOTE — PROGRESS NOTES
BARIATRIC POST-OPERATIVE VISIT:    HPI:  David Corona Jr. is a 59 y.o. year old male presents for 2 week post op visit following s/p sleeve.  he is doing well and tolerating the diet without difficulty.  he has no complaints.    Pt states he is doing well   BS controlled has a sliding scale   BP controlled is in digital medicine       Denies: nausea, vomiting, abdominal pain, changes in bowel movement pattern, fever, chills, dysphagia, chest pain, and shortness of breath.    Review of Systems   Constitutional:  Negative for fatigue and fever.   HENT:  Negative for tinnitus and trouble swallowing.    Eyes:  Negative for visual disturbance.   Respiratory:  Negative for cough, choking, chest tightness and shortness of breath.    Gastrointestinal:  Negative for abdominal pain, constipation, diarrhea, nausea and vomiting.   Genitourinary:  Negative for decreased urine volume and urgency.   Musculoskeletal:  Negative for myalgias and neck pain.   Skin:  Negative for rash.   Neurological:  Negative for dizziness, light-headedness and headaches.   Psychiatric/Behavioral:  Negative for dysphoric mood. The patient is not nervous/anxious.        Post Discharge     2 Weeks     Total Opioids Used (Outpatient): 0 tabsml: mLs  Unused Opioids Returned: No Educated on safe opioid disposal location at [facility].   Additional Opioids Provided: no    EXERCISE & VITAMINS:  See Bariatric Assessment  Adherent to vitamin regimen   MEDICATIONS/ALLERGIES:  Have been reviewed.    DIET: Liquid Bariatric Diet.  2-3 protein shakes daily, ~60-90 grams protein.  50 fl oz SF clear beverage.  See Dietician note from today for a more detailed assessment.      Physical Exam  Vitals reviewed.   Constitutional:       Appearance: He is obese.   HENT:      Head: Normocephalic and atraumatic.   Eyes:      Extraocular Movements: Extraocular movements intact.      Conjunctiva/sclera: Conjunctivae normal.      Pupils: Pupils are  equal, round, and reactive to light.   Cardiovascular:      Rate and Rhythm: Normal rate and regular rhythm.      Pulses: Normal pulses.      Heart sounds: Normal heart sounds. No murmur heard.  Pulmonary:      Effort: Pulmonary effort is normal. No respiratory distress.      Breath sounds: Normal breath sounds.   Abdominal:      General: Bowel sounds are normal.      Palpations: Abdomen is soft.      Tenderness: There is no abdominal tenderness.   Musculoskeletal:      Cervical back: Normal range of motion and neck supple.   Skin:     General: Skin is warm and dry.      Capillary Refill: Capillary refill takes less than 2 seconds.      Coloration: Skin is not jaundiced.   Neurological:      General: No focal deficit present.      Mental Status: He is alert and oriented to person, place, and time.   Psychiatric:         Mood and Affect: Mood normal.         Behavior: Behavior normal.         Thought Content: Thought content normal.         ASSESSMENT:  - Morbid obesity s/p sleeve gastrectomy  - Co-morbidities: diabetes mellitus, dyslipidemia, and hypertension  - Good Weight loss, 42#'s and 16% EWL  - no formal Exercise routine  - good Diet  - Good Vitamin regimen    PLAN:  - Ursodiol 500 mg daily for 6 months  - Anti-Acid medication, PPI daily for 3 months  - No lifting more than 10 lbs for 6 weeks  - Miralax daily for constipation  - Emphasized the importance of regular exercise and adherence to bariatric diet to achieve maximum weight loss.  - Encouraged patient to start/continue regular exercise.  - Follow-up with dietician to advance diet.  - Continue daily vitamins and medications.  - RTC per schedule  - Call the office for any issues.  - Check labs today.

## 2025-04-02 NOTE — PATIENT INSTRUCTIONS
Bariatric Soft Diet           Start Soft Diet 2 weeks after gastric banding  -   Start Soft Diet 4 weeks after gastric bypass and sleeve    As your stomach heals, your doctor will progress your diet to soft foods.  This diet usually lasts for 2-3 months, but can last longer depending on each individual. Soft foods are those which can be easily mashed with a fork.    Remember these principles:  No liquids with meals. Do no drink 30 minutes before meals and wait 30 minutes to 1 hour after meals to start drinking.  Sip on water, sugar-free beverages or non-fat milk throughout the day.  You will need to continue drinking at least 1 protein drink daily to meet protein needs.  100% fruit juice (no sugar added) is allowed, but limit to 4oz a day because it is high in calories and does not contain any protein.  Chew foods slowly; one meal should take 20-30 minutes.  Eat 3-5 meals per day, without any additional snacking.  Stop eating as soon as you feel full.  Avoid using table sugar and foods made with refined sugar, which can trigger dumping syndrome.  Marinating meats with a low sugar marinade, adding low-fat salad dressing, or adding low calorie gravy (made from powder and water) can help meats to digest easier.     Adding Vegetables and Fruits:    As long as you are consuming >80g total protein daily from combination of foods and protein drinks, you may start adding small bites of fruits and vegetables to your meals. Cooked, tender vegetables and ripe fruits without the peel are tolerated best.    Avoid fruit canned in syrup, sugary fruit juices, and vegetables cooked with oil, butter or garcia.  Bariatric SOFT Diet    EAT THESE FOODS AVOID THESE FOODS   High in Protein: High in Fat/Sugar:   Canned tuna or chicken (packed in water)  Lean ground turkey breast or ground round  Turkey or chicken (no skin); cooked tender and cut in small pieces  Lean pork or beef (cook in crock pot until very tender; cut in small  pieces  Scrambled, poached, or boiled eggs  Baked, broiled, grilled or boiled fish and seafood (not fried!)  Silken tofu, Edamame (soybeans)  Beans, hummus and lentils  Lean deli meats (turkey and chicken breast, ham, roast beef)  1% or Skim Milk, Lactaid, or Soymilk  Low-fat or fat-free cottage cheese, soft cheese, mozzarella string cheese, or ricotta  Light yogurt, Greek yogurt, SF pudding High fat milk (whole, 2%)  Butter, margarine, oil, mayonnaise  Sour cream, cream cheese, salad dressing  Ice Cream  Cakes, cookies, pies, desserts  Candy  Luncheon meats (bologna, salami, chopped ham)  Sausage, Aly  Gravy  Fried Foods  ___________________________________  Tough/Crunchy--------------------------------  Tough or dry meats  Corn   Granola/cereal with nuts  Shredded Coconut    May add after 3 months:  Raw veggies  Lettuce  Plain, Unsalted Nuts and Seeds  Protein bars with 0-4 grams of sugar   As long as you are getting >80g PRO: Starchy Carbohydrates. At goal weight, some may include whole grains in small amounts.   Cooked tender vegetables without peel  Ripe fruits without peel  Frozen fruits with no added sugar  Fruit canned in its own juice or in water  Fat free, sugar free, frozen yogurt White and wheat Bread, Rice, Pasta   Cereals (including grits, oatmeal)   Crackers, Pretzels, Chips, Granola  Corn, Popcorn, Peas  White Potatoes, Sweet potatoes  Flour and corn tortillas     Fluids: Always Avoid:   Skim/1% milk, Lactaid, Soymilk  Water and Sugar-free beverages  (decaf and non-carbonated)  Decaf coffee & decaf tea  Sugary drinks  Carbonated drinks  Alcohol  Drinking through straws       Protein Content of Foods Recommended after                   Weight Loss Surgery    Food Name Portion Calories Protein (gms)   Almonds (unsalted) 1/4 cup 160 6   [place] milk, unsweetened 1 cup 30  1   Beef, Roast 1 oz 46 8   Beef, Steak, sirloin, trimmed 1 oz 55 9   Catfish, broiled or baked 1 oz 30 5   Cheese, American FF 1 oz  40 6   Cheese, Cottage 1% fat ¼ cup 41 7   Cheese, Parmesan, grated ¼ cup 128 12   Cheese, Mozzarella, part skim 1 oz 78 8   Cheese, part skim Ricotta ¼ cup 90 8   Chicken, white breast w/o skin 1 oz 46 9   Chicken, leg w/o skin 1 oz 54 7   Crab, steamed ¼ cup  40 9   Crawfish tails, boiled ¼ cup 35 8   Edamame, shelled ¼ cup 50 4   Egg 1 78 6   Ham, lean 5% 1 oz 44 7   Hamburger, lean 1 oz 56 7   Hummus ¼ cup 100 5   Lobster, steamed 1 oz 26 5   Milk, skim or 1%, soy  1 cup 90 8   Pork Tenderloin 1 oz 46 7   Pudding, SF 1 serv 60 2   Red beans ¼ cup 56 4   Refried beans, fat free ¼ cup 65 4   [place], baked 1 oz 52 7   Shrimp, steamed 1 oz 28 6   Soymilk, plain ½ cup 40 3   Tilapia, white fish, cooked 1 oz 36 8   Tofu ¼ cup 47 5   Trout 1 oz 48 7   Tuna, canned in water 1 oz 37 8   Turkey, white meat 1 oz 35 7   Veal Loin 1 oz 50 7   Yogurt, SF, frozen vanilla 3 oz 72 3.5   Yogurt, Fruit, FF, light 3 oz 40 2.5   Yogurt, Greek 3 oz 70 8     *Abbreviations: SF=sugar free, LF=low fat, FF= fat free, gms=grams  *3oz of cooked meat/protein = size of deck of cards or ladies palm   *1oz cheese = 1inch cube or 1 slice American cheese      Sample Menu for Bariatric Soft Diet  For Gastric Bypass and Sleeve            3 meals + 2 protein drinks  Remember: No drinking with meals.    Time of Day Day 1 Day 2   7am:    1 egg (or ¼ cup Egg Beaters) ¼ cup low-fat cottage cheese, 1 tbsp berries   8am: 1 cup water/SF beverage     9am: 1 cup water/SF beverage     10am:  Protein drink  Protein drink   11am: 1 cup water/SF beverage     12pm:    1-2 oz grilled shrimp, ¼ cup green beans   1-2oz canned chicken, shredded cheese, 1 tbsp salsa   1pm: 1 cup water/SF beverage     3pm:  Protein drink   Protein drink   4pm: 1 cup water/SF beverage     6pm:  ½ cup low fat chili, 1oz low-fat cheese, ¼ cup broccoli 2 oz grilled fish,  ¼  cup lima beans   7pm: 1 cup water/SF beverage       This sample menu provides approx. 80g protein total, including  about 40g protein from foods and at least 40g protein from protein drinks.  Drinking protein drinks daily helps decrease muscle loss, increase weight loss, and prevent hair loss.    Sip fluids continuously in between meals.    For fluids: 1 cup = 8 oz   For food: ¼ cup = 4 tablespoons = 1oz  No drinking from 30 minutes before meals to 30 minutes after meals.  3oz meat is approx. the size of a deck of cards.    A food scale will help you determine portion size (Can be purchased at [employer])

## 2025-04-04 ENCOUNTER — PATIENT OUTREACH (OUTPATIENT)
Dept: ADMINISTRATIVE | Facility: HOSPITAL | Age: 60
End: 2025-04-04
Payer: COMMERCIAL

## 2025-04-06 DIAGNOSIS — I10 ESSENTIAL HYPERTENSION: ICD-10-CM

## 2025-04-06 NOTE — TELEPHONE ENCOUNTER
No care due was identified.  [facility] Embedded Care Due Messages. Reference number: [id number].   4/06/2025 7:23:18 AM CDT

## 2025-04-07 RX ORDER — FUROSEMIDE 40 MG/1
40 TABLET ORAL 2 TIMES DAILY
Qty: 180 TABLET | Refills: 0 | Status: SHIPPED | OUTPATIENT
Start: 2025-04-07

## 2025-04-07 NOTE — TELEPHONE ENCOUNTER
Refill Routing Note   Medication(s) are not appropriate for processing by [facility] for the following reason(s):        Patient not seen by provider within 15 months  Required labs abnormal    [facility] action(s):  Route  Defer             Appointments  past 12m or future 3m with PCP    Date Provider   Last Visit   6/5/2023 Riddhi Galindo MD   Next Visit   6/16/2025 Riddhi Galindo MD   ED visits in past 90 days: 0        Note composed:1:49 PM 04/07/2025

## 2025-04-08 ENCOUNTER — PATIENT MESSAGE (OUTPATIENT)
Dept: BARIATRICS | Facility: CLINIC | Age: 60
End: 2025-04-08
Payer: COMMERCIAL

## 2025-04-09 LAB — W VITAMIN B1: 64 UG/L

## 2025-04-25 ENCOUNTER — OFFICE VISIT (OUTPATIENT)
Dept: OPHTHALMOLOGY | Facility: CLINIC | Age: 60
End: 2025-04-25
Payer: COMMERCIAL

## 2025-04-25 DIAGNOSIS — H26.491 PCO (POSTERIOR CAPSULAR OPACIFICATION), RIGHT: ICD-10-CM

## 2025-04-25 DIAGNOSIS — I10 ESSENTIAL HYPERTENSION: ICD-10-CM

## 2025-04-25 DIAGNOSIS — H25.12 NUCLEAR SCLEROSIS, LEFT: Primary | ICD-10-CM

## 2025-04-25 DIAGNOSIS — Z96.1 PSEUDOPHAKIA: ICD-10-CM

## 2025-04-25 DIAGNOSIS — E11.9 DM TYPE 2 WITHOUT RETINOPATHY: ICD-10-CM

## 2025-04-25 PROCEDURE — 99999 PR PBB SHADOW E&M-EST. PATIENT-LVL II: CPT | Mod: PBBFAC,,, | Performed by: OPHTHALMOLOGY

## 2025-04-25 NOTE — PROGRESS NOTES
Subjective:       Patient ID: David Corona Jr. is a 59 y.o. male.    Chief Complaint: Diabetic Eye Exam    HPI    Here for DM eye exam (Unable to get AR)    Eye meds: none    59 year old male states he is having blurry vision OU. C/o of extreme   light sensitivity. Denies flashes, floaters or diplopia. Denies ocular   pain. Denies itchy or scratchy eyes. Pt had PCIOL OD in the past but   vision is blurry. States he recently had gastritic sleeve weight lose   surgery    Hemoglobin A1C       Date                     Value               Ref Range             Status                02/25/2025               6.1 (H)             4.0 - 5.6 %           Final                     08/19/2024               6.1 (H)             4.0 - 5.6 %           Final                     04/27/2021               7.4 (H)             4.8 - 5.6 %           Final              Comment:    [employer]  ----------    Last edited by Carmen Horn on 4/25/2025  1:29 PM.             Assessment:       1. Nuclear sclerosis, left    2. PCO (posterior capsular opacification), right    3. DM type 2 without retinopathy    4. Essential hypertension    5. Pseudophakia - Right Eye        Plan:       Cataract OS- Not visually significant per Pt at this time.    Visually significant  PCO OS- Pt. Wants Laser.  DM-No NPDR OU.  HTN-No retinopathy OU.        Control DM & HTN.  RTC 7/17/25 for YAG CAP OD.

## 2025-05-01 ENCOUNTER — TELEPHONE (OUTPATIENT)
Dept: BARIATRICS | Facility: CLINIC | Age: 60
End: 2025-05-01
Payer: COMMERCIAL

## 2025-05-01 DIAGNOSIS — I10 ESSENTIAL HYPERTENSION: ICD-10-CM

## 2025-05-01 NOTE — TELEPHONE ENCOUNTER
----- Message from Eric sent at 5/1/2025  3:17 PM CDT -----  Regarding: Form  Contact: [phone]  Who call ? David Corona Jr. What is the request Details : Pt calling to speak with someone in provider office regards requesting return to work form.  Please upload on [facility].  Can clinic  use patient portal  : No What number to call back : [phone]

## 2025-05-02 ENCOUNTER — TELEPHONE (OUTPATIENT)
Dept: BARIATRICS | Facility: CLINIC | Age: 60
End: 2025-05-02
Payer: COMMERCIAL

## 2025-05-02 RX ORDER — HYDRALAZINE HYDROCHLORIDE 50 MG/1
50 TABLET, FILM COATED ORAL 2 TIMES DAILY
Qty: 180 TABLET | Refills: 0 | Status: SHIPPED | OUTPATIENT
Start: 2025-05-02

## 2025-05-02 NOTE — TELEPHONE ENCOUNTER
No care due was identified.  [facility] Embedded Care Due Messages. Reference number: [id number].   5/01/2025 9:37:52 PM CDT

## 2025-05-02 NOTE — LETTER
May 2, 2025      [facility] B[facility]ric Surg 2nd Fl  [address]  [facility] [address]  Phone: [phone]  Fax: [phone]       Patient: David Corona   YOB: 1965  Date of Visit: 05/02/2025    To Whom It May Concern:    Francisca Corona  was at [facility] for surgery on 3/19/2025. The patient may return to work on 4/30/2025 with no restrictions. If you have any questions or concerns, or if I can be of further assistance, please do not hesitate to contact me.    Sincerely,    Musa Leone MD  Section Head - General, Laparoscopic, Bariatric,  Acute Care and Oncologic Surgery  Bariatric   [facility] [place], [place]

## 2025-05-02 NOTE — TELEPHONE ENCOUNTER
----- Message from Allison sent at 5/2/2025  8:17 AM CDT -----  Regarding: Return call  Contact: pt [phone]  Type:  Patient Returning CallWho Called:David Who Left Message for Patient:Jessi Donald Does the patient know what this is regarding?:to speak with pt Would the patient rather a call back or a response via [facility]? Call back Best Call Back Number:pt [phone] Additional Information:

## 2025-05-02 NOTE — TELEPHONE ENCOUNTER
Refill Routing Note   Medication(s) are not appropriate for processing by [facility] for the following reason(s):        Patient not seen by provider within 15 months  ED/Hospital Visit since last OV with provider    [facility] action(s):  Defer             Appointments  past 12m or future 3m with PCP    Date Provider   Last Visit   6/5/2023 Riddhi Galindo MD   Next Visit   6/16/2025 Riddhi Galindo MD   ED visits in past 90 days: 0        Note composed:9:52 PM 05/01/2025

## 2025-05-02 NOTE — TELEPHONE ENCOUNTER
----- Message from Analisa sent at 5/2/2025  9:57 AM CDT -----  Regarding: Return To Work  Contact: Pt  Patient would like to get Return To  Work DocumentationPlease call to advisePdavid  [phone] You

## 2025-05-05 DIAGNOSIS — Z79.4 TYPE 2 DIABETES MELLITUS WITHOUT COMPLICATION, WITH LONG-TERM CURRENT USE OF INSULIN: Primary | ICD-10-CM

## 2025-05-05 DIAGNOSIS — E11.9 TYPE 2 DIABETES MELLITUS WITHOUT COMPLICATION, WITH LONG-TERM CURRENT USE OF INSULIN: Primary | ICD-10-CM

## 2025-05-06 RX ORDER — INSULIN ASPART 100 [IU]/ML
INJECTION, SOLUTION INTRAVENOUS; SUBCUTANEOUS
Qty: 15 ML | Refills: 0 | Status: SHIPPED | OUTPATIENT
Start: 2025-05-06

## 2025-05-08 DIAGNOSIS — M10.9 GOUT, UNSPECIFIED CAUSE, UNSPECIFIED CHRONICITY, UNSPECIFIED SITE: ICD-10-CM

## 2025-05-08 RX ORDER — COLCHICINE 0.6 MG/1
TABLET ORAL
Qty: 90 TABLET | Refills: 0 | Status: SHIPPED | OUTPATIENT
Start: 2025-05-08

## 2025-05-08 NOTE — TELEPHONE ENCOUNTER
No care due was identified.  [facility] Embedded Care Due Messages. Reference number: [id number].   5/08/2025 6:36:29 AM CDT  
Please see the attached refill request.  
No

## 2025-05-13 ENCOUNTER — PATIENT MESSAGE (OUTPATIENT)
Dept: BARIATRICS | Facility: CLINIC | Age: 60
End: 2025-05-13
Payer: COMMERCIAL

## 2025-05-14 ENCOUNTER — LAB VISIT (OUTPATIENT)
Dept: LAB | Facility: HOSPITAL | Age: 60
End: 2025-05-14
Payer: COMMERCIAL

## 2025-05-14 ENCOUNTER — OFFICE VISIT (OUTPATIENT)
Dept: BARIATRICS | Facility: CLINIC | Age: 60
End: 2025-05-14
Payer: COMMERCIAL

## 2025-05-14 ENCOUNTER — CLINICAL SUPPORT (OUTPATIENT)
Dept: BARIATRICS | Facility: CLINIC | Age: 60
End: 2025-05-14
Payer: COMMERCIAL

## 2025-05-14 VITALS
HEIGHT: 74 IN | OXYGEN SATURATION: 95 % | HEART RATE: 97 BPM | SYSTOLIC BLOOD PRESSURE: 104 MMHG | DIASTOLIC BLOOD PRESSURE: 56 MMHG | BODY MASS INDEX: 40.43 KG/M2 | WEIGHT: 315 LBS

## 2025-05-14 DIAGNOSIS — E11.69 TYPE 2 DIABETES MELLITUS WITH OTHER SPECIFIED COMPLICATION, UNSPECIFIED WHETHER LONG TERM INSULIN USE: ICD-10-CM

## 2025-05-14 DIAGNOSIS — E78.5 HYPERLIPIDEMIA LDL GOAL <100: ICD-10-CM

## 2025-05-14 DIAGNOSIS — E66.01 MORBID OBESITY WITH BMI OF 50.0-59.9, ADULT: ICD-10-CM

## 2025-05-14 DIAGNOSIS — Z98.84 S/P BARIATRIC SURGERY: ICD-10-CM

## 2025-05-14 DIAGNOSIS — Z98.84 S/P BARIATRIC SURGERY: Primary | ICD-10-CM

## 2025-05-14 DIAGNOSIS — I10 ESSENTIAL HYPERTENSION: ICD-10-CM

## 2025-05-14 DIAGNOSIS — Z71.3 DIETARY COUNSELING: Primary | ICD-10-CM

## 2025-05-14 DIAGNOSIS — E66.01 MORBID OBESITY WITH BMI OF 45.0-49.9, ADULT: ICD-10-CM

## 2025-05-14 DIAGNOSIS — G47.33 OSA (OBSTRUCTIVE SLEEP APNEA): ICD-10-CM

## 2025-05-14 DIAGNOSIS — E11.40 POORLY CONTROLLED TYPE 2 DIABETES MELLITUS WITH NEUROPATHY: ICD-10-CM

## 2025-05-14 DIAGNOSIS — Z79.4 TYPE 2 DIABETES MELLITUS WITHOUT COMPLICATION, WITH LONG-TERM CURRENT USE OF INSULIN: ICD-10-CM

## 2025-05-14 DIAGNOSIS — E11.65 POORLY CONTROLLED TYPE 2 DIABETES MELLITUS WITH NEUROPATHY: ICD-10-CM

## 2025-05-14 DIAGNOSIS — E11.9 TYPE 2 DIABETES MELLITUS WITHOUT COMPLICATION, WITH LONG-TERM CURRENT USE OF INSULIN: ICD-10-CM

## 2025-05-14 LAB
25(OH)D3+25(OH)D2 SERPL-MCNC: 43 NG/ML (ref 30–96)
ABSOLUTE EOSINOPHIL (OHS): 0.26 K/UL
ABSOLUTE MONOCYTE (OHS): 0.68 K/UL (ref 0.3–1)
ABSOLUTE NEUTROPHIL COUNT (OHS): 4.15 K/UL (ref 1.8–7.7)
ALBUMIN SERPL BCP-MCNC: 3.4 G/DL (ref 3.5–5.2)
ALP SERPL-CCNC: 132 UNIT/L (ref 40–150)
ALT SERPL W/O P-5'-P-CCNC: 7 UNIT/L (ref 10–44)
ANION GAP (OHS): 9 MMOL/L (ref 8–16)
AST SERPL-CCNC: 22 UNIT/L (ref 11–45)
BASOPHILS # BLD AUTO: 0.03 K/UL
BASOPHILS NFR BLD AUTO: 0.4 %
BILIRUB SERPL-MCNC: 1 MG/DL (ref 0.1–1)
BUN SERPL-MCNC: 22 MG/DL (ref 6–20)
CALCIUM SERPL-MCNC: 9.4 MG/DL (ref 8.7–10.5)
CHLORIDE SERPL-SCNC: 99 MMOL/L (ref 95–110)
CHOLEST SERPL-MCNC: 112 MG/DL (ref 120–199)
CHOLEST/HDLC SERPL: 2.7 {RATIO} (ref 2–5)
CO2 SERPL-SCNC: 28 MMOL/L (ref 23–29)
CREAT SERPL-MCNC: 1.4 MG/DL (ref 0.5–1.4)
EAG (OHS): 148 MG/DL (ref 68–131)
ERYTHROCYTE [DISTWIDTH] IN BLOOD BY AUTOMATED COUNT: 15.9 % (ref 11.5–14.5)
GFR SERPLBLD CREATININE-BSD FMLA CKD-EPI: 58 ML/MIN/1.73/M2
GLUCOSE SERPL-MCNC: 147 MG/DL (ref 70–110)
HBA1C MFR BLD: 6.8 % (ref 4–5.6)
HCT VFR BLD AUTO: 42.6 % (ref 40–54)
HDLC SERPL-MCNC: 42 MG/DL (ref 40–75)
HDLC SERPL: 37.5 % (ref 20–50)
HGB BLD-MCNC: 13.9 GM/DL (ref 14–18)
IMM GRANULOCYTES # BLD AUTO: 0.03 K/UL (ref 0–0.04)
IMM GRANULOCYTES NFR BLD AUTO: 0.4 % (ref 0–0.5)
IRON SATN MFR SERPL: 28 % (ref 20–50)
IRON SERPL-MCNC: 85 UG/DL (ref 45–160)
LDLC SERPL CALC-MCNC: 56 MG/DL (ref 63–159)
LYMPHOCYTES # BLD AUTO: 1.94 K/UL (ref 1–4.8)
MCH RBC QN AUTO: 30.4 PG (ref 27–31)
MCHC RBC AUTO-ENTMCNC: 32.6 G/DL (ref 32–36)
MCV RBC AUTO: 93 FL (ref 82–98)
NONHDLC SERPL-MCNC: 70 MG/DL
NUCLEATED RBC (/100WBC) (OHS): 0 /100 WBC
PLATELET # BLD AUTO: 235 K/UL (ref 150–450)
PMV BLD AUTO: 11.9 FL (ref 9.2–12.9)
POTASSIUM SERPL-SCNC: 3.7 MMOL/L (ref 3.5–5.1)
PROT SERPL-MCNC: 7.2 GM/DL (ref 6–8.4)
RBC # BLD AUTO: 4.57 M/UL (ref 4.6–6.2)
RELATIVE EOSINOPHIL (OHS): 3.7 %
RELATIVE LYMPHOCYTE (OHS): 27.4 % (ref 18–48)
RELATIVE MONOCYTE (OHS): 9.6 % (ref 4–15)
RELATIVE NEUTROPHIL (OHS): 58.5 % (ref 38–73)
SODIUM SERPL-SCNC: 136 MMOL/L (ref 136–145)
TIBC SERPL-MCNC: 305 UG/DL (ref 250–450)
TRANSFERRIN SERPL-MCNC: 206 MG/DL (ref 200–375)
TRIGL SERPL-MCNC: 70 MG/DL (ref 30–150)
VIT B12 SERPL-MCNC: 664 PG/ML (ref 210–950)
WBC # BLD AUTO: 7.09 K/UL (ref 3.9–12.7)

## 2025-05-14 PROCEDURE — 84466 ASSAY OF TRANSFERRIN: CPT

## 2025-05-14 PROCEDURE — 85025 COMPLETE CBC W/AUTO DIFF WBC: CPT

## 2025-05-14 PROCEDURE — 83036 HEMOGLOBIN GLYCOSYLATED A1C: CPT

## 2025-05-14 PROCEDURE — 3044F HG A1C LEVEL LT 7.0%: CPT | Mod: CPTII,S$GLB,, | Performed by: NURSE PRACTITIONER

## 2025-05-14 PROCEDURE — 99999 PR PBB SHADOW E&M-EST. PATIENT-LVL I: CPT | Mod: PBBFAC,,, | Performed by: DIETITIAN, REGISTERED

## 2025-05-14 PROCEDURE — 3074F SYST BP LT 130 MM HG: CPT | Mod: CPTII,S$GLB,, | Performed by: NURSE PRACTITIONER

## 2025-05-14 PROCEDURE — 82465 ASSAY BLD/SERUM CHOLESTEROL: CPT

## 2025-05-14 PROCEDURE — 1160F RVW MEDS BY RX/DR IN RCRD: CPT | Mod: CPTII,S$GLB,, | Performed by: NURSE PRACTITIONER

## 2025-05-14 PROCEDURE — 82947 ASSAY GLUCOSE BLOOD QUANT: CPT

## 2025-05-14 PROCEDURE — 3078F DIAST BP <80 MM HG: CPT | Mod: CPTII,S$GLB,, | Performed by: NURSE PRACTITIONER

## 2025-05-14 PROCEDURE — 36415 COLL VENOUS BLD VENIPUNCTURE: CPT

## 2025-05-14 PROCEDURE — 84425 ASSAY OF VITAMIN B-1: CPT

## 2025-05-14 PROCEDURE — 82607 VITAMIN B-12: CPT

## 2025-05-14 PROCEDURE — 99999 PR PBB SHADOW E&M-EST. PATIENT-LVL V: CPT | Mod: PBBFAC,,, | Performed by: NURSE PRACTITIONER

## 2025-05-14 PROCEDURE — 4010F ACE/ARB THERAPY RXD/TAKEN: CPT | Mod: CPTII,S$GLB,, | Performed by: NURSE PRACTITIONER

## 2025-05-14 PROCEDURE — 3066F NEPHROPATHY DOC TX: CPT | Mod: CPTII,S$GLB,, | Performed by: NURSE PRACTITIONER

## 2025-05-14 PROCEDURE — 82306 VITAMIN D 25 HYDROXY: CPT

## 2025-05-14 PROCEDURE — 99024 POSTOP FOLLOW-UP VISIT: CPT | Mod: S$GLB,,, | Performed by: NURSE PRACTITIONER

## 2025-05-14 PROCEDURE — 1159F MED LIST DOCD IN RCRD: CPT | Mod: CPTII,S$GLB,, | Performed by: NURSE PRACTITIONER

## 2025-05-14 NOTE — PATIENT INSTRUCTIONS
Meal Ideas for Regular Bariatric Diet  *Recipes and products available at [email or web address].com      Breakfast: (15-20g protein)    - Egg white omelet: 2 egg whites or ½ cup Egg Beaters. (Optional proteins: cheese, shrimp, black beans, chicken, sliced turkey) (Optional veggies: tomatoes, salsa, spinach, mushrooms, onions, green peppers, or small slice avocado)     - Egg and sausage: 1 egg or ¼ cup Egg Beaters (any variety), with 1 jase or 2 links of Turkey sausage or Veggie breakfast sausage ([employer] or [employer])    - Crust-less breakfast quiche: To make a glass pie dish, mix 4oz part skim Ricotta, 1 cup skim milk, and 2 eggs as your base. Add protein: shredded cheese, sliced lean ham or turkey, turkey garcia/sausage. Add veggies: tomato, onion, green onion, mushroom, green pepper, spinach, etc.    - Yogurt parfait: Mix 1 - 6oz container Dannon Light N Fit vanilla yogurt, with ¼ cup crushed unsalted nuts    - Cottage cheese and fruit: ½ cup part-skim cottage cheese or ricotta cheese topped with fresh fruit or sugar free preserves     - Indira Pugh's Vanilla Egg custard* (add 2 Tbsp instant coffee granules to make Cappuccino Custard*)    - Hi-Protein café latte (skim milk, decaf coffee, 1 scoop protein powder). Optional to add Sugar free syrup or extract flavoring.    - Breakfast Lox: spread fat free cream cheese on slices of smoked salmon. Serve over scrambled or egg over easy (sauteed with nonstick cookspray) OR on a cucumber slice    - Eggwhich: Scramble or cook 1 large egg over easy using nonstick cookspray. Place between 2 slices of [place] garcia and low fat cheese.     Lunch: (20-30g protein)    - ½ cup Black bean soup (Homemade or Progresso), with ¼ cup shredded low-fat cheese. Top with chopped tomato or fresh salsa.     - Lean deli turkey breast and low-fat sliced cheese, mustard or light morton to moisten, rolled up together, or wrapped in a Cezar lettuce leaf    - Chicken salad made from dinner  leftovers, moisten with low-fat salad dressing or light morton. Also try leftover salmon, shrimp, tuna or boiled eggs. Serve ½ cup over dark green salad    - Fat-free canned refried beans, topped with ¼ cup shredded low-fat cheese. Top with chopped tomato or fresh salsa.     - Greek salad: Top mixed greens with 1-2oz grilled chicken, tomatoes, red onions, 2-3 kalamata olives, and sprinkle lightly with feta cheese. Spritz with Balsamic vinegar to taste.     - Crust-less lunch quiche: To make a glass pie dish, mix 4oz part skim Ricotta, 1 cup skim milk, and 2 eggs as your base. Add protein: shredded cheese, sliced lean ham or turkey, shrimp, chicken. Add veggies: tomato, onion, green onion, mushroom, green pepper, spinach, artichoke, broccoli, etc.    - Pizza bake: spread a  nicholas klaus mushroom with tomato sauce, low-fat shredded mozzarella and turkey pepperoni or [place] garcia. Add any veggies. Roast for 10-15 minutes, until cheese melted.     - Cucumber crab bites: Spread ¼ cup crab dip (lump crabmeat + light cream cheese and green onions) over sliced cucumber.     - Chicken with light spinach and artichoke dip*: Puree in : 6oz cooked and drained spinach, 2 cloves garlic, 1 can cannelloni beans, ½ cup chopped green onions, 1 can drained artichoke hearts (not marinated in oil), lemon juice and basil. Mix in 2oz chopped up chicken.    Supper: (20-30g protein)    - Serve grilled fish over dark green salad tossed with low-fat dressing, served with grilled asparagus armenta     - Rotisserie chicken salad: served with sliced strawberries, walnuts, fat-free feta cheese crumbles and 1 tbsp Sanchezs Own Light Raspberry [place] Vinaigrette    - Shrimp cocktail: Dip cold boiled shrimp in homemade low-sugar cocktail sauce (1/2 cup Carolina One Carb ketchup, 2 tbsp horseradish, 1/4 tsp hot sauce, 1 tsp Worcestershire sauce, 1 tbsp freshly-squeezed lemon juice). Serve with dark green salad, walnuts, and crumbled blue  cheese drizzled with olive oil and Balsamic vinegar    - Tuna Melt: Spread tuna salad onto 2 thick slices of tomato. Top with low-fat cheese and broil until cheese is melted. May also be made with chicken salad of shrimp salad. [place] with different types of cheeses.    - Chicken or beef fajitas (no tortilla, rice, beans, chips). Top meat and veggies w/ fresh salsa, fat free sour cream.     - Homemade low-fat Chili using extra lean ground beef or ground turkey. Top with shredded cheese and salsa as desired. May add dollop fat-free sour cream if desired    - Chicken parmesan: Top chicken breast w/ low sugar marinara sauce, mozzarella cheese and bake until chicken reaches 165*.  Serve w/ spaghetti SQUASH or Algerian cut green beans    - Dinner Omelet with shrimp or chicken and onion, green peppers and chives.    - No noodle lasagna: Use sliced zucchini or eggplant in place of noodles.  Layer with part skim ricotta cheese and low sugar meat sauce (use very lean ground beef or ground turkey).    - Mexican chicken bake: Bake chunks of chicken breast or thigh with taco seasoning, Pace brand enchilada sauce, green onions and low-fat cheese. Serve with ¼ cup black beans or fat free refried beans topped with chopped tomatoes or salsa.    - Wilma frozen meatballs, simmered in Classico Marinara sauce. Different flavors of salsa or spaghetti sauce create different dishes! Sprinkle with parmesan cheese. Serve with grilled or steamed veggies, or a dark green salad.    - Simmer boneless skinless chicken thigh chunks in Classico Marinara sauce or roasted salsa until tender with chopped onion, bell pepper, garlic, mushrooms, spinach, etc.     - Hamburger or veggie burger, without the bun, dressed the way you like. Served with grilled or steamed veggies.    - Eggplant parmesan: Bake slices of eggplant at 350 degrees for 15 minutes. Layer tomato sauce, sliced eggplant and low-fat mozzarella cheese in a baking dish and cover with  foil. Bake 30-40 more minutes or until bubbly. Uncover and bake at 400 degrees for about 15 more minutes, or until top is slightly crisp.    - Fish tacos: grilled/baked white fish, wrapped in Cezar lettuce leaf, topped with salsa, shredded low-fat cheese, and light coleslaw.    - Chicken poonam: Sprinkle chicken w/ 1 tsp of hidden [place] ranch dip mix. Then grill chicken and top with black beans, salsa and 1 tsp fat free sour cream.     - Cauliflower pizza crust: Use cauliflower as crust (see recipe on pinterest, no flour!). Top w/ low fat cheese, turkey pepperoni and veggies and bake again    - chicken or turkey crust pizza: use ground chicken or turkey instead of cauliflower, spread in [place] and bake at 350 for about 20-30 minutes(may want to add garlic, black pepper, oregano and other herbs to ground meat mixture).  Remove and top w/ low fat cheese, turkey pepperoni and veggies and bake again for another 10 minutes or until cheese is browned.     Snacks: (100-200 calories; >5g protein)    - 1 low-fat cheese stick with 8 cherry tomatoes or 1 serving fresh fruit  - 4 thin slices fat-free turkey breast and 1 slice low-fat cheese  - 4 thin slices fat-free honey ham with wedge of melon  - 6-8 edamame pods (equivalent to about 1/4 cup edamame without pods).   - 1/4 cup unsalted nuts with ½ cup fruit  - 6-oz container Dannon Light n Fit vanilla yogurt, topped with 1oz unsalted nuts         - apple, celery or baby carrots spread with 2 Tbsp PB2  - apple slices with 1 oz slice low-fat cheese  - Apple slices dipped in 2 Tbsp of PB2  - celery, cucumber, bell pepper or baby carrots dipped in ¼ cup hummus bean spread or light spinach and artichoke dip (*recipe in lunch section)  - celery, cucumber, baby carrots dipped in high protein greek yogurt (Mix 16 oz plain greek yogurt + 1 packet of hidden valley ranch dip mix)  - Maurice Links Beef Steak - 14g protein! (similar to beef jerky)  - 2 wedges Laughing Cow - Light Herb  & Garlic Cheese with sliced cucumber or green bell pepper  - 1/2 cup low-fat cottage cheese with ¼ cup fruit or ¼ cup salsa  - RTD Protein drinks: Atkins, Low Carb Slim Fast, EAS light, Muscle Milk Light, etc.  - Homemade Protein drinks: GNC Soy95, Isopure, Nectar, UNJURY, Whey Gourmet, etc. Mix 1 scoop powder with 8oz skim/1% milk or light soymilk.  - Protein bars: Atkins, EAS, Pure Protein, Think Thin, Detour, etc. Must have 0-4 grams sugar - Read the label.    Takeout Options: No more than twice/week  Deli - Salads (no pasta or rice), meats, cheeses. Roasted chicken. Lox (salmon)    Mexican - Platters which don't include tortillas, chips, or rice. Go easy on the beans. Example: Fajitas without the tortillas. Ask the  not to bring chips to the table if they are too tempting.    Greek - Meat or fish and vegetable, but no bread or rice. Including hummus, baba ganoush, etc, is OK. Most sit-down Greek restaurants can provide you with cucumber slices for dipping instead of reymundo bread.    Fast Food (Avoid as much as possible) - Salads (no croutons and limit salad dressing to 2 tbsp), grilled chicken sandwich without the bun and ask for no morton. Zulys low fat chili or Taco Bell pintos and cheese.    BBQ - The meats are fine if you ask for sauces on the side, but most of the traditional side dishes are loaded with carbs. Shar slaw, baked beans and BBQ sauce are typically made with sugar.    Chinese - Nothing deep-fried, no rice or noodles. Many Chinese sauces have starch and sugar in them, so you'll have to use your judgement. If you find that these sauces trigger cravings, or cause Dumping, you can ask for the sauce to be made without sugar or just use soy sauce.     How to taper off of Omeprazole:  - Take 1 Tablet every other day for 2 weeks.  If you do not experience any heartburn, indigestion, nausea symptoms, the following week, take 1 tablet every 3 days.  Again if you remain without the above symptoms, you  may completely discontinue the medication.  If symptoms return at any point, please restart the medication.

## 2025-05-14 NOTE — PROGRESS NOTES
NUTRITION NOTE  Referring Physician: Musa Leone M.D.   Reason for MNT Referral: Follow-up 8 Weeks s/p laparoscopic sleeve gastrectomy    PAST MEDICAL HISTORY:  Denies nausea, vomiting, constipation, and diarrhea.  Reports doing well.    Past Medical History:   Diagnosis Date    Arthritis     Cataract     left eye     CKD (chronic kidney disease) stage 3, GFR 30-59 ml/min     Colon polyp 11/2016    Dependent edema     Diabetes mellitus     Diverticulosis     Gout, arthritis     Hyperlipidemia LDL goal <100     Hypertension     Morbid obesity     TOSIN (obstructive sleep apnea)     Peripheral autonomic neuropathy in disorders classified elsewhere(337.1)     Proteinuria     Type II or unspecified type diabetes mellitus with neurological manifestations, uncontrolled(250.62)     Venous stasis of lower extremity      CLINICAL DATA:  60 y.o.-year-old Black or  male.    Current Weight: 379 lbs  BMI: 48.77  Total Weight Loss: 54 lbs    Excess Weight Loss: 21 %        CURRENT DIET:  Bariatric Soft Diet    Diet Recall:  grams of protein/day; 64 oz of fluids/day    B: 2 eggs  L: Protein shake  S: Fruit  D: Rotisserie chicken and vegetables  Sometimes a second protein shake    Diet Includes:   Meal Pattern: 3 meal(s) + 1 snack(s) + 1-2 protein supplement(s)  Adequate protein supplement intake. Fairlife Nutrition Plan   Adequate dairy intake.  Adequate vegetable intake. Broccoli, cauliflower and carrot mix  Adequate fruit intake. Watermelon  Starchy CHO: None  Beverages Water, Crystal Light, Gatorade Zero    EXERCISE:  Yardwork   Walking    LABS:  Reviewed.    VITAMINS/MINERALS:  Men's 50+ Multivitamin with 18 mg iron 2 x day  Equate Calcium Citrate 630 mg with vitamin D - will purchase  B-1 with 250 mg, 1 x week  B-12 sublingual 2500 mcg, 1 x week     ASSESSMENT:  Doing well overall.  Adequate protein intake.  Adequate fluid intake.  Advancing diet appropriately.  Some exercising  Inadequate vitamins  & minerals.    BARIATRIC DIET DISCUSSION:  Instructed and provided written materials on bariatric regular diet plan.  Reinforced post-op nutrition guidelines.  Reminded PT no starchy CHO until goal weight  Encouraged PT to include strength training in exercise regimen  Discussing including protein with snack - nuts, protein bars, cheese    PLAN / RECOMMENDATIONS:  Advance to bariatric regular diet.  Maintain protein intake.  Maintain fluid intake.  Increase light exercise.  Continue appropriate vitamins & minerals.  Adjust vitamins & minerals by switching to adult multi and including calcium    Return to clinic in 4 months.    SESSION TIME: 20 minutes

## 2025-05-14 NOTE — PROGRESS NOTES
BARIATRIC POST-OPERATIVE VISIT:    HPI:  David Corona Jr. is a 60 y.o. year old male presents for 8 week post op visit following s/p sleeve.  he is doing well and tolerating the diet without difficulty.  he has no complaints.    Pt states he is doing well   BS controlled has a sliding scale   BP controlled is in digital medicine     Denies: nausea, vomiting, abdominal pain, changes in bowel movement pattern, fever, chills, dysphagia, chest pain, and shortness of breath.    Review of Systems   Constitutional:  Negative for fatigue and fever.   HENT:  Negative for tinnitus and trouble swallowing.    Eyes:  Negative for visual disturbance.   Respiratory:  Negative for cough, choking, chest tightness and shortness of breath.    Gastrointestinal:  Negative for abdominal pain, constipation, diarrhea, nausea and vomiting.   Genitourinary:  Negative for decreased urine volume and urgency.   Musculoskeletal:  Negative for myalgias and neck pain.   Skin:  Negative for rash.   Neurological:  Negative for dizziness, light-headedness and headaches.   Psychiatric/Behavioral:  Negative for dysphoric mood. The patient is not nervous/anxious.        Post Discharge     8 Weeks     Total Opioids Used (Outpatient): 0 tabsml: mLs  Unused Opioids Returned: No Educated on safe opioid disposal location at [facility].   Additional Opioids Provided: no    EXERCISE & VITAMINS:  See Bariatric Assessment  Adherent to vitamin regimen     MEDICATIONS/ALLERGIES:  Have been reviewed.    DIET: Soft/Regular Bariatric Diet.  2 protein shakes daily, ~90 grams protein. 64 fl oz SF clear beverage.    Eggs, fruits, veggies, chicken,     See Dietician note from today for a more detailed assessment.      Physical Exam  Vitals reviewed.   Constitutional:       Appearance: He is obese.   HENT:      Head: Normocephalic and atraumatic.   Eyes:      Extraocular Movements: Extraocular movements intact.      Conjunctiva/sclera:  Conjunctivae normal.      Pupils: Pupils are equal, round, and reactive to light.   Cardiovascular:      Rate and Rhythm: Normal rate and regular rhythm.      Pulses: Normal pulses.      Heart sounds: Normal heart sounds. No murmur heard.  Pulmonary:      Effort: Pulmonary effort is normal. No respiratory distress.      Breath sounds: Normal breath sounds.   Abdominal:      General: Bowel sounds are normal.      Palpations: Abdomen is soft.      Tenderness: There is no abdominal tenderness.   Musculoskeletal:      Cervical back: Normal range of motion and neck supple.   Skin:     General: Skin is warm and dry.      Capillary Refill: Capillary refill takes less than 2 seconds.      Coloration: Skin is not jaundiced.   Neurological:      General: No focal deficit present.      Mental Status: He is alert and oriented to person, place, and time.   Psychiatric:         Mood and Affect: Mood normal.         Behavior: Behavior normal.         Thought Content: Thought content normal.       ASSESSMENT:  - Morbid obesity s/p sleeve gastrectomy  - Co-morbidities: diabetes mellitus, dyslipidemia, and hypertension  - Good Weight loss, 54.4#'s and 21% EWL  - Exercise routine walking daily   - Good Diet  - Good Vitamin regimen    PLAN:  - Ursodiol 500 mg daily for 6 months  - Anti-Acid medication, PPI daily for 3 months, slow taper  - Miralax daily for constipation  - Emphasized the importance of regular exercise and adherence to bariatric diet to achieve maximum weight loss.  - Encouraged patient to start/continue regular exercise.  - Follow-up with dietician to advance diet.  - Continue daily vitamins and medications.  - RTC per schedule  - Call the office for any issues.  - Check labs today.  - F/u PCP next week for DM and HTN  meds

## 2025-05-20 ENCOUNTER — PATIENT MESSAGE (OUTPATIENT)
Dept: ADMINISTRATIVE | Facility: OTHER | Age: 60
End: 2025-05-20
Payer: COMMERCIAL

## 2025-05-20 LAB — W VITAMIN B1: 68 UG/L

## 2025-05-21 ENCOUNTER — OFFICE VISIT (OUTPATIENT)
Dept: ENDOCRINOLOGY | Facility: CLINIC | Age: 60
End: 2025-05-21
Payer: COMMERCIAL

## 2025-05-21 VITALS
HEART RATE: 90 BPM | SYSTOLIC BLOOD PRESSURE: 108 MMHG | WEIGHT: 315 LBS | TEMPERATURE: 98 F | BODY MASS INDEX: 49.51 KG/M2 | DIASTOLIC BLOOD PRESSURE: 70 MMHG

## 2025-05-21 DIAGNOSIS — I10 ESSENTIAL HYPERTENSION: ICD-10-CM

## 2025-05-21 DIAGNOSIS — E11.9 TYPE 2 DIABETES MELLITUS WITHOUT COMPLICATION, WITH LONG-TERM CURRENT USE OF INSULIN: Primary | ICD-10-CM

## 2025-05-21 DIAGNOSIS — E66.01 MORBID OBESITY, UNSPECIFIED OBESITY TYPE: ICD-10-CM

## 2025-05-21 DIAGNOSIS — Z79.4 TYPE 2 DIABETES MELLITUS WITHOUT COMPLICATION, WITH LONG-TERM CURRENT USE OF INSULIN: Primary | ICD-10-CM

## 2025-05-21 PROCEDURE — 3044F HG A1C LEVEL LT 7.0%: CPT | Mod: CPTII,S$GLB,, | Performed by: NURSE PRACTITIONER

## 2025-05-21 PROCEDURE — 3074F SYST BP LT 130 MM HG: CPT | Mod: CPTII,S$GLB,, | Performed by: NURSE PRACTITIONER

## 2025-05-21 PROCEDURE — 3078F DIAST BP <80 MM HG: CPT | Mod: CPTII,S$GLB,, | Performed by: NURSE PRACTITIONER

## 2025-05-21 PROCEDURE — 3066F NEPHROPATHY DOC TX: CPT | Mod: CPTII,S$GLB,, | Performed by: NURSE PRACTITIONER

## 2025-05-21 PROCEDURE — 99999 PR PBB SHADOW E&M-EST. PATIENT-LVL IV: CPT | Mod: PBBFAC,,, | Performed by: NURSE PRACTITIONER

## 2025-05-21 PROCEDURE — 1159F MED LIST DOCD IN RCRD: CPT | Mod: CPTII,S$GLB,, | Performed by: NURSE PRACTITIONER

## 2025-05-21 PROCEDURE — 3008F BODY MASS INDEX DOCD: CPT | Mod: CPTII,S$GLB,, | Performed by: NURSE PRACTITIONER

## 2025-05-21 PROCEDURE — G2211 COMPLEX E/M VISIT ADD ON: HCPCS | Mod: S$GLB,,, | Performed by: NURSE PRACTITIONER

## 2025-05-21 PROCEDURE — 99214 OFFICE O/P EST MOD 30 MIN: CPT | Mod: S$GLB,,, | Performed by: NURSE PRACTITIONER

## 2025-05-21 PROCEDURE — 4010F ACE/ARB THERAPY RXD/TAKEN: CPT | Mod: CPTII,S$GLB,, | Performed by: NURSE PRACTITIONER

## 2025-05-21 PROCEDURE — 1160F RVW MEDS BY RX/DR IN RCRD: CPT | Mod: CPTII,S$GLB,, | Performed by: NURSE PRACTITIONER

## 2025-05-21 RX ORDER — INSULIN GLARGINE 100 [IU]/ML
INJECTION, SOLUTION SUBCUTANEOUS
Qty: 15 ML | Refills: 5 | Status: SHIPPED | OUTPATIENT
Start: 2025-05-21

## 2025-05-21 RX ORDER — PIOGLITAZONE 45 MG/1
TABLET ORAL
Qty: 90 TABLET | Refills: 2 | Status: SHIPPED | OUTPATIENT
Start: 2025-05-21

## 2025-05-21 RX ORDER — GLIMEPIRIDE 4 MG/1
4 TABLET ORAL
Qty: 90 TABLET | Refills: 0 | Status: SHIPPED | OUTPATIENT
Start: 2025-05-21 | End: 2026-05-21

## 2025-05-21 RX ORDER — DAPAGLIFLOZIN 10 MG/1
10 TABLET, FILM COATED ORAL DAILY
Qty: 90 TABLET | Refills: 2 | Status: SHIPPED | OUTPATIENT
Start: 2025-05-21

## 2025-05-21 NOTE — PROGRESS NOTES
CC: This 60 y.o. Black or  male  is here for evaluation of  T2DM along with comorbidities indicated in the Visit Diagnosis section of this encounter.    HPI: David Corona Jr. was diagnosed with T2DM in 1991.  Medical history: TOSIN on cpap        Prior visit 11/20/24  A1c is down from 6.7 to 6.1%.   Unable to view Dexcom data as he was not logged into [facility].   He has lost 29 lb since lov.   He has been doing w/u for gastric sleeve and plans to undergo sx early 2025.   Plan Reduce Basaglar from 30 to 20 units once daily.   If blood sugars still dropping less than 80, reduce to 16 units.   Return to clinic in 6 months with A1c prior, or sooner as needed.         Interval hx  A1c is up from 6.1 to 6.8%.      He underwent gastrectomy 3/19/25. He has lost 58 lb since lov.   He is injecting Basaglar 10 units qhs. Prior dose was 20 units. Continues to take pioglitazone and Farxiga. He has stopped Mounjaro.   He has been taking Fiasp 6-7 units before meals. Pt is on solid diet now.     Unable to view Dexcom data since he has not connected to [facility].     14 day CGM average 170, GMI 7.4%, TIR 62%.         LAST DIABETES EDUCATION: 11/2020    HOSPITALIZED FOR DIABETES OR RELATED COMPLICATIONS -  Yes - upon diagnosis.    SIGNIFICANT DIABETES MED HISTORY:   Metformin - diarrhea   Glimepiride stopped and Trulicity started at initial visit 10/2020; switched from Trulicity to Mounjaro 8/2022  Declines VGo insulin delivery device   Farxiga started 10/2021  Mounjaro started 10/2022  Humalog fixed meal dosing stopped 3/2023 when Mounjaro dose was increased.       PRESCRIBED DIABETES MEDICATIONS:  Farxiga 10 mg once daily  pioglitazone 45 mg once daily  Basaglar 10 units qhs     Fiasp ac ss : 150-200=+2, 201-250=+4; 251-300=+6; 301-350=+8, over 350=+10 units      Misses medication doses - No    DM COMPLICATIONS: nephropathy    SELF MONITORING BLOOD GLUCOSE: Dexcom G7 sae         HYPOGLYCEMIC EPISODES:  none  recenbt.       CURRENT DIET: Drinks water, coffee in the morning.    Eats breakfast, protein shake for lunch, snack in the afternoon and night, and dinner at 4-5 pm.     Diet recall: dinner was gumbo, lemonade SF. Breakfast today was 2 eggs and 1/2 hour later ate a yogurt.       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       /70 (BP Location: Left arm, Patient Position: Sitting)   Pulse 90   Temp 98.1 °F (36.7 °C)   Wt (!) 174.9 kg (385 lb 9.6 oz)   BMI 49.51 kg/m²       ROS:   CONSTITUTIONAL: Appetite good, denies fatigue  GI: Denies n/v, constipation, or diarrhea.           PHYSICAL EXAM:  GENERAL: Well developed, well nourished. No acute distress.   PSYCH: AAOx3, appropriate mood and affect, conversant, well-groomed. Judgement and insight good.   NEURO: Cranial nerves grossly intact. Speech clear, no tremor.   CHEST: Respirations even and unlabored.        Hemoglobin A1C   Date Value Ref Range Status   02/25/2025 6.1 (H) 4.0 - 5.6 % Final     Comment:     ADA Screening Guidelines:  5.7-6.4%  Consistent with prediabetes  >or=6.5%  Consistent with diabetes    High levels of fetal hemoglobin interfere with the HbA1C  assay. Heterozygous hemoglobin variants (HbS, HgC, etc)do  not significantly interfere with this assay.   However, presence of multiple variants may affect accuracy.     11/16/2024 6.1 (H) 4.0 - 5.6 % Final     Comment:     ADA Screening Guidelines:  5.7-6.4%  Consistent with prediabetes  >or=6.5%  Consistent with diabetes    High levels of fetal hemoglobin interfere with the HbA1C  assay. Heterozygous hemoglobin variants (HbS, HgC, etc)do  not significantly interfere with this assay.   However, presence of multiple variants may affect accuracy.     08/19/2024 6.1 (H) 4.0 - 5.6 % Final     Comment:     ADA Screening Guidelines:  5.7-6.4%  Consistent with prediabetes  >or=6.5%  Consistent with  diabetes    High levels of fetal hemoglobin interfere with the HbA1C  assay. Heterozygous hemoglobin variants (HbS, HgC, etc)do  not significantly interfere with this assay.   However, presence of multiple variants may affect accuracy.     04/27/2021 7.4 (H) 4.8 - 5.6 % Final     Comment:     [facility]     Hemoglobin A1c   Date Value Ref Range Status   05/14/2025 6.8 (H) 4.0 - 5.6 % Final     Comment:     ADA Screening Guidelines:  5.7-6.4%  Consistent with prediabetes  >=6.5%  Consistent with diabetes    High levels of fetal hemoglobin interfere with the HbA1C  assay. Heterozygous hemoglobin variants (HbS, HgC, etc)do  not significantly interfere with this assay.   However, presence of multiple variants may affect accuracy.           Component Value Date/Time     05/14/2025 0750     (L) 03/20/2025 1214    K 3.7 05/14/2025 0750    K 4.7 03/20/2025 1214    CL 99 05/14/2025 0750     03/20/2025 1214    CO2 28 05/14/2025 0750    CO2 21 (L) 03/20/2025 1214    BUN 22 (H) 05/14/2025 0750    CREATININE 1.4 05/14/2025 0750     (H) 05/14/2025 0750     (H) 03/20/2025 1214    CALCIUM 9.4 05/14/2025 0750    CALCIUM 8.9 03/20/2025 1214    ALKPHOS 132 05/14/2025 0750    ALKPHOS 127 02/25/2025 1250    AST 22 05/14/2025 0750    AST 29 02/25/2025 1250    ALT 7 (L) 05/14/2025 0750    ALT 8 (L) 02/25/2025 1250    BILITOT 1.0 05/14/2025 0750    BILITOT 0.7 02/25/2025 1250    EGFRNORACEVR 58 (L) 05/14/2025 0750    EGFRNORACEVR 49.6 (A) 03/20/2025 1214    ESTGFRAFRICA >60 08/07/2021 0810     Lab Results   Component Value Date    CHOL 112 (L) 05/14/2025    CHOL 116 (L) 11/16/2024    CHOL 116 (L) 11/16/2024     Lab Results   Component Value Date    HDL 42 05/14/2025    HDL 37 (L) 11/16/2024    HDL 37 (L) 11/16/2024     Lab Results   Component Value Date    LDLCALC 56.0 (L) 05/14/2025    LDLCALC 69.4 11/16/2024    LDLCALC 69.4 11/16/2024     Lab Results   Component Value Date    TRIG 70 05/14/2025     TRIG 48 11/16/2024    TRIG 48 11/16/2024       Lab Results   Component Value Date    CHOLHDL 37.5 05/14/2025    CHOLHDL 31.9 11/16/2024    CHOLHDL 31.9 11/16/2024         Lab Results   Component Value Date    MICALBCREAT Unable to calculate 11/16/2024         ASSESSMENT and PLAN:    A1C GOAL: < 7 %            1. Type 2 diabetes mellitus without complication, with long-term current use of insulin  Will hold off on starting Mounjaro at this time to avoid GI s/e since pt newly post-op from gastric sleeve.     Increase Basaglar to 12 units nightly.    Continue Farxiga and pioglitazone.     Start glimepiride 4 mg tablet once daily before breakfast.   No Fiasp meal dose.     Inject Novolog as needed before meals:   Blood sugar 150 to 200, + 2 units  Blood sugar 201 to 250, + 4 units  Blood sugar 251 to 300, + 6 units  Blood sugar 301 to 350, + 8 units   Blood sugar greater than 350, + 10 units    Return to clinic in 2 months with labs prior.   Contact office with any issues, if glucoses < 70 or < 250 often.       Hemoglobin A1C    insulin glargine U-100, Lantus, (BASAGLAR KWIKPEN U-100 INSULIN) 100 unit/mL (3 mL) InPn pen    dapagliflozin propanediol (FARXIGA) 10 mg tablet      2. Morbid obesity, unspecified obesity type  S/p gastric sleeve. Weight loss noted.       3. Essential hypertension  controlled                Orders Placed This Encounter   Procedures    Hemoglobin A1C     Standing Status:   Future     Expected Date:   5/21/2025     Expiration Date:   7/20/2026     Send normal result to authorizing provider's In Basket if patient is active on MyChart::   Yes          Follow up in about 2 months (around 7/21/2025).

## 2025-05-21 NOTE — PATIENT INSTRUCTIONS
Will hold off on starting Mounjaro at this time to avoid GI s/e since pt newly post-op from gastric sleeve.       Increase Basaglar to 12 units nightly.    Continue Farxiga and pioglitazone.     Start glimepiride 4 mg tablet once daily before breakfast.   No Fiasp meal dose.     Inject Novolog as needed before meals:   Blood sugar 150 to 200, + 2 units  Blood sugar 201 to 250, + 4 units  Blood sugar 251 to 300, + 6 units  Blood sugar 301 to 350, + 8 units   Blood sugar greater than 350, + 10 units    Return to clinic in 2 months with labs prior.   Contact office with any issues, if glucoses < 70 or < 250 often.

## 2025-05-29 DIAGNOSIS — Z12.5 PROSTATE CANCER SCREENING: Primary | ICD-10-CM

## 2025-05-29 PROBLEM — E66.9 OBESITY: Status: RESOLVED | Noted: 2025-03-19 | Resolved: 2025-05-29

## 2025-06-03 ENCOUNTER — PATIENT MESSAGE (OUTPATIENT)
Dept: BARIATRICS | Facility: CLINIC | Age: 60
End: 2025-06-03
Payer: COMMERCIAL

## 2025-06-13 DIAGNOSIS — I10 ESSENTIAL HYPERTENSION: ICD-10-CM

## 2025-06-14 NOTE — TELEPHONE ENCOUNTER
No care due was identified.  [facility] Embedded Care Due Messages. Reference number: [id number].   6/13/2025 10:50:07 PM CDT

## 2025-06-16 ENCOUNTER — TELEPHONE (OUTPATIENT)
Dept: FAMILY MEDICINE | Facility: CLINIC | Age: 60
End: 2025-06-16

## 2025-06-16 ENCOUNTER — OFFICE VISIT (OUTPATIENT)
Dept: FAMILY MEDICINE | Facility: CLINIC | Age: 60
End: 2025-06-16
Payer: COMMERCIAL

## 2025-06-16 VITALS
HEART RATE: 80 BPM | HEIGHT: 74 IN | TEMPERATURE: 99 F | OXYGEN SATURATION: 96 % | DIASTOLIC BLOOD PRESSURE: 72 MMHG | BODY MASS INDEX: 40.43 KG/M2 | SYSTOLIC BLOOD PRESSURE: 118 MMHG | WEIGHT: 315 LBS

## 2025-06-16 DIAGNOSIS — I10 ESSENTIAL HYPERTENSION: ICD-10-CM

## 2025-06-16 DIAGNOSIS — Z79.4 CONTROLLED TYPE 2 DIABETES MELLITUS WITH MILD NONPROLIFERATIVE RETINOPATHY AND MACULAR EDEMA, WITH LONG-TERM CURRENT USE OF INSULIN, UNSPECIFIED LATERALITY: ICD-10-CM

## 2025-06-16 DIAGNOSIS — Z00.00 ROUTINE MEDICAL EXAM: Primary | ICD-10-CM

## 2025-06-16 DIAGNOSIS — E78.5 HYPERLIPIDEMIA LDL GOAL <100: ICD-10-CM

## 2025-06-16 DIAGNOSIS — G47.33 OSA (OBSTRUCTIVE SLEEP APNEA): ICD-10-CM

## 2025-06-16 DIAGNOSIS — Z98.84 S/P BARIATRIC SURGERY: ICD-10-CM

## 2025-06-16 DIAGNOSIS — E11.22 CONTROLLED TYPE 2 DIABETES MELLITUS WITH STAGE 3 CHRONIC KIDNEY DISEASE, WITH LONG-TERM CURRENT USE OF INSULIN: ICD-10-CM

## 2025-06-16 DIAGNOSIS — Z79.4 CONTROLLED TYPE 2 DIABETES MELLITUS WITH STAGE 3 CHRONIC KIDNEY DISEASE, WITH LONG-TERM CURRENT USE OF INSULIN: ICD-10-CM

## 2025-06-16 DIAGNOSIS — Z23 NEED FOR COVID-19 VACCINE: ICD-10-CM

## 2025-06-16 DIAGNOSIS — R60.9 DEPENDENT EDEMA: ICD-10-CM

## 2025-06-16 DIAGNOSIS — E66.01 MORBID OBESITY WITH BMI OF 50.0-59.9, ADULT: ICD-10-CM

## 2025-06-16 DIAGNOSIS — I87.8 VENOUS STASIS OF LOWER EXTREMITY: ICD-10-CM

## 2025-06-16 DIAGNOSIS — N18.31 STAGE 3A CHRONIC KIDNEY DISEASE: ICD-10-CM

## 2025-06-16 DIAGNOSIS — E11.40 CONTROLLED TYPE 2 DIABETES WITH NEUROPATHY: ICD-10-CM

## 2025-06-16 DIAGNOSIS — E11.3219 CONTROLLED TYPE 2 DIABETES MELLITUS WITH MILD NONPROLIFERATIVE RETINOPATHY AND MACULAR EDEMA, WITH LONG-TERM CURRENT USE OF INSULIN, UNSPECIFIED LATERALITY: ICD-10-CM

## 2025-06-16 DIAGNOSIS — N18.30 CONTROLLED TYPE 2 DIABETES MELLITUS WITH STAGE 3 CHRONIC KIDNEY DISEASE, WITH LONG-TERM CURRENT USE OF INSULIN: ICD-10-CM

## 2025-06-16 PROCEDURE — 3044F HG A1C LEVEL LT 7.0%: CPT | Mod: CPTII,S$GLB,, | Performed by: INTERNAL MEDICINE

## 2025-06-16 PROCEDURE — 4010F ACE/ARB THERAPY RXD/TAKEN: CPT | Mod: CPTII,S$GLB,, | Performed by: INTERNAL MEDICINE

## 2025-06-16 PROCEDURE — 1159F MED LIST DOCD IN RCRD: CPT | Mod: CPTII,S$GLB,, | Performed by: INTERNAL MEDICINE

## 2025-06-16 PROCEDURE — 1160F RVW MEDS BY RX/DR IN RCRD: CPT | Mod: CPTII,S$GLB,, | Performed by: INTERNAL MEDICINE

## 2025-06-16 PROCEDURE — 99999 PR PBB SHADOW E&M-EST. PATIENT-LVL V: CPT | Mod: PBBFAC,,, | Performed by: INTERNAL MEDICINE

## 2025-06-16 PROCEDURE — 3078F DIAST BP <80 MM HG: CPT | Mod: CPTII,S$GLB,, | Performed by: INTERNAL MEDICINE

## 2025-06-16 PROCEDURE — 99396 PREV VISIT EST AGE 40-64: CPT | Mod: S$GLB,,, | Performed by: INTERNAL MEDICINE

## 2025-06-16 PROCEDURE — 3008F BODY MASS INDEX DOCD: CPT | Mod: CPTII,S$GLB,, | Performed by: INTERNAL MEDICINE

## 2025-06-16 PROCEDURE — 3074F SYST BP LT 130 MM HG: CPT | Mod: CPTII,S$GLB,, | Performed by: INTERNAL MEDICINE

## 2025-06-16 PROCEDURE — 3066F NEPHROPATHY DOC TX: CPT | Mod: CPTII,S$GLB,, | Performed by: INTERNAL MEDICINE

## 2025-06-16 RX ORDER — FUROSEMIDE 40 MG/1
40 TABLET ORAL 2 TIMES DAILY
Qty: 180 TABLET | Refills: 3 | Status: SHIPPED | OUTPATIENT
Start: 2025-06-16

## 2025-06-16 NOTE — TELEPHONE ENCOUNTER
Please call patient: I contacted Cardiology who states he can stop the aspirin. I have removed it from his medication list.

## 2025-06-16 NOTE — TELEPHONE ENCOUNTER
Refill Decision Note   David Corona  is requesting a refill authorization.  Brief Assessment and Rationale for Refill:  Approve     Medication Therapy Plan:        Comments:     Note composed:8:31 AM 06/16/2025

## 2025-06-16 NOTE — PROGRESS NOTES
CHIEF COMPLAINT:   Chief Complaint   Patient presents with    Diabetes    Hypertension    Anxiety          HISTORY OF PRESENT ILLNESS:  David Corona Jr. is a 60 y.o. male who presents to the clinic today for a routine medical physical exam. His last physical exam was approximately 1 years(s) ago.          Patient underwent bariatric surgery on March 19th and reports doing well since the procedure. He has five incisions from the laparoscopic procedure, which felt tight but not painful. Patient reports significant improvements in his health and daily functioning since the surgery, including improved mobility and ability to perform activities previously not possible. His sleep patterns have improved, now sleeping from around 10 or 11 PM until 6 AM, whereas before he would wake up at 2 or 3 AM. He continues to use a CPAP machine for sleep apnea but notices he can sleep longer with it now. Patient reports increased energy levels and significant improvement in leg swelling.  Patient is actively monitoring his blood pressure at home and participating in digital medicine follow-ups. He is still taking several medications, including short acting insulin on a sliding scale (and 12 units of long acting insulin at night), and is hopeful about reducing medications as his health improves.    Patient denies pain from the surgery or any current post-operative complications.          Subjective    PAST MEDICAL HISTORY:  Past Medical History:   Diagnosis Date    Arthritis     Cataract     left eye     CKD (chronic kidney disease) stage 3, GFR 30-59 ml/min     Colon polyp 11/2016    Dependent edema     Diabetes mellitus     Diverticulosis     Gout, arthritis     Hyperlipidemia LDL goal <100     Hypertension     Morbid obesity     TOSIN (obstructive sleep apnea)     Peripheral autonomic neuropathy in disorders classified elsewhere(337.1)     Proteinuria     Type II or unspecified type diabetes mellitus with neurological  manifestations, uncontrolled(250.62)     Venous stasis of lower extremity        PAST SURGICAL HISTORY:  Past Surgical History:   Procedure Laterality Date    CATARACT EXTRACTION W/  INTRAOCULAR LENS IMPLANT Right 2007    COLONOSCOPY N/A 11/22/2016    Procedure: COLONOSCOPY;  Surgeon: Abdi La MD;  Location: [facility] ([address] [place] [address]);  Service: Endoscopy;  Laterality: N/A;  2nd floor case/BMI 59.13/wants week of Thanksgiving    COLONOSCOPY N/A 02/28/2022    Procedure: COLONOSCOPY;  Surgeon: Jessi Martinez MD;  Location: [facility] ([address] [place] [address]);  Service: Endoscopy;  Laterality: N/A;  BMI 61.71  covid test [place] 2/25 2/24 pt confirmed appt and new arrival time-Kpvt    EYE SURGERY      ROBOT-ASSISTED LAPAROSCOPIC SLEEVE GASTRECTOMY USING DA DARVIN XI N/A 3/19/2025    Procedure: XI ROBOTIC SLEEVE GASTRECTOMY w/intraop EGD;  Surgeon: Musa Leone MD;  Location: [facility] OR [address] [place] [address];  Service: General;  Laterality: N/A;  anesthesia to perform block in preop       SOCIAL HISTORY:  Social History[1]    FAMILY HISTORY:  Family History   Problem Relation Name Age of Onset    Breast cancer Mother inge blair doshirley     Diabetes Mother inge blair doshirley     Hypertension Mother inge blair doshirley     Stroke Mother inge blair dobblobo     Cancer Mother inge blair dobblobo     Dementia Mother inge blair doshirley     Prostate cancer Father demi trujillo sr     Diabetes Father demi trujillo sr     Hypertension Father demi trujillo sr     Cancer Father demi trujillo sr     No Known Problems Sister Cass     No Known Problems Brother Bebeto     Breast cancer Maternal Grandmother      Amblyopia Neg Hx      Blindness Neg Hx      Cataracts Neg Hx      Glaucoma Neg Hx      Macular degeneration Neg Hx      Retinal detachment Neg Hx      Strabismus Neg Hx         ALLERGIES AND MEDICATIONS: updated and reviewed.  Review of patient's allergies indicates:  No Known Allergies  Medication List with Changes/Refills   Current Medications     "ACETAMINOPHEN (TYLENOL) 500 MG TABLET    Take 1,000 mg by mouth 3 (three) times daily as needed.    ALLOPURINOL (ZYLOPRIM) 100 MG TABLET    Take 1 tablet (100 mg total) by mouth once daily.    ASPIRIN (ECOTRIN) 81 MG EC TABLET    Take 1 tablet (81 mg total) by mouth once daily.    ATORVASTATIN (LIPITOR) 40 MG TABLET    Take 1 tablet (40 mg total) by mouth every evening.    BLOOD-GLUCOSE SENSOR (DEXCOM G7 SENSOR) CECILY    Change every 10 days    COLCHICINE (COLCRYS) 0.6 MG TABLET    TAKE 1 TABLET(0.6 MG) BY MOUTH DAILY    DAPAGLIFLOZIN PROPANEDIOL (FARXIGA) 10 MG TABLET    Take 1 tablet (10 mg total) by mouth once daily.    GLIMEPIRIDE (AMARYL) 4 MG TABLET    Take 1 tablet (4 mg total) by mouth before breakfast.    HYDRALAZINE (APRESOLINE) 50 MG TABLET    Take 1 tablet (50 mg total) by mouth 2 (two) times a day.    INSULIN ASPART U-100 (NOVOLOG FLEXPEN U-100 INSULIN) 100 UNIT/ML (3 ML) INPN PEN    Take as needed before meals: 150-200=+2, 201-250=+4; 251-300=+6; 301-350=+8, over 350=+10 units. Max daily dose 30 units    INSULIN GLARGINE U-100, LANTUS, (BASAGLAR KWIKPEN U-100 INSULIN) 100 UNIT/ML (3 ML) INPN PEN    Inject 12 units into the skin once daily    METOPROLOL SUCCINATE (TOPROL-XL) 50 MG 24 HR TABLET    TAKE 1 TABLET(50 MG) BY MOUTH DAILY    MULTIVITAMIN WITH MINERALS TABLET    Take 1 tablet by mouth once daily.    OMEPRAZOLE (PRILOSEC) 40 MG CAPSULE    Take 1 capsule (40 mg total) by mouth every morning. Open capsule and take with apple sauce    PEN NEEDLE, DIABETIC (NOVOFINE 32) 32 GAUGE X 1/4" NDLE    USE FOUR TIMES DAILY AS NEEDED    PIOGLITAZONE (ACTOS) 45 MG TABLET    TAKE 1 TABLET (45 MG) BY MOUTH EVERY DAY    URSODIOL (MICHELINE FORTE) 500 MG TABLET    Take 1 tablet (500 mg total) by mouth once daily. For gallstone prevention.    VALSARTAN (DIOVAN) 320 MG TABLET    Take 1 tablet (320 mg total) by mouth once daily.   Changed and/or Refilled Medications    Modified Medication Previous Medication    FUROSEMIDE " (LASIX) 40 MG TABLET furosemide (LASIX) 40 MG tablet       Take 1 tablet (40 mg total) by mouth 2 (two) times daily.    TAKE 1 TABLET(40 MG) BY MOUTH TWICE DAILY   Discontinued Medications    GABAPENTIN (NEURONTIN) 300 MG CAPSULE    OPEN capsule into a tablespoon and consume with sip of liquid. Take once the MORNING OF SURGERY. After surgery: take one capsule TWICE DAILY for at least 3 days and up to 5 days as needed for pain.    INSULIN ASPART, NIACINAMIDE, (FIASP FLEXTOUCH U-100 INSULIN) 100 UNIT/ML (3 ML) INPN    Take as needed before meals: 150-200=+2, 201-250=+4; 251-300=+6; 301-350=+8, over 350=+10 units. Max daily dose 30 units    METOPROLOL TARTRATE (LOPRESSOR) 25 MG TABLET    Take 1 tablet (25 mg total) by mouth 2 (two) times daily. for 14 days    MULTIVITAMIN (THERAGRAN) PER TABLET    Take 1 tablet by mouth.    OMEGA-3 FATTY ACIDS 1,000 MG CAP    Take 2 capsules by mouth once daily.    ONDANSETRON (ZOFRAN-ODT) 8 MG TBDL    Dissolve 1 tablet (8 mg total) by mouth every 6 (six) hours as needed (Nausea).         CARE TEAM:  Patient Care Team:  Riddhi Galindo MD as PCP - General (Internal Medicine)  Munira Garnett LPN as Care Coordinator  Sloane Enamorado PharmD as Hypertension Digital Medicine Clinician (Pharmacist)  Riddhi Galindo MD as Hypertension Digital Medicine Responsible Provider (Internal Medicine)         SCREENING HISTORY:  Health Maintenance         Date Due Completion Date    COVID-19 Vaccine (5 - 2024-25 season) 09/01/2024 7/7/2022    PROSTATE-SPECIFIC ANTIGEN 03/28/2025 3/28/2024    Foot Exam 08/16/2025 8/16/2024    Override on 6/20/2023: Done (seen by podiatry)    Hemoglobin A1c 11/14/2025 5/14/2025    Diabetes Urine Screening 11/16/2025 11/16/2024    Diabetic Eye Exam 04/25/2026 4/25/2025    Override on 1/30/2012: Done    Lipid Panel 05/14/2026 5/14/2025    Low Dose Statin 06/16/2026 6/16/2025    Colorectal Cancer Screening 02/28/2027 2/28/2022    TETANUS VACCINE 09/04/2027  "9/4/2017    RSV Vaccine (Age 60+ and Pregnant patients) (1 - 1-dose 75+ series) 05/07/2041 ---              REVIEW OF SYSTEMS:   The patient reports: they are improving their dietary habits  .  The patient reports  : that they do not exercise regularly, but he is overall able to be more active  Review of Systems   ROS : patient denies: difficulty initiating urination          Objective    PHYSICAL EXAMINATION/VITALS:  Vitals:    06/16/25 0803   BP: 118/72   Pulse: 80   Temp: 98.9 °F (37.2 °C)   TempSrc: Oral   SpO2: 96%   Weight: (!) 174.4 kg (384 lb 7.7 oz)   Height: 6' 2" (1.88 m)       Body mass index is 49.36 kg/m².    General appearance - alert, well appearing, and in no distress, morbidly obese  Psychiatric - alert, oriented to person, place, and time, normal behavior, speech, dress, motor activity and thought process  Eyes - pupils equal and reactive, extraocular eye movements intact, sclera anicteric  Neck - supple, no significant adenopathy  Chest - clear to auscultation, no wheezes, rales or rhonchi, symmetric air entry  Heart - normal rate and regular rhythm  Neurological - alert, normal speech, no focal findings; cranial nerves II through XII intact  Musculoskeletal - not examined, gait normal  Extremities - pedal edema trace, venous stasis dermatitis noted - moderate bilateral  Skin - normal coloration, no suspicious skin lesions      LABS:  Lab Results   Component Value Date    HGBA1C 6.8 (H) 05/14/2025    HGBA1C 6.1 (H) 02/25/2025    HGBA1C 6.1 (H) 11/16/2024      Lab Results   Component Value Date    CHOL 112 (L) 05/14/2025    CHOL 116 (L) 11/16/2024    CHOL 116 (L) 11/16/2024     Lab Results   Component Value Date    LDLCALC 56.0 (L) 05/14/2025    LDLCALC 69.4 11/16/2024    LDLCALC 69.4 11/16/2024             ASSESSMENT AND PLAN:   1. Routine medical exam  Counseled on age appropriate medical preventative services including age appropriate cancer screenings, age appropriate eye and dental exams, " over all nutritional health, need for a consistent exercise regimen, and an over all push towards maintaining a vigorous and active lifestyle.  Counseled on age appropriate vaccines and discussed upcoming health care needs based on age/gender. Discussed good sleep hygiene and stress management.    2. 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 Controlled type 2 diabetes with neuropathy/4. Controlled type 2 diabetes mellitus with stage 3 chronic kidney disease, with long-term current use of insulin  Lab Results   Component Value Date    HGBA1C 6.8 (H) 05/14/2025     Diabetes is under good control at this time for age and comorbid conditions.   We discussed diabetic diet and regular exercise.   We discussed home blood sugar monitoring, if appropriate - the patient should test twice daily and as needed.   Continue current medication regimen.  Patient is followed by endocrinology.  I suspect he will continue to be able to have his medication doses adjusted as he continues with weight loss.  Diabetic complications addressed: Neuropathy pain controlled.   Patient was counseled on the need for yearly eye exam to screen for/monitor diabetic retinopathy and yearly diabetic foot exam.  Overview:  - mild - right eye only      5. Stage 3a chronic kidney disease  eGFR   Date Value Ref Range Status   05/14/2025 58 (L) >60 mL/min/1.73/m2 Final     Comment:     Estimated GFR calculated using the CKD-EPI creatinine (2021) equation.   04/02/2025 50 (L) >60 mL/min/1.73/m2 Final     Comment:     Estimated GFR calculated using the CKD-EPI creatinine (2021) equation.   04/02/2025 50 (L) >60 mL/min/1.73/m2 Final     Comment:     Estimated GFR calculated using the CKD-EPI creatinine (2021) equation.   03/20/2025 49.6 (A) >60 mL/min/1.73 m^2 Final   03/20/2025 43.1 (A) >60 mL/min/1.73 m^2 Final   02/25/2025 >60.0 >60  mL/min/1.73 m^2 Final     Stable decreased kidney function. Observe. Patient counseled to avoid/minimize the use of anti-inflammatory medication. Discussed to stay well hydrated. Also discussed with patient that good control of blood pressure and/or diabetes, if present, will help to prevent progression.    6. Essential hypertension  BP Readings from Last 1 Encounters:   06/16/25 118/72      The current medical regimen is effective;  continue present plan and medications. Recommended patient to check home readings to monitor and see me for followup as scheduled or sooner as needed.   Discussed sodium restriction, maintaining ideal body weight and regular exercise program as physiologic means to continue to achieve blood pressure control in addition to medication compliance.  Patient was educated that both decongestant and anti-inflammatory medication may raise blood pressure.  The patient is active on the digital hypertension program.    7. Hyperlipidemia LDL goal <100  Lab Results   Component Value Date    CHOL 112 (L) 05/14/2025     Lab Results   Component Value Date    HDL 42 05/14/2025     Lab Results   Component Value Date    LDLCALC 56.0 (L) 05/14/2025     Lab Results   Component Value Date    TRIG 70 05/14/2025     Lab Results   Component Value Date    LDLCALC 56.0 (L) 05/14/2025     We discussed low fat diet and regular exercise.The current medical regimen is effective;  continue present plan and medications.     8. Dependent edema/9. Venous stasis of lower extremity  Improved. Continue lasix for now.    10. TOSIN (obstructive sleep apnea)  He continues to report compliance with his CPAP machine.  I suspect that he will eventually need retesting for new settings.  We will wait on this until he has reached his steady state weight from bariatric surgery.  Overview:  - uses CPAP regularly      11. Morbid obesity with BMI of 50.0-59.9, adult  BMI Readings from Last 3 Encounters:   06/16/25 49.36 kg/m²   05/21/25  49.51 kg/m²   05/14/25 48.77 kg/m²     The patient is asked to continue to make an attempt to improve diet and exercise patterns to aid in medical management of this problem.   He will follow-up with bariatric surgery as per their recommendations.    12. Need for COVID-19 vaccine  Declined.               PATIENT EDUCATION:  Explained importance of maintaining lifestyle changes and discipline in dietary habits post-bariatric surgery to prevent weight regain.  Discussed risk of stomach expansion from carbonated soft drinks and its potential impact on weight loss maintenance.  Informed about protective nature of some medications that may need to be continued at low doses due to previous heart damage from obesity.    ACTION ITEMS/LIFESTYLE:  Patient to continue attending monthly support groups for bariatric surgery patients.  Patient to continue using CPAP machine as prescribed.  Patient to regularly monitor BP at home.          No orders of the defined types were placed in this encounter.      FOLLOW UP: Follow up in about 6 months (around 12/16/2025), or if symptoms worsen or fail to improve, for follow up chronic medical conditions.. or sooner as needed.    This note was generated with the assistance of ambient listening technology. Verbal consent was obtained by the patient and accompanying visitor(s) for the recording of patient appointment to facilitate this note. I attest to having reviewed and edited the generated note for accuracy, though some syntax or spelling errors may persist. Please contact the author of this note for any clarification.                 [1]   Social History  Socioeconomic History    Marital status:     Number of children: 3   Occupational History    Occupation:      Employer: [employer]    Occupation:    Tobacco Use    Smoking status: Never    Smokeless tobacco: Never   Substance and Sexual Activity    Alcohol use: Not Currently      Comment: rarely    Drug use: No    Sexual activity: Yes     Partners: Female     Birth control/protection: None     Social Drivers of Health     Financial Resource Strain: Low Risk  (3/19/2025)    Overall Financial Resource Strain (CARDIA)     Difficulty of Paying Living Expenses: Not hard at all   Food Insecurity: No Food Insecurity (3/19/2025)    Hunger Vital Sign     Worried About Running Out of Food in the Last Year: Never true     Ran Out of Food in the Last Year: Never true   Transportation Needs: No Transportation Needs (5/6/2024)    Received from [facility]    PRAPARE - Transportation     Lack of Transportation (Medical): No     Lack of Transportation (Non-Medical): No   Physical Activity: Insufficiently Active (5/6/2024)    Received from [facility]    Exercise Vital Sign     Days of Exercise per Week: 5 days     Minutes of Exercise per Session: 20 min   Stress: No Stress Concern Present (3/19/2025)    [employer] [place] [employer] - Occupational Stress Questionnaire     Feeling of Stress : Not at all   Housing Stability: Low Risk  (3/19/2025)    Housing Stability Vital Sign     Unable to Pay for Housing in the Last Year: No     Homeless in the Last Year: No

## 2025-06-18 RX ORDER — OMEPRAZOLE 40 MG/1
40 CAPSULE, DELAYED RELEASE ORAL EVERY MORNING
Qty: 30 CAPSULE | Refills: 2 | Status: SHIPPED | OUTPATIENT
Start: 2025-06-18

## 2025-06-25 DIAGNOSIS — Z79.4 TYPE 2 DIABETES MELLITUS WITHOUT COMPLICATION, WITH LONG-TERM CURRENT USE OF INSULIN: ICD-10-CM

## 2025-06-25 DIAGNOSIS — E11.9 TYPE 2 DIABETES MELLITUS WITHOUT COMPLICATION, WITH LONG-TERM CURRENT USE OF INSULIN: ICD-10-CM

## 2025-06-25 RX ORDER — INSULIN ASPART 100 [IU]/ML
INJECTION, SOLUTION INTRAVENOUS; SUBCUTANEOUS
Qty: 15 ML | Refills: 0 | Status: SHIPPED | OUTPATIENT
Start: 2025-06-25

## 2025-07-07 ENCOUNTER — PATIENT MESSAGE (OUTPATIENT)
Dept: BARIATRICS | Facility: CLINIC | Age: 60
End: 2025-07-07
Payer: COMMERCIAL

## 2025-07-08 ENCOUNTER — PATIENT MESSAGE (OUTPATIENT)
Dept: BARIATRICS | Facility: CLINIC | Age: 60
End: 2025-07-08
Payer: COMMERCIAL

## 2025-07-12 ENCOUNTER — LAB VISIT (OUTPATIENT)
Dept: LAB | Facility: HOSPITAL | Age: 60
End: 2025-07-12
Attending: INTERNAL MEDICINE
Payer: COMMERCIAL

## 2025-07-12 DIAGNOSIS — Z12.5 PROSTATE CANCER SCREENING: ICD-10-CM

## 2025-07-12 DIAGNOSIS — E11.9 TYPE 2 DIABETES MELLITUS WITHOUT COMPLICATION, WITH LONG-TERM CURRENT USE OF INSULIN: ICD-10-CM

## 2025-07-12 DIAGNOSIS — Z79.4 TYPE 2 DIABETES MELLITUS WITHOUT COMPLICATION, WITH LONG-TERM CURRENT USE OF INSULIN: ICD-10-CM

## 2025-07-12 LAB
EAG (OHS): 134 MG/DL (ref 68–131)
HBA1C MFR BLD: 6.3 % (ref 4–5.6)
PSA SERPL-MCNC: 1.09 NG/ML

## 2025-07-12 PROCEDURE — 36415 COLL VENOUS BLD VENIPUNCTURE: CPT | Mod: PO

## 2025-07-12 PROCEDURE — 83036 HEMOGLOBIN GLYCOSYLATED A1C: CPT

## 2025-07-12 PROCEDURE — 84153 ASSAY OF PSA TOTAL: CPT

## 2025-07-21 ENCOUNTER — OFFICE VISIT (OUTPATIENT)
Dept: ENDOCRINOLOGY | Facility: CLINIC | Age: 60
End: 2025-07-21
Payer: COMMERCIAL

## 2025-07-21 VITALS
SYSTOLIC BLOOD PRESSURE: 153 MMHG | TEMPERATURE: 97 F | BODY MASS INDEX: 49.35 KG/M2 | HEART RATE: 91 BPM | DIASTOLIC BLOOD PRESSURE: 88 MMHG | WEIGHT: 315 LBS

## 2025-07-21 DIAGNOSIS — E66.01 MORBID OBESITY, UNSPECIFIED OBESITY TYPE: ICD-10-CM

## 2025-07-21 DIAGNOSIS — Z79.4 TYPE 2 DIABETES MELLITUS WITHOUT COMPLICATION, WITH LONG-TERM CURRENT USE OF INSULIN: Primary | ICD-10-CM

## 2025-07-21 DIAGNOSIS — E11.9 TYPE 2 DIABETES MELLITUS WITHOUT COMPLICATION, WITH LONG-TERM CURRENT USE OF INSULIN: Primary | ICD-10-CM

## 2025-07-21 PROCEDURE — 3079F DIAST BP 80-89 MM HG: CPT | Mod: CPTII,S$GLB,, | Performed by: NURSE PRACTITIONER

## 2025-07-21 PROCEDURE — 4010F ACE/ARB THERAPY RXD/TAKEN: CPT | Mod: CPTII,S$GLB,, | Performed by: NURSE PRACTITIONER

## 2025-07-21 PROCEDURE — 1160F RVW MEDS BY RX/DR IN RCRD: CPT | Mod: CPTII,S$GLB,, | Performed by: NURSE PRACTITIONER

## 2025-07-21 PROCEDURE — 3008F BODY MASS INDEX DOCD: CPT | Mod: CPTII,S$GLB,, | Performed by: NURSE PRACTITIONER

## 2025-07-21 PROCEDURE — 95251 CONT GLUC MNTR ANALYSIS I&R: CPT | Mod: S$GLB,,, | Performed by: NURSE PRACTITIONER

## 2025-07-21 PROCEDURE — 99999 PR PBB SHADOW E&M-EST. PATIENT-LVL IV: CPT | Mod: PBBFAC,,, | Performed by: NURSE PRACTITIONER

## 2025-07-21 PROCEDURE — 1159F MED LIST DOCD IN RCRD: CPT | Mod: CPTII,S$GLB,, | Performed by: NURSE PRACTITIONER

## 2025-07-21 PROCEDURE — 3044F HG A1C LEVEL LT 7.0%: CPT | Mod: CPTII,S$GLB,, | Performed by: NURSE PRACTITIONER

## 2025-07-21 PROCEDURE — 3077F SYST BP >= 140 MM HG: CPT | Mod: CPTII,S$GLB,, | Performed by: NURSE PRACTITIONER

## 2025-07-21 PROCEDURE — 3066F NEPHROPATHY DOC TX: CPT | Mod: CPTII,S$GLB,, | Performed by: NURSE PRACTITIONER

## 2025-07-21 PROCEDURE — G2211 COMPLEX E/M VISIT ADD ON: HCPCS | Mod: S$GLB,,, | Performed by: NURSE PRACTITIONER

## 2025-07-21 PROCEDURE — 99214 OFFICE O/P EST MOD 30 MIN: CPT | Mod: S$GLB,,, | Performed by: NURSE PRACTITIONER

## 2025-07-21 RX ORDER — INSULIN GLARGINE 100 [IU]/ML
INJECTION, SOLUTION SUBCUTANEOUS
Qty: 15 ML | Refills: 5 | Status: SHIPPED | OUTPATIENT
Start: 2025-07-21

## 2025-07-21 RX ORDER — TIRZEPATIDE 5 MG/.5ML
INJECTION, SOLUTION SUBCUTANEOUS
Qty: 4 PEN | Refills: 3 | Status: SHIPPED | OUTPATIENT
Start: 2025-07-21

## 2025-07-21 RX ORDER — GLIMEPIRIDE 2 MG/1
2 TABLET ORAL
Qty: 90 TABLET | Refills: 0 | Status: SHIPPED | OUTPATIENT
Start: 2025-07-21 | End: 2026-07-21

## 2025-07-21 RX ORDER — TIRZEPATIDE 2.5 MG/.5ML
INJECTION, SOLUTION SUBCUTANEOUS
Qty: 4 PEN | Refills: 0 | Status: SHIPPED | OUTPATIENT
Start: 2025-07-21

## 2025-07-21 RX ORDER — ATORVASTATIN CALCIUM 40 MG/1
40 TABLET, FILM COATED ORAL NIGHTLY
Qty: 90 TABLET | Refills: 3 | Status: SHIPPED | OUTPATIENT
Start: 2025-07-21

## 2025-07-21 NOTE — PROGRESS NOTES
CC: This 60 y.o. Black or  male  is here for evaluation of  T2DM along with comorbidities indicated in the Visit Diagnosis section of this encounter.    HPI: David Corona Jr. was diagnosed with T2DM in 1991.  Medical history: TOSIN on cpap            Prior visit 5/21/25  A1c is up from 6.1 to 6.8%.    He underwent gastrectomy 3/19/25. He has lost 58 lb since lov.   He is injecting Basaglar 10 units qhs. Prior dose was 20 units. Continues to take pioglitazone and Farxiga. He has stopped Mounjaro.   He has been taking Fiasp 6-7 units before meals. Pt is on solid diet now.   Unable to view Dexcom data since he has not connected to [employer].   14 day CGM average 170, GMI 7.4%, TIR 62%.   Plan Will hold off on starting Mounjaro at this time to avoid GI s/e since pt newly post-op from gastric sleeve.   Increase Basaglar to 12 units nightly.    Continue Farxiga and pioglitazone.   Start glimepiride 4 mg tablet once daily before breakfast.   No Fiasp meal dose.   Inject Fiasp as needed before meals: 150-200=+2, 201-250=+4; 251-300=+6; 301-350=+8, over 350=+10 units  Return to clinic in 2 months with labs prior.   Contact office with any issues, if glucoses < 70 or < 250 often.       Interval hx  A1c is down fro 6.8 to 6.1%.  61/90  day CGM average 145    He has cravings sometimes as night.   D/t lows overnight, pt reduced Basaglar from 12 to 10 units  a few weeks ago         LAST DIABETES EDUCATION: 11/2020    HOSPITALIZED FOR DIABETES OR RELATED COMPLICATIONS -  Yes - upon diagnosis.    SIGNIFICANT DIABETES MED HISTORY:   Metformin - diarrhea   Glimepiride stopped and Trulicity started at initial visit 10/2020; switched from Trulicity to Mounjaro 8/2022  Declines VGo insulin delivery device   Farxiga started 10/2021  Mounjaro started 10/2022  Humalog fixed meal dosing stopped 3/2023 when Mounjaro dose was increased.       PRESCRIBED DIABETES MEDICATIONS:  Farxiga 10 mg once daily  Glimepiride 4 mg daily    pioglitazone 45 mg once daily  Basaglar 10 units qhs     Novolog ac ss : 150-200=+2, 201-250=+4; 251-300=+6; 301-350=+8, over 350=+10 units      Misses medication doses - No    DM COMPLICATIONS: nephropathy    SELF MONITORING BLOOD GLUCOSE: Dexcom G7 ase       CGM interpretation:  Glucoses mostly well-controlled with occasional spikes to the mid 200s after meals.  No recent hypoglycemia with insulin dose reduction.      HYPOGLYCEMIC EPISODES:  none recent.       CURRENT DIET: Drinks water, coffee in the morning.    Eats breakfast, protein shake for lunch, snack in the afternoon and night, and dinner at 4-5 pm.          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       BP (!) 153/88 (BP Location: Left arm, Patient Position: Sitting)   Pulse 91   Temp 97 °F (36.1 °C)   Wt (!) 174.4 kg (384 lb 6.4 oz)   BMI 49.35 kg/m²       ROS:   CONSTITUTIONAL: Appetite good, denies fatigue  GI: Denies n/v, constipation, or diarrhea.           PHYSICAL EXAM:  GENERAL: Well developed, well nourished. No acute distress.   PSYCH: AAOx3, appropriate mood and affect, conversant, well-groomed. Judgement and insight good.   NEURO: Cranial nerves grossly intact. Speech clear, no tremor.   CHEST: Respirations even and unlabored.        Hemoglobin A1C   Date Value Ref Range Status   02/25/2025 6.1 (H) 4.0 - 5.6 % Final     Comment:     ADA Screening Guidelines:  5.7-6.4%  Consistent with prediabetes  >or=6.5%  Consistent with diabetes    High levels of fetal hemoglobin interfere with the HbA1C  assay. Heterozygous hemoglobin variants (HbS, HgC, etc)do  not significantly interfere with this assay.   However, presence of multiple variants may affect accuracy.     11/16/2024 6.1 (H) 4.0 - 5.6 % Final     Comment:     ADA Screening Guidelines:  5.7-6.4%  Consistent with prediabetes  >or=6.5%  Consistent with diabetes    High levels of fetal  hemoglobin interfere with the HbA1C  assay. Heterozygous hemoglobin variants (HbS, HgC, etc)do  not significantly interfere with this assay.   However, presence of multiple variants may affect accuracy.     08/19/2024 6.1 (H) 4.0 - 5.6 % Final     Comment:     ADA Screening Guidelines:  5.7-6.4%  Consistent with prediabetes  >or=6.5%  Consistent with diabetes    High levels of fetal hemoglobin interfere with the HbA1C  assay. Heterozygous hemoglobin variants (HbS, HgC, etc)do  not significantly interfere with this assay.   However, presence of multiple variants may affect accuracy.     04/27/2021 7.4 (H) 4.8 - 5.6 % Final     Comment:     [facility]     Hemoglobin A1c   Date Value Ref Range Status   07/12/2025 6.3 (H) 4.0 - 5.6 % Final     Comment:     ADA Screening Guidelines:  5.7-6.4%  Consistent with prediabetes  >=6.5%  Consistent with diabetes    High levels of fetal hemoglobin interfere with the HbA1C  assay. Heterozygous hemoglobin variants (HbS, HgC, etc)do  not significantly interfere with this assay.   However, presence of multiple variants may affect accuracy.   05/14/2025 6.8 (H) 4.0 - 5.6 % Final     Comment:     ADA Screening Guidelines:  5.7-6.4%  Consistent with prediabetes  >=6.5%  Consistent with diabetes    High levels of fetal hemoglobin interfere with the HbA1C  assay. Heterozygous hemoglobin variants (HbS, HgC, etc)do  not significantly interfere with this assay.   However, presence of multiple variants may affect accuracy.           Component Value Date/Time     05/14/2025 0750     (L) 03/20/2025 1214    K 3.7 05/14/2025 0750    K 4.7 03/20/2025 1214    CL 99 05/14/2025 0750     03/20/2025 1214    CO2 28 05/14/2025 0750    CO2 21 (L) 03/20/2025 1214    BUN 22 (H) 05/14/2025 0750    CREATININE 1.4 05/14/2025 0750     (H) 05/14/2025 0750     (H) 03/20/2025 1214    CALCIUM 9.4 05/14/2025 0750    CALCIUM 8.9 03/20/2025 1214    ALKPHOS 132 05/14/2025 0754     ALKPHOS 127 02/25/2025 1250    AST 22 05/14/2025 0750    AST 29 02/25/2025 1250    ALT 7 (L) 05/14/2025 0750    ALT 8 (L) 02/25/2025 1250    BILITOT 1.0 05/14/2025 0750    BILITOT 0.7 02/25/2025 1250    EGFRNORACEVR 58 (L) 05/14/2025 0750    EGFRNORACEVR 49.6 (A) 03/20/2025 1214    ESTGFRAFRICA >60 08/07/2021 0810     Lab Results   Component Value Date    CHOL 112 (L) 05/14/2025    CHOL 116 (L) 11/16/2024    CHOL 116 (L) 11/16/2024     Lab Results   Component Value Date    HDL 42 05/14/2025    HDL 37 (L) 11/16/2024    HDL 37 (L) 11/16/2024     Lab Results   Component Value Date    LDLCALC 56.0 (L) 05/14/2025    LDLCALC 69.4 11/16/2024    LDLCALC 69.4 11/16/2024     Lab Results   Component Value Date    TRIG 70 05/14/2025    TRIG 48 11/16/2024    TRIG 48 11/16/2024       Lab Results   Component Value Date    CHOLHDL 37.5 05/14/2025    CHOLHDL 31.9 11/16/2024    CHOLHDL 31.9 11/16/2024         Lab Results   Component Value Date    MICALBCREAT Unable to calculate 11/16/2024         ASSESSMENT and PLAN:    A1C GOAL: < 7 %            1. Type 2 diabetes mellitus without complication, with long-term current use of insulin  Continue Basaglar 10 units and pioglitazone.   Start Mounjaro 2.5 mg once weekly for 4 weeks and continue glimepiride.   Then increase to Mounjaro 5 mg once weekly and reduce glimepiride to 2 mg daily.   If having lows overnight, reduce Basaglar from 10 units to 8 units. If glucoses are still dropping less than 80 often, then stop Basaglar.   Return to clinic in 3 months with labs prior. Contact office with any issues.         insulin glargine U-100, Lantus, (BASAGLAR KWIKPEN U-100 INSULIN) 100 unit/mL (3 mL) InPn pen    tirzepatide (MOUNJARO) 5 mg/0.5 mL PnIj    tirzepatide (MOUNJARO) 2.5 mg/0.5 mL PnIj    Hemoglobin A1C    Microalbumin/Creatinine Ratio, Urine      2. Morbid obesity, unspecified obesity type  Increases insulin resistance.     Start Mounjaro.  Reviewed potential side effects.  Patient  previously tolerate but  Mounjaro 15 mg and understands he is now at higher risk for GI side effects status post bariatric surgery. As such we will titrate slowly.                   Orders Placed This Encounter   Procedures    Hemoglobin A1C     Standing Status:   Future     Expected Date:   7/21/2025     Expiration Date:   9/19/2026     Send normal result to authorizing provider's In Basket if patient is active on MyChart::   Yes    Microalbumin/Creatinine Ratio, Urine     Standing Status:   Future     Expected Date:   7/21/2025     Expiration Date:   9/19/2026     Specimen Source:   Urine          Follow up in about 3 months (around 10/21/2025).

## 2025-07-21 NOTE — TELEPHONE ENCOUNTER
No care due was identified.  [facility] Embedded Care Due Messages. Reference number: [id number].   7/21/2025 11:21:02 AM CDT

## 2025-07-21 NOTE — PATIENT INSTRUCTIONS
Continue Basaglar 10 units and pioglitazone.   Start Mounjaro 2.5 mg once weekly for 4 weeks and continue glimepiride.   Then increase to Mounjaro 5 mg once weekly and reduce glimepiride to 2 mg daily.   If having lows overnight, reduce Basaglar from 10 units to 8 units. If glucoses are still dropping less than 80 often, then stop Basaglar.   Return to clinic in 3 months with labs prior. Contact office with any issues.

## 2025-07-22 NOTE — TELEPHONE ENCOUNTER
Refill Decision Note   David Corona  is requesting a refill authorization.  Brief Assessment and Rationale for Refill:  Approve     Medication Therapy Plan:         Comments:     Note composed:9:32 PM 07/21/2025             Appointments     Last Visit   6/16/2025 Riddhi Galindo MD   Next Visit   12/17/2025 Riddhi Galindo MD

## 2025-07-31 ENCOUNTER — OFFICE VISIT (OUTPATIENT)
Dept: OPHTHALMOLOGY | Facility: CLINIC | Age: 60
End: 2025-07-31
Payer: COMMERCIAL

## 2025-07-31 DIAGNOSIS — E11.9 DM TYPE 2 WITHOUT RETINOPATHY: ICD-10-CM

## 2025-07-31 DIAGNOSIS — H26.491 PCO (POSTERIOR CAPSULAR OPACIFICATION), RIGHT: Primary | ICD-10-CM

## 2025-07-31 DIAGNOSIS — Z96.1 PSEUDOPHAKIA: ICD-10-CM

## 2025-07-31 PROCEDURE — 66821 AFTER CATARACT LASER SURGERY: CPT | Mod: RT,S$GLB,, | Performed by: OPHTHALMOLOGY

## 2025-07-31 PROCEDURE — 99499 UNLISTED E&M SERVICE: CPT | Mod: S$GLB,,, | Performed by: OPHTHALMOLOGY

## 2025-07-31 PROCEDURE — 99999 PR PBB SHADOW E&M-EST. PATIENT-LVL III: CPT | Mod: PBBFAC,,, | Performed by: OPHTHALMOLOGY

## 2025-07-31 NOTE — PROGRESS NOTES
Subjective:       Patient ID: David Corona Jr. is a 60 y.o. male.    Chief Complaint: Laser Treatment    HPI    DLS: 4/25/25    No eyedrops    1. NS OS   2. PCO OD   3. Type 2 DM no DR   4. HTN Retinopathy OU   5. PCIOL OD   6. Refractive Error     Pt here for YAG Cap OD. Pt denies changes since last visit OD.  Last edited by Tasia Beebe MA on 7/31/2025  2:32 PM.             Assessment:       1. PCO (posterior capsular opacification), right    2. DM type 2 without retinopathy    3. Pseudophakia - Right Eye        Plan:       Visually significant  PCO OD- Pt is here for Laser.    DM-No NPDR OD.        YAG CAP right eye (OD) today. TE=   150 mj, Avg. 2.0-2.5 mj, 63 pulses.  PF taper OD.  Control DM.  RTC 2-3 wks.    YAG laser Capsulotomy Procedure Note:  Informed consent was obtained and correct eye was verified with patient.   One drop of topical Proparacaine 1% was instilled.  YAG laser applied to posterior capsule in cruciate pattern.  One drop of Apraclonidine 0.5% and 1 drop of Pred Acetate 1% applied to eye after laser.  Pt tolerated procedure well. No complications.  Follow up in 2-3 weeks.

## 2025-08-01 DIAGNOSIS — E11.9 TYPE 2 DIABETES MELLITUS WITHOUT COMPLICATION, WITH LONG-TERM CURRENT USE OF INSULIN: ICD-10-CM

## 2025-08-01 DIAGNOSIS — Z79.4 TYPE 2 DIABETES MELLITUS WITHOUT COMPLICATION, WITH LONG-TERM CURRENT USE OF INSULIN: ICD-10-CM

## 2025-08-01 DIAGNOSIS — I10 ESSENTIAL HYPERTENSION: ICD-10-CM

## 2025-08-01 NOTE — TELEPHONE ENCOUNTER
No care due was identified.  [facility] Embedded Care Due Messages. Reference number: [id number].   8/01/2025 1:50:59 PM CDT

## 2025-08-04 RX ORDER — TIRZEPATIDE 2.5 MG/.5ML
INJECTION, SOLUTION SUBCUTANEOUS
Qty: 4 PEN | Refills: 0 | OUTPATIENT
Start: 2025-08-04

## 2025-08-04 RX ORDER — HYDRALAZINE HYDROCHLORIDE 50 MG/1
50 TABLET, FILM COATED ORAL 2 TIMES DAILY
Qty: 180 TABLET | Refills: 1 | Status: SHIPPED | OUTPATIENT
Start: 2025-08-04

## 2025-08-04 NOTE — TELEPHONE ENCOUNTER
Refill Routing Note   Medication(s) are not appropriate for processing by [facility] for the following reason(s):        Required vitals abnormal    [facility] action(s):  Defer             Appointments  past 12m or future 3m with PCP    Date Provider   Last Visit   6/16/2025 Riddhi Galindo MD   Next Visit   12/17/2025 Riddhi Galindo MD   ED visits in past 90 days: 0        Note composed:9:21 AM 08/04/2025

## 2025-08-06 DIAGNOSIS — M10.9 GOUT, UNSPECIFIED CAUSE, UNSPECIFIED CHRONICITY, UNSPECIFIED SITE: ICD-10-CM

## 2025-08-06 RX ORDER — COLCHICINE 0.6 MG/1
TABLET ORAL
Qty: 90 TABLET | Refills: 1 | Status: SHIPPED | OUTPATIENT
Start: 2025-08-06

## 2025-08-06 NOTE — TELEPHONE ENCOUNTER
Refill Decision Note   David Corona  is requesting a refill authorization.  Brief Assessment and Rationale for Refill:  Approve     Medication Therapy Plan:         Comments:     Note composed:12:19 PM 08/06/2025

## 2025-08-06 NOTE — TELEPHONE ENCOUNTER
No care due was identified.  [facility] Embedded Care Due Messages. Reference number: [id number].   8/06/2025 5:46:21 AM CDT

## 2025-08-12 ENCOUNTER — PATIENT MESSAGE (OUTPATIENT)
Dept: BARIATRICS | Facility: CLINIC | Age: 60
End: 2025-08-12
Payer: COMMERCIAL

## 2025-08-13 DIAGNOSIS — E11.9 TYPE 2 DIABETES MELLITUS WITHOUT COMPLICATION, WITH LONG-TERM CURRENT USE OF INSULIN: ICD-10-CM

## 2025-08-13 DIAGNOSIS — Z79.4 TYPE 2 DIABETES MELLITUS WITHOUT COMPLICATION, WITH LONG-TERM CURRENT USE OF INSULIN: ICD-10-CM

## 2025-08-14 DIAGNOSIS — E11.9 TYPE 2 DIABETES MELLITUS WITHOUT COMPLICATION, WITH LONG-TERM CURRENT USE OF INSULIN: ICD-10-CM

## 2025-08-14 DIAGNOSIS — Z79.4 TYPE 2 DIABETES MELLITUS WITHOUT COMPLICATION, WITH LONG-TERM CURRENT USE OF INSULIN: ICD-10-CM

## 2025-08-14 RX ORDER — INSULIN ASPART 100 [IU]/ML
INJECTION, SOLUTION INTRAVENOUS; SUBCUTANEOUS
Qty: 15 ML | Refills: 0 | Status: SHIPPED | OUTPATIENT
Start: 2025-08-14

## 2025-08-14 RX ORDER — TIRZEPATIDE 2.5 MG/.5ML
INJECTION, SOLUTION SUBCUTANEOUS
Qty: 4 PEN | Refills: 0 | OUTPATIENT
Start: 2025-08-14

## 2025-08-18 RX ORDER — GLIMEPIRIDE 2 MG/1
2 TABLET ORAL
Qty: 90 TABLET | Refills: 2 | Status: SHIPPED | OUTPATIENT
Start: 2025-08-18 | End: 2026-08-18

## 2025-08-21 ENCOUNTER — OFFICE VISIT (OUTPATIENT)
Dept: OPHTHALMOLOGY | Facility: CLINIC | Age: 60
End: 2025-08-21
Payer: COMMERCIAL

## 2025-08-21 DIAGNOSIS — Z98.890 POST-OPERATIVE STATE: Primary | ICD-10-CM

## 2025-08-21 DIAGNOSIS — H25.12 NUCLEAR SCLEROSIS, LEFT: ICD-10-CM

## 2025-08-21 DIAGNOSIS — H52.7 REFRACTIVE ERROR: ICD-10-CM

## 2025-08-21 PROCEDURE — 99999 PR PBB SHADOW E&M-EST. PATIENT-LVL II: CPT | Mod: PBBFAC,,, | Performed by: OPHTHALMOLOGY

## (undated) DEVICE — RELOAD SUREFORM 60 3.5 BLU 6R

## (undated) DEVICE — SOL ELECTROLUBE ANTI-STIC

## (undated) DEVICE — SUT VICRYL+ 27 UR-6 VIOL

## (undated) DEVICE — BLADE SURG CARBON STEEL SZ11

## (undated) DEVICE — RELOAD SUREFORM 60 2.5 WHT 6R

## (undated) DEVICE — DRAPE ARM DAVINCI XI

## (undated) DEVICE — PENCIL ROCKER SWITCH 10FT CORD

## (undated) DEVICE — TROCAR ENDOPATH XCEL 5X100MM

## (undated) DEVICE — SUT GUT PL. 4-0 27 FS-2

## (undated) DEVICE — ELECTRODE MEGADYNE RETURN DUAL

## (undated) DEVICE — KIT ANTIFOG W/SPONG & FLUID

## (undated) DEVICE — DEVICE SYNCHROSEAL DA VINCI

## (undated) DEVICE — SEAL CANN UNIVERSAL 5-12MM

## (undated) DEVICE — DRAPE COLUMN DAVINCI XI

## (undated) DEVICE — CANNULA SEAL 12MM

## (undated) DEVICE — NDL HYPO REG 25G X 1 1/2

## (undated) DEVICE — DRAPE SCOPE PILLOW WARMER

## (undated) DEVICE — SYR 10CC LUER LOCK

## (undated) DEVICE — ELECTRODE REM PLYHSV RETURN 9

## (undated) DEVICE — CANNULA REDUCER 12-8MM

## (undated) DEVICE — DRAPE STERI INSTRUMENT 1018

## (undated) DEVICE — OBTURATOR BLADELESS 8MM XI CLR

## (undated) DEVICE — STAPLER SUREFORM 60 SPU

## (undated) DEVICE — ADHESIVE DERMABOND ADVANCED

## (undated) DEVICE — TUBE SET SINGLE LUMEN FILTERED

## (undated) DEVICE — TRAY MINOR GEN SURG OMC

## (undated) DEVICE — Device